# Patient Record
Sex: FEMALE | Race: WHITE | NOT HISPANIC OR LATINO | Employment: UNEMPLOYED | URBAN - METROPOLITAN AREA
[De-identification: names, ages, dates, MRNs, and addresses within clinical notes are randomized per-mention and may not be internally consistent; named-entity substitution may affect disease eponyms.]

---

## 2019-02-13 ENCOUNTER — OFFICE VISIT (OUTPATIENT)
Dept: FAMILY MEDICINE | Facility: CLINIC | Age: 24
End: 2019-02-13
Payer: COMMERCIAL

## 2019-02-13 ENCOUNTER — MEDICAL CORRESPONDENCE (OUTPATIENT)
Dept: HEALTH INFORMATION MANAGEMENT | Facility: CLINIC | Age: 24
End: 2019-02-13

## 2019-02-13 ENCOUNTER — TELEPHONE (OUTPATIENT)
Dept: FAMILY MEDICINE | Facility: CLINIC | Age: 24
End: 2019-02-13

## 2019-02-13 ENCOUNTER — TRANSFERRED RECORDS (OUTPATIENT)
Dept: HEALTH INFORMATION MANAGEMENT | Facility: CLINIC | Age: 24
End: 2019-02-13

## 2019-02-13 VITALS
BODY MASS INDEX: 23.74 KG/M2 | OXYGEN SATURATION: 98 % | DIASTOLIC BLOOD PRESSURE: 72 MMHG | HEART RATE: 83 BPM | WEIGHT: 134 LBS | HEIGHT: 63 IN | SYSTOLIC BLOOD PRESSURE: 115 MMHG | RESPIRATION RATE: 16 BRPM | TEMPERATURE: 98.1 F

## 2019-02-13 DIAGNOSIS — Z11.3 SCREEN FOR STD (SEXUALLY TRANSMITTED DISEASE): ICD-10-CM

## 2019-02-13 DIAGNOSIS — Z30.09 FAMILY PLANNING: ICD-10-CM

## 2019-02-13 DIAGNOSIS — E10.65 TYPE 1 DIABETES MELLITUS WITH HYPERGLYCEMIA (H): ICD-10-CM

## 2019-02-13 DIAGNOSIS — Z12.4 SCREENING FOR CERVICAL CANCER: ICD-10-CM

## 2019-02-13 DIAGNOSIS — Z11.1 SCREENING-PULMONARY TB: ICD-10-CM

## 2019-02-13 DIAGNOSIS — F41.8 ANXIETY WITH DEPRESSION: ICD-10-CM

## 2019-02-13 DIAGNOSIS — F15.10 METHAMPHETAMINE ABUSE (H): ICD-10-CM

## 2019-02-13 DIAGNOSIS — F11.10 HEROIN ABUSE (H): ICD-10-CM

## 2019-02-13 DIAGNOSIS — Z00.00 ROUTINE GENERAL MEDICAL EXAMINATION AT A HEALTH CARE FACILITY: Primary | ICD-10-CM

## 2019-02-13 LAB — BETA HCG QUAL IFA URINE: NEGATIVE

## 2019-02-13 PROCEDURE — 99213 OFFICE O/P EST LOW 20 MIN: CPT | Mod: 25 | Performed by: FAMILY MEDICINE

## 2019-02-13 PROCEDURE — 84703 CHORIONIC GONADOTROPIN ASSAY: CPT | Performed by: FAMILY MEDICINE

## 2019-02-13 PROCEDURE — G0145 SCR C/V CYTO,THINLAYER,RESCR: HCPCS | Performed by: FAMILY MEDICINE

## 2019-02-13 PROCEDURE — 99385 PREV VISIT NEW AGE 18-39: CPT | Performed by: FAMILY MEDICINE

## 2019-02-13 PROCEDURE — 87491 CHLMYD TRACH DNA AMP PROBE: CPT | Performed by: FAMILY MEDICINE

## 2019-02-13 PROCEDURE — 87591 N.GONORRHOEAE DNA AMP PROB: CPT | Performed by: FAMILY MEDICINE

## 2019-02-13 RX ORDER — ESCITALOPRAM OXALATE 5 MG/1
5 TABLET ORAL DAILY
Qty: 30 TABLET | Refills: 1 | Status: ON HOLD | OUTPATIENT
Start: 2019-02-13 | End: 2022-12-13

## 2019-02-13 ASSESSMENT — ANXIETY QUESTIONNAIRES
GAD7 TOTAL SCORE: 17
2. NOT BEING ABLE TO STOP OR CONTROL WORRYING: NEARLY EVERY DAY
7. FEELING AFRAID AS IF SOMETHING AWFUL MIGHT HAPPEN: NEARLY EVERY DAY
1. FEELING NERVOUS, ANXIOUS, OR ON EDGE: NEARLY EVERY DAY
5. BEING SO RESTLESS THAT IT IS HARD TO SIT STILL: SEVERAL DAYS
6. BECOMING EASILY ANNOYED OR IRRITABLE: NEARLY EVERY DAY
IF YOU CHECKED OFF ANY PROBLEMS ON THIS QUESTIONNAIRE, HOW DIFFICULT HAVE THESE PROBLEMS MADE IT FOR YOU TO DO YOUR WORK, TAKE CARE OF THINGS AT HOME, OR GET ALONG WITH OTHER PEOPLE: SOMEWHAT DIFFICULT
3. WORRYING TOO MUCH ABOUT DIFFERENT THINGS: NEARLY EVERY DAY

## 2019-02-13 ASSESSMENT — MIFFLIN-ST. JEOR: SCORE: 1341.95

## 2019-02-13 ASSESSMENT — PATIENT HEALTH QUESTIONNAIRE - PHQ9
SUM OF ALL RESPONSES TO PHQ QUESTIONS 1-9: 18
5. POOR APPETITE OR OVEREATING: SEVERAL DAYS

## 2019-02-13 NOTE — TELEPHONE ENCOUNTER
left message for omar([phone]) at [facility] to call back. Patient was here for a physical and labs, but told the lab she wanted to cancel the blood draw(so they did not draw her blood)   Stone Webb ma

## 2019-02-13 NOTE — NURSING NOTE
"Chief Complaint   Patient presents with     Physical     initial /72 (BP Location: Left arm, Cuff Size: Adult Regular)   Pulse 83   Temp 98.1  F (36.7  C) (Oral)   Resp 16   Ht 1.6 m (5' 3\")   Wt 60.8 kg (134 lb)   SpO2 98%   BMI 23.74 kg/m   Estimated body mass index is 23.74 kg/m  as calculated from the following:    Height as of this encounter: 1.6 m (5' 3\").    Weight as of this encounter: 60.8 kg (134 lb).  BP completed using cuff size: regular.  L  arm      Health Maintenance that is potentially due pending provider review:  NONE    n/a    Stone Webb ma  "

## 2019-02-13 NOTE — PATIENT INSTRUCTIONS
Start lexapro 5 mg once daily  Follow up at [facility] in 4-6 weeks    Earlier if concerns      Preventive Health Recommendations  Female Ages 21 to 25     Yearly exam:     See your health care provider every year in order to  o Review health changes.   o Discuss preventive care.    o Review your medicines if your doctor has prescribed any.      You should be tested each year for STDs (sexually transmitted diseases).       Talk to your provider about how often you should have cholesterol testing.      Get a Pap test every three years. If you have an abnormal result, your doctor may have you test more often.      If you are at risk for diabetes, you should have a diabetes test (fasting glucose).     Shots:     Get a flu shot each year.     Get a tetanus shot every 10 years.     Consider getting the shot (vaccine) that prevents cervical cancer (Gardasil).    Nutrition:     Eat at least 5 servings of fruits and vegetables each day.    Eat whole-grain bread, whole-wheat pasta and brown rice instead of white grains and rice.    Get adequate Calcium and Vitamin D.     Lifestyle    Exercise at least 150 minutes a week each week (30 minutes a day, 5 days a week). This will help you control your weight and prevent disease.    Limit alcohol to one drink per day.    No smoking.     Wear sunscreen to prevent skin cancer.    See your dentist every six months for an exam and cleaning.

## 2019-02-13 NOTE — LETTER
February 19, 2019      Bridget Dang Coleyjustin  [address] [place] [id number]    Dear Ms.Aasen,      I am happy to inform you that your recent cervical cancer screening test (PAP smear) was normal.      Preventative screenings such as this help to ensure your health for years to come. You should repeat a pap smear in 3 years, unless otherwise directed.      You will still need to return to the clinic every year for your annual exam and other preventive tests.     If you have additional questions regarding this result, please call our registered nurse, Neha at [phone].      Sincerely,      Lori Sheppard MD/jay jay

## 2019-02-13 NOTE — PROGRESS NOTES
hep   SUBJECTIVE:   CC: Deidre Nikole Aasen is an 23 year old woman who presents for preventive health visit.   new patient here to establish care.  Patient just started treatment at the [place] [facility] for her drug  abuse treatment - heroin & methamphetamine  .     Physical   Annual:     Getting at least 3 servings of Calcium per day:  Yes    Bi-annual eye exam:  Yes    Dental care twice a year:  NO    Sleep apnea or symptoms of sleep apnea:  Daytime drowsiness    Diet:  Regular (no restrictions)    Frequency of exercise:  2-3 days/week    Duration of exercise:  30-45 minutes    Taking medications regularly:  Yes    Medication side effects:  None    Additional concerns today:  No    PHQ-2 Total Score: 2    Mom has had depression and anxiety  Bridget feels a lot of anxiety and that leads to depression  She has had thoughts of better being dead but no plans and no intension of hurting herself or others.  She has never been prescribed any medications- never mentioned anxiety as a problem    She has been I the facility for treatment of heroin & methamphetamine   In 2015- was charged with possession and served time  Sent back for payroll violation dec 2018, and last use of heroin was Nov 26 th 2018  and then choose treatment       Has seen counselor once and did talk about anxiety and depression   Dad has told her to manage mood with only natural methods ,But mom had to get on medications for anxiety    PROBLEMS TO ADD ON...    Today's PHQ-2 Score:   PHQ-2 ( 1999 [employer]) 2/13/2019   Q1: Little interest or pleasure in doing things 1   Q2: Feeling down, depressed or hopeless 1   PHQ-2 Score 2   Q1: Little interest or pleasure in doing things Several days   Q2: Feeling down, depressed or hopeless Several days   PHQ-2 Score 2       Abuse: Current or Past(Physical, Sexual or Emotional)- No  Do you feel safe in your environment? Yes    Social History     Tobacco Use     Smoking status: Never Smoker     Smokeless  "tobacco: Never Used   Substance Use Topics     Alcohol use: No     Alcohol Use 2/13/2019   If you drink alcohol do you typically have greater than 3 drinks per day OR greater than 7 drinks per week? No   No flowsheet data found.    Reviewed orders with patient.  Reviewed health maintenance and updated orders accordingly - Yes  Labs reviewed in EPIC    Mammogram not appropriate for this patient based on age.    Pertinent mammograms are reviewed under the imaging tab.  History of abnormal Pap smear: NO - age 21-29 PAP every 3 years recommended     Reviewed and updated as needed this visit by clinical staff  Tobacco  Allergies  Meds  Soc Hx        Reviewed and updated as needed this visit by Provider            Review of Systems  CONSTITUTIONAL: NEGATIVE for fever, chills, change in weight  INTEGUMENTARU/SKIN: NEGATIVE for worrisome rashes, moles or lesions  EYES: NEGATIVE for vision changes or irritation  ENT: NEGATIVE for ear, mouth and throat problems  RESP: NEGATIVE for significant cough or SOB  BREAST: NEGATIVE for masses, tenderness or discharge  CV: NEGATIVE for chest pain, palpitations or peripheral edema  GI: NEGATIVE for nausea, abdominal pain, heartburn, or change in bowel habits  : NEGATIVE for unusual urinary or vaginal symptoms. Periods are regular.  MUSCULOSKELETAL: NEGATIVE for significant arthralgias or myalgia  NEURO: NEGATIVE for weakness, dizziness or paresthesias  PSYCHIATRIC: as in History of presenting illness      OBJECTIVE:   /72 (BP Location: Left arm, Cuff Size: Adult Regular)   Pulse 83   Temp 98.1  F (36.7  C) (Oral)   Resp 16   Ht 1.6 m (5' 3\")   Wt 60.8 kg (134 lb)   SpO2 98%   BMI 23.74 kg/m    Physical Exam  GENERAL: healthy, alert and no distress  EYES: Eyes grossly normal to inspection, PERRL and conjunctivae and sclerae normal  HENT: ear canals and TM's normal, nose and mouth without ulcers or lesions  NECK: no adenopathy, no asymmetry, masses, or scars and thyroid " normal to palpation  RESP: lungs clear to auscultation - no rales, rhonchi or wheezes  BREAST: normal without masses, tenderness or nipple discharge and no palpable axillary masses or adenopathy  CV: regular rate and rhythm, normal S1 S2, no S3 or S4, no murmur, click or rub, no peripheral edema and peripheral pulses strong  ABDOMEN: soft, nontender, no hepatosplenomegaly, no masses and bowel sounds normal   (female): normal female external genitalia, normal urethral meatus, vaginal mucosa pink, moist, well rugated, and normal cervix/adnexa/uterus without masses or discharge  MS: no gross musculoskeletal defects noted, no edema  SKIN: no suspicious lesions or rashes  NEURO: Normal strength and tone, mentation intact and speech normal  PSYCH: mentation appears normal, affect normal/bright    Diagnostic Test Results:  No results found for this or any previous visit (from the past 24 hour(s)).    ASSESSMENT/PLAN:   1. Routine general medical examination at a health care facility  new patient here to establish care.  Patient just started treatment at the [place] [facility] for her drug  abuse treatment - heroin & methamphetamine  .       - Hepatitis B Surface Antibody & Antigen ; Future  - Hepatitis Antibody A IgG & IGM; Future  - **Hepatitis C Screen Reflex to RNA FUTURE anytime; Future  - **HIV Antigen Antibody Combo FUTURE anytime; Future  - Hepatitis B surface antigen; Future    2. Screening for cervical cancer  Plan:- Pap imaged thin layer screen only - recommended age 21 - 24 years    3. Family planning  Plan:- Beta HCG Qual, Urine - [facility] and [place] ([id number])    4. Type 1 diabetes mellitus with hyperglycemia (H)  Plan: under care of endocrinology at [facility] [place]    5. Screen for STD (sexually transmitted disease)    - Chlamydia trachomatis PCR  - Neisseria gonorrhoeae PCR    6. Screening-pulmonary TB    - Quantiferon TB Gold Plus; Future    7. Anxiety with depression  Plan: We discussed the treatment for  "anxiety and depression in detail.  The importance of a multi faceted approach in controlling symptoms was reviewed.  The benefits of cognitive behavioral therapy reviewed, benefits of exercise, and stress reduction also discussed.      Duration of treatment  Is long months to years. It may take 3-4 weeks before symptom improvement happens.  Do not stop medication suddenly, medication will need to be tapered off.  Slight increased risk of suicide with SSRI group of medications discussed.        - escitalopram (LEXAPRO) 5 MG tablet; Take 1 tablet (5 mg) by mouth daily  Dispense: 30 tablet; Refill: 1    Follow up in 1 month     8. Heroin abuse (H),9. Methamphetamine abuse (H)   Patient just started treatment at the [place] [facility] for her drug  abuse treatment - heroin & methamphetamine  .     encouarged her to particiate woit      COUNSELING:  Reviewed preventive health counseling, as reflected in patient instructions       Regular exercise       Healthy diet/nutrition    BP Readings from Last 1 Encounters:   02/13/19 115/72     Estimated body mass index is 23.74 kg/m  as calculated from the following:    Height as of this encounter: 1.6 m (5' 3\").    Weight as of this encounter: 60.8 kg (134 lb).           reports that  has never smoked. she has never used smokeless tobacco.      Counseling Resources:  ATP IV Guidelines  Pooled Cohorts Equation Calculator  Breast Cancer Risk Calculator  FRAX Risk Assessment  ICSI Preventive Guidelines  Dietary Guidelines for Americans, 2010  USDA's MyPlate  ASA Prophylaxis  Lung CA Screening    Lori Sheppard MD  [facility]  "

## 2019-02-14 LAB
C TRACH DNA SPEC QL NAA+PROBE: NEGATIVE
N GONORRHOEA DNA SPEC QL NAA+PROBE: NEGATIVE
SPECIMEN SOURCE: NORMAL
SPECIMEN SOURCE: NORMAL

## 2019-02-14 ASSESSMENT — ANXIETY QUESTIONNAIRES: GAD7 TOTAL SCORE: 17

## 2019-02-14 NOTE — RESULT ENCOUNTER NOTE
Dear Team- [facility]- I am not sure if we have correct address or phone. Please find and inform- thanks     Inform patient about negative chlamydia and gonnerhea   Thanks  Lori Sheppard ....................  2/14/2019   5:05 PM

## 2019-02-18 LAB
COPATH REPORT: NORMAL
PAP: NORMAL

## 2019-09-17 ENCOUNTER — HOSPITAL ENCOUNTER (EMERGENCY)
Facility: CLINIC | Age: 24
Discharge: HOME | End: 2019-09-17
Attending: EMERGENCY MEDICINE | Admitting: EMERGENCY MEDICINE
Payer: COMMERCIAL

## 2019-09-17 VITALS
OXYGEN SATURATION: 100 % | SYSTOLIC BLOOD PRESSURE: 121 MMHG | BODY MASS INDEX: 21.26 KG/M2 | RESPIRATION RATE: 16 BRPM | TEMPERATURE: 97 F | WEIGHT: 120 LBS | HEIGHT: 63 IN | DIASTOLIC BLOOD PRESSURE: 72 MMHG

## 2019-09-17 DIAGNOSIS — R73.09 ELEVATED GLUCOSE: ICD-10-CM

## 2019-09-17 DIAGNOSIS — E10.9 TYPE 1 DIABETES MELLITUS WITHOUT COMPLICATION (H): ICD-10-CM

## 2019-09-17 LAB
ANION GAP SERPL CALCULATED.3IONS-SCNC: 7 MMOL/L (ref 3–14)
BASOPHILS # BLD AUTO: 0 10E9/L (ref 0–0.2)
BASOPHILS NFR BLD AUTO: 0.5 %
BUN SERPL-MCNC: 12 MG/DL (ref 7–30)
CALCIUM SERPL-MCNC: 9 MG/DL (ref 8.5–10.1)
CHLORIDE SERPL-SCNC: 94 MMOL/L (ref 94–109)
CO2 BLDCOV-SCNC: 28 MMOL/L (ref 21–28)
CO2 SERPL-SCNC: 28 MMOL/L (ref 20–32)
CREAT SERPL-MCNC: 0.41 MG/DL (ref 0.52–1.04)
DIFFERENTIAL METHOD BLD: NORMAL
EOSINOPHIL # BLD AUTO: 0.2 10E9/L (ref 0–0.7)
EOSINOPHIL NFR BLD AUTO: 2.5 %
ERYTHROCYTE [DISTWIDTH] IN BLOOD BY AUTOMATED COUNT: 13 % (ref 10–15)
ETHANOL SERPL-MCNC: <0.01 G/DL
GFR SERPL CREATININE-BSD FRML MDRD: >90 ML/MIN/{1.73_M2}
GLUCOSE BLDC GLUCOMTR-MCNC: >600 MG/DL (ref 70–99)
GLUCOSE SERPL-MCNC: 655 MG/DL (ref 70–99)
HCG SERPL QL: NEGATIVE
HCT VFR BLD AUTO: 39.5 % (ref 35–47)
HGB BLD-MCNC: 13.5 G/DL (ref 11.7–15.7)
IMM GRANULOCYTES # BLD: 0 10E9/L (ref 0–0.4)
IMM GRANULOCYTES NFR BLD: 0.2 %
KETONES BLD-SCNC: 0.2 MMOL/L (ref 0–0.6)
LACTATE BLD-SCNC: 0.7 MMOL/L (ref 0.7–2.1)
LACTATE BLD-SCNC: 0.8 MMOL/L (ref 0.7–2)
LYMPHOCYTES # BLD AUTO: 2.2 10E9/L (ref 0.8–5.3)
LYMPHOCYTES NFR BLD AUTO: 34.1 %
MCH RBC QN AUTO: 31 PG (ref 26.5–33)
MCHC RBC AUTO-ENTMCNC: 34.2 G/DL (ref 31.5–36.5)
MCV RBC AUTO: 91 FL (ref 78–100)
MONOCYTES # BLD AUTO: 0.5 10E9/L (ref 0–1.3)
MONOCYTES NFR BLD AUTO: 8.1 %
NEUTROPHILS # BLD AUTO: 3.4 10E9/L (ref 1.6–8.3)
NEUTROPHILS NFR BLD AUTO: 54.6 %
NRBC # BLD AUTO: 0 10*3/UL
NRBC BLD AUTO-RTO: 0 /100
PCO2 BLDV: 50 MM HG (ref 40–50)
PH BLDV: 7.36 PH (ref 7.32–7.43)
PLATELET # BLD AUTO: 321 10E9/L (ref 150–450)
PO2 BLDV: 57 MM HG (ref 25–47)
POTASSIUM SERPL-SCNC: 4 MMOL/L (ref 3.4–5.3)
RBC # BLD AUTO: 4.36 10E12/L (ref 3.8–5.2)
SAO2 % BLDV FROM PO2: 88 %
SODIUM SERPL-SCNC: 129 MMOL/L (ref 133–144)
WBC # BLD AUTO: 6.3 10E9/L (ref 4–11)

## 2019-09-17 PROCEDURE — 82010 KETONE BODYS QUAN: CPT | Performed by: EMERGENCY MEDICINE

## 2019-09-17 PROCEDURE — 96372 THER/PROPH/DIAG INJ SC/IM: CPT

## 2019-09-17 PROCEDURE — 85025 COMPLETE CBC W/AUTO DIFF WBC: CPT | Performed by: EMERGENCY MEDICINE

## 2019-09-17 PROCEDURE — 99284 EMERGENCY DEPT VISIT MOD MDM: CPT | Mod: 25

## 2019-09-17 PROCEDURE — 80048 BASIC METABOLIC PNL TOTAL CA: CPT | Performed by: EMERGENCY MEDICINE

## 2019-09-17 PROCEDURE — 00000146 ZZHCL STATISTIC GLUCOSE BY METER IP

## 2019-09-17 PROCEDURE — 83605 ASSAY OF LACTIC ACID: CPT

## 2019-09-17 PROCEDURE — 82803 BLOOD GASES ANY COMBINATION: CPT

## 2019-09-17 PROCEDURE — 25000131 ZZH RX MED GY IP 250 OP 636 PS 637: Performed by: EMERGENCY MEDICINE

## 2019-09-17 PROCEDURE — 80320 DRUG SCREEN QUANTALCOHOLS: CPT | Performed by: EMERGENCY MEDICINE

## 2019-09-17 PROCEDURE — 83605 ASSAY OF LACTIC ACID: CPT | Performed by: EMERGENCY MEDICINE

## 2019-09-17 PROCEDURE — 84703 CHORIONIC GONADOTROPIN ASSAY: CPT | Performed by: EMERGENCY MEDICINE

## 2019-09-17 RX ORDER — INSULIN GLARGINE 100 [IU]/ML
20 INJECTION, SOLUTION SUBCUTANEOUS DAILY
Qty: 6 ML | Refills: 0 | Status: ON HOLD | OUTPATIENT
Start: 2019-09-17 | End: 2023-01-18

## 2019-09-17 RX ADMIN — INSULIN ASPART 11 UNITS: 100 INJECTION, SOLUTION INTRAVENOUS; SUBCUTANEOUS at 03:54

## 2019-09-17 ASSESSMENT — MIFFLIN-ST. JEOR: SCORE: 1273.45

## 2019-09-17 ASSESSMENT — ENCOUNTER SYMPTOMS
FATIGUE: 1
DYSURIA: 0
NAUSEA: 0
RHINORRHEA: 0
COUGH: 0
VOMITING: 0
FEVER: 0
SORE THROAT: 0
ABDOMINAL PAIN: 1
FREQUENCY: 0

## 2019-09-17 NOTE — DISCHARGE INSTRUCTIONS
You must call Dr. Ness, your endocrinologist later today to discuss your being out of your insulin and your high sugars here.  If you do not take your diabetes medicine, and continue to check your sugars you could have severe lasting complications including death from this.  You are free to return the emergency department at any point should you feel worse, have trouble correcting her sugars or have low sugars.  You should check your sugar 30 minutes after leaving the ED.    Your sliding scale insulin using the aspart is as follows:  When blood sugars are elevated before meals use follow ing sliding scale for novolog    Blood sugars 151-200 take 1 extra unit of novolog  201-250 take 2 extra units of novolog  251-300 take 3 extra units of novolog  302-350 take 4 extra units of novolog  351-400 take 5 extra units of novolog  Over 400 take extra 6 units of novolog

## 2019-09-17 NOTE — ED NOTES
Patient refusing IV fluids because of pain in her IV site. Requesting to get IV out and leave. MD notified.

## 2019-09-17 NOTE — ED NOTES
DATE:  9/17/2019   TIME OF RECEIPT FROM LAB:  0319  LAB TEST:  Glucose  LAB VALUE:  655  RESULTS GIVEN WITH READ-BACK TO (PROVIDER):  Edgar Castellano MD  TIME LAB VALUE REPORTED TO PROVIDER:   0320

## 2019-09-17 NOTE — ED NOTES
Fluids started, then pt started crying and complaining of pain at the IV site, IV is infusing with no signs of infiltration or extravasation. Pt yells I don't want this, fluids stopped, pt request to take IV out, benefits of the IV explained. Primary RN notified.

## 2019-09-17 NOTE — ED PROVIDER NOTES
"  History     Chief Complaint:  Dehydration     The history is provided by the patient.      Deidre Nikole Aasen is a 24 year old female, with history significant for uncontrolled DM I and heroin abuse, who presents with concerns for dehydration. Notably, patient has been non-compliant to her insulin for the past six months. In addition, she complains of abdominal pain and fatigue. Bridget denies any fever, dysuria, frequency, urgency, difficulty urinating, rhinorrhea, sore throat, cough, nausea, emesis, chance of pregnancy, or recent drug use. Patient endorses prior hospitalization secondary to DKA.    Allergies:  Cipro     Medications:    Lexapro  Novolog  Levemir  Lantus   Clonidine  Valium  Basaglar    Past Medical History:    Anxiety with depression  Heroin abuse  Non-compliant DM I    Past Surgical History:    The patient does not have any pertinent past surgical history.    Family History:    No past pertinent family history.    Social History:  Daily Smoker  Alcohol Use: No  Drug use: Heroin  Marital Status: Single     Review of Systems   Constitutional: Positive for fatigue. Negative for fever.        +dehydration   HENT: Negative for rhinorrhea and sore throat.    Respiratory: Negative for cough.    Gastrointestinal: Positive for abdominal pain. Negative for nausea and vomiting.   Genitourinary: Negative for dysuria, frequency and urgency.   All other systems reviewed and are negative.    Physical Exam     Patient Vitals for the past 24 hrs:   BP Temp Temp src Heart Rate Resp SpO2 Height Weight   09/17/19 0141 121/72 97  F (36.1  C) Temporal 102 16 100 % 1.6 m (5' 3\") 54.4 kg (120 lb)     Physical Exam  Constitutional: Alert, attentive, GCS 15  HENT:    Nose: Nose normal.    Mouth/Throat: Oropharynx is clear, mucous membranes are dry  Eyes: EOM are normal, anicteric, conjugate gaze.Pupils midposition, reactive, no nystagmus  CV: regular rate and rhythm; no murmurs  Chest: Effort normal and breath sounds " clear without wheezing or rales, symmetric bilaterally   GI:  non tender. No distension. No guarding or rebound.    MSK: No LE edema, no tenderness to palpation of BLE.  Neurological: Alert, attentive, moving all extremities equally.   Skin: Skin is warm and dry.    Emergency Department Course     Laboratory:  Glc: >600 (HH)  CBC: WBC 6.3, HGB 13.5,   BMP: Na 123 (L) glc 544 (HH) creatinine 0.41 (L) o/w WNL  Ketone beta-hydroxybutyrate quant: 0.2  ISTAT VB.36/50/57(H)/28; Lactic Acid (Resulted: 0258): 0.7   Lactic Acid (Resulted: 0300): 0.8  Glc: >600 (HH)  Alcohol ethyl: <0.01    Interventions:  0354: Novolog 11 units subcutaneous    Emergency Department Course:  Nursing notes and vitals reviewed.  0207 I performed an exam of the patient as documented above.   0249 IV was inserted and blood was drawn for laboratory testing, results above.  0408 Patient voiced wishes to go. I personally answered all related questions prior to discharge, anticipatory guidance given.    Impression & Plan      Medical Decision Making:  Deidre Nikole Aasen is a 24 year old female with past medical history of poorly controlled type 1 diabetes, history of substance abuse though she declines this currently and states she is in remission presenting for evaluation of dehydration and not having checked her blood sugar or taken any insulin since March.  When her glucose on arrival was greater than 600, vital signs notable for mild tachycardia she had no evidence of clue michelle respirations and screening for DKA was negative including normal lactate and beta hydroxybutyrate.  She was given 11 units subcutaneous insulin as part per her sliding scale recommendations from her last note with endocrinology and plan was for IV hydration however patient declined IV hydration and requested to leave the emergency department.  I do not feel she is holdable at this time, serum ethanol was negative she declined drug use and is of sound mind at  this time.  I did recommend she stay ED for further IV hydration and monitoring of her blood sugar however she declined.  I was able to order her her PTA aspart and long-acting insulin per notes from her endocrinologist back in February as she says this was accurate.  I also ordered her further testing supplies implored her to follow-up with her endocrinologist and call them later this morning.  Patient expressed understand this plan and that she is free to return the emergency department at any point.  She was able to express the risks of failing to control her blood sugar including disability and death.  Patient was discharged AGAINST MEDICAL ADVICE from the emergency department.    Diagnosis:    ICD-10-CM   1. Elevated glucose R73.09   2. Type 1 diabetes mellitus without complication (H) E10.9     Disposition: Home    Discharge Medications:  insulin aspart 100 UNIT/ML pen  Commonly known as:  NovoLOG PEN Inject Subcutaneous 3 times daily (with meals)  Refills:  0     insulin aspart 100 UNIT/ML pen        Dose:  5 Units  Inject 5 Units Subcutaneous 3 times daily (with meals)  Quantity:  5 mL  Refills:  0     insulin glargine 100 UNIT/ML pen     Dose:  20 Units  Inject 20 Units Subcutaneous daily  Quantity:  6 mL  Refills:  0       Edgar Castellano MD, MD   [employer]  4:54 AM 09/17/19     Scribe Disclosure:  I, Anand Davidson, am serving as a scribe at 1:57 AM on 9/17/2019 to document services personally performed by Edgar Castellano MD based on my observations and the provider's statements to me.     EMERGENCY DEPARTMENT       Edgar Castellano MD  09/17/19 0454

## 2019-09-17 NOTE — ED AVS SNAPSHOT
Emergency Department  [address] [place] [address]  Phone:  [phone]  Fax:  [phone]                                    Deidre Nikole Aasen   MRN: [id number]    Department:   Emergency Department   Date of Visit:  9/17/2019           After Visit Summary Signature Page    I have received my discharge instructions, and my questions have been answered. I have discussed any challenges I see with this plan with the nurse or doctor.    ..........................................................................................................................................  Patient/Patient Representative Signature      ..........................................................................................................................................  Patient Representative Print Name and Relationship to Patient    ..................................................               ................................................  Date                                   Time    ..........................................................................................................................................  Reviewed by Signature/Title    ...................................................              ..............................................  Date                                               Time          22EPIC Rev 08/18

## 2020-01-14 ENCOUNTER — TELEPHONE (OUTPATIENT)
Dept: FAMILY MEDICINE | Facility: CLINIC | Age: 25
End: 2020-01-14

## 2020-01-14 NOTE — TELEPHONE ENCOUNTER
Summary:    Patient is due/failing the following:   Diabetic Eye Exam    Type of outreach:  Phone, left message for patient to call back.   Action needed: Schedule eye exam with eye doctor    Vivi SALES RN

## 2022-10-05 ENCOUNTER — HOSPITAL ENCOUNTER (EMERGENCY)
Facility: CLINIC | Age: 27
End: 2022-10-05

## 2022-12-12 PROCEDURE — 93005 ELECTROCARDIOGRAM TRACING: CPT

## 2022-12-12 PROCEDURE — C9803 HOPD COVID-19 SPEC COLLECT: HCPCS

## 2022-12-12 PROCEDURE — 99291 CRITICAL CARE FIRST HOUR: CPT | Mod: CS,25

## 2022-12-13 ENCOUNTER — APPOINTMENT (OUTPATIENT)
Dept: CT IMAGING | Facility: CLINIC | Age: 27
End: 2022-12-13
Attending: EMERGENCY MEDICINE
Payer: COMMERCIAL

## 2022-12-13 ENCOUNTER — APPOINTMENT (OUTPATIENT)
Dept: GENERAL RADIOLOGY | Facility: CLINIC | Age: 27
End: 2022-12-13
Attending: EMERGENCY MEDICINE
Payer: COMMERCIAL

## 2022-12-13 ENCOUNTER — HOSPITAL ENCOUNTER (EMERGENCY)
Facility: CLINIC | Age: 27
Discharge: ANOTHER HOSPITAL | End: 2022-12-13
Attending: EMERGENCY MEDICINE | Admitting: EMERGENCY MEDICINE
Payer: COMMERCIAL

## 2022-12-13 ENCOUNTER — APPOINTMENT (OUTPATIENT)
Dept: CT IMAGING | Facility: HOSPITAL | Age: 27
End: 2022-12-13
Attending: INTERNAL MEDICINE
Payer: COMMERCIAL

## 2022-12-13 ENCOUNTER — HOSPITAL ENCOUNTER (INPATIENT)
Facility: HOSPITAL | Age: 27
LOS: 1 days | Discharge: LEFT BEFORE DISCHARGE | End: 2022-12-13
Attending: INTERNAL MEDICINE | Admitting: INTERNAL MEDICINE
Payer: COMMERCIAL

## 2022-12-13 VITALS
DIASTOLIC BLOOD PRESSURE: 77 MMHG | HEIGHT: 63 IN | HEART RATE: 127 BPM | RESPIRATION RATE: 47 BRPM | TEMPERATURE: 98.7 F | WEIGHT: 96.12 LBS | OXYGEN SATURATION: 97 % | BODY MASS INDEX: 17.03 KG/M2 | SYSTOLIC BLOOD PRESSURE: 118 MMHG

## 2022-12-13 VITALS
RESPIRATION RATE: 15 BRPM | SYSTOLIC BLOOD PRESSURE: 113 MMHG | OXYGEN SATURATION: 97 % | HEART RATE: 134 BPM | TEMPERATURE: 97.9 F | DIASTOLIC BLOOD PRESSURE: 72 MMHG

## 2022-12-13 DIAGNOSIS — K92.0 COFFEE GROUND EMESIS: ICD-10-CM

## 2022-12-13 DIAGNOSIS — E10.10 DIABETIC KETOACIDOSIS WITHOUT COMA ASSOCIATED WITH TYPE 1 DIABETES MELLITUS (H): ICD-10-CM

## 2022-12-13 DIAGNOSIS — J18.9 PNEUMONIA OF RIGHT LOWER LOBE DUE TO INFECTIOUS ORGANISM: ICD-10-CM

## 2022-12-13 PROBLEM — E11.10 DKA (DIABETIC KETOACIDOSIS) (H): Status: ACTIVE | Noted: 2022-12-13

## 2022-12-13 LAB
ABO/RH(D): NORMAL
ALBUMIN SERPL BCG-MCNC: 3.8 G/DL (ref 3.5–5.2)
ALBUMIN UR-MCNC: 20 MG/DL
ALP SERPL-CCNC: 160 U/L (ref 35–104)
ALT SERPL W P-5'-P-CCNC: 18 U/L (ref 10–35)
AMPHETAMINES UR QL SCN: NORMAL
ANION GAP SERPL CALCULATED.3IONS-SCNC: 15 MMOL/L (ref 7–15)
ANION GAP SERPL CALCULATED.3IONS-SCNC: 19 MMOL/L (ref 7–15)
ANION GAP SERPL CALCULATED.3IONS-SCNC: 19 MMOL/L (ref 7–15)
ANION GAP SERPL CALCULATED.3IONS-SCNC: 20 MMOL/L (ref 7–15)
ANION GAP SERPL CALCULATED.3IONS-SCNC: 20 MMOL/L (ref 7–15)
ANION GAP SERPL CALCULATED.3IONS-SCNC: 21 MMOL/L (ref 7–15)
ANION GAP SERPL CALCULATED.3IONS-SCNC: 29 MMOL/L (ref 7–15)
ANTIBODY SCREEN: NEGATIVE
APPEARANCE UR: CLEAR
APTT PPP: 39 SECONDS (ref 22–38)
AST SERPL W P-5'-P-CCNC: 24 U/L (ref 10–35)
ATRIAL RATE - MUSE: 138 BPM
BARBITURATES UR QL SCN: NORMAL
BASE EXCESS BLDV CALC-SCNC: -12.5 MMOL/L (ref -7.7–1.9)
BASE EXCESS BLDV CALC-SCNC: -19.6 MMOL/L (ref -7.7–1.9)
BASOPHILS # BLD AUTO: 0.1 10E3/UL (ref 0–0.2)
BASOPHILS NFR BLD AUTO: 1 %
BENZODIAZ UR QL SCN: NORMAL
BILIRUB DIRECT SERPL-MCNC: <0.2 MG/DL (ref 0–0.3)
BILIRUB SERPL-MCNC: 0.3 MG/DL
BILIRUB UR QL STRIP: NEGATIVE
BUN SERPL-MCNC: 23 MG/DL (ref 6–20)
BUN SERPL-MCNC: 28 MG/DL (ref 6–20)
BUN SERPL-MCNC: 31 MG/DL (ref 6–20)
BUN SERPL-MCNC: 35.7 MG/DL (ref 6–20)
BUN SERPL-MCNC: 41.3 MG/DL (ref 6–20)
BUN SERPL-MCNC: 44 MG/DL (ref 6–20)
BUN SERPL-MCNC: 54.5 MG/DL (ref 6–20)
BZE UR QL SCN: NORMAL
CALCIUM SERPL-MCNC: 7.8 MG/DL (ref 8.6–10)
CALCIUM SERPL-MCNC: 8.6 MG/DL (ref 8.6–10)
CALCIUM SERPL-MCNC: 8.6 MG/DL (ref 8.6–10)
CALCIUM SERPL-MCNC: 8.7 MG/DL (ref 8.6–10)
CALCIUM SERPL-MCNC: 8.7 MG/DL (ref 8.6–10)
CALCIUM SERPL-MCNC: 8.8 MG/DL (ref 8.6–10)
CALCIUM SERPL-MCNC: 9.8 MG/DL (ref 8.6–10)
CANNABINOIDS UR QL SCN: NORMAL
CHLORIDE SERPL-SCNC: 100 MMOL/L (ref 98–107)
CHLORIDE SERPL-SCNC: 102 MMOL/L (ref 98–107)
CHLORIDE SERPL-SCNC: 105 MMOL/L (ref 98–107)
CHLORIDE SERPL-SCNC: 83 MMOL/L (ref 98–107)
CHLORIDE SERPL-SCNC: 96 MMOL/L (ref 98–107)
CHLORIDE SERPL-SCNC: 98 MMOL/L (ref 98–107)
CHLORIDE SERPL-SCNC: 99 MMOL/L (ref 98–107)
COLOR UR AUTO: ABNORMAL
CREAT SERPL-MCNC: 0.36 MG/DL (ref 0.51–0.95)
CREAT SERPL-MCNC: 0.39 MG/DL (ref 0.51–0.95)
CREAT SERPL-MCNC: 0.39 MG/DL (ref 0.51–0.95)
CREAT SERPL-MCNC: 0.44 MG/DL (ref 0.51–0.95)
CREAT SERPL-MCNC: 0.47 MG/DL (ref 0.51–0.95)
CREAT SERPL-MCNC: 0.51 MG/DL (ref 0.51–0.95)
CREAT SERPL-MCNC: 0.67 MG/DL (ref 0.51–0.95)
D DIMER PPP FEU-MCNC: 3.1 UG/ML FEU (ref 0–0.5)
DEPRECATED HCO3 PLAS-SCNC: 10 MMOL/L (ref 22–29)
DEPRECATED HCO3 PLAS-SCNC: 10 MMOL/L (ref 22–29)
DEPRECATED HCO3 PLAS-SCNC: 11 MMOL/L (ref 22–29)
DEPRECATED HCO3 PLAS-SCNC: 11 MMOL/L (ref 22–29)
DEPRECATED HCO3 PLAS-SCNC: 14 MMOL/L (ref 22–29)
DEPRECATED HCO3 PLAS-SCNC: 7 MMOL/L (ref 22–29)
DEPRECATED HCO3 PLAS-SCNC: 8 MMOL/L (ref 22–29)
DIASTOLIC BLOOD PRESSURE - MUSE: NORMAL MMHG
EOSINOPHIL # BLD AUTO: 0 10E3/UL (ref 0–0.7)
EOSINOPHIL NFR BLD AUTO: 0 %
ERYTHROCYTE [DISTWIDTH] IN BLOOD BY AUTOMATED COUNT: 11.5 % (ref 10–15)
ERYTHROCYTE [DISTWIDTH] IN BLOOD BY AUTOMATED COUNT: 11.7 % (ref 10–15)
ERYTHROCYTE [DISTWIDTH] IN BLOOD BY AUTOMATED COUNT: 12 % (ref 10–15)
FLUAV RNA SPEC QL NAA+PROBE: NEGATIVE
FLUBV RNA RESP QL NAA+PROBE: NEGATIVE
GASTROCULT GAST QL: POSITIVE
GFR SERPL CREATININE-BSD FRML MDRD: >90 ML/MIN/1.73M2
GLUCOSE BLDC GLUCOMTR-MCNC: 113 MG/DL (ref 70–99)
GLUCOSE BLDC GLUCOMTR-MCNC: 125 MG/DL (ref 70–99)
GLUCOSE BLDC GLUCOMTR-MCNC: 128 MG/DL (ref 70–99)
GLUCOSE BLDC GLUCOMTR-MCNC: 140 MG/DL (ref 70–99)
GLUCOSE BLDC GLUCOMTR-MCNC: 151 MG/DL (ref 70–99)
GLUCOSE BLDC GLUCOMTR-MCNC: 157 MG/DL (ref 70–99)
GLUCOSE BLDC GLUCOMTR-MCNC: 192 MG/DL (ref 70–99)
GLUCOSE BLDC GLUCOMTR-MCNC: 216 MG/DL (ref 70–99)
GLUCOSE BLDC GLUCOMTR-MCNC: 229 MG/DL (ref 70–99)
GLUCOSE BLDC GLUCOMTR-MCNC: 238 MG/DL (ref 70–99)
GLUCOSE BLDC GLUCOMTR-MCNC: 249 MG/DL (ref 70–99)
GLUCOSE BLDC GLUCOMTR-MCNC: 252 MG/DL (ref 70–99)
GLUCOSE BLDC GLUCOMTR-MCNC: 253 MG/DL (ref 70–99)
GLUCOSE BLDC GLUCOMTR-MCNC: 295 MG/DL (ref 70–99)
GLUCOSE BLDC GLUCOMTR-MCNC: 326 MG/DL (ref 70–99)
GLUCOSE BLDC GLUCOMTR-MCNC: 334 MG/DL (ref 70–99)
GLUCOSE BLDC GLUCOMTR-MCNC: 428 MG/DL (ref 70–99)
GLUCOSE BLDC GLUCOMTR-MCNC: 452 MG/DL (ref 70–99)
GLUCOSE BLDC GLUCOMTR-MCNC: 594 MG/DL (ref 70–99)
GLUCOSE SERPL-MCNC: 115 MG/DL (ref 70–99)
GLUCOSE SERPL-MCNC: 192 MG/DL (ref 70–99)
GLUCOSE SERPL-MCNC: 199 MG/DL (ref 70–99)
GLUCOSE SERPL-MCNC: 239 MG/DL (ref 70–99)
GLUCOSE SERPL-MCNC: 294 MG/DL (ref 70–99)
GLUCOSE SERPL-MCNC: 405 MG/DL (ref 70–99)
GLUCOSE SERPL-MCNC: 600 MG/DL (ref 70–99)
GLUCOSE UR STRIP-MCNC: >=1000 MG/DL
HBA1C MFR BLD: 16.4 %
HCG SERPL QL: NEGATIVE
HCO3 BLDV-SCNC: 12 MMOL/L (ref 21–28)
HCO3 BLDV-SCNC: 9 MMOL/L (ref 21–28)
HCT VFR BLD AUTO: 37.7 % (ref 35–47)
HCT VFR BLD AUTO: 38.6 % (ref 35–47)
HCT VFR BLD AUTO: 46.5 % (ref 35–47)
HGB BLD-MCNC: 13.4 G/DL (ref 11.7–15.7)
HGB BLD-MCNC: 13.7 G/DL (ref 11.7–15.7)
HGB BLD-MCNC: 16.3 G/DL (ref 11.7–15.7)
HGB UR QL STRIP: ABNORMAL
HOLD SPECIMEN: NORMAL
IMM GRANULOCYTES # BLD: 0.2 10E3/UL
IMM GRANULOCYTES NFR BLD: 1 %
INR PPP: 1.21 (ref 0.85–1.15)
INTERPRETATION ECG - MUSE: NORMAL
KETONES BLD-SCNC: 0.9 MMOL/L (ref 0–0.6)
KETONES BLD-SCNC: 1.6 MMOL/L (ref 0–0.6)
KETONES BLD-SCNC: 2.9 MMOL/L (ref 0–0.6)
KETONES BLD-SCNC: 3.7 MMOL/L (ref 0–0.6)
KETONES BLD-SCNC: 4.1 MMOL/L (ref 0–0.6)
KETONES UR STRIP-MCNC: >150 MG/DL
LACTATE SERPL-SCNC: 1.6 MMOL/L (ref 0.7–2)
LACTATE SERPL-SCNC: 3.5 MMOL/L (ref 0.7–2)
LEUKOCYTE ESTERASE UR QL STRIP: NEGATIVE
LYMPHOCYTES # BLD AUTO: 0.7 10E3/UL (ref 0.8–5.3)
LYMPHOCYTES NFR BLD AUTO: 4 %
MAGNESIUM SERPL-MCNC: 1.9 MG/DL (ref 1.7–2.3)
MAGNESIUM SERPL-MCNC: 2.4 MG/DL (ref 1.7–2.3)
MCH RBC QN AUTO: 31 PG (ref 26.5–33)
MCH RBC QN AUTO: 31.2 PG (ref 26.5–33)
MCH RBC QN AUTO: 31.4 PG (ref 26.5–33)
MCHC RBC AUTO-ENTMCNC: 35.1 G/DL (ref 31.5–36.5)
MCHC RBC AUTO-ENTMCNC: 35.5 G/DL (ref 31.5–36.5)
MCHC RBC AUTO-ENTMCNC: 35.5 G/DL (ref 31.5–36.5)
MCV RBC AUTO: 88 FL (ref 78–100)
MONOCYTES # BLD AUTO: 1.9 10E3/UL (ref 0–1.3)
MONOCYTES NFR BLD AUTO: 10 %
NEUTROPHILS # BLD AUTO: 15.4 10E3/UL (ref 1.6–8.3)
NEUTROPHILS NFR BLD AUTO: 84 %
NITRATE UR QL: NEGATIVE
NRBC # BLD AUTO: 0 10E3/UL
NRBC BLD AUTO-RTO: 0 /100
O2/TOTAL GAS SETTING VFR VENT: 0 %
O2/TOTAL GAS SETTING VFR VENT: 0 %
OPIATES UR QL SCN: NORMAL
P AXIS - MUSE: 82 DEGREES
PCO2 BLDV: 24 MM HG (ref 40–50)
PCO2 BLDV: 31 MM HG (ref 40–50)
PH BLDV: 7.09 [PH] (ref 7.32–7.43)
PH BLDV: 7.31 [PH] (ref 7.32–7.43)
PH UR STRIP: 5.5 [PH] (ref 5–7)
PHOSPHATE SERPL-MCNC: 0.6 MG/DL (ref 2.5–4.5)
PLATELET # BLD AUTO: 402 10E3/UL (ref 150–450)
PLATELET # BLD AUTO: 438 10E3/UL (ref 150–450)
PLATELET # BLD AUTO: 551 10E3/UL (ref 150–450)
PO2 BLDV: 25 MM HG (ref 25–47)
PO2 BLDV: 30 MM HG (ref 25–47)
POTASSIUM SERPL-SCNC: 2.8 MMOL/L (ref 3.4–5.3)
POTASSIUM SERPL-SCNC: 3.1 MMOL/L (ref 3.4–5.3)
POTASSIUM SERPL-SCNC: 3.4 MMOL/L (ref 3.4–5.3)
POTASSIUM SERPL-SCNC: 3.5 MMOL/L (ref 3.4–5.3)
POTASSIUM SERPL-SCNC: 4.1 MMOL/L (ref 3.4–5.3)
POTASSIUM SERPL-SCNC: 4.4 MMOL/L (ref 3.4–5.3)
POTASSIUM SERPL-SCNC: 5.2 MMOL/L (ref 3.4–5.3)
PR INTERVAL - MUSE: 114 MS
PROT SERPL-MCNC: 8.2 G/DL (ref 6.4–8.3)
QRS DURATION - MUSE: 60 MS
QT - MUSE: 370 MS
QTC - MUSE: 560 MS
R AXIS - MUSE: 81 DEGREES
RBC # BLD AUTO: 4.3 10E6/UL (ref 3.8–5.2)
RBC # BLD AUTO: 4.37 10E6/UL (ref 3.8–5.2)
RBC # BLD AUTO: 5.26 10E6/UL (ref 3.8–5.2)
RBC URINE: <1 /HPF
RSV RNA SPEC NAA+PROBE: NEGATIVE
SARS-COV-2 RNA RESP QL NAA+PROBE: POSITIVE
SODIUM SERPL-SCNC: 119 MMOL/L (ref 136–145)
SODIUM SERPL-SCNC: 126 MMOL/L (ref 136–145)
SODIUM SERPL-SCNC: 128 MMOL/L (ref 136–145)
SODIUM SERPL-SCNC: 128 MMOL/L (ref 136–145)
SODIUM SERPL-SCNC: 130 MMOL/L (ref 136–145)
SODIUM SERPL-SCNC: 132 MMOL/L (ref 136–145)
SODIUM SERPL-SCNC: 134 MMOL/L (ref 136–145)
SP GR UR STRIP: 1.02 (ref 1–1.03)
SPECIMEN EXPIRATION DATE: NORMAL
SQUAMOUS EPITHELIAL: 1 /HPF
SYSTOLIC BLOOD PRESSURE - MUSE: NORMAL MMHG
T AXIS - MUSE: 42 DEGREES
TSH SERPL DL<=0.005 MIU/L-ACNC: 0.46 UIU/ML (ref 0.3–4.2)
UROBILINOGEN UR STRIP-MCNC: NORMAL MG/DL
VENTRICULAR RATE- MUSE: 138 BPM
WBC # BLD AUTO: 14.7 10E3/UL (ref 4–11)
WBC # BLD AUTO: 16 10E3/UL (ref 4–11)
WBC # BLD AUTO: 18.2 10E3/UL (ref 4–11)
WBC URINE: 1 /HPF

## 2022-12-13 PROCEDURE — 85379 FIBRIN DEGRADATION QUANT: CPT | Performed by: INTERNAL MEDICINE

## 2022-12-13 PROCEDURE — 82010 KETONE BODYS QUAN: CPT | Performed by: INTERNAL MEDICINE

## 2022-12-13 PROCEDURE — 87637 SARSCOV2&INF A&B&RSV AMP PRB: CPT | Performed by: EMERGENCY MEDICINE

## 2022-12-13 PROCEDURE — C9113 INJ PANTOPRAZOLE SODIUM, VIA: HCPCS | Performed by: EMERGENCY MEDICINE

## 2022-12-13 PROCEDURE — 250N000011 HC RX IP 250 OP 636: Performed by: EMERGENCY MEDICINE

## 2022-12-13 PROCEDURE — 96365 THER/PROPH/DIAG IV INF INIT: CPT

## 2022-12-13 PROCEDURE — 82803 BLOOD GASES ANY COMBINATION: CPT | Performed by: EMERGENCY MEDICINE

## 2022-12-13 PROCEDURE — 84100 ASSAY OF PHOSPHORUS: CPT | Performed by: INTERNAL MEDICINE

## 2022-12-13 PROCEDURE — 250N000009 HC RX 250: Performed by: EMERGENCY MEDICINE

## 2022-12-13 PROCEDURE — 85025 COMPLETE CBC W/AUTO DIFF WBC: CPT | Performed by: EMERGENCY MEDICINE

## 2022-12-13 PROCEDURE — 80307 DRUG TEST PRSMV CHEM ANLYZR: CPT | Performed by: EMERGENCY MEDICINE

## 2022-12-13 PROCEDURE — 83735 ASSAY OF MAGNESIUM: CPT | Performed by: INTERNAL MEDICINE

## 2022-12-13 PROCEDURE — 82248 BILIRUBIN DIRECT: CPT | Performed by: EMERGENCY MEDICINE

## 2022-12-13 PROCEDURE — 250N000013 HC RX MED GY IP 250 OP 250 PS 637: Performed by: INTERNAL MEDICINE

## 2022-12-13 PROCEDURE — 84703 CHORIONIC GONADOTROPIN ASSAY: CPT | Performed by: EMERGENCY MEDICINE

## 2022-12-13 PROCEDURE — 36415 COLL VENOUS BLD VENIPUNCTURE: CPT | Performed by: INTERNAL MEDICINE

## 2022-12-13 PROCEDURE — 250N000012 HC RX MED GY IP 250 OP 636 PS 637: Performed by: INTERNAL MEDICINE

## 2022-12-13 PROCEDURE — 200N000001 HC R&B ICU

## 2022-12-13 PROCEDURE — 250N000009 HC RX 250: Performed by: INTERNAL MEDICINE

## 2022-12-13 PROCEDURE — 81001 URINALYSIS AUTO W/SCOPE: CPT | Mod: XU | Performed by: EMERGENCY MEDICINE

## 2022-12-13 PROCEDURE — 85730 THROMBOPLASTIN TIME PARTIAL: CPT | Performed by: EMERGENCY MEDICINE

## 2022-12-13 PROCEDURE — 96375 TX/PRO/DX INJ NEW DRUG ADDON: CPT

## 2022-12-13 PROCEDURE — 250N000011 HC RX IP 250 OP 636: Performed by: INTERNAL MEDICINE

## 2022-12-13 PROCEDURE — 258N000003 HC RX IP 258 OP 636: Performed by: EMERGENCY MEDICINE

## 2022-12-13 PROCEDURE — 71045 X-RAY EXAM CHEST 1 VIEW: CPT

## 2022-12-13 PROCEDURE — 96368 THER/DIAG CONCURRENT INF: CPT

## 2022-12-13 PROCEDURE — 85014 HEMATOCRIT: CPT | Performed by: EMERGENCY MEDICINE

## 2022-12-13 PROCEDURE — 82010 KETONE BODYS QUAN: CPT | Performed by: EMERGENCY MEDICINE

## 2022-12-13 PROCEDURE — 82310 ASSAY OF CALCIUM: CPT | Performed by: INTERNAL MEDICINE

## 2022-12-13 PROCEDURE — 74174 CTA ABD&PLVS W/CONTRAST: CPT

## 2022-12-13 PROCEDURE — 99223 1ST HOSP IP/OBS HIGH 75: CPT | Mod: AI | Performed by: INTERNAL MEDICINE

## 2022-12-13 PROCEDURE — 36415 COLL VENOUS BLD VENIPUNCTURE: CPT | Performed by: EMERGENCY MEDICINE

## 2022-12-13 PROCEDURE — 258N000003 HC RX IP 258 OP 636: Performed by: INTERNAL MEDICINE

## 2022-12-13 PROCEDURE — 87040 BLOOD CULTURE FOR BACTERIA: CPT | Performed by: EMERGENCY MEDICINE

## 2022-12-13 PROCEDURE — 84443 ASSAY THYROID STIM HORMONE: CPT | Performed by: INTERNAL MEDICINE

## 2022-12-13 PROCEDURE — 86901 BLOOD TYPING SEROLOGIC RH(D): CPT | Performed by: EMERGENCY MEDICINE

## 2022-12-13 PROCEDURE — 83036 HEMOGLOBIN GLYCOSYLATED A1C: CPT | Performed by: EMERGENCY MEDICINE

## 2022-12-13 PROCEDURE — 83605 ASSAY OF LACTIC ACID: CPT | Performed by: EMERGENCY MEDICINE

## 2022-12-13 PROCEDURE — 96366 THER/PROPH/DIAG IV INF ADDON: CPT

## 2022-12-13 PROCEDURE — 85610 PROTHROMBIN TIME: CPT | Performed by: EMERGENCY MEDICINE

## 2022-12-13 PROCEDURE — 86850 RBC ANTIBODY SCREEN: CPT | Performed by: EMERGENCY MEDICINE

## 2022-12-13 PROCEDURE — 83735 ASSAY OF MAGNESIUM: CPT | Performed by: EMERGENCY MEDICINE

## 2022-12-13 PROCEDURE — 82271 OCCULT BLOOD OTHER SOURCES: CPT | Performed by: EMERGENCY MEDICINE

## 2022-12-13 PROCEDURE — 82310 ASSAY OF CALCIUM: CPT | Performed by: EMERGENCY MEDICINE

## 2022-12-13 PROCEDURE — 96367 TX/PROPH/DG ADDL SEQ IV INF: CPT

## 2022-12-13 PROCEDURE — 96361 HYDRATE IV INFUSION ADD-ON: CPT

## 2022-12-13 PROCEDURE — 71275 CT ANGIOGRAPHY CHEST: CPT

## 2022-12-13 RX ORDER — PANTOPRAZOLE SODIUM 40 MG/1
40 TABLET, DELAYED RELEASE ORAL
Status: DISCONTINUED | OUTPATIENT
Start: 2022-12-13 | End: 2022-12-14 | Stop reason: HOSPADM

## 2022-12-13 RX ORDER — IOPAMIDOL 755 MG/ML
100 INJECTION, SOLUTION INTRAVASCULAR ONCE
Status: COMPLETED | OUTPATIENT
Start: 2022-12-13 | End: 2022-12-13

## 2022-12-13 RX ORDER — NALOXONE HYDROCHLORIDE 0.4 MG/ML
0.2 INJECTION, SOLUTION INTRAMUSCULAR; INTRAVENOUS; SUBCUTANEOUS
Status: DISCONTINUED | OUTPATIENT
Start: 2022-12-13 | End: 2022-12-14 | Stop reason: HOSPADM

## 2022-12-13 RX ORDER — ONDANSETRON 2 MG/ML
4 INJECTION INTRAMUSCULAR; INTRAVENOUS EVERY 6 HOURS PRN
Status: DISCONTINUED | OUTPATIENT
Start: 2022-12-13 | End: 2022-12-14 | Stop reason: HOSPADM

## 2022-12-13 RX ORDER — ONDANSETRON 4 MG/1
4 TABLET, ORALLY DISINTEGRATING ORAL EVERY 6 HOURS PRN
Status: DISCONTINUED | OUTPATIENT
Start: 2022-12-13 | End: 2022-12-14 | Stop reason: HOSPADM

## 2022-12-13 RX ORDER — NICOTINE POLACRILEX 4 MG
15-30 LOZENGE BUCCAL
Status: DISCONTINUED | OUTPATIENT
Start: 2022-12-13 | End: 2022-12-14 | Stop reason: HOSPADM

## 2022-12-13 RX ORDER — ONDANSETRON 2 MG/ML
4 INJECTION INTRAMUSCULAR; INTRAVENOUS EVERY 30 MIN PRN
Status: DISCONTINUED | OUTPATIENT
Start: 2022-12-13 | End: 2022-12-13 | Stop reason: HOSPADM

## 2022-12-13 RX ORDER — DEXTROSE MONOHYDRATE 25 G/50ML
25-50 INJECTION, SOLUTION INTRAVENOUS
Status: DISCONTINUED | OUTPATIENT
Start: 2022-12-13 | End: 2022-12-13 | Stop reason: HOSPADM

## 2022-12-13 RX ORDER — POTASSIUM CHLORIDE 7.45 MG/ML
10 INJECTION INTRAVENOUS
Status: DISCONTINUED | OUTPATIENT
Start: 2022-12-13 | End: 2022-12-13 | Stop reason: HOSPADM

## 2022-12-13 RX ORDER — POTASSIUM CHLORIDE 1500 MG/1
40 TABLET, EXTENDED RELEASE ORAL ONCE
Status: COMPLETED | OUTPATIENT
Start: 2022-12-13 | End: 2022-12-13

## 2022-12-13 RX ORDER — NICOTINE POLACRILEX 4 MG
15-30 LOZENGE BUCCAL
Status: DISCONTINUED | OUTPATIENT
Start: 2022-12-13 | End: 2022-12-13 | Stop reason: HOSPADM

## 2022-12-13 RX ORDER — PIPERACILLIN SODIUM, TAZOBACTAM SODIUM 3; .375 G/15ML; G/15ML
3.38 INJECTION, POWDER, LYOPHILIZED, FOR SOLUTION INTRAVENOUS EVERY 8 HOURS
Status: DISCONTINUED | OUTPATIENT
Start: 2022-12-13 | End: 2022-12-14 | Stop reason: HOSPADM

## 2022-12-13 RX ORDER — DEXTROSE MONOHYDRATE, SODIUM CHLORIDE, AND POTASSIUM CHLORIDE 50; 2.24; 4.5 G/1000ML; G/1000ML; G/1000ML
INJECTION, SOLUTION INTRAVENOUS CONTINUOUS
Status: DISPENSED | OUTPATIENT
Start: 2022-12-13 | End: 2022-12-13

## 2022-12-13 RX ORDER — NALOXONE HYDROCHLORIDE 0.4 MG/ML
0.4 INJECTION, SOLUTION INTRAMUSCULAR; INTRAVENOUS; SUBCUTANEOUS
Status: DISCONTINUED | OUTPATIENT
Start: 2022-12-13 | End: 2022-12-14 | Stop reason: HOSPADM

## 2022-12-13 RX ORDER — SODIUM CHLORIDE 9 MG/ML
1000 INJECTION, SOLUTION INTRAVENOUS CONTINUOUS
Status: DISCONTINUED | OUTPATIENT
Start: 2022-12-13 | End: 2022-12-13 | Stop reason: HOSPADM

## 2022-12-13 RX ORDER — METOPROLOL TARTRATE 25 MG/1
25 TABLET, FILM COATED ORAL EVERY 6 HOURS PRN
Status: DISCONTINUED | OUTPATIENT
Start: 2022-12-13 | End: 2022-12-14 | Stop reason: HOSPADM

## 2022-12-13 RX ORDER — HYDROMORPHONE HYDROCHLORIDE 1 MG/ML
0.5 INJECTION, SOLUTION INTRAMUSCULAR; INTRAVENOUS; SUBCUTANEOUS ONCE
Status: COMPLETED | OUTPATIENT
Start: 2022-12-13 | End: 2022-12-13

## 2022-12-13 RX ORDER — ENOXAPARIN SODIUM 100 MG/ML
40 INJECTION SUBCUTANEOUS EVERY 24 HOURS
Status: DISCONTINUED | OUTPATIENT
Start: 2022-12-13 | End: 2022-12-14 | Stop reason: HOSPADM

## 2022-12-13 RX ORDER — IOPAMIDOL 755 MG/ML
500 INJECTION, SOLUTION INTRAVASCULAR ONCE
Status: COMPLETED | OUTPATIENT
Start: 2022-12-13 | End: 2022-12-13

## 2022-12-13 RX ORDER — ACETAMINOPHEN 325 MG/1
650 TABLET ORAL EVERY 4 HOURS PRN
Status: DISCONTINUED | OUTPATIENT
Start: 2022-12-13 | End: 2022-12-14 | Stop reason: HOSPADM

## 2022-12-13 RX ORDER — DEXTROSE MONOHYDRATE 25 G/50ML
25-50 INJECTION, SOLUTION INTRAVENOUS
Status: DISCONTINUED | OUTPATIENT
Start: 2022-12-13 | End: 2022-12-14 | Stop reason: HOSPADM

## 2022-12-13 RX ADMIN — PANTOPRAZOLE SODIUM 80 MG: 40 INJECTION, POWDER, FOR SOLUTION INTRAVENOUS at 05:03

## 2022-12-13 RX ADMIN — REMDESIVIR 200 MG: 100 INJECTION, POWDER, LYOPHILIZED, FOR SOLUTION INTRAVENOUS at 07:22

## 2022-12-13 RX ADMIN — IOPAMIDOL 60 ML: 755 INJECTION, SOLUTION INTRAVENOUS at 04:09

## 2022-12-13 RX ADMIN — SODIUM CHLORIDE 5.5 UNITS/HR: 9 INJECTION, SOLUTION INTRAVENOUS at 01:54

## 2022-12-13 RX ADMIN — ONDANSETRON 4 MG: 4 TABLET, ORALLY DISINTEGRATING ORAL at 20:24

## 2022-12-13 RX ADMIN — ENOXAPARIN SODIUM 40 MG: 40 INJECTION SUBCUTANEOUS at 10:11

## 2022-12-13 RX ADMIN — TAZOBACTAM SODIUM AND PIPERACILLIN SODIUM 4.5 G: 500; 4 INJECTION, SOLUTION INTRAVENOUS at 03:13

## 2022-12-13 RX ADMIN — PANTOPRAZOLE SODIUM 40 MG: 40 TABLET, DELAYED RELEASE ORAL at 16:17

## 2022-12-13 RX ADMIN — METOPROLOL TARTRATE 25 MG: 25 TABLET, FILM COATED ORAL at 12:43

## 2022-12-13 RX ADMIN — POTASSIUM CHLORIDE 40 MEQ: 1500 TABLET, EXTENDED RELEASE ORAL at 17:56

## 2022-12-13 RX ADMIN — HYDROMORPHONE HYDROCHLORIDE 0.5 MG: 1 INJECTION, SOLUTION INTRAMUSCULAR; INTRAVENOUS; SUBCUTANEOUS at 05:26

## 2022-12-13 RX ADMIN — INSULIN HUMAN 5 UNITS: 1 INJECTION, SOLUTION INTRAVENOUS at 10:25

## 2022-12-13 RX ADMIN — SODIUM CHLORIDE 1000 ML: 9 INJECTION, SOLUTION INTRAVENOUS at 00:28

## 2022-12-13 RX ADMIN — POTASSIUM CHLORIDE, DEXTROSE MONOHYDRATE AND SODIUM CHLORIDE: 224; 5; 450 INJECTION, SOLUTION INTRAVENOUS at 13:26

## 2022-12-13 RX ADMIN — INSULIN GLARGINE 10 UNITS: 100 INJECTION, SOLUTION SUBCUTANEOUS at 17:50

## 2022-12-13 RX ADMIN — SODIUM CHLORIDE 1000 ML: 9 INJECTION, SOLUTION INTRAVENOUS at 00:32

## 2022-12-13 RX ADMIN — PIPERACILLIN AND TAZOBACTAM 3.38 G: 3; .375 INJECTION, POWDER, LYOPHILIZED, FOR SOLUTION INTRAVENOUS at 19:53

## 2022-12-13 RX ADMIN — SODIUM CHLORIDE 8 UNITS/HR: 9 INJECTION, SOLUTION INTRAVENOUS at 05:13

## 2022-12-13 RX ADMIN — SODIUM CHLORIDE 1000 ML: 9 INJECTION, SOLUTION INTRAVENOUS at 01:44

## 2022-12-13 RX ADMIN — POTASSIUM CHLORIDE 10 MEQ: 7.46 INJECTION, SOLUTION INTRAVENOUS at 01:44

## 2022-12-13 RX ADMIN — ONDANSETRON 4 MG: 2 INJECTION INTRAMUSCULAR; INTRAVENOUS at 00:31

## 2022-12-13 RX ADMIN — POTASSIUM CHLORIDE: 2 INJECTION, SOLUTION, CONCENTRATE INTRAVENOUS at 11:54

## 2022-12-13 RX ADMIN — INSULIN ASPART 2 UNITS: 100 INJECTION, SOLUTION INTRAVENOUS; SUBCUTANEOUS at 17:51

## 2022-12-13 RX ADMIN — IOPAMIDOL 100 ML: 755 INJECTION, SOLUTION INTRAVENOUS at 19:48

## 2022-12-13 RX ADMIN — SODIUM CHLORIDE 50 ML: 9 INJECTION, SOLUTION INTRAVENOUS at 07:22

## 2022-12-13 RX ADMIN — PIPERACILLIN AND TAZOBACTAM 3.38 G: 3; .375 INJECTION, POWDER, LYOPHILIZED, FOR SOLUTION INTRAVENOUS at 10:10

## 2022-12-13 ASSESSMENT — ENCOUNTER SYMPTOMS
FEVER: 0
WEAKNESS: 1
NAUSEA: 1
ABDOMINAL PAIN: 1
DYSURIA: 0
VOMITING: 1

## 2022-12-13 ASSESSMENT — ACTIVITIES OF DAILY LIVING (ADL)
ADLS_ACUITY_SCORE: 35
CONCENTRATING,_REMEMBERING_OR_MAKING_DECISIONS_DIFFICULTY: NO
ADLS_ACUITY_SCORE: 20
HEARING_DIFFICULTY_OR_DEAF: NO
ADLS_ACUITY_SCORE: 20
ADLS_ACUITY_SCORE: 35
DOING_ERRANDS_INDEPENDENTLY_DIFFICULTY: NO
CHANGE_IN_FUNCTIONAL_STATUS_SINCE_ONSET_OF_CURRENT_ILLNESS/INJURY: NO
ADLS_ACUITY_SCORE: 20
DIFFICULTY_COMMUNICATING: NO
FALL_HISTORY_WITHIN_LAST_SIX_MONTHS: NO
WEAR_GLASSES_OR_BLIND: NO
ADLS_ACUITY_SCORE: 18
WALKING_OR_CLIMBING_STAIRS_DIFFICULTY: NO
DIFFICULTY_EATING/SWALLOWING: NO
ADLS_ACUITY_SCORE: 20
TOILETING_ISSUES: NO
ADLS_ACUITY_SCORE: 35
ADLS_ACUITY_SCORE: 35
DRESSING/BATHING_DIFFICULTY: NO

## 2022-12-13 NOTE — ED PROVIDER NOTES
History   Chief Complaint:  Elevated Blood Sugar     HPI   Deidre N Aasen is a 27 year old female with history of type I diabetes who presents for evaluation of an elevated blood sugar. Over the last several days the patient has developed generalized weakness, chest pain, abdominal pain, nausea, and vomiting. She presents to the ED with primary concern that she could be in diabetic ketoacidosis as she has had similar symptoms with this in the past. She believes she was last in DKA about a year ago. She does not currently have an insulin pump. She denies any fever or dysuria.      Review of Systems   Unable to perform ROS: Acuity of condition   Constitutional: Negative for fever.   Cardiovascular: Positive for chest pain.   Gastrointestinal: Positive for abdominal pain, nausea and vomiting.   Genitourinary: Negative for dysuria.   Neurological: Positive for weakness.       Allergies:  Ciprofloxacin    Medications:  Lexapro  Ibuprofen  Novolog  Levemir  Basaglar     Past Medical History:     Anxiety   Depression   Methamphetamine abuse  Heroin abuse   Diabetes mellitus, type I       Social History:  The patient presents to the ED with her significant other  PCP: No Ref-Primary, Physician       Physical Exam     Patient Vitals for the past 24 hrs:   BP Temp Temp src Pulse Resp SpO2   12/13/22 0632 -- 97.9  F (36.6  C) Oral -- -- --   12/13/22 0531 -- -- -- (!) 138 -- 98 %   12/13/22 0516 124/84 -- -- (!) 135 -- 98 %   12/13/22 0501 138/87 -- -- (!) 131 -- 98 %   12/13/22 0446 132/88 -- -- (!) 131 -- 98 %   12/13/22 0445 -- -- -- (!) 130 -- 98 %   12/13/22 0430 134/81 -- -- (!) 126 -- 99 %   12/13/22 0415 124/85 -- -- (!) 125 -- 99 %   12/13/22 0318 130/89 -- -- (!) 129 -- 99 %   12/13/22 0255 129/79 -- -- (!) 128 -- 99 %   12/13/22 0240 135/86 -- -- (!) 129 -- 100 %   12/13/22 0225 128/84 -- -- (!) 130 -- --   12/13/22 0210 -- -- -- (!) 121 -- 98 %   12/13/22 0157 -- -- -- 117 -- 99 %   12/13/22 0142 128/79 -- -- 117  -- 99 %   12/13/22 0127 124/83 -- -- (!) 122 -- 99 %   12/13/22 0112 116/79 -- -- (!) 123 -- 100 %   12/13/22 0057 124/76 -- -- (!) 124 -- 99 %   12/13/22 0042 125/82 -- -- (!) 125 -- 98 %   12/13/22 0001 122/89 98.7  F (37.1  C) Oral (!) 142 26 100 %       Physical Exam  General: Laying on the ED bed, no distress  HEENT: Normocephalic, atraumatic  Cardiac: Radial pulses 2+, regular tachycardia  Pulm: Breathing comfortably, no accessory muscle usage, no conversational dyspnea, and lungs clear bilaterally  GI: Abdomen soft, mild diffuse TTP, no rigidity or guarding  MSK: No deformities, cachectic  Skin: Warm and dry  Neuro: Moves all extremities  Psych: Normal mood and affect     Emergency Department Course   ECG  ECG results from 12/13/22   EKG 12-lead, tracing only     Value    Systolic Blood Pressure     Diastolic Blood Pressure     Ventricular Rate 138    Atrial Rate 138    NV Interval 114    QRS Duration 60        QTc 560    P Axis 82    R AXIS 81    T Axis 42    Interpretation ECG      Sinus tachycardia  Right atrial enlargement  Prolonged QTc  Borderline ECG  No previous ECGs available         Imaging:  CTA Abdomen Pelvis with Contrast   Final Result   IMPRESSION:    1.  Partial visualization of apparent moderate wall thickening of the distal esophagus. This is nonspecific, but could relate to esophagitis. If clinically relevant, an upper endoscopy could be considered for further evaluation.   2.  Partial visualization of a moderate-sized region of patchy consolidation in the right lower lobe. This most likely represents pneumonia.    3.  No other acute abnormality identified in the abdomen or pelvis. No CT evidence of active gastrointestinal bleeding.      XR Chest Port 1 View   Final Result   IMPRESSION: Heart size and pulmonary vascularity normal. Airspace opacities within the right base laterally concerning for pneumonia. Follow-up exam recommended to ensure resolution. The left lung is clear. No  effusions.      Report per radiology    Laboratory:  Labs Ordered and Resulted from Time of ED Arrival to Time of ED Departure   GLUCOSE BY METER - Abnormal       Result Value    GLUCOSE BY METER POCT 594 (*)    BASIC METABOLIC PANEL - Abnormal    Sodium 119 (*)     Potassium 4.4      Chloride 83 (*)     Carbon Dioxide (CO2) 7 (*)     Anion Gap 29 (*)     Urea Nitrogen 54.5 (*)     Creatinine 0.67      Calcium 9.8      Glucose 600 (*)     GFR Estimate >90     BLOOD GAS VENOUS - Abnormal    pH Venous 7.09 (*)     pCO2 Venous 31 (*)     pO2 Venous 25      Bicarbonate Venous 9 (*)     Base Excess/Deficit (+/-) -19.6 (*)     FIO2 0     KETONE BETA-HYDROXYBUTYRATE QUANTITATIVE, RAPID - Abnormal    Ketone (Beta-Hydroxybutyrate) Quantitative 4.1 (*)    HEPATIC FUNCTION PANEL - Abnormal    Protein Total 8.2      Albumin 3.8      Bilirubin Total 0.3      Alkaline Phosphatase 160 (*)     AST 24      ALT 18      Bilirubin Direct <0.20     LACTIC ACID WHOLE BLOOD - Abnormal    Lactic Acid 3.5 (*)    ROUTINE UA WITH MICROSCOPIC REFLEX TO CULTURE - Abnormal    Color Urine Straw      Appearance Urine Clear      Glucose Urine >=1000 (*)     Bilirubin Urine Negative      Ketones Urine >150 (*)     Specific Gravity Urine 1.020      Blood Urine Moderate (*)     pH Urine 5.5      Protein Albumin Urine 20 (*)     Urobilinogen Urine Normal      Nitrite Urine Negative      Leukocyte Esterase Urine Negative      RBC Urine <1      WBC Urine 1      Squamous Epithelials Urine 1     MAGNESIUM - Abnormal    Magnesium 2.4 (*)    CBC WITH PLATELETS AND DIFFERENTIAL - Abnormal    WBC Count 18.2 (*)     RBC Count 5.26 (*)     Hemoglobin 16.3 (*)     Hematocrit 46.5      MCV 88      MCH 31.0      MCHC 35.1      RDW 11.7      Platelet Count 551 (*)     % Neutrophils 84      % Lymphocytes 4      % Monocytes 10      % Eosinophils 0      % Basophils 1      % Immature Granulocytes 1      NRBCs per 100 WBC 0      Absolute Neutrophils 15.4 (*)     Absolute  Lymphocytes 0.7 (*)     Absolute Monocytes 1.9 (*)     Absolute Eosinophils 0.0      Absolute Basophils 0.1      Absolute Immature Granulocytes 0.2      Absolute NRBCs 0.0     HEMOGLOBIN A1C - Abnormal    Hemoglobin A1C 16.4 (*)    GLUCOSE BY METER - Abnormal    GLUCOSE BY METER POCT 452 (*)    CBC WITH PLATELETS - Abnormal    WBC Count 16.0 (*)     RBC Count 4.37      Hemoglobin 13.7      Hematocrit 38.6      MCV 88      MCH 31.4      MCHC 35.5      RDW 11.5      Platelet Count 438     BASIC METABOLIC PANEL - Abnormal    Sodium 126 (*)     Potassium 5.2      Chloride 98      Carbon Dioxide (CO2) 8 (*)     Anion Gap 20 (*)     Urea Nitrogen 44.0 (*)     Creatinine 0.51      Calcium 7.8 (*)     Glucose 405 (*)     GFR Estimate >90     INR - Abnormal    INR 1.21 (*)    PARTIAL THROMBOPLASTIN TIME - Abnormal    aPTT 39 (*)    INFLUENZA A/B & SARS-COV2 PCR MULTIPLEX - Abnormal    Influenza A PCR Negative      Influenza B PCR Negative      RSV PCR Negative      SARS CoV2 PCR Positive (*)    OCCULT BLOOD GASTRIC - Abnormal    Occult Blood Gastric Positive (*)    GLUCOSE BY METER - Abnormal    GLUCOSE BY METER POCT 428 (*)    GLUCOSE BY METER - Abnormal    GLUCOSE BY METER POCT 326 (*)    CBC WITH PLATELETS - Abnormal    WBC Count 14.7 (*)     RBC Count 4.30      Hemoglobin 13.4      Hematocrit 37.7      MCV 88      MCH 31.2      MCHC 35.5      RDW 12.0      Platelet Count 402     BASIC METABOLIC PANEL - Abnormal    Sodium 128 (*)     Potassium 4.1      Chloride 96 (*)     Carbon Dioxide (CO2) 11 (*)     Anion Gap 21 (*)     Urea Nitrogen 41.3 (*)     Creatinine 0.47 (*)     Calcium 8.6      Glucose 294 (*)     GFR Estimate >90     GLUCOSE BY METER - Abnormal    GLUCOSE BY METER POCT 295 (*)    GLUCOSE BY METER - Abnormal    GLUCOSE BY METER POCT 252 (*)    GLUCOSE BY METER - Abnormal    GLUCOSE BY METER POCT 253 (*)    LACTIC ACID WHOLE BLOOD - Normal    Lactic Acid 1.6     HCG QUALITATIVE PREGNANCY - Normal    hCG Serum  Qualitative Negative     DRUG ABUSE SCREEN 1 URINE (ED) - Normal    Amphetamines Urine Screen Negative      Barbituates Urine Screen Negative      Benzodiazepine Urine Screen Negative      Cannabinoids Urine Screen Negative      Cocaine Urine Screen Negative      Opiates Urine Screen Negative     GLUCOSE MONITOR NURSING POCT   BASIC METABOLIC PANEL   BLOOD GAS VENOUS   TYPE AND SCREEN, ADULT    ABO/RH(D) O POS      Antibody Screen Negative      SPECIMEN EXPIRATION DATE [id number]     BLOOD CULTURE   BLOOD CULTURE   ABO/RH TYPE AND SCREEN        Procedures     Limited Bedside ED Ultrasound for Peripheral Vein Cannulation Procedure Note:     PROCEDURE: PERFORMED BY: Dr. Avitia  INDICATIONS/SYMPTOM:  Hypotension and IV Access Needed for Multiple Medications  PROBE: High frequency linear probe  BODY LOCATION: The ultrasound was performed on the left Upper arm.  FINDINGS: Artery and vein visualized.  Vein completely compressible (ie collapsible) and no thrombus visualized.  INTERPRETATION: Patent vein confirmed with US.  Central line inserted into vein utilizing real-time ultrasonic guidance.   IMAGE DOCUMENTATION: Images were not archived.       Emergency Department Course:     Reviewed:  I reviewed nursing notes, vitals and past medical history    Assessments:  0026: I obtained history and examined the patient as noted above.     0218: I updated and reassessed the patient.      0305:  A ultrasound IV placement was performed as outlined in the procedure note above.  The patient tolerated well and there were no complications.     Consults:  I spoke with Dr. RAO of the hospitalist service from [facility] regarding patient's presentation, findings, and plan of care.     I spoke with Dr. Colvin of the critical care service from [facility] regarding patient's presentation, findings, and plan of care.     Interventions:  0028 NS 1,000 mL IV   0031 Zofran 4 mg IV   0032 NS 1,000 mL IV   0144 NS 1,000 mL IV   0144 Potassium  "chloride 10 mEq IV   0154 Insulin 5.5 units/hr IV   0256 Insulin 12 units/hr IV rate/dose change  0313 Zosyn 4.5 g IV   0426 Insulin 10 units/hr IV   0526 Dilaudid 0.5 mg IV      Disposition:  The patient was transferred to [facility] via EMS. Dr. RAO accepted the patient for transfer.     Impression & Plan     CMS Diagnoses:   The patient has signs of Severe Sepsis        If one the following conditions is present, a 30 mL/kg bolus is recommended as part of the 6 hour bundle (IBW can be used for BMI >30, or document refusal/contraindication):      1.   Initial hypotension  defined as 2 bps < 90 or map < 65 in the 6hrs before or 3hrs after time zero.     2.  Lactate >4.      The patient has signs of Severe Sepsis as evidenced by:    1. 2 SIRS criteria, AND  2. Suspected infection, AND   3. Organ dysfunction: Lactic Acidosis with value >2.0    Time severe sepsis diagnosis confirmed: 0132 12/13/22 as this was the time when the chest x-ray showed pneumonia    3 Hour Severe Sepsis Bundle Completion:    1. Initial Lactic Acid Result:   Recent Labs   Lab Test 12/13/22  0235 12/13/22  0028 09/17/19  0250   LACT 1.6 3.5* 0.7     2. Blood Cultures before Antibiotics: No, antibiotics were started prior to BCx collection b/c waiting for BCx to be collected would have been detrimental to the patient  3. Broad Spectrum Antibiotics Administered:  Targeted antibiotics for pneumonia with Zosyn       Anti-infectives (From admission through now)    Start     Dose/Rate Route Frequency Ordered Stop    12/13/22 0230  piperacillin-tazobactam (ZOSYN) intermittent infusion 4.5 g        Note to Pharmacy: For [facility], [facility] and [facility]: For Zosyn-naive patients, use the \"Zosyn initial dose + extended infusion\" order panel.    4.5 g  200 mL/hr over 30 Minutes Intravenous ONCE 12/13/22 0229 12/13/22 0403          4. Is initial hypotension present?     No (IV fluid bolus NOT required). IV Fluid volume administered: 2 L bolus followed by infusion          "           Severe Sepsis reassessment:  1. Repeat Lactic Acid Level within 6 hours of time zero: 1.6  2. MAP>65 after initial IVF bolus, will continue to monitor fluid status and vital signs      Medical Decision Makin-year-old type I diabetic female presents with symptoms of DKA as above.  She is ill-appearing and quite tachycardic on initial evaluation.  Her initial labs are significant for acidemia, bicarb of 9, normal potassium, hyperglycemia.  Crystalloid bolus initiated prior to labs being resulted and after potassium resulted patient was started on an insulin drip.  She does not quite meet criteria for a bicarb drip at this time.  Her heart rate is improving with crystalloid resuscitation.  Plan at this time is for admission to the ICU for further care for this 27-year-old with DKA likely due to pneumonia.  Admission was unfortunately delayed due to limited bed availability and then subsequently limited transport availability.  She had an episode of coffee-ground emesis that was Gastroccult positive.  Repeat CBC after multiple liters of crystalloid resuscitation showed a slight drop in hemoglobin that was stable on another repeat, overall relatively low suspicion for acute GI bleed at this time however ordering pantoprazole bolus nonetheless.  Subsequent repeat BMPs showed slow closing of the anion gap and improving glucoses with repeat fingersticks.  Given the prolonged nature of her stay in the ED, a repeat VBG was ordered and showed improvement from previous.  Plan remains for transfer to [facility] ICU for further care when transport is available. Pt signed out to my colleague Dr. Baker at the end of my shift pending transport to [facility] ICU.    Critical Care Time: was 120 minutes for this patient excluding procedures    Diagnosis:    ICD-10-CM    1. Diabetic ketoacidosis without coma associated with type 1 diabetes mellitus (H)  E10.10       2. Pneumonia of right lower lobe due to infectious  organism  J18.9       3. Coffee ground emesis  K92.0            Scribe Disclosure:  I, Guille Estrada, am serving as a scribe at 12:26 AM on 12/13/2022 to document services personally performed by Rafiq Avitia MD based on my observations and the provider's statements to me.           Rafiq Avitia MD  12/13/22 0757

## 2022-12-13 NOTE — PHARMACY-ADMISSION MEDICATION HISTORY
Pharmacy Note - Admission Medication History     ______________________________________________________________________    Prior To Admission (PTA) med list completed and updated in EMR.       PTA Med List   Medication Sig Last Dose     insulin aspart (NOVOLOG PEN) 100 UNIT/ML pen Inject Subcutaneous 3 times daily (with meals) Sliding scale Unknown     insulin glargine (BASAGLAR KWIKPEN) 100 UNIT/ML pen Inject 20 Units Subcutaneous daily Unknown       Information source(s): Patient and CareEverywhere/SureScripts  Method of interview communication: via Vocera with RN in room    Summary of Changes to PTA Med List  New: none  Discontinued: escitalopram, ibuprofen  Changed: Novolog-sliding scale only per patient    Patient was asked about OTC/herbal products specifically.  PTA med list reflects this.    In the past week, patient estimated taking medication this percent of the time:  Unable to assess.    Allergies were reviewed, assessed, and updated with the patient.      Patient does not use any multi-dose medications prior to admission.    The information provided in this note is only as accurate as the sources available at the time of the update(s).    Thank you for the opportunity to participate in the care of this patient.    Melissa Gonzalez RPH  12/13/2022 10:30 AM

## 2022-12-13 NOTE — H&P
[facility]    History and Physical - Hospitalist Service       Date of Admission:  12/13/2022    Assessment & Plan      Deidre N Aasen is a 27 year old female with history of type I diabetes and polysubstance abuse who was transferred from [facility] ER for DKA.     DKA, type I diabetes: Poorly controlled with HbA1C 16.4, likely not compliant with medication.   - Continue insulin drip, IVF and monitoring ketone, BMP per DKA protocol  - PTA Lantus 20 units daily and Novolog sliding scale TIDAC. When DKA resolves, start diabetes diet and resume Lantus.   - Diabetes educator consult    Pneumonia: Presents with cough, tachycardia and leukocytosis. Chest XR reveals airspace opacities within the right lung base.   - Started Zosyn in ER, will continue  - Monitor respiratory status. She is not hypoxic currently  - Follow up blood culture    COVID19 infection: tested positive on 12/13/22. Patient reports that she is not vaccinated. It is not clear whether the pulmonary infiltrate she has is related to COVID. Patient is not hypoxic.   - Start remdesivir for 5 days or until discharge  - Check d dimer  - Continue COVID19 precaution    Sinus tachycardia: Patient has been having sinus tachycardia with HR at the 120s-130s. Likely related to her DKA and pneumonia.  - Check TSH  - Metoprolol prn    Hypophosphatemia: replace and recheck    Hematemesis: Patient reports having coffee-ground emesis. Gastric content occult blood positive. Likely due to Aydee-Dunn tear. Hemoglobin stable. CTA reveals no active bleeding, but esophagitis.   - Protonix daily    History of polysubstance abuse: mostly heroin. In ER, urine drug test reveals opiates. When asked about substance use, patient did not answer.   - Supportive care. May need social work consult.      Addendum: DKA resolved after insulin drip and IVF. Start diabetes diet. Resume lantus at 10 units today. If having good oral intake, will resume  Lnatus 20 units tomorrow. Also start Novolog mealtime carb count 1:10 tidac.  D dimer elevated to 3.1. CT chest to rule out PE.        Diet: NPO for Medical/Clinical Reasons Except for: Meds, Ice Chips    DVT Prophylaxis: Low Risk/Ambulatory with no VTE prophylaxis indicated  [place] Catheter: Not present  Central Lines: None  Cardiac Monitoring: ACTIVE order. Indication: dka  Code Status:  Full code      Disposition Plan      Expected Discharge Date: 12/15/2022                The patient's care was discussed with the Bedside Nurse, Care Coordinator/ and Patient.    Raffaele Gannon MD  Hospitalist Service  [facility]  Securely message with the Vocera Web Console (learn more here)  Text page via [employer] Paging/Directory         ______________________________________________________________________    Chief Complaint   Elevated blood sugar    History is obtained from the patient    History of Present Illness   Deidre N Aasen is a 27 year old female with history of type I diabetes who was transferred from [facility] ER for DKA. Yesterday, patient presented to [facility] ER for evaluation of elevated blood sugar. She reports that she has been having generalized weakness, chest pain, abdominal pain, nausea, and vomiting for the past few days. She concerns that she has developed DKA since she had similar symptoms in the past when she had DKA. Her last DKA was about one year ago. In ER, patient was found to have DKA. She was tested COVID19 positive. Chest XR showed airspace opacities within the right base laterally, concerning for pneumonia. Patient reports having some cough since yesterday. No fever and no SOB. Patient was started insulin drip and transferred to [facility].    Review of Systems    The 10 point Review of Systems is negative other than noted in the HPI or here.     Past Medical History    I have reviewed this patient's medical history  and updated it with pertinent information if needed.   Past Medical History:   Diagnosis Date     Anxiety     not taking any medications for it     Substance abuse (H)        Past Surgical History   I have reviewed this patient's surgical history and updated it with pertinent information if needed.  No past surgical history on file.    Social History   I have reviewed this patient's social history and updated it with pertinent information if needed.  Social History     Tobacco Use     Smoking status: Never     Smokeless tobacco: Never   Substance Use Topics     Alcohol use: No     Drug use: Yes       Family History   No pertinent family history    Prior to Admission Medications   Prior to Admission Medications   Prescriptions Last Dose Informant Patient Reported? Taking?   blood glucose (NO BRAND SPECIFIED) test strip   No No   Sig: Use to test blood sugar four times daily before meals and before bedtime.   escitalopram (LEXAPRO) 5 MG tablet   No No   Sig: Take 1 tablet (5 mg) by mouth daily   ibuprofen (ADVIL,MOTRIN) 600 MG tablet   No No   Sig: Take 1 tablet (600 mg) by mouth every 6 hours as needed for pain Take w/ food   insulin aspart (NOVOLOG PEN) 100 UNIT/ML pen   Yes No   Sig: Inject Subcutaneous 3 times daily (with meals)   insulin aspart (NOVOLOG PEN) 100 UNIT/ML pen   No No   Sig: Inject 5 Units Subcutaneous 3 times daily (with meals)   insulin detemir (LEVEMIR PEN) 100 UNIT/ML pen   Yes No   Sig: Inject Subcutaneous At Bedtime   insulin glargine (BASAGLAR KWIKPEN) 100 UNIT/ML pen   No No   Sig: Inject 20 Units Subcutaneous daily      Facility-Administered Medications: None     Allergies   Allergies   Allergen Reactions     Ciprofloxacin Other (See Comments)     Vomiting; diarrhea       Physical Exam   Vital Signs:                    Weight: 0 lbs 0 oz    General appearance: not in acute distress  HEENT: PERRL, EOMI  Lungs: Clear breath sounds in bilateral lung fields  Cardiovascular: Tachycardia, regular  rhythm, normal S1-S2  Abdomen: Soft, mild tenderness to palpation, no distension, normal bowel sound  Musculoskeletal: No joint swelling  Skin: No rash and no edema  Neurology: AAO ×3.  Cranial nerves II - XII normal.  Normal muscle strength in all four extremities.    Data   Data reviewed today: I reviewed all medications, new labs and imaging results over the last 24 hours.     Recent Labs   Lab 12/13/22  0744 12/13/22  0649 12/13/22  0646 12/13/22  0512 12/13/22  0508 12/13/22  0251 12/13/22  0235 12/13/22  0154 12/13/22  0003   WBC  --   --   --   --  14.7*  --  16.0*  --  18.2*   HGB  --   --   --   --  13.4  --  13.7  --  16.3*   MCV  --   --   --   --  88  --  88  --  88   PLT  --   --   --   --  402  --  438  --  551*   INR  --   --   --   --   --   --  1.21*  --   --    NA  --  128*  --   --  128*  --  126*  --  119*   POTASSIUM  --  3.5  --   --  4.1  --  5.2  --  4.4   CHLORIDE  --  99  --   --  96*  --  98  --  83*   CO2  --  10*  --   --  11*  --  8*  --  7*   BUN  --  35.7*  --   --  41.3*  --  44.0*  --  54.5*   CR  --  0.44*  --   --  0.47*  --  0.51  --  0.67   ANIONGAP  --  19*  --   --  21*  --  20*  --  29*   AGNES  --  8.7  --   --  8.6  --  7.8*  --  9.8   * 239* 249*   < > 294*   < > 405*   < > 600*   ALBUMIN  --   --   --   --   --   --   --   --  3.8   PROTTOTAL  --   --   --   --   --   --   --   --  8.2   BILITOTAL  --   --   --   --   --   --   --   --  0.3   ALKPHOS  --   --   --   --   --   --   --   --  160*   ALT  --   --   --   --   --   --   --   --  18   AST  --   --   --   --   --   --   --   --  24    < > = values in this interval not displayed.       CTA abdomen and pelvis:  IMPRESSION:   1.  Partial visualization of apparent moderate wall thickening of the distal esophagus. This is nonspecific, but could relate to esophagitis. If clinically relevant, an upper endoscopy could be considered for further evaluation.  2.  Partial visualization of a moderate-sized region of  patchy consolidation in the right lower lobe. This most likely represents pneumonia.   3.  No other acute abnormality identified in the abdomen or pelvis. No CT evidence of active gastrointestinal bleeding.     Chest XR:  IMPRESSION: Heart size and pulmonary vascularity normal. Airspace opacities within the right base laterally concerning for pneumonia. Follow-up exam recommended to ensure resolution. The left lung is clear. No effusions.

## 2022-12-13 NOTE — PROGRESS NOTES
"Patient transfer signout note.    I received call from patient placement regarding transfer of patient from [facility] ED to [facility] ICU for DKA.    Per ED provider, Dr. Avitia, 27-year-old female with past medical history of type I DM, polysubstance abuse presented to ED with a complaint of \"I may be on DKA\".  In ED, work-up showed DKA with venous pH 7.01, positive ketones, blood sugar 600, anion gap 29, bicarb 7.  .  Patient initiated on ED DKA protocol per ED provider.    Also reported right lower lobe pneumonia initiated on antibiotics  Also reported patient had coffee-ground emesis and reported getting CTA abdomen and pelvis to rule out active bleeding.  Requested ED provider to inform us if any significant CT findings.  Patient was also reviewed by intensivist and deemed appropriate for ICU but requested hospitalist medicine to manage patient's primarily.  I will give signout to daytime admitting provider in detail.      Note created using dragon voice recognition software.  Errors in spelling or words which seems out of context are unintentional.  Sounds alike errors may have escaped editing.    12/13/2022  JANAY KIM MD  [facility]  PAGER NO. [phone]   "

## 2022-12-13 NOTE — ED TRIAGE NOTES
"  Patient arrives to ED very anxious and hyperventilating.  Patient states she is in diabetic ketoacidosis, but is unable to tell writer when she last took her blood sugar or insulin. Blood sugar in triage 594.  Patient is tachycardic in triage. Patient unable to answer most questions and boyfriend states \"he is unsure of what is going on\"   Triage Assessment     Row Name 12/12/22 3466       Triage Assessment (Adult)    Airway WDL WDL       Respiratory WDL    Respiratory WDL WDL       Skin Circulation/Temperature WDL    Skin Circulation/Temperature WDL WDL       Cardiac WDL    Cardiac WDL WDL       Peripheral/Neurovascular WDL    Peripheral Neurovascular WDL WDL       Cognitive/Neuro/Behavioral WDL    Cognitive/Neuro/Behavioral WDL WDL              "

## 2022-12-14 NOTE — SIGNIFICANT EVENT
The patient requested to leave. I considered this to be leaving against medical advice. I personally discussed the following with them.    That they currently had a medical condition of: DKA + COVID + hypokalemia  My proposed course of evaluation and treatment currently would be remdesivir, insulin, potassium replacement  Benefits would include: glucose control, electrolyte correction  Risks of leaving before this had been completed include: discussed patient's low potassium and the cardiac risks this poses. Discussed the risk that patient will return to a DKA state without proper treatment.     Despite this they stated they wanted to leave and refused further evaluation, treatment, or admission at this time.They appear clinically sober, to be mentating appropriately, free from distracting injury, have controlled pain, appear to have intact insight, judgement, and reason and in my opinion have the capacity to make this decision. Patient has a history of drug use, UDS on arrival was negative.     Specifically, they were able to verbally state back in a coherent manner their current medical condition/current diagnosis, the proposes course of treatment, and the risks, benefits, and alternatives of treatment versus leaving against medical advice.    They understand that they may return to seek medical attention here at whatever time they want. I highly advised them to return to the Emergency Department immediately if they experienced any worsening or recurrent symptoms, reconsidered treatment a/o admission, or had any other concerns. This would be without any repercussions.    I recommended they follow-up with primary care provider or return to the emergency department for further evaluation and treatment.    Sierra Orozco MD  [facility] [place] [facility], PGY-2

## 2022-12-14 NOTE — SIGNIFICANT EVENT
Patient stated she wishes to leave. Dr. Orozco discussed her condition bedside and ensured she understood the risks of leaving versus the benefits of staying. Patient chose to leave and able to state clearly that she understands and has someone at home to take care of her. Her boyfriend is to pick her up at 11:15 pm. Myself and security will escort her down.

## 2022-12-16 ENCOUNTER — APPOINTMENT (OUTPATIENT)
Dept: GENERAL RADIOLOGY | Facility: CLINIC | Age: 27
End: 2022-12-16
Attending: INTERNAL MEDICINE
Payer: COMMERCIAL

## 2022-12-16 ENCOUNTER — HOSPITAL ENCOUNTER (INPATIENT)
Facility: CLINIC | Age: 27
LOS: 19 days | Discharge: NURSING, REHAB OR LONG-TERM CARE | End: 2023-01-04
Attending: INTERNAL MEDICINE | Admitting: INTERNAL MEDICINE
Payer: COMMERCIAL

## 2022-12-16 ENCOUNTER — APPOINTMENT (OUTPATIENT)
Dept: CT IMAGING | Facility: CLINIC | Age: 27
End: 2022-12-16
Attending: EMERGENCY MEDICINE
Payer: COMMERCIAL

## 2022-12-16 ENCOUNTER — HOSPITAL ENCOUNTER (EMERGENCY)
Facility: CLINIC | Age: 27
Discharge: ANOTHER HOSPITAL | End: 2022-12-16
Attending: EMERGENCY MEDICINE | Admitting: EMERGENCY MEDICINE
Payer: COMMERCIAL

## 2022-12-16 ENCOUNTER — APPOINTMENT (OUTPATIENT)
Dept: CARDIOLOGY | Facility: CLINIC | Age: 27
End: 2022-12-16
Attending: INTERNAL MEDICINE
Payer: COMMERCIAL

## 2022-12-16 ENCOUNTER — APPOINTMENT (OUTPATIENT)
Dept: GENERAL RADIOLOGY | Facility: CLINIC | Age: 27
End: 2022-12-16
Attending: EMERGENCY MEDICINE
Payer: COMMERCIAL

## 2022-12-16 VITALS
TEMPERATURE: 92.1 F | HEART RATE: 103 BPM | SYSTOLIC BLOOD PRESSURE: 92 MMHG | OXYGEN SATURATION: 95 % | RESPIRATION RATE: 37 BRPM | DIASTOLIC BLOOD PRESSURE: 53 MMHG

## 2022-12-16 DIAGNOSIS — F41.8 ANXIETY WITH DEPRESSION: ICD-10-CM

## 2022-12-16 DIAGNOSIS — U07.1 INFECTION DUE TO 2019 NOVEL CORONAVIRUS: ICD-10-CM

## 2022-12-16 DIAGNOSIS — F11.10 HEROIN ABUSE (H): ICD-10-CM

## 2022-12-16 DIAGNOSIS — O99.323: ICD-10-CM

## 2022-12-16 DIAGNOSIS — E10.10 DIABETIC KETOACIDOSIS WITHOUT COMA ASSOCIATED WITH TYPE 1 DIABETES MELLITUS (H): ICD-10-CM

## 2022-12-16 DIAGNOSIS — Z91.148 NONCOMPLIANCE WITH MEDICATION REGIMEN: ICD-10-CM

## 2022-12-16 DIAGNOSIS — T68.XXXA HYPOTHERMIA, INITIAL ENCOUNTER: ICD-10-CM

## 2022-12-16 DIAGNOSIS — N17.9 ACUTE KIDNEY INJURY (H): ICD-10-CM

## 2022-12-16 DIAGNOSIS — E46 CHRONIC MALNUTRITION (H): ICD-10-CM

## 2022-12-16 DIAGNOSIS — J69.0 ASPIRATION PNEUMONIA OF RIGHT LOWER LOBE, UNSPECIFIED ASPIRATION PNEUMONIA TYPE (H): ICD-10-CM

## 2022-12-16 DIAGNOSIS — E86.0 DEHYDRATION: ICD-10-CM

## 2022-12-16 DIAGNOSIS — E10.10 TYPE 1 DIABETES MELLITUS WITH KETOACIDOSIS WITHOUT COMA (H): ICD-10-CM

## 2022-12-16 DIAGNOSIS — G93.40 ENCEPHALOPATHY: ICD-10-CM

## 2022-12-16 DIAGNOSIS — F15.10 METHAMPHETAMINE ABUSE (H): ICD-10-CM

## 2022-12-16 DIAGNOSIS — T40.1X1A ACCIDENTAL OVERDOSE OF HEROIN, INITIAL ENCOUNTER (H): ICD-10-CM

## 2022-12-16 DIAGNOSIS — F19.10: ICD-10-CM

## 2022-12-16 DIAGNOSIS — N17.0 ACUTE KIDNEY FAILURE WITH TUBULAR NECROSIS (H): Primary | ICD-10-CM

## 2022-12-16 LAB
ABO/RH(D): NORMAL
ACANTHOCYTES BLD QL SMEAR: ABNORMAL
ALBUMIN SERPL BCG-MCNC: 2.8 G/DL (ref 3.5–5.2)
ALBUMIN UR-MCNC: 100 MG/DL
ALLEN'S TEST: ABNORMAL
ALLEN'S TEST: YES
ALLEN'S TEST: YES
ALP SERPL-CCNC: 173 U/L (ref 35–104)
ALT SERPL W P-5'-P-CCNC: 13 U/L (ref 10–35)
AMPHETAMINES UR QL SCN: ABNORMAL
ANION GAP SERPL CALCULATED.3IONS-SCNC: 11 MMOL/L (ref 3–14)
ANION GAP SERPL CALCULATED.3IONS-SCNC: 15 MMOL/L (ref 3–14)
ANION GAP SERPL CALCULATED.3IONS-SCNC: 24 MMOL/L (ref 7–15)
ANION GAP SERPL CALCULATED.3IONS-SCNC: 28 MMOL/L (ref 7–15)
ANION GAP SERPL CALCULATED.3IONS-SCNC: 9 MMOL/L (ref 3–14)
ANTIBODY SCREEN: NEGATIVE
APAP SERPL-MCNC: <5 UG/ML (ref 10–30)
APPEARANCE UR: CLEAR
AST SERPL W P-5'-P-CCNC: 27 U/L (ref 10–35)
ATRIAL RATE - MUSE: 110 BPM
BARBITURATES UR QL SCN: ABNORMAL
BASE EXCESS BLDA CALC-SCNC: -10.5 MMOL/L (ref -9–1.8)
BASE EXCESS BLDA CALC-SCNC: -11 MMOL/L (ref -9–1.8)
BASE EXCESS BLDA CALC-SCNC: -12.8 MMOL/L (ref -9–1.8)
BASE EXCESS BLDV CALC-SCNC: -12.8 MMOL/L (ref -7.7–1.9)
BASE EXCESS BLDV CALC-SCNC: -13.3 MMOL/L (ref -7.7–1.9)
BASE EXCESS BLDV CALC-SCNC: -25.4 MMOL/L (ref -7.7–1.9)
BASE EXCESS BLDV CALC-SCNC: -28.5 MMOL/L (ref -7.7–1.9)
BASOPHILS # BLD MANUAL: 0 10E3/UL (ref 0–0.2)
BASOPHILS # BLD MANUAL: 0 10E3/UL (ref 0–0.2)
BASOPHILS NFR BLD MANUAL: 0 %
BASOPHILS NFR BLD MANUAL: 0 %
BENZODIAZ UR QL SCN: ABNORMAL
BILIRUB SERPL-MCNC: <0.2 MG/DL
BILIRUB UR QL STRIP: NEGATIVE
BUN SERPL-MCNC: 21.5 MG/DL (ref 6–20)
BUN SERPL-MCNC: 24 MG/DL (ref 7–30)
BUN SERPL-MCNC: 26 MG/DL (ref 7–30)
BUN SERPL-MCNC: 27 MG/DL (ref 7–30)
BUN SERPL-MCNC: 27.1 MG/DL (ref 6–20)
BZE UR QL SCN: ABNORMAL
CA-I BLD-MCNC: 4.6 MG/DL (ref 4.4–5.2)
CA-I BLD-MCNC: 4.6 MG/DL (ref 4.4–5.2)
CALCIUM SERPL-MCNC: 7.3 MG/DL (ref 8.5–10.1)
CALCIUM SERPL-MCNC: 7.4 MG/DL (ref 8.5–10.1)
CALCIUM SERPL-MCNC: 7.9 MG/DL (ref 8.5–10.1)
CALCIUM SERPL-MCNC: 8.3 MG/DL (ref 8.6–10)
CALCIUM SERPL-MCNC: 9.3 MG/DL (ref 8.6–10)
CANNABINOIDS UR QL SCN: ABNORMAL
CHLORIDE BLD-SCNC: 108 MMOL/L (ref 94–109)
CHLORIDE BLD-SCNC: 111 MMOL/L (ref 94–109)
CHLORIDE BLD-SCNC: 112 MMOL/L (ref 94–109)
CHLORIDE SERPL-SCNC: 88 MMOL/L (ref 98–107)
CHLORIDE SERPL-SCNC: 96 MMOL/L (ref 98–107)
CK SERPL-CCNC: 221 U/L (ref 30–225)
CO2 SERPL-SCNC: 14 MMOL/L (ref 20–32)
CO2 SERPL-SCNC: 14 MMOL/L (ref 20–32)
CO2 SERPL-SCNC: 16 MMOL/L (ref 20–32)
COLOR UR AUTO: ABNORMAL
CREAT SERPL-MCNC: 0.54 MG/DL (ref 0.52–1.04)
CREAT SERPL-MCNC: 0.6 MG/DL (ref 0.52–1.04)
CREAT SERPL-MCNC: 0.71 MG/DL (ref 0.51–0.95)
CREAT SERPL-MCNC: 0.72 MG/DL (ref 0.51–0.95)
CREAT SERPL-MCNC: 0.88 MG/DL (ref 0.52–1.04)
CRP SERPL-MCNC: 277 MG/L (ref 0–8)
CRP SERPL-MCNC: 468.79 MG/L
D DIMER PPP FEU-MCNC: 3.7 UG/ML FEU (ref 0–0.5)
DEPRECATED HCO3 PLAS-SCNC: 5 MMOL/L (ref 22–29)
DEPRECATED HCO3 PLAS-SCNC: 7 MMOL/L (ref 22–29)
DIASTOLIC BLOOD PRESSURE - MUSE: NORMAL MMHG
ENTEROCOCCUS FAECALIS: NOT DETECTED
ENTEROCOCCUS FAECIUM: NOT DETECTED
EOSINOPHIL # BLD MANUAL: 0 10E3/UL (ref 0–0.7)
EOSINOPHIL # BLD MANUAL: 0 10E3/UL (ref 0–0.7)
EOSINOPHIL NFR BLD MANUAL: 0 %
EOSINOPHIL NFR BLD MANUAL: 0 %
ERYTHROCYTE [DISTWIDTH] IN BLOOD BY AUTOMATED COUNT: 13.2 % (ref 10–15)
ERYTHROCYTE [DISTWIDTH] IN BLOOD BY AUTOMATED COUNT: 14.2 % (ref 10–15)
ETHANOL SERPL-MCNC: <0.01 G/DL
GFR SERPL CREATININE-BSD FRML MDRD: >90 ML/MIN/1.73M2
GLUCOSE BLD-MCNC: 204 MG/DL (ref 70–99)
GLUCOSE BLD-MCNC: 246 MG/DL (ref 70–99)
GLUCOSE BLD-MCNC: 272 MG/DL (ref 70–99)
GLUCOSE BLDC GLUCOMTR-MCNC: 121 MG/DL (ref 70–99)
GLUCOSE BLDC GLUCOMTR-MCNC: 166 MG/DL (ref 70–99)
GLUCOSE BLDC GLUCOMTR-MCNC: 168 MG/DL (ref 70–99)
GLUCOSE BLDC GLUCOMTR-MCNC: 196 MG/DL (ref 70–99)
GLUCOSE BLDC GLUCOMTR-MCNC: 200 MG/DL (ref 70–99)
GLUCOSE BLDC GLUCOMTR-MCNC: 208 MG/DL (ref 70–99)
GLUCOSE BLDC GLUCOMTR-MCNC: 225 MG/DL (ref 70–99)
GLUCOSE BLDC GLUCOMTR-MCNC: 230 MG/DL (ref 70–99)
GLUCOSE BLDC GLUCOMTR-MCNC: 230 MG/DL (ref 70–99)
GLUCOSE BLDC GLUCOMTR-MCNC: 231 MG/DL (ref 70–99)
GLUCOSE BLDC GLUCOMTR-MCNC: 232 MG/DL (ref 70–99)
GLUCOSE BLDC GLUCOMTR-MCNC: 236 MG/DL (ref 70–99)
GLUCOSE BLDC GLUCOMTR-MCNC: 237 MG/DL (ref 70–99)
GLUCOSE BLDC GLUCOMTR-MCNC: 254 MG/DL (ref 70–99)
GLUCOSE BLDC GLUCOMTR-MCNC: 294 MG/DL (ref 70–99)
GLUCOSE BLDC GLUCOMTR-MCNC: 383 MG/DL (ref 70–99)
GLUCOSE BLDC GLUCOMTR-MCNC: 421 MG/DL (ref 70–99)
GLUCOSE BLDC GLUCOMTR-MCNC: 493 MG/DL (ref 70–99)
GLUCOSE BLDC GLUCOMTR-MCNC: 504 MG/DL (ref 70–99)
GLUCOSE BLDC GLUCOMTR-MCNC: 593 MG/DL (ref 70–99)
GLUCOSE SERPL-MCNC: 517 MG/DL (ref 70–99)
GLUCOSE SERPL-MCNC: 561 MG/DL (ref 70–99)
GLUCOSE UR STRIP-MCNC: >=1000 MG/DL
HCG SERPL QL: NEGATIVE
HCO3 BLD-SCNC: 15 MMOL/L (ref 21–28)
HCO3 BLD-SCNC: 16 MMOL/L (ref 21–28)
HCO3 BLD-SCNC: 17 MMOL/L (ref 21–28)
HCO3 BLDV-SCNC: 14 MMOL/L (ref 21–28)
HCO3 BLDV-SCNC: 16 MMOL/L (ref 21–28)
HCO3 BLDV-SCNC: 5 MMOL/L (ref 21–28)
HCO3 BLDV-SCNC: 7 MMOL/L (ref 21–28)
HCT VFR BLD AUTO: 26 % (ref 35–47)
HCT VFR BLD AUTO: 37.4 % (ref 35–47)
HEMOCCULT STL QL: POSITIVE
HGB BLD-MCNC: 11.8 G/DL (ref 11.7–15.7)
HGB BLD-MCNC: 9.2 G/DL (ref 11.7–15.7)
HGB UR QL STRIP: ABNORMAL
INR PPP: 1.68 (ref 0.85–1.15)
INR PPP: 1.82 (ref 0.85–1.15)
INTERPRETATION ECG - MUSE: NORMAL
KETONES BLD-SCNC: 3.2 MMOL/L (ref 0–0.6)
KETONES BLD-SCNC: 4.4 MMOL/L (ref 0–0.6)
KETONES UR STRIP-MCNC: >150 MG/DL
LACTATE SERPL-SCNC: 1.2 MMOL/L (ref 0.7–2)
LACTATE SERPL-SCNC: 2 MMOL/L (ref 0.7–2)
LEUKOCYTE ESTERASE UR QL STRIP: NEGATIVE
LIPASE SERPL-CCNC: 47 U/L (ref 73–393)
LISTERIA SPECIES (DETECTED/NOT DETECTED): NOT DETECTED
LVEF ECHO: NORMAL
LYMPHOCYTES # BLD MANUAL: 0.8 10E3/UL (ref 0.8–5.3)
LYMPHOCYTES # BLD MANUAL: 0.9 10E3/UL (ref 0.8–5.3)
LYMPHOCYTES NFR BLD MANUAL: 28 %
LYMPHOCYTES NFR BLD MANUAL: 5 %
MAGNESIUM SERPL-MCNC: 1.6 MG/DL (ref 1.6–2.3)
MAGNESIUM SERPL-MCNC: 1.6 MG/DL (ref 1.6–2.3)
MAGNESIUM SERPL-MCNC: 2 MG/DL (ref 1.6–2.3)
MAGNESIUM SERPL-MCNC: 2.2 MG/DL (ref 1.6–2.3)
MAGNESIUM SERPL-MCNC: 2.6 MG/DL (ref 1.7–2.3)
MCH RBC QN AUTO: 31 PG (ref 26.5–33)
MCH RBC QN AUTO: 31.3 PG (ref 26.5–33)
MCHC RBC AUTO-ENTMCNC: 31.6 G/DL (ref 31.5–36.5)
MCHC RBC AUTO-ENTMCNC: 35.4 G/DL (ref 31.5–36.5)
MCV RBC AUTO: 88 FL (ref 78–100)
MCV RBC AUTO: 98 FL (ref 78–100)
METAMYELOCYTES # BLD MANUAL: 0.2 10E3/UL
METAMYELOCYTES # BLD MANUAL: 0.6 10E3/UL
METAMYELOCYTES NFR BLD MANUAL: 4 %
METAMYELOCYTES NFR BLD MANUAL: 6 %
MONOCYTES # BLD MANUAL: 0.1 10E3/UL (ref 0–1.3)
MONOCYTES # BLD MANUAL: 1.3 10E3/UL (ref 0–1.3)
MONOCYTES NFR BLD MANUAL: 4 %
MONOCYTES NFR BLD MANUAL: 8 %
MRSA DNA SPEC QL NAA+PROBE: POSITIVE
MUCOUS THREADS #/AREA URNS LPF: PRESENT /LPF
NEUTROPHILS # BLD MANUAL: 13.3 10E3/UL (ref 1.6–8.3)
NEUTROPHILS # BLD MANUAL: 2 10E3/UL (ref 1.6–8.3)
NEUTROPHILS NFR BLD MANUAL: 62 %
NEUTROPHILS NFR BLD MANUAL: 83 %
NITRATE UR QL: NEGATIVE
O2/TOTAL GAS SETTING VFR VENT: 100 %
O2/TOTAL GAS SETTING VFR VENT: 3 %
O2/TOTAL GAS SETTING VFR VENT: 4 %
O2/TOTAL GAS SETTING VFR VENT: 50 %
O2/TOTAL GAS SETTING VFR VENT: 50 %
OPIATES UR QL SCN: ABNORMAL
OXYHGB MFR BLDV: 59 % (ref 70–75)
OXYHGB MFR BLDV: 75 % (ref 70–75)
P AXIS - MUSE: 84 DEGREES
PCO2 BLD: 31 MM HG (ref 35–45)
PCO2 BLD: 39 MM HG (ref 35–45)
PCO2 BLD: 62 MM HG (ref 35–45)
PCO2 BLDV: 35 MM HG (ref 40–50)
PCO2 BLDV: 35 MM HG (ref 40–50)
PCO2 BLDV: 44 MM HG (ref 40–50)
PCO2 BLDV: 51 MM HG (ref 40–50)
PH BLD: 7.06 [PH] (ref 7.35–7.45)
PH BLD: 7.23 [PH] (ref 7.35–7.45)
PH BLD: 7.28 [PH] (ref 7.35–7.45)
PH BLDV: 6.79 [PH] (ref 7.32–7.43)
PH BLDV: 6.8 [PH] (ref 7.32–7.43)
PH BLDV: 7.1 [PH] (ref 7.32–7.43)
PH BLDV: 7.21 [PH] (ref 7.32–7.43)
PH UR STRIP: 5.5 [PH] (ref 5–7)
PHOSPHATE SERPL-MCNC: 1 MG/DL (ref 2.5–4.5)
PHOSPHATE SERPL-MCNC: 1.4 MG/DL (ref 2.5–4.5)
PLAT MORPH BLD: ABNORMAL
PLAT MORPH BLD: ABNORMAL
PLATELET # BLD AUTO: 238 10E3/UL (ref 150–450)
PLATELET # BLD AUTO: 497 10E3/UL (ref 150–450)
PO2 BLD: 136 MM HG (ref 80–105)
PO2 BLD: 41 MM HG (ref 80–105)
PO2 BLD: 78 MM HG (ref 80–105)
PO2 BLDV: 31 MM HG (ref 25–47)
PO2 BLDV: 31 MM HG (ref 25–47)
PO2 BLDV: 51 MM HG (ref 25–47)
PO2 BLDV: 51 MM HG (ref 25–47)
POLYCHROMASIA BLD QL SMEAR: SLIGHT
POTASSIUM BLD-SCNC: 2.6 MMOL/L (ref 3.4–5.3)
POTASSIUM BLD-SCNC: 2.6 MMOL/L (ref 3.4–5.3)
POTASSIUM BLD-SCNC: 3.8 MMOL/L (ref 3.4–5.3)
POTASSIUM BLD-SCNC: 4 MMOL/L (ref 3.4–5.3)
POTASSIUM SERPL-SCNC: 3.2 MMOL/L (ref 3.4–5.3)
POTASSIUM SERPL-SCNC: 4.9 MMOL/L (ref 3.4–5.3)
PR INTERVAL - MUSE: 138 MS
PROCALCITONIN SERPL IA-MCNC: 72.56 NG/ML
PROT SERPL-MCNC: 6.9 G/DL (ref 6.4–8.3)
QRS DURATION - MUSE: 76 MS
QT - MUSE: 340 MS
QTC - MUSE: 460 MS
R AXIS - MUSE: 81 DEGREES
RBC # BLD AUTO: 2.94 10E6/UL (ref 3.8–5.2)
RBC # BLD AUTO: 3.81 10E6/UL (ref 3.8–5.2)
RBC MORPH BLD: ABNORMAL
RBC MORPH BLD: ABNORMAL
RBC URINE: 1 /HPF
SA TARGET DNA: POSITIVE
SALICYLATES SERPL-MCNC: <0.5 MG/DL
SODIUM SERPL-SCNC: 121 MMOL/L (ref 136–145)
SODIUM SERPL-SCNC: 127 MMOL/L (ref 136–145)
SODIUM SERPL-SCNC: 135 MMOL/L (ref 133–144)
SODIUM SERPL-SCNC: 137 MMOL/L (ref 133–144)
SODIUM SERPL-SCNC: 138 MMOL/L (ref 133–144)
SP GR UR STRIP: 1.02 (ref 1–1.03)
SPECIMEN EXPIRATION DATE: NORMAL
SQUAMOUS EPITHELIAL: 1 /HPF
STAPHYLOCOCCUS AUREUS: DETECTED
STAPHYLOCOCCUS EPIDERMIDIS: NOT DETECTED
STAPHYLOCOCCUS LUGDUNENSIS: NOT DETECTED
STREPTOCOCCUS AGALACTIAE: NOT DETECTED
STREPTOCOCCUS ANGINOSUS GROUP: NOT DETECTED
STREPTOCOCCUS PNEUMONIAE: NOT DETECTED
STREPTOCOCCUS PYOGENES: NOT DETECTED
STREPTOCOCCUS SPECIES: NOT DETECTED
SYSTOLIC BLOOD PRESSURE - MUSE: NORMAL MMHG
T AXIS - MUSE: 38 DEGREES
TOXIC GRANULES BLD QL SMEAR: PRESENT
UROBILINOGEN UR STRIP-MCNC: NORMAL MG/DL
VENTRICULAR RATE- MUSE: 110 BPM
WBC # BLD AUTO: 16 10E3/UL (ref 4–11)
WBC # BLD AUTO: 3.3 10E3/UL (ref 4–11)
WBC URINE: 1 /HPF

## 2022-12-16 PROCEDURE — 86140 C-REACTIVE PROTEIN: CPT | Performed by: EMERGENCY MEDICINE

## 2022-12-16 PROCEDURE — 999N000065 XR CHEST PORT 1 VIEW

## 2022-12-16 PROCEDURE — 31500 INSERT EMERGENCY AIRWAY: CPT | Mod: GC | Performed by: INTERNAL MEDICINE

## 2022-12-16 PROCEDURE — 94002 VENT MGMT INPAT INIT DAY: CPT

## 2022-12-16 PROCEDURE — 250N000011 HC RX IP 250 OP 636: Performed by: SURGERY

## 2022-12-16 PROCEDURE — 99255 IP/OBS CONSLTJ NEW/EST HI 80: CPT | Performed by: INTERNAL MEDICINE

## 2022-12-16 PROCEDURE — 86140 C-REACTIVE PROTEIN: CPT | Performed by: INTERNAL MEDICINE

## 2022-12-16 PROCEDURE — 99292 CRITICAL CARE ADDL 30 MIN: CPT

## 2022-12-16 PROCEDURE — 250N000009 HC RX 250: Performed by: INTERNAL MEDICINE

## 2022-12-16 PROCEDURE — 99222 1ST HOSP IP/OBS MODERATE 55: CPT | Mod: CS | Performed by: PHYSICIAN ASSISTANT

## 2022-12-16 PROCEDURE — 85014 HEMATOCRIT: CPT | Performed by: INTERNAL MEDICINE

## 2022-12-16 PROCEDURE — 258N000003 HC RX IP 258 OP 636: Performed by: INTERNAL MEDICINE

## 2022-12-16 PROCEDURE — 93306 TTE W/DOPPLER COMPLETE: CPT | Mod: 26 | Performed by: INTERNAL MEDICINE

## 2022-12-16 PROCEDURE — 83690 ASSAY OF LIPASE: CPT | Performed by: SURGERY

## 2022-12-16 PROCEDURE — 70450 CT HEAD/BRAIN W/O DYE: CPT

## 2022-12-16 PROCEDURE — 86923 COMPATIBILITY TEST ELECTRIC: CPT | Performed by: INTERNAL MEDICINE

## 2022-12-16 PROCEDURE — 87077 CULTURE AEROBIC IDENTIFY: CPT | Performed by: EMERGENCY MEDICINE

## 2022-12-16 PROCEDURE — 85007 BL SMEAR W/DIFF WBC COUNT: CPT | Performed by: EMERGENCY MEDICINE

## 2022-12-16 PROCEDURE — 250N000011 HC RX IP 250 OP 636: Performed by: EMERGENCY MEDICINE

## 2022-12-16 PROCEDURE — 82310 ASSAY OF CALCIUM: CPT | Performed by: SURGERY

## 2022-12-16 PROCEDURE — 99291 CRITICAL CARE FIRST HOUR: CPT | Mod: 25

## 2022-12-16 PROCEDURE — 82010 KETONE BODYS QUAN: CPT | Performed by: EMERGENCY MEDICINE

## 2022-12-16 PROCEDURE — 84132 ASSAY OF SERUM POTASSIUM: CPT | Performed by: INTERNAL MEDICINE

## 2022-12-16 PROCEDURE — 3E043XZ INTRODUCTION OF VASOPRESSOR INTO CENTRAL VEIN, PERCUTANEOUS APPROACH: ICD-10-PCS | Performed by: INTERNAL MEDICINE

## 2022-12-16 PROCEDURE — 999N000157 HC STATISTIC RCP TIME EA 10 MIN

## 2022-12-16 PROCEDURE — 258N000003 HC RX IP 258 OP 636: Performed by: EMERGENCY MEDICINE

## 2022-12-16 PROCEDURE — 250N000011 HC RX IP 250 OP 636: Performed by: INTERNAL MEDICINE

## 2022-12-16 PROCEDURE — 83735 ASSAY OF MAGNESIUM: CPT | Performed by: EMERGENCY MEDICINE

## 2022-12-16 PROCEDURE — 85027 COMPLETE CBC AUTOMATED: CPT | Performed by: EMERGENCY MEDICINE

## 2022-12-16 PROCEDURE — 83605 ASSAY OF LACTIC ACID: CPT | Performed by: SURGERY

## 2022-12-16 PROCEDURE — 255N000002 HC RX 255 OP 636: Performed by: INTERNAL MEDICINE

## 2022-12-16 PROCEDURE — XW043E5 INTRODUCTION OF REMDESIVIR ANTI-INFECTIVE INTO CENTRAL VEIN, PERCUTANEOUS APPROACH, NEW TECHNOLOGY GROUP 5: ICD-10-PCS | Performed by: INTERNAL MEDICINE

## 2022-12-16 PROCEDURE — 84100 ASSAY OF PHOSPHORUS: CPT | Performed by: INTERNAL MEDICINE

## 2022-12-16 PROCEDURE — 200N000001 HC R&B ICU

## 2022-12-16 PROCEDURE — 84145 PROCALCITONIN (PCT): CPT | Performed by: EMERGENCY MEDICINE

## 2022-12-16 PROCEDURE — 82010 KETONE BODYS QUAN: CPT | Performed by: INTERNAL MEDICINE

## 2022-12-16 PROCEDURE — 80047 BASIC METABLC PNL IONIZED CA: CPT | Mod: XU

## 2022-12-16 PROCEDURE — 85610 PROTHROMBIN TIME: CPT | Performed by: INTERNAL MEDICINE

## 2022-12-16 PROCEDURE — 85007 BL SMEAR W/DIFF WBC COUNT: CPT | Performed by: INTERNAL MEDICINE

## 2022-12-16 PROCEDURE — 5A1955Z RESPIRATORY VENTILATION, GREATER THAN 96 CONSECUTIVE HOURS: ICD-10-PCS | Performed by: INTERNAL MEDICINE

## 2022-12-16 PROCEDURE — 85610 PROTHROMBIN TIME: CPT | Performed by: EMERGENCY MEDICINE

## 2022-12-16 PROCEDURE — 36569 INSJ PICC 5 YR+ W/O IMAGING: CPT

## 2022-12-16 PROCEDURE — 80179 DRUG ASSAY SALICYLATE: CPT | Performed by: EMERGENCY MEDICINE

## 2022-12-16 PROCEDURE — 250N000009 HC RX 250

## 2022-12-16 PROCEDURE — 250N000013 HC RX MED GY IP 250 OP 250 PS 637: Performed by: INTERNAL MEDICINE

## 2022-12-16 PROCEDURE — 82272 OCCULT BLD FECES 1-3 TESTS: CPT | Performed by: EMERGENCY MEDICINE

## 2022-12-16 PROCEDURE — 80048 BASIC METABOLIC PNL TOTAL CA: CPT | Performed by: INTERNAL MEDICINE

## 2022-12-16 PROCEDURE — 85379 FIBRIN DEGRADATION QUANT: CPT | Performed by: INTERNAL MEDICINE

## 2022-12-16 PROCEDURE — 80307 DRUG TEST PRSMV CHEM ANLYZR: CPT | Performed by: EMERGENCY MEDICINE

## 2022-12-16 PROCEDURE — 250N000009 HC RX 250: Performed by: SURGERY

## 2022-12-16 PROCEDURE — 99223 1ST HOSP IP/OBS HIGH 75: CPT | Mod: AI | Performed by: INTERNAL MEDICINE

## 2022-12-16 PROCEDURE — 87077 CULTURE AEROBIC IDENTIFY: CPT | Performed by: SURGERY

## 2022-12-16 PROCEDURE — C9113 INJ PANTOPRAZOLE SODIUM, VIA: HCPCS | Performed by: INTERNAL MEDICINE

## 2022-12-16 PROCEDURE — 87149 DNA/RNA DIRECT PROBE: CPT | Performed by: EMERGENCY MEDICINE

## 2022-12-16 PROCEDURE — 272N000451 HC KIT SHRLOCK 5FR POWER PICC DOUBLE LUMEN

## 2022-12-16 PROCEDURE — 82550 ASSAY OF CK (CPK): CPT | Performed by: SURGERY

## 2022-12-16 PROCEDURE — 80053 COMPREHEN METABOLIC PANEL: CPT | Performed by: EMERGENCY MEDICINE

## 2022-12-16 PROCEDURE — 82805 BLOOD GASES W/O2 SATURATION: CPT | Performed by: EMERGENCY MEDICINE

## 2022-12-16 PROCEDURE — 36415 COLL VENOUS BLD VENIPUNCTURE: CPT | Performed by: EMERGENCY MEDICINE

## 2022-12-16 PROCEDURE — 82803 BLOOD GASES ANY COMBINATION: CPT | Performed by: SURGERY

## 2022-12-16 PROCEDURE — 96367 TX/PROPH/DG ADDL SEQ IV INF: CPT

## 2022-12-16 PROCEDURE — 93005 ELECTROCARDIOGRAM TRACING: CPT

## 2022-12-16 PROCEDURE — 82330 ASSAY OF CALCIUM: CPT | Performed by: SURGERY

## 2022-12-16 PROCEDURE — 96365 THER/PROPH/DIAG IV INF INIT: CPT

## 2022-12-16 PROCEDURE — 83735 ASSAY OF MAGNESIUM: CPT | Performed by: SURGERY

## 2022-12-16 PROCEDURE — 83605 ASSAY OF LACTIC ACID: CPT | Performed by: INTERNAL MEDICINE

## 2022-12-16 PROCEDURE — 272N000017 HC KIT MONITOR CVP

## 2022-12-16 PROCEDURE — 96366 THER/PROPH/DIAG IV INF ADDON: CPT

## 2022-12-16 PROCEDURE — 999N000040 HC STATISTIC CONSULT NO CHARGE VASC ACCESS

## 2022-12-16 PROCEDURE — 80143 DRUG ASSAY ACETAMINOPHEN: CPT | Performed by: EMERGENCY MEDICINE

## 2022-12-16 PROCEDURE — 250N000013 HC RX MED GY IP 250 OP 250 PS 637: Performed by: SURGERY

## 2022-12-16 PROCEDURE — 87641 MR-STAPH DNA AMP PROBE: CPT | Performed by: INTERNAL MEDICINE

## 2022-12-16 PROCEDURE — 250N000009 HC RX 250: Performed by: EMERGENCY MEDICINE

## 2022-12-16 PROCEDURE — 93010 ELECTROCARDIOGRAM REPORT: CPT | Performed by: INTERNAL MEDICINE

## 2022-12-16 PROCEDURE — 82805 BLOOD GASES W/O2 SATURATION: CPT | Performed by: INTERNAL MEDICINE

## 2022-12-16 PROCEDURE — 82330 ASSAY OF CALCIUM: CPT | Performed by: INTERNAL MEDICINE

## 2022-12-16 PROCEDURE — 250N000012 HC RX MED GY IP 250 OP 636 PS 637: Performed by: INTERNAL MEDICINE

## 2022-12-16 PROCEDURE — 87205 SMEAR GRAM STAIN: CPT | Performed by: SURGERY

## 2022-12-16 PROCEDURE — 86850 RBC ANTIBODY SCREEN: CPT | Performed by: EMERGENCY MEDICINE

## 2022-12-16 PROCEDURE — 36620 INSERTION CATHETER ARTERY: CPT | Mod: GC | Performed by: INTERNAL MEDICINE

## 2022-12-16 PROCEDURE — 82803 BLOOD GASES ANY COMBINATION: CPT | Performed by: EMERGENCY MEDICINE

## 2022-12-16 PROCEDURE — 999N000208 ECHOCARDIOGRAM COMPLETE

## 2022-12-16 PROCEDURE — 258N000003 HC RX IP 258 OP 636: Performed by: SURGERY

## 2022-12-16 PROCEDURE — 86901 BLOOD TYPING SEROLOGIC RH(D): CPT | Performed by: EMERGENCY MEDICINE

## 2022-12-16 PROCEDURE — 258N000001 HC RX 258: Performed by: INTERNAL MEDICINE

## 2022-12-16 PROCEDURE — C9113 INJ PANTOPRAZOLE SODIUM, VIA: HCPCS | Performed by: SURGERY

## 2022-12-16 PROCEDURE — 250N000011 HC RX IP 250 OP 636

## 2022-12-16 PROCEDURE — 36556 INSERT NON-TUNNEL CV CATH: CPT

## 2022-12-16 PROCEDURE — 82077 ASSAY SPEC XCP UR&BREATH IA: CPT | Performed by: EMERGENCY MEDICINE

## 2022-12-16 PROCEDURE — 36415 COLL VENOUS BLD VENIPUNCTURE: CPT | Mod: 76 | Performed by: EMERGENCY MEDICINE

## 2022-12-16 PROCEDURE — 258N000002 HC RX IP 258 OP 250: Performed by: INTERNAL MEDICINE

## 2022-12-16 PROCEDURE — 81001 URINALYSIS AUTO W/SCOPE: CPT | Mod: XU | Performed by: EMERGENCY MEDICINE

## 2022-12-16 PROCEDURE — 84703 CHORIONIC GONADOTROPIN ASSAY: CPT | Performed by: EMERGENCY MEDICINE

## 2022-12-16 PROCEDURE — 83735 ASSAY OF MAGNESIUM: CPT | Performed by: INTERNAL MEDICINE

## 2022-12-16 RX ORDER — DEXTROSE MONOHYDRATE 25 G/50ML
25-50 INJECTION, SOLUTION INTRAVENOUS
Status: DISCONTINUED | OUTPATIENT
Start: 2022-12-16 | End: 2022-12-17

## 2022-12-16 RX ORDER — PIPERACILLIN SODIUM, TAZOBACTAM SODIUM 4; .5 G/20ML; G/20ML
4.5 INJECTION, POWDER, LYOPHILIZED, FOR SOLUTION INTRAVENOUS EVERY 6 HOURS
Status: DISCONTINUED | OUTPATIENT
Start: 2022-12-16 | End: 2022-12-17

## 2022-12-16 RX ORDER — PHENYLEPHRINE HCL IN 0.9% NACL 50MG/250ML
.1-6 PLASTIC BAG, INJECTION (ML) INTRAVENOUS CONTINUOUS
Status: DISCONTINUED | OUTPATIENT
Start: 2022-12-16 | End: 2022-12-19

## 2022-12-16 RX ORDER — AMPICILLIN AND SULBACTAM 2; 1 G/1; G/1
3 INJECTION, POWDER, FOR SOLUTION INTRAMUSCULAR; INTRAVENOUS ONCE
Status: COMPLETED | OUTPATIENT
Start: 2022-12-16 | End: 2022-12-16

## 2022-12-16 RX ORDER — ONDANSETRON 2 MG/ML
4 INJECTION INTRAMUSCULAR; INTRAVENOUS EVERY 6 HOURS PRN
Status: DISCONTINUED | OUTPATIENT
Start: 2022-12-16 | End: 2023-01-04 | Stop reason: HOSPADM

## 2022-12-16 RX ORDER — DEXTROSE MONOHYDRATE 25 G/50ML
25-50 INJECTION, SOLUTION INTRAVENOUS
Status: DISCONTINUED | OUTPATIENT
Start: 2022-12-16 | End: 2022-12-16

## 2022-12-16 RX ORDER — NICOTINE POLACRILEX 4 MG
15-30 LOZENGE BUCCAL
Status: DISCONTINUED | OUTPATIENT
Start: 2022-12-16 | End: 2022-12-16

## 2022-12-16 RX ORDER — SODIUM CHLORIDE 9 MG/ML
INJECTION, SOLUTION INTRAVENOUS CONTINUOUS
Status: DISCONTINUED | OUTPATIENT
Start: 2022-12-16 | End: 2022-12-21 | Stop reason: ALTCHOICE

## 2022-12-16 RX ORDER — POTASSIUM CHLORIDE 7.45 MG/ML
10 INJECTION INTRAVENOUS
Status: DISCONTINUED | OUTPATIENT
Start: 2022-12-16 | End: 2022-12-16

## 2022-12-16 RX ORDER — NALOXONE HYDROCHLORIDE 0.4 MG/ML
0.4 INJECTION, SOLUTION INTRAMUSCULAR; INTRAVENOUS; SUBCUTANEOUS
Status: DISCONTINUED | OUTPATIENT
Start: 2022-12-16 | End: 2022-12-20

## 2022-12-16 RX ORDER — PROCHLORPERAZINE 25 MG
25 SUPPOSITORY, RECTAL RECTAL EVERY 12 HOURS PRN
Status: DISCONTINUED | OUTPATIENT
Start: 2022-12-16 | End: 2023-01-04 | Stop reason: HOSPADM

## 2022-12-16 RX ORDER — POTASSIUM CHLORIDE 7.45 MG/ML
10 INJECTION INTRAVENOUS
Status: COMPLETED | OUTPATIENT
Start: 2022-12-16 | End: 2022-12-16

## 2022-12-16 RX ORDER — CALCIUM GLUCONATE 20 MG/ML
2 INJECTION, SOLUTION INTRAVENOUS ONCE
Status: COMPLETED | OUTPATIENT
Start: 2022-12-16 | End: 2022-12-16

## 2022-12-16 RX ORDER — CHLORHEXIDINE GLUCONATE ORAL RINSE 1.2 MG/ML
15 SOLUTION DENTAL EVERY 12 HOURS
Status: DISCONTINUED | OUTPATIENT
Start: 2022-12-16 | End: 2023-01-04 | Stop reason: HOSPADM

## 2022-12-16 RX ORDER — NALOXONE HYDROCHLORIDE 1 MG/ML
0.2 INJECTION INTRAMUSCULAR; INTRAVENOUS; SUBCUTANEOUS ONCE
Status: COMPLETED | OUTPATIENT
Start: 2022-12-16 | End: 2022-12-16

## 2022-12-16 RX ORDER — FENTANYL CITRATE 50 UG/ML
25-100 INJECTION, SOLUTION INTRAMUSCULAR; INTRAVENOUS
Status: DISCONTINUED | OUTPATIENT
Start: 2022-12-16 | End: 2022-12-30

## 2022-12-16 RX ORDER — ACETAMINOPHEN 650 MG/1
650 SUPPOSITORY RECTAL EVERY 4 HOURS PRN
Status: DISCONTINUED | OUTPATIENT
Start: 2022-12-16 | End: 2022-12-19

## 2022-12-16 RX ORDER — ACETAMINOPHEN 325 MG/1
650 TABLET ORAL EVERY 6 HOURS PRN
Status: DISCONTINUED | OUTPATIENT
Start: 2022-12-16 | End: 2022-12-16

## 2022-12-16 RX ORDER — HEPARIN SODIUM 5000 [USP'U]/.5ML
5000 INJECTION, SOLUTION INTRAVENOUS; SUBCUTANEOUS EVERY 12 HOURS
Status: DISCONTINUED | OUTPATIENT
Start: 2022-12-16 | End: 2023-01-04 | Stop reason: HOSPADM

## 2022-12-16 RX ORDER — PROPOFOL 10 MG/ML
5-75 INJECTION, EMULSION INTRAVENOUS CONTINUOUS
Status: DISCONTINUED | OUTPATIENT
Start: 2022-12-16 | End: 2022-12-25

## 2022-12-16 RX ORDER — NOREPINEPHRINE BITARTRATE 0.02 MG/ML
.01-.6 INJECTION, SOLUTION INTRAVENOUS CONTINUOUS
Status: DISCONTINUED | OUTPATIENT
Start: 2022-12-16 | End: 2022-12-19

## 2022-12-16 RX ORDER — NALOXONE HYDROCHLORIDE 0.4 MG/ML
0.2 INJECTION, SOLUTION INTRAMUSCULAR; INTRAVENOUS; SUBCUTANEOUS
Status: DISCONTINUED | OUTPATIENT
Start: 2022-12-16 | End: 2022-12-20

## 2022-12-16 RX ORDER — NALOXONE HYDROCHLORIDE 0.4 MG/ML
0.2 INJECTION, SOLUTION INTRAMUSCULAR; INTRAVENOUS; SUBCUTANEOUS ONCE
Status: DISCONTINUED | OUTPATIENT
Start: 2022-12-16 | End: 2022-12-16

## 2022-12-16 RX ORDER — NICOTINE POLACRILEX 4 MG
15-30 LOZENGE BUCCAL
Status: DISCONTINUED | OUTPATIENT
Start: 2022-12-16 | End: 2022-12-17

## 2022-12-16 RX ORDER — SODIUM CHLORIDE 9 MG/ML
1000 INJECTION, SOLUTION INTRAVENOUS CONTINUOUS
Status: DISCONTINUED | OUTPATIENT
Start: 2022-12-16 | End: 2022-12-16

## 2022-12-16 RX ORDER — SODIUM CHLORIDE 9 MG/ML
INJECTION, SOLUTION INTRAVENOUS ONCE
Status: COMPLETED | OUTPATIENT
Start: 2022-12-16 | End: 2022-12-16

## 2022-12-16 RX ORDER — CALCIUM GLUCONATE 94 MG/ML
2 INJECTION, SOLUTION INTRAVENOUS ONCE
Status: DISCONTINUED | OUTPATIENT
Start: 2022-12-16 | End: 2022-12-16

## 2022-12-16 RX ORDER — AMPICILLIN AND SULBACTAM 2; 1 G/1; G/1
3 INJECTION, POWDER, FOR SOLUTION INTRAMUSCULAR; INTRAVENOUS EVERY 6 HOURS
Status: DISCONTINUED | OUTPATIENT
Start: 2022-12-16 | End: 2022-12-16

## 2022-12-16 RX ORDER — ACETAMINOPHEN 325 MG/1
650 TABLET ORAL EVERY 4 HOURS PRN
Status: DISCONTINUED | OUTPATIENT
Start: 2022-12-16 | End: 2022-12-19

## 2022-12-16 RX ORDER — PROCHLORPERAZINE MALEATE 5 MG
5 TABLET ORAL EVERY 6 HOURS PRN
Status: DISCONTINUED | OUTPATIENT
Start: 2022-12-16 | End: 2023-01-04 | Stop reason: HOSPADM

## 2022-12-16 RX ORDER — SODIUM CHLORIDE 9 MG/ML
INJECTION, SOLUTION INTRAVENOUS CONTINUOUS
Status: DISCONTINUED | OUTPATIENT
Start: 2022-12-16 | End: 2022-12-17

## 2022-12-16 RX ORDER — PROPOFOL 10 MG/ML
INJECTION, EMULSION INTRAVENOUS
Status: COMPLETED
Start: 2022-12-16 | End: 2022-12-16

## 2022-12-16 RX ORDER — HALOPERIDOL 5 MG/ML
5 INJECTION INTRAMUSCULAR EVERY 6 HOURS PRN
Status: DISCONTINUED | OUTPATIENT
Start: 2022-12-16 | End: 2022-12-19

## 2022-12-16 RX ORDER — SODIUM CHLORIDE 450 MG/100ML
1000 INJECTION, SOLUTION INTRAVENOUS CONTINUOUS
Status: DISCONTINUED | OUTPATIENT
Start: 2022-12-16 | End: 2022-12-16

## 2022-12-16 RX ORDER — NOREPINEPHRINE BITARTRATE 0.02 MG/ML
.01-.6 INJECTION, SOLUTION INTRAVENOUS CONTINUOUS
Status: DISCONTINUED | OUTPATIENT
Start: 2022-12-16 | End: 2022-12-16

## 2022-12-16 RX ORDER — FENTANYL CITRATE 50 UG/ML
25-50 INJECTION, SOLUTION INTRAMUSCULAR; INTRAVENOUS
Status: DISCONTINUED | OUTPATIENT
Start: 2022-12-16 | End: 2022-12-16

## 2022-12-16 RX ORDER — ONDANSETRON 4 MG/1
4 TABLET, ORALLY DISINTEGRATING ORAL EVERY 6 HOURS PRN
Status: DISCONTINUED | OUTPATIENT
Start: 2022-12-16 | End: 2023-01-04 | Stop reason: HOSPADM

## 2022-12-16 RX ORDER — NOREPINEPHRINE BITARTRATE 0.02 MG/ML
INJECTION, SOLUTION INTRAVENOUS
Status: COMPLETED
Start: 2022-12-16 | End: 2022-12-16

## 2022-12-16 RX ORDER — MIDAZOLAM HCL IN 0.9 % NACL/PF 1 MG/ML
1-8 PLASTIC BAG, INJECTION (ML) INTRAVENOUS CONTINUOUS
Status: DISCONTINUED | OUTPATIENT
Start: 2022-12-16 | End: 2022-12-19

## 2022-12-16 RX ORDER — VANCOMYCIN HYDROCHLORIDE 1 G/200ML
1000 INJECTION, SOLUTION INTRAVENOUS ONCE
Status: COMPLETED | OUTPATIENT
Start: 2022-12-16 | End: 2022-12-16

## 2022-12-16 RX ADMIN — HUMAN ALBUMIN MICROSPHERES AND PERFLUTREN 9 ML: 10; .22 INJECTION, SOLUTION INTRAVENOUS at 15:39

## 2022-12-16 RX ADMIN — LIDOCAINE HYDROCHLORIDE 1 ML: 10 INJECTION, SOLUTION EPIDURAL; INFILTRATION; INTRACAUDAL; PERINEURAL at 15:36

## 2022-12-16 RX ADMIN — CHLORHEXIDINE GLUCONATE 0.12% ORAL RINSE 15 ML: 1.2 LIQUID ORAL at 12:49

## 2022-12-16 RX ADMIN — MIDAZOLAM 2 MG: 1 INJECTION INTRAMUSCULAR; INTRAVENOUS at 16:14

## 2022-12-16 RX ADMIN — Medication 10 MG: at 14:21

## 2022-12-16 RX ADMIN — ONDANSETRON 4 MG: 2 INJECTION INTRAMUSCULAR; INTRAVENOUS at 09:24

## 2022-12-16 RX ADMIN — ACETAMINOPHEN 650 MG: 650 SUPPOSITORY RECTAL at 20:08

## 2022-12-16 RX ADMIN — NALOXONE HYDROCHLORIDE 0.2 MG: 1 INJECTION PARENTERAL at 02:25

## 2022-12-16 RX ADMIN — AMPICILLIN SODIUM AND SULBACTAM SODIUM 3 G: 2; 1 INJECTION, POWDER, FOR SOLUTION INTRAMUSCULAR; INTRAVENOUS at 04:03

## 2022-12-16 RX ADMIN — PHENYLEPHRINE HYDROCHLORIDE 0.5 MCG/KG/MIN: 10 INJECTION INTRAVENOUS at 18:19

## 2022-12-16 RX ADMIN — SODIUM BICARBONATE 25 MEQ: 84 INJECTION INTRAVENOUS at 05:10

## 2022-12-16 RX ADMIN — SODIUM CHLORIDE: 9 INJECTION, SOLUTION INTRAVENOUS at 16:23

## 2022-12-16 RX ADMIN — MIDAZOLAM 2 MG: 1 INJECTION INTRAMUSCULAR; INTRAVENOUS at 22:18

## 2022-12-16 RX ADMIN — POTASSIUM CHLORIDE 10 MEQ: 7.46 INJECTION, SOLUTION INTRAVENOUS at 13:33

## 2022-12-16 RX ADMIN — SODIUM CHLORIDE: 9 INJECTION, SOLUTION INTRAVENOUS at 08:37

## 2022-12-16 RX ADMIN — HYDROCORTISONE SODIUM SUCCINATE 50 MG: 100 INJECTION, POWDER, FOR SOLUTION INTRAMUSCULAR; INTRAVENOUS at 18:01

## 2022-12-16 RX ADMIN — Medication 50 MCG/HR: at 15:21

## 2022-12-16 RX ADMIN — POTASSIUM CHLORIDE: 2 INJECTION, SOLUTION, CONCENTRATE INTRAVENOUS at 09:41

## 2022-12-16 RX ADMIN — SODIUM CHLORIDE: 9 INJECTION, SOLUTION INTRAVENOUS at 10:02

## 2022-12-16 RX ADMIN — PROPOFOL 5 MCG/KG/MIN: 10 INJECTION, EMULSION INTRAVENOUS at 11:24

## 2022-12-16 RX ADMIN — PANTOPRAZOLE SODIUM 40 MG: 40 INJECTION, POWDER, FOR SOLUTION INTRAVENOUS at 20:07

## 2022-12-16 RX ADMIN — VASOPRESSIN 2.4 UNITS/HR: 20 INJECTION, SOLUTION INTRAMUSCULAR; SUBCUTANEOUS at 18:44

## 2022-12-16 RX ADMIN — POTASSIUM CHLORIDE: 2 INJECTION, SOLUTION, CONCENTRATE INTRAVENOUS at 18:05

## 2022-12-16 RX ADMIN — HEPARIN SODIUM 5000 UNITS: 5000 INJECTION, SOLUTION INTRAVENOUS; SUBCUTANEOUS at 21:19

## 2022-12-16 RX ADMIN — MIDAZOLAM 2 MG: 1 INJECTION INTRAMUSCULAR; INTRAVENOUS at 20:04

## 2022-12-16 RX ADMIN — PIPERACILLIN AND TAZOBACTAM 4.5 G: 4; .5 INJECTION, POWDER, FOR SOLUTION INTRAVENOUS at 09:47

## 2022-12-16 RX ADMIN — POTASSIUM CHLORIDE 10 MEQ: 7.46 INJECTION, SOLUTION INTRAVENOUS at 14:24

## 2022-12-16 RX ADMIN — SODIUM CHLORIDE 50 ML: 9 INJECTION, SOLUTION INTRAVENOUS at 08:28

## 2022-12-16 RX ADMIN — FENTANYL CITRATE 25 MCG: 50 INJECTION, SOLUTION INTRAMUSCULAR; INTRAVENOUS at 13:56

## 2022-12-16 RX ADMIN — SODIUM BICARBONATE 50 MEQ: 84 INJECTION INTRAVENOUS at 08:17

## 2022-12-16 RX ADMIN — POTASSIUM CHLORIDE 10 MEQ: 7.46 INJECTION, SOLUTION INTRAVENOUS at 17:34

## 2022-12-16 RX ADMIN — POTASSIUM CHLORIDE 10 MEQ: 7.46 INJECTION, SOLUTION INTRAVENOUS at 15:24

## 2022-12-16 RX ADMIN — MIDAZOLAM HYDROCHLORIDE 1 MG/HR: 1 INJECTION, SOLUTION INTRAVENOUS at 21:15

## 2022-12-16 RX ADMIN — SODIUM CHLORIDE: 900 INJECTION INTRAVENOUS at 04:47

## 2022-12-16 RX ADMIN — SODIUM CHLORIDE 1000 ML: 9 INJECTION, SOLUTION INTRAVENOUS at 09:43

## 2022-12-16 RX ADMIN — Medication 50 MEQ: at 08:17

## 2022-12-16 RX ADMIN — SODIUM CHLORIDE 5.5 UNITS/HR: 9 INJECTION, SOLUTION INTRAVENOUS at 03:25

## 2022-12-16 RX ADMIN — SODIUM CHLORIDE: 9 INJECTION, SOLUTION INTRAVENOUS at 11:33

## 2022-12-16 RX ADMIN — SODIUM CHLORIDE 2 UNITS/HR: 9 INJECTION, SOLUTION INTRAVENOUS at 23:17

## 2022-12-16 RX ADMIN — SODIUM CHLORIDE 1000 ML: 9 INJECTION, SOLUTION INTRAVENOUS at 08:43

## 2022-12-16 RX ADMIN — FENTANYL CITRATE 100 MCG: 50 INJECTION INTRAMUSCULAR; INTRAVENOUS at 14:51

## 2022-12-16 RX ADMIN — SODIUM CHLORIDE 1000 ML: 9 INJECTION, SOLUTION INTRAVENOUS at 11:20

## 2022-12-16 RX ADMIN — MIDAZOLAM 2 MG: 1 INJECTION INTRAMUSCULAR; INTRAVENOUS at 18:26

## 2022-12-16 RX ADMIN — SODIUM BICARBONATE: 84 INJECTION, SOLUTION INTRAVENOUS at 08:42

## 2022-12-16 RX ADMIN — SODIUM CHLORIDE, POTASSIUM CHLORIDE, SODIUM LACTATE AND CALCIUM CHLORIDE 1000 ML: 600; 310; 30; 20 INJECTION, SOLUTION INTRAVENOUS at 15:44

## 2022-12-16 RX ADMIN — Medication 0.03 MCG/KG/MIN: at 10:50

## 2022-12-16 RX ADMIN — VASOPRESSIN 2.4 UNITS/HR: 20 INJECTION, SOLUTION INTRAMUSCULAR; SUBCUTANEOUS at 11:51

## 2022-12-16 RX ADMIN — DEXTROSE MONOHYDRATE 30 MMOL: 5 INJECTION, SOLUTION INTRAVENOUS at 15:33

## 2022-12-16 RX ADMIN — VANCOMYCIN HYDROCHLORIDE 1000 MG: 1 INJECTION, SOLUTION INTRAVENOUS at 09:58

## 2022-12-16 RX ADMIN — PROPOFOL 15 MCG/KG/MIN: 10 INJECTION, EMULSION INTRAVENOUS at 22:30

## 2022-12-16 RX ADMIN — CALCIUM GLUCONATE 2 G: 20 INJECTION, SOLUTION INTRAVENOUS at 14:09

## 2022-12-16 RX ADMIN — HYDROCORTISONE SODIUM SUCCINATE 50 MG: 100 INJECTION, POWDER, FOR SOLUTION INTRAMUSCULAR; INTRAVENOUS at 13:14

## 2022-12-16 RX ADMIN — Medication 10 MG: at 14:33

## 2022-12-16 RX ADMIN — PIPERACILLIN AND TAZOBACTAM 4.5 G: 4; .5 INJECTION, POWDER, FOR SOLUTION INTRAVENOUS at 20:07

## 2022-12-16 RX ADMIN — POTASSIUM CHLORIDE 10 MEQ: 7.46 INJECTION, SOLUTION INTRAVENOUS at 18:32

## 2022-12-16 RX ADMIN — SODIUM CHLORIDE: 9 INJECTION, SOLUTION INTRAVENOUS at 09:22

## 2022-12-16 RX ADMIN — POTASSIUM CHLORIDE 10 MEQ: 7.46 INJECTION, SOLUTION INTRAVENOUS at 16:29

## 2022-12-16 RX ADMIN — PANTOPRAZOLE SODIUM 40 MG: 40 INJECTION, POWDER, FOR SOLUTION INTRAVENOUS at 08:13

## 2022-12-16 RX ADMIN — VANCOMYCIN HYDROCHLORIDE 750 MG: 1 INJECTION, POWDER, LYOPHILIZED, FOR SOLUTION INTRAVENOUS at 22:10

## 2022-12-16 RX ADMIN — PIPERACILLIN AND TAZOBACTAM 4.5 G: 4; .5 INJECTION, POWDER, FOR SOLUTION INTRAVENOUS at 15:11

## 2022-12-16 RX ADMIN — SODIUM CHLORIDE 1000 ML: 9 INJECTION, SOLUTION INTRAVENOUS at 04:03

## 2022-12-16 RX ADMIN — SODIUM CHLORIDE 1000 ML: 9 INJECTION, SOLUTION INTRAVENOUS at 03:22

## 2022-12-16 RX ADMIN — SODIUM CHLORIDE: 9 INJECTION, SOLUTION INTRAVENOUS at 09:56

## 2022-12-16 RX ADMIN — CHLORHEXIDINE GLUCONATE 0.12% ORAL RINSE 15 ML: 1.2 LIQUID ORAL at 20:08

## 2022-12-16 RX ADMIN — HEPARIN SODIUM 5000 UNITS: 5000 INJECTION, SOLUTION INTRAVENOUS; SUBCUTANEOUS at 09:35

## 2022-12-16 RX ADMIN — SODIUM CHLORIDE 12 UNITS/HR: 9 INJECTION, SOLUTION INTRAVENOUS at 07:02

## 2022-12-16 RX ADMIN — NOREPINEPHRINE BITARTRATE 0.03 MCG/KG/MIN: 0.02 INJECTION, SOLUTION INTRAVENOUS at 10:50

## 2022-12-16 RX ADMIN — SODIUM CHLORIDE: 9 INJECTION, SOLUTION INTRAVENOUS at 21:22

## 2022-12-16 RX ADMIN — REMDESIVIR 200 MG: 100 INJECTION, POWDER, LYOPHILIZED, FOR SOLUTION INTRAVENOUS at 08:29

## 2022-12-16 RX ADMIN — Medication 0.14 MCG/KG/MIN: at 19:04

## 2022-12-16 RX ADMIN — SODIUM CHLORIDE 7 UNITS/HR: 9 INJECTION, SOLUTION INTRAVENOUS at 21:07

## 2022-12-16 RX ADMIN — PROPOFOL 5 MCG/KG/MIN: 10 INJECTION, EMULSION INTRAVENOUS at 22:11

## 2022-12-16 ASSESSMENT — ACTIVITIES OF DAILY LIVING (ADL)
ADLS_ACUITY_SCORE: 35
ADLS_ACUITY_SCORE: 48
ADLS_ACUITY_SCORE: 35
ADLS_ACUITY_SCORE: 53
ADLS_ACUITY_SCORE: 35
ADLS_ACUITY_SCORE: 35
ADLS_ACUITY_SCORE: 44
ADLS_ACUITY_SCORE: 35

## 2022-12-16 NOTE — PROGRESS NOTES
[facility]    Medicine History and Physical - Hospitalist Service       Date of Admission:  12/16/2022    Interval History   First visit with Deidre N Aasen today in ICU this morning, admitted overnight by Dr. Krishna.  Patient discussed with Dr. Krishna.  Prior hospitalization with AMA discharge 12/13 reported ([facility]).  In bed, awake, confused, able to follow some commands such as squeezing hands.  On BiPAP, easily falls asleep, fatigued and encephalopathic.  ICU nursing at the bedside.  Patient discussed with intensivist Dr. Dawkins this morning.  Critically ill, severe and life-threatening. Subsequent intubation and mechanical ventilation with addition of IV pressors shortly after my initial visit.    Physical Exam                      There were no vitals filed for this visit.  Exam  GENERAL lying in bed, fatigued, mental status fluctuating  HEENT no conjunctival injection or jaundice  LUNGS moderate inspiratory effort, no wheezes or crackles  HEART S1, S2 regular rate and rhythm; no rubs or gallops  ABDOMEN soft, nontender to palpation; no guarding, rebound, or rigidity  MUSCULOSKELETAL extremities without edema  SKIN warm and dry  NEUROLOGIC moves upper and lower extremities spontaneously and to command  MENTAL STATUS on BiPAP, following some simple commands, fatigued and confused    Assessment & Plan   Deidre N Aasen, 27 year old female w/ PMH of type I diabetes, anxiety and depression, prior polysubstance use, history of medical noncompliance previously leaving another hospital A, admitted to ICU 12/16/2022 w/ diabetic ketoacidosis (DKA) w/ severe acidosis, acute hypoxic respiratory failure, acute encephalopathy, (+) COVID-19, and pneumonia with a right-sided cavitary lung lesion on CXR.    Severe diabetic ketoacidosis (DKA) w/ hyperglycemia and acidosis  Type I diabetes mellitus, uncontrolled, A1C 16.4% 12/16/22  Severe metabolic acidosis w/ high anion gap, admission PH  6.80  (+) COVID-19  Acute hypoxic respiratory failure, s/p intubation and w/ mechanical ventilation 12/16/22  Probable pneumonia right lung, ~ 5cm cavitation on CXR, r/o abscess  Acute encephalopathy, toxic-metabolic  Acute sinusitis right maxillary and sphenoid on admission CT 12/16  Coagulopathy, admission INR 1.68  Hypothermia  Hypotension  S/p R femoral artery line placement 12/16  Severe DKA, acidosis, uncontrolled type I diabetes at baseline, presenting with hypoxia, low blood pressure, and altered mental status.  Concerns of septic shock.  IV pressors started in ICU, later intubated and placed on mechanical ventilation.  Subspecialty consultation requested for severe, life-threatening critical illness, currently in ICU.    Intensivist consulted, patient discussed w/ Dr. Barbaar Dawkins    Thoracic surgery consult    ID consult    IV insulin, IV fluids, electrolyte replacement    IV remdesivir on admission, defer to ID    IV Unasyn on admission, adjusted to Zosyn/vancomycin, ID to review    IV hydrocortisone 50 mg IV q6h    Bicarbonate replacement, as per intensivist    IV Protonix, PPI    CT chest, cavitary lesion on admit CXR    Pain control, sedation while on mechanical ventilation    NPO, nutrition consult    AM labs as ordered w/ follow-up labs today    By report, prior blood cultures drawn at outside hospital, need follow-up**    Reported hematemesis  Per admission H&P, unclear if ongoing, suspicion of Aydee-Dunn tear last hospitalization, see note.    Continue PPI    Monitor Hb, consider GI consult if worsening anemia or clear concerns arise    Hyponatremia  Hypokalemia    Labs as ordered    Replace potassium as needed    Anxiety and depression  History of polysubstance use, heroin and methamphetamine by report  History of medical noncompliance and past AMA from hospital    Monitor, social work consulted    Intensivist service unable to contact family today, note 12/16   Ju[place]o    Disposition  Estimated length of stay 5 to 7 days, indeterminate at this time    Care plan discussed with patient and ICU nursing at the bedside.  Care plan discussed with Dr. Dawkins, intensivist service, on 12/16/22 on two separate occasions.  Given the fact patient is now intubated, sedated, and on pressors in ICU and critically ill with life-threatening illness, will turn over primary care to intensivist service.    Clinically Significant Risk Factors Present on Admission        # Hypokalemia: Lowest K = 3.2 mmol/L in last 2 days, will replace as needed  # Hyponatremia: Lowest Na = 121 mmol/L in last 2 days, will monitor as appropriate   # Hypercalcemia: corrected calcium is >10.1, will monitor as appropriate   # Anion Gap Metabolic Acidosis: Highest Anion Gap = 28 mmol/L in last 2 days, will monitor and treat as appropriate  # Hypoalbuminemia: Lowest albumin = 2.8 g/dL at 12/16/2022  2:43 AM, will monitor as appropriate  # Coagulation Defect: INR = 1.68 (Ref range: 0.85 - 1.15) and/or PTT = 39 Seconds (Ref range: 22 - 38 Seconds), will monitor for bleeding                 Diet: NPO for Medical/Clinical Reasons Except for: Meds, Ice Chips    [place] Catheter: Not present  DVT Prophylaxis: Defer to primary service, on SCD's, subcutaneous heparin  Code Status: Full Code       Expected Discharge Date: 12/18/2022               Roman Garcia MD [facility]  Hospitalist Service  [facility]    ______________________________________________________________________    Medications     IV infusion builder WITH LARGE additive list       insulin 12 Units/hr (12/16/22 0702)     - MEDICATION INSTRUCTIONS -       IV infusion builder WITH LARGE additive list         remdesivir  200 mg Intravenous Once    Followed by     sodium chloride 0.9%  50 mL Intravenous Once     [START ON 12/17/2022] remdesivir  100 mg Intravenous Q24H    And     [START ON 12/17/2022] sodium chloride 0.9%  50 mL  Intravenous Q24H     ampicillin-sulbactam  3 g Intravenous Q6H     pantoprazole  40 mg Intravenous Daily with breakfast     sodium bicarb and potassium chloride piggyback for dka   Intravenous Once     sodium bicarbonate           Data reviewed today: I reviewed all new labs and imaging results over the last 24 hours.    Data   Recent Labs   Lab 12/16/22  0646 12/16/22  0532 12/16/22  0500 12/16/22  0434 12/16/22  0310 12/16/22  0243 12/13/22  1600 12/13/22  1537 12/13/22  0512 12/13/22  0508 12/13/22  0251 12/13/22  0235 12/13/22  0154 12/13/22  0003   WBC  --   --   --   --   --  16.0*  --   --   --  14.7*  --  16.0*  --  18.2*   HGB  --   --   --   --   --  11.8  --   --   --  13.4  --  13.7  --  16.3*   MCV  --   --   --   --   --  98  --   --   --  88  --  88  --  88   PLT  --   --   --   --   --  497*  --   --   --  402  --  438  --  551*   INR  --   --   --   --   --  1.68*  --   --   --   --   --  1.21*  --   --    NA  --   --   --  127*  --  121*  --  134*   < > 128*  --  126*  --  119*   POTASSIUM  --   --   --  3.2*  --  4.9  --  2.8*   < > 4.1  --  5.2  --  4.4   CHLORIDE  --   --   --  96*  --  88*  --  105   < > 96*  --  98  --  83*   CO2  --   --   --  7*  --  5*  --  14*   < > 11*  --  8*  --  7*   BUN  --   --   --  27.1*  --  21.5*  --  23.0*   < > 41.3*  --  44.0*  --  54.5*   CR  --   --   --  0.71  --  0.72  --  0.36*   < > 0.47*  --  0.51  --  0.67   ANIONGAP  --   --   --  24*  --  28*  --  15   < > 21*  --  20*  --  29*   AGNES  --   --   --  8.3*  --  9.3  --  8.7   < > 8.6  --  7.8*  --  9.8   * 421* 493* 517*   < > 561*   < > 115*   < > 294*   < > 405*   < > 600*   ALBUMIN  --   --   --   --   --  2.8*  --   --   --   --   --   --   --  3.8   PROTTOTAL  --   --   --   --   --  6.9  --   --   --   --   --   --   --  8.2   BILITOTAL  --   --   --   --   --  <0.2  --   --   --   --   --   --   --  0.3   ALKPHOS  --   --   --   --   --  173*  --   --   --   --   --   --   --  160*   ALT   --   --   --   --   --  13  --   --   --   --   --   --   --  18   AST  --   --   --   --   --  27  --   --   --   --   --   --   --  24    < > = values in this interval not displayed.       Imaging  Recent Results (from the past 24 hour(s))   Head CT w/o contrast    Narrative    EXAM: CT HEAD W/O CONTRAST  LOCATION: [facility]  DATE/TIME: 12/16/2022 2:47 AM    INDICATION: AMS  COMPARISON: None.  TECHNIQUE: Routine CT Head without IV contrast. Multiplanar reformats. Dose reduction techniques were used.    FINDINGS:  INTRACRANIAL CONTENTS: No intracranial hemorrhage, extraaxial collection, or mass effect.  No CT evidence of acute infarct. Normal parenchymal attenuation. Normal ventricles and sulci.     VISUALIZED ORBITS/SINUSES/MASTOIDS: No intraorbital abnormality. Incomplete evaluation of the paranasal sinuses demonstrates evidence of pneumatized secretions in the right maxillary and sphenoid sinuses. No middle ear or mastoid effusion.    BONES/SOFT TISSUES: No acute abnormality.      Impression    IMPRESSION:  1.  No acute intracranial pathology.  2.  Evidence of acute sinusitis in the right maxillary and sphenoid sinuses.   XR Chest Port 1 View    Narrative    EXAM: CHEST SINGLE VIEW PORTABLE  LOCATION: [facility]  DATE/TIME: 12/16/2022 3:19 AM    INDICATION: Altered mental state. Known COVID infection.  COMPARISON: 12/13/2022.      Impression    IMPRESSION:   1. A moderate to large-sized region of patchy opacities within the mid and lower right lung. This is nonspecific, but most likely represents pneumonia. On the recent comparison study dated 12/13/2022, there was a much smaller patchy nodular opacity at   this location.  2. Within the central aspect of the aforementioned patchy opacity, there is a 5 cm lobulated lucent region that may represent cavitation, possibly an abscess.  3. Interval placement of a right internal jugular central venous catheter distal  catheter tip in the right atrium, approximately 1 cm distal to the junction of the superior vena cava and right atrium.

## 2022-12-16 NOTE — PROCEDURES
[facility]    Double Lumen PICC Placement    Date/Time: 12/16/2022 2:30 PM  Performed by: Gary Ortiz RN  Authorized by: Barbara Dawkins MD   Indications: vascular access      UNIVERSAL PROTOCOL   Site Marked: Yes  Prior Images Obtained and Reviewed:  Yes  Required items: Required blood products, implants, devices and special equipment available    Patient identity confirmed:  Arm band and hospital-assigned identification number  Patient was reevaluated immediately before administering moderate or deep sedation or anesthesia  Confirmation Checklist:  Patient's identity using two indicators, procedure was appropriate and matched the consent or emergent situation, relevant allergies and correct equipment/implants were available  Time out: Immediately prior to the procedure a time out was called    Universal Protocol: the Joint Commission Universal Protocol was followed    Preparation: Patient was prepped and draped in usual sterile fashion    ESBL (mL):  1     ANESTHESIA    Local Anesthetic: Lidocaine 1% without epinephrine      SEDATION    Patient Sedated: No        Preparation: skin prepped with ChloraPrep  Skin prep agent: skin prep agent completely dried prior to procedure  Sterile barriers: maximum sterile barriers were used: cap, mask, sterile gown, sterile gloves, and large sterile sheet  Hand hygiene: hand hygiene performed prior to central venous catheter insertion  Type of line used: PICC  Catheter type: double lumen  Lumen type: valved and power PICC  Lumen Identification: Red and Purple  Catheter size: 5 Fr  Brand: Bard  Lot number: [id number]  Placement method: venipuncture, MST, ultrasound and tip navigation system  Number of attempts: 1  Difficulty threading catheter: no  Successful placement: yes  Orientation: right    Location: basilic vein  Tip Location: SVC  Arm circumference: adults 10 cm  Extremity circumference: 21  Visible catheter length: 4  Total catheter length:  35  Internal Lumen Volume: 31 mL    Dressing and securement: transparent securement dressing, sterile dressing applied, transparent dressing, site cleansed, chlorhexidine patch applied and subcutaneous anchor securement system  Post procedure assessment: blood return through all ports  PROCEDURE   Patient Tolerance:  Patient tolerated the procedure well with no immediate complicationsDescribe Procedure: Family not located, pt's provider signed the consent form. Double lumen picc line placed on right basilic vein. No difficulty threading. Pt tolerated well.

## 2022-12-16 NOTE — ED TRIAGE NOTES
Pt dropped off by friends after noticing increased work of breathing. Pt is unresponsive on arrival. Kussmaul respirations noted. Seen 12/13 for DKA and GI bleed, pt left AMA.     Triage Assessment     Row Name 12/16/22 0257       Triage Assessment (Adult)    Airway WDL WDL       Respiratory WDL    Respiratory WDL X;all    Rhythm/Pattern, Respiratory Kussmaul respirations;shortness of breath;shallow

## 2022-12-16 NOTE — PROGRESS NOTES
Attempted to call family with update and obtain consent for art line procedure. Numbers listed in the chart are either not working or received no answer. Unable to update family. Art line placed under assumed implied consent.     Barbara Dawkins MD

## 2022-12-16 NOTE — PROVIDER NOTIFICATION
Dr. Pool notified of critical lab value: K 2.6. MD ordered K replacement but already on order set.

## 2022-12-16 NOTE — ED PROVIDER NOTES
History     Chief Complaint:  Unresponsive     The history is provided by a friend and medical records. History limited by: altered mental status.      Deidre N Aasen is a 27 year old female with history of type 1 diabetes DKA, anxiety, depression, and polysubstance abuse who presents with increasing shortness of breath today (per friends that dropped her off) and altered mental status. Per her friend's report, she was seen in the ED 3 days ago and was found to be in DKA with possible hematemesis. Hemoglobin 13 at that time. She was transferred to [facility] where she was also found to be Covid positive and was further intended to be treated for RLL pneumonia, however, she left AMA yesterday. Her friend reports she found the patient to be short of breath today, which seemed to worsen throughout the day, prompting her to bring in the patient. Patient was brought into the room with a GCS of 6 (E1, V2, M3). At 0216, she responded to nasal narcan and is now responding to questions with head nods.    Review of Systems   Unable to perform ROS: Mental status change     Allergies:  Ciprofloxacin    Medications:  Novolog  Levemir  Basaglar    Past Medical History:     Diagnosis Date   Anxiety    Depressive disorder    DKA    Diabetes mellitus    Heroin abuse    Methamphetamine abuse    H/o Medication noncompliance     Social History:  The patient presents in a private vehicle  The patient presents with a friend    Physical Exam     Patient Vitals for the past 24 hrs:   BP Temp Temp src Pulse Resp SpO2   12/16/22 0530 92/49  92.1  F (33.4  C) Bladder 100 30 97 %   12/16/22 0525 92/46  -- 98 39 97 %   12/16/22 0520 92/46  -- 95 28 97 %   12/16/22 0515 95/42 92.1  F (33.4  C) -- 95  42 96 %   12/16/22 0510  83/45  -- 92 22 93 %   12/16/22 0505 96/44  -- 91 24 99 %   12/16/22 0500 98/52  92.5  F (33.6  C) -- 107  52 98 %   12/16/22 0455 104/51  -- 97 29 97 %   12/16/22 0450 92/54  -- 98  35 97 %   12/16/22 0446 92/62 92.7  F  (33.7  C) Bladder 97 30 99 %   12/16/22 0429  85/53 -- -- 76 29 96 %   12/16/22 0403 115/58  93.5  F (34.2  C) Rectal 105  42 98 %   12/16/22 0400 -- -- -- 103 41 98 %   12/16/22 0355 114/32 -- -- 102  36 98 %   12/16/22 0350 100/57 -- -- 105  39 97 %   12/16/22 0345 101/55 -- -- 106  35 98 %   12/16/22 0340 98/54 -- -- 106  41 97 %   12/16/22 0335 105/57 -- -- 106  41 97 %   12/16/22 0330 96/53 -- -- 105 24 98 %   12/16/22 0325 94/50 -- -- 109  42 96 %   12/16/22 0320 89/46 -- -- 108 42 92 %   12/16/22 0250 -- -- -- 112  42 96 %   12/16/22 0230 -- -- -- 116 24  81 %   12/16/22 0225 101/51 -- -- 114 26 91 %   12/16/22 0220 85/49 -- -- 117 30 95 %       Physical Exam  Nursing note and vitals reviewed.  Constitutional: Unresponsive to verbal stimuli or sternal rub. Cachectic and chronically ill appearing. Decorticate posturing of upper extremities.  HENT:   Mouth/Throat: extremely dry mucous membranes.   Eyes: Pupils are miotic and midline.  Cardiovascular: Tachycardic, regular rhythm and normal heart sounds.    Pulmonary/Chest: Poor respiratory effort initially. Following nasal narcan began kussmaul respiration pattern. Coarse breath sounds, especially on the right.   Abdominal: Soft. Normal appearance.  No distention.   Musculoskeletal: Normal range of motion without deformity.   Neurological: Initial GCS 6 (E1, V2, M3).    Following Narcan:  GCS 12 (E3, V4, M5)  Skin: Skin is diaphoretic.   Psychiatric: Unable to assess.     Emergency Department Course     ECG  ECG taken at 0253, ECG read at 0308  Sinus tachycardia.   Otherwise normal ECG.  Rate 110 bpm. WY interval 138 ms. QRS duration 76 ms. QT/QTc 340/460 ms. P-R-T axis 84 81 38.     Imaging:  XR Chest Port 1 View   Final Result   IMPRESSION:    1. A moderate to large-sized region of patchy opacities within the mid and lower right lung. This is nonspecific, but most likely represents pneumonia. On the recent comparison study dated 12/13/2022, there was a much  smaller patchy nodular opacity at    this location.   2. Within the central aspect of the aforementioned patchy opacity, there is a 5 cm lobulated lucent region that may represent cavitation, possibly an abscess.   3. Interval placement of a right internal jugular central venous catheter distal catheter tip in the right atrium, approximately 1 cm distal to the junction of the superior vena cava and right atrium.      Head CT w/o contrast   Final Result   IMPRESSION:   1.  No acute intracranial pathology.   2.  Evidence of acute sinusitis in the right maxillary and sphenoid sinuses.      Report per radiology    Laboratory:  Labs Ordered and Resulted from Time of ED Arrival to Time of ED Departure   COMPREHENSIVE METABOLIC PANEL - Abnormal       Result Value    Sodium 121 (*)     Potassium 4.9      Chloride 88 (*)     Carbon Dioxide (CO2) 5 (*)     Anion Gap 28 (*)     Urea Nitrogen 21.5 (*)     Creatinine 0.72      Calcium 9.3      Glucose 561 (*)     Alkaline Phosphatase 173 (*)     AST 27      ALT 13      Protein Total 6.9      Albumin 2.8 (*)     Bilirubin Total <0.2      GFR Estimate >90     ACETAMINOPHEN LEVEL - Abnormal    Acetaminophen <5.0 (*)    MAGNESIUM - Abnormal    Magnesium 2.6 (*)    INR - Abnormal    INR 1.68 (*)    KETONE BETA-HYDROXYBUTYRATE QUANTITATIVE, RAPID - Abnormal    Ketone (Beta-Hydroxybutyrate) Quantitative 4.4 (*)    BLOOD GAS VENOUS - Abnormal    pH Venous 6.79 (*)     pCO2 Venous 35 (*)     pO2 Venous 51 (*)     Bicarbonate Venous 5 (*)     Base Excess/Deficit (+/-) -28.5 (*)     FIO2 3     ROUTINE UA WITH MICROSCOPIC - Abnormal    Color Urine Light Yellow      Appearance Urine Clear      Glucose Urine >=1000 (*)     Bilirubin Urine Negative      Ketones Urine >150 (*)     Specific Gravity Urine 1.019      Blood Urine Small (*)     pH Urine 5.5      Protein Albumin Urine 100 (*)     Urobilinogen Urine Normal      Nitrite Urine Negative      Leukocyte Esterase Urine Negative      Mucus  Urine Present (*)     RBC Urine 1      WBC Urine 1      Squamous Epithelials Urine 1     CBC WITH PLATELETS AND DIFFERENTIAL - Abnormal    WBC Count 16.0 (*)     RBC Count 3.81      Hemoglobin 11.8      Hematocrit 37.4      MCV 98      MCH 31.0      MCHC 31.6      RDW 14.2      Platelet Count 497 (*)    CRP INFLAMMATION - Abnormal    CRP Inflammation 468.79 (*)    PROCALCITONIN - Abnormal    Procalcitonin 72.56 (*)    GLUCOSE BY METER - Abnormal    GLUCOSE BY METER POCT 593 (*)    BLOOD GAS VENOUS WITH OXYHEMOGLOBIN (after 1 hour in insulin and 1 liter IVF) - Abnormal    pH Venous 6.80 (*)     pCO2 Venous 44      pO2 Venous 51 (*)     Bicarbonate Venous 7 (*)     FIO2 4      Oxyhemoglobin Venous 75      Base Excess/Deficit (+/-) -25.4 (*)    BASIC METABOLIC PANEL (after 1 hour in insulin and 1 liter IVF) - Abnormal    Sodium 127 (*)     Potassium 3.2 (*)     Chloride 96 (*)     Carbon Dioxide (CO2) 7 (*)     Anion Gap 24 (*)     Urea Nitrogen 27.1 (*)     Creatinine 0.71      Calcium 8.3 (*)     Glucose 517 (*)     GFR Estimate >90     OCCULT BLOOD STOOL - Abnormal    Occult Blood Positive (*)    DRUG ABUSE SCREEN 1 URINE (ED) - Abnormal    Amphetamines Urine Screen Positive (*)     Barbituates Urine Screen Negative      Benzodiazepine Urine Screen Negative      Cannabinoids Urine Screen Negative      Cocaine Urine Screen Negative      Opiates Urine Screen Negative     DIFFERENTIAL - Abnormal    % Neutrophils 83      % Lymphocytes 5      % Monocytes 8      % Eosinophils 0      % Basophils 0      % Metamyelocytes 4      Absolute Neutrophils 13.3 (*)     Absolute Lymphocytes 0.8      Absolute Monocytes 1.3      Absolute Eosinophils 0.0      Absolute Basophils 0.0      Absolute Metamyelocytes 0.6 (*)     RBC Morphology Confirmed RBC Indices      Platelet Assessment        Value: Automated Count Confirmed. Platelet morphology is normal.    Toxic Neutrophils Present (*)    GLUCOSE BY METER - Abnormal    GLUCOSE BY METER  POCT 504 (*)    GLUCOSE BY METER - Abnormal    GLUCOSE BY METER POCT 493 (*)    GLUCOSE BY METER - Abnormal    GLUCOSE BY METER POCT 421 (*)    SALICYLATE LEVEL - Normal    Salicylate <0.5     ETHYL ALCOHOL LEVEL - Normal    Alcohol ethyl <0.01     HCG QUALITATIVE PREGNANCY - Normal    hCG Serum Qualitative Negative     TYPE AND SCREEN, ADULT    ABO/RH(D) O POS      Antibody Screen Negative      SPECIMEN EXPIRATION DATE [id number]     BLOOD CULTURE - pending   BLOOD CULTURE - pending      Procedures      Central Line Placement     Procedure:  Central Venous Catheter Insertion     Indication: Vascular access    Consent:  Unable/Emergent  Risk Discussed: Unable due to encephalopathy    Universal Protocol: Universal protocol was followed and time out conducted just prior to starting procedure, confirming patient identity, site/side, procedure, patient position, and availability of correct equipment and implants.    Anesthesia/Sedation: Lidocaine - 1%    Procedure Detail:    Central line bundle elements were complete including preparatory hand leansing, donning full barrier precautions, use of a full body drape and chlorhxidine gluconate skin prep.   The Right internal jugular vein was selected as the optimal location for patient s condition.   Ultrasound was utilized for guidance: Yes.   A finder needle was used to enter the vein, through which a guidewire was inserted. The finder needle was then removed and the tract was dilated.   A triple lumen central venous catheter was inserted over the guidewire without difficulty. The guidewire was removed and the catheter was sutured in place and covered with an appropriate dressing.   A post-procedure chest radiograph was performed.     Patient Status:  The patient tolerated the procedure well: Yes. There were no complications.    Reviewed:  I reviewed nursing notes, vitals, past medical history and Care Everywhere    Assessments:  0213 I obtained history and examined the  patient as noted above.   0236 I rechecked the patient. The patient is more with it now. She admits to heroin use tonight.  0249 I rechecked the patient and placed a central line.  0400 I rechecked the patient.   0418 I rechecked the patient. Her temperature was 97 degrees. Will place a Camilo hugger and temperature sensitive degroot catheter.    Consults:  0343 I consulted with Dr. Gregg, hospitalist, regarding the patient's history and presentation here in the emergency department who accepted the patient for transfer and admission at [facility].    Interventions:  0216 Narcan 0.2 mg nasal. Patient responded well (see above)  0322 NS 1 L IV  0325 Novolin, humulin regular 5.5 units/her IV  0403 Unasyn 3 g IV  0403 NS 1 L IV  0429 Novolin, humulin regular 5.5 units/her IV rate/dose verify  0510 Novolin, humulin regular 5.5 units/her IV rate/dose verify  0510 Sodium bicarbonate 8.4% 25 mEq IV    Disposition:  The patient was transferred to [facility] via ambulance. Dr. Gregg accepted the patient for transfer.     Impression & Plan     Medical Decision Making:  Deidre Aasen is a 27 year old female noncompliant diabetic with history of polysubstance abuse who presents in near respiratory rest. Etiology ultimately was likely due to be heroin overdose as the patient responded to nasal narcan and has since admitted to using heroin this evening. Her Urine drug screen was positive for amphetamines only, but given her clinical response to Narcan I suspect she has come type of opioid on board as well.  She recently signed out against medical advise following a DKA admission. After narcan resuscitation, she immediately developed a kussmaul respiratory pattern. She is severely dehydrated based on clinical exam and lab analysis. Workup also consists of diabetic ketoacidosis with severe acidemia. An insulin drip has been initiated with a single bicarb bolus given due to persistently low pH.   Encephalopathy is likely multifactorial and secondary to dehydration,infection (pneumonia), DKA, and ongoing drug use. She also has a significant infiltrate in the right lower lobe of her lung almost certainly due to aspiration given her clinical history. A CT may be helpful given concern raised by radiology for cavitary lesion.  Unasyn has been started, blood cultures have been sent and pending. Her Covid test was positive several days ago. At this time, she has been resuscitated, though remains critically ill and will need ICU care moving forward for correction of her multiple metabolic abnormalities, continued monitored fluid resuscitation, and monitoring of her respiratory status, as well as treatment of her underlying pneumonia.    Critical Care Time: was 45 minutes for this patient excluding procedures    Diagnosis:    ICD-10-CM    1. Diabetic ketoacidosis without coma associated with type 1 diabetes mellitus  E10.10       2. Accidental overdose of heroin, initial encounter  T40.1X1A       3. Dehydration  E86.0       4. Noncompliance with medication regimen  Z91.14       5. Chronic malnutrition  E46       6. Infection due to 2019 novel coronavirus  U07.1       7. Aspiration pneumonia of right lower lobe, unspecified aspiration pneumonia type  J69.0       8. Encephalopathy  G93.40       9. Acute kidney injury  N17.9       10. Hypothermia, initial encounter  T68.XXXA           Scribe Disclosure:  Rosa GARCIA, am serving as a scribe at 2:20 AM on 12/16/2022 to document services personally performed by Edgar Harrell MD based on my observations and the provider's statements to me.          Edgar Harrell MD  12/16/22 0664

## 2022-12-16 NOTE — PROGRESS NOTES
Before patient was intubated, she gave permission for her significant other José Luis to receive medical information. She gave permission for her Dad to be told that she is in the hospital for Covid but doesn't want him to know other medical information.

## 2022-12-16 NOTE — PROGRESS NOTES
[facility] ICU RESPIRATORY NOTE        Date of Admission: 12/16/2022    Date of Intubation (most recent): 12/16/2022.    Reason for Mechanical Ventilation: Respiratory Failure.    Number of Days on Mechanical Ventilation: Initial Day.    Met Criteria for Spontaneous Breathing Trial: No.    Reason for No Spontaneous Breathing Trial: PEEP of 10 cm H2O, FiO2: 100%.    Significant Events Today: [facility], failed BiPAP, intubated. Vent settings adjusted.    ABG Results:   Recent Labs   Lab 12/16/22  1439 12/16/22  1258 12/16/22  1015 12/16/22  0915   PH 7.23* 7.06*  --   --    PCO2 39 62*  --   --    PO2 41* 78*  --   --    HCO3 16* 17*  --   --    O2PER 100 100 50 50       Current Vent Settings: Vent Mode: CMV/AC  (Continuous Mandatory Ventilation/ Assist Control)  FiO2 (%): 100 %  Resp Rate (Set): 30 breaths/min  Tidal Volume (Set, mL): 400 mL  PEEP (cm H2O): 10 cmH2O  Resp: (!) 32      Skin Assessment: Clean/Dry/Intact.    Plan: Continue to monitor patient on full ventilatory support and assess for weaning daily.     Gordo Ray, RRT on 12/16/2022 at 5:22 PM

## 2022-12-16 NOTE — H&P
[facility]    History and Physical - Hospitalist Service       Date of Admission:  12/16/2022  Dictation #: [id number]  Brief Summary (see dictation for more details): 27F hx DM1 presents with confusion, SOB and hyperglycemia noted to have severe DKA with pH of 6.8.  Also noted is COVID-19 infection and likely aspiration pneumonia though with reported polysubstance abuse and amphetamine positive she is at high risk given the cavitary lesion.  Will change unasyn to zosyn and add vanco given cavitary lesion, hypothermia, critical illness and concern for evolving sepsis. Blood cultures already drawn at [facility].  Remdesivir for COVID.  For DKA will be on protocol and also give 50 meq bicarb.  Repeat labs ordered for 0900 at which point additional assessment will be required for consideration of adjustment in treatment plan and additional labs.  Will ask for thoracic surgery consult give cavitary lesion in right lung.  Monitor hgb and assess for evidence of GI bleed, consider GI consult.  Started PPI.  Discussed with oncoming hospitalist and will also get intensivist to consult and help manage.      Clinically Significant Risk Factors Present on Admission        # Hypokalemia: Lowest K = 3.2 mmol/L in last 2 days, will replace as needed  # Hyponatremia: Lowest Na = 121 mmol/L in last 2 days, will monitor as appropriate   # Hypercalcemia: corrected calcium is >10.1, will monitor as appropriate   # Anion Gap Metabolic Acidosis: Highest Anion Gap = 28 mmol/L in last 2 days, will monitor and treat as appropriate  # Hypoalbuminemia: Lowest albumin = 2.8 g/dL at 12/16/2022  2:43 AM, will monitor as appropriate  # Coagulation Defect: INR = 1.68 (Ref range: 0.85 - 1.15) and/or PTT = 39 Seconds (Ref range: 22 - 38 Seconds), will monitor for bleeding                   Joao Krishna, DO  Hospitalist Service  [facility]  Securely message with the Vocera Web Console (learn more  here)  Text page via [facility] Paging/Directory

## 2022-12-16 NOTE — PHARMACY-VANCOMYCIN DOSING SERVICE
"Pharmacy Vancomycin Initial Note  Date of Service 2022  Patient's  1995  27 year old, female    Indication: Sepsis    Current estimated CrCl = Estimated Creatinine Clearance: 83.4 mL/min (based on SCr of 0.71 mg/dL).    Creatinine for last 3 days  2022:  9:48 AM Creatinine 0.39 mg/dL; 12:22 PM Creatinine 0.39 mg/dL;  3:37 PM Creatinine 0.36 mg/dL  2022:  2:43 AM Creatinine 0.72 mg/dL;  4:34 AM Creatinine 0.71 mg/dL    Recent Vancomycin Level(s) for last 3 days  No results found for requested labs within last 72 hours.      Vancomycin IV Administrations (past 72 hours)      No vancomycin orders with administrations in past 72 hours.                Nephrotoxins and other renal medications (From now, onward)    Start     Dose/Rate Route Frequency Ordered Stop    220  vancomycin (VANCOCIN) 750 mg in sodium chloride 0.9 % 250 mL intermittent infusion         750 mg  over 90 Minutes Intravenous EVERY 12 HOURS 22 0908      22 0930  vancomycin (VANCOCIN) 1000 mg in dextrose 5% 200 mL PREMIX         1,000 mg  200 mL/hr over 1 Hours Intravenous ONCE 22 0907      22 0830  piperacillin-tazobactam (ZOSYN) 4.5 g vial to attach to  mL bag        Note to Pharmacy: For [facility], [facility] and [facility]: For Zosyn-naive patients, use the \"Zosyn initial dose + extended infusion\" order panel.    4.5 g  over 30 Minutes Intravenous EVERY 6 HOURS 22 0811            Contrast Orders - past 72 hours (72h ago, onward)    None          [employer] Prediction of Planned Initial Vancomycin Regimen    Loading dose: 1000 mg at 0930 2022.  Regimen: 750 mg IV every 12 hours.  Start time:  on 2022  Exposure target: AUC24 (range)400-600 mg/L.hr   AUC24,ss: 443 mg/L.hr  Probability of AUC24 > 400: 61 %  Ctrough,ss: 12.7 mg/L  Probability of Ctrough,ss > 20: 17 %  Probability of nephrotoxicity (Lodise LEONORA ): 8 %          Plan:  1. Start vancomycin  1000 mg IV once; then 750 " mg IV q12h.   2. Vancomycin monitoring method: AUC  3. Vancomycin therapeutic monitoring goal: 400-600 mg*h/L  4. Pharmacy will check vancomycin levels as appropriate in 1-3 Days.    5. Serum creatinine levels will be ordered daily for the first week of therapy and at least twice weekly for subsequent weeks.      Aleena Crump RPH

## 2022-12-16 NOTE — ED NOTES
Report given to Itz at [facility] ICU.   Update given to José Luis (SADA) on transfer to [facility]

## 2022-12-16 NOTE — PROCEDURES
[facility]  Procedure Note           Intubation:       Deidre N Aasen  MRN# [id number]   December 16, 2022, 12:19 PM Indication: Respiratory failure           Patient intubated at: December 16, 2022, 12:20 PM   Patient informed of: Why intubation was required   Informed consent: Obtained   Cervical spine: Was not stabilized during the procedure   Sedative medication: Was administered during the procedure   Technique used: Fiberoptic visualization with GlideScope   Endotracheal tube size: 7.0 cm with cuff   Number of attempts: 1   Placement confirmed by: Auscultation of bilateral breath sounds  Visualization of bilateral chest wall rise  End-tidal CO2 monitor  Chest X-ray   ET tube repositioning: Was not performed   Tube secured at: 22 cm      This procedure was performed without difficulty and she tolerated the procedure well with no complications.      Recorded by Barbara Dawkins MD

## 2022-12-16 NOTE — PROCEDURES
[facility]    Arterial line placement    Date/Time: 12/16/2022 12:21 PM  Performed by: Barbara Dawkins MD  Authorized by: Barbara Dawkins MD       UNIVERSAL PROTOCOL   Site Marked: Yes  Prior Images Obtained and Reviewed:  NA  Required items: Required blood products, implants, devices and special equipment available    Patient identity confirmed:  Anonymous protocol, patient vented/unresponsive and arm band  NA - No sedation, light sedation, or local anesthesia  Confirmation Checklist:  Patient's identity using two indicators, relevant allergies, correct equipment/implants were available and procedure was appropriate and matched the consent or emergent situation  Time out: Immediately prior to the procedure a time out was called    Universal Protocol: the Joint Commission Universal Protocol was followed    ESBL (mL):  5  Indication: multiple ABGs respiratory failure hemodynamic monitoring  Location:  Right radial      SEDATION  Patient Sedated: Yes    Sedation Type:  Moderate (conscious) sedation  Sedation:  Propofol  Vital signs: Vital signs monitored during sedation      PROCEDURE DETAILS  Polo's Test Normal?: Polo's test not abnormal  Needle Gauge:  20  Seldinger technique: Seldinger technique used    Number of Attempts:  5 or more        PROCEDURE  Describe Procedure: Attempted to place right radial arterial line under US guidance using Selinger technique. Multiple attempts. First attempt with cannulation of the artery but unable to thread wire without resistance. Developed local hematoma at this site. Pressure applied. Additional attempts more proximally but unable to cannulate artery again. Aborted further attempts at the right radial location and transitioned to femoral art line attempt.   Length of time physician/provider present for 1:1 monitoring during sedation: 20

## 2022-12-16 NOTE — PROCEDURES
[facility]    Arterial line placement    Date/Time: 12/16/2022 12:25 PM  Performed by: Barbara Dawkins MD  Authorized by: Barbara Dawkins MD       UNIVERSAL PROTOCOL   Site Marked: Yes  Prior Images Obtained and Reviewed:  NA  Required items: Required blood products, implants, devices and special equipment available    Patient identity confirmed:  Arm band  Patient was reevaluated immediately before administering moderate or deep sedation or anesthesia  Confirmation Checklist:  Patient's identity using two indicators, relevant allergies, procedure was appropriate and matched the consent or emergent situation and correct equipment/implants were available  Time out: Immediately prior to the procedure a time out was called    Universal Protocol: the Joint Commission Universal Protocol was followed    Preparation: Patient was prepped and draped in usual sterile fashion    ESBL (mL):  5  Indication: multiple ABGs respiratory failure hemodynamic monitoring  Location:  Right femoral      SEDATION  Patient Sedated: Yes    Sedation Type:  Moderate (conscious) sedation  Sedation:  Propofol  Vital signs: Vital signs monitored during sedation      PROCEDURE DETAILS  Polo's test: NA.  Needle Gauge:  20  Seldinger technique: Seldinger technique used    Number of Attempts:  2  Post-procedure:  Line sutured and dressing applied      PROCEDURE  Describe Procedure: Right femoral artery line placed under US guidance using Seldinger technique. Cannulated artery successfully on first attempt but wire unable to be threaded. Lost cannulation with removal of wire. Second attempt with successful artery cannulation. Transitioned to thinner wire and able to thread without resistance. Successful placement of right femoral arterial line.   Length of time physician/provider present for 1:1 monitoring during sedation: 20

## 2022-12-16 NOTE — CONSULTS
[facility]    Infectious Disease Consultation     Date of Admission:  12/16/2022  Date of Consult (When I saw the patient): 12/16/22    Assessment & Plan   Deidre N Aasen is a 27 year old female who was admitted on 12/16/2022.     Impression:  1. 27 y.o patient with polysubstance abuse, IVDU  2. Type 1 diabetes  3. History of medical noncompliance   4. Recent presentation to OSH (epic records reviewed ) left AMA  5. Admitted this occasion with diabetic ketoacidosis (DKA) w/ severe acidosis, acute hypoxic respiratory failure, acute encephalopathy, (+) COVID-19, and pneumonia possible cavitation and abscess.   6. On remdesivir, vancomycin and zosyn   7. Cultures pending   8. bronch planned     Recommendations:   Agree with broad spectrum antibiotics   Follow up on the pending cultures and planned bronch   Continue remdesivir and dexamethasone.       Risk profile for tociluzumab includes serious  infection.   In recovery trial has show superiority of remdesivir + dexamethasone over remdesivir +  barcitinib.   Risk profile for Bracitinib include serious infection, thrombosis. So avoid     Max pulmonary support as in place.        Rosy Davis MD    Reason for Consult   Reason for consult: I was asked to evaluate this patient for covid+, encephalopathy, pneumonia.    Primary Care Physician   Physician No Ref-Primary    Chief Complaint   Intubated     History is obtained from the medical records    History of Present Illness    Patient ins intubated I cannot obtain any history   History from the chart:     Deidre N Aasen is a 27 year old female  w/ PMH of type I diabetes, anxiety and depression, prior polysubstance use, history of medical noncompliance previously leaving another hospital AMA, admitted to ICU 12/16/2022 w/ diabetic ketoacidosis (DKA) w/ severe acidosis, acute hypoxic respiratory failure, acute encephalopathy, (+) COVID-19, and pneumonia     Past Medical History   I have reviewed  this patient's medical history and updated it with pertinent information if needed.   Past Medical History:   Diagnosis Date     Anxiety      Chronic malnutrition      Depressive disorder      Diabetes mellitus      Heroin abuse      Methamphetamine abuse      Noncompliance with medication regimen        Past Surgical History   I have reviewed this patient's surgical history and updated it with pertinent information if needed.  No past surgical history on file.    Prior to Admission Medications   Prior to Admission Medications   Prescriptions Last Dose Informant Patient Reported? Taking?   insulin aspart (NOVOLOG PEN) 100 UNIT/ML pen   Yes No   Sig: Inject Subcutaneous 3 times daily (with meals) Sliding scale   insulin glargine (BASAGLAR KWIKPEN) 100 UNIT/ML pen   No No   Sig: Inject 20 Units Subcutaneous daily      Facility-Administered Medications: None     Allergies   Allergies   Allergen Reactions     Ciprofloxacin Other (See Comments)     Vomiting; diarrhea       Immunization History   Immunization History   Administered Date(s) Administered     DTAP (<7y) 1997, 08/29/2002     DTP-Hib 1997, 1997     HPV Quadrivalent 05/31/2007     HepA-ped 2 Dose 05/31/2007     HepB-Adult 1997, 1997, 02/26/1998     Hib (PRP-T) 1997     Influenza Vaccine >6 months (Alfuria,Fluzone) 11/05/2015     MMR 09/17/1998, 08/29/2002     OPV, trivalent, live 1997, 1997, 09/17/1998     Poliovirus, inactivated (IPV) 08/29/2002     TRIHIBIT (DTAP/HIB, <7y) 09/17/1998     Tdap (Adacel,Boostrix) 09/30/2015     Varicella 05/26/2005, 05/31/2007       Social History   I have reviewed this patient's social history and updated it with pertinent information if needed. Deidre N Aasen  reports that she has never smoked. She has never used smokeless tobacco. She reports current drug use. She reports that she does not drink alcohol.    Family History   I have reviewed this patient's family history and  updated it with pertinent information if needed.   No family history on file.    Review of Systems   The 10 point Review of Systems cannot be obtained as patient is intubated     Physical Exam   Temp: 98.4  F (36.9  C) Temp src: Axillary BP: 93/44 Pulse: (!) 128   Resp: 28 SpO2: 99 % O2 Device: Mechanical Ventilator    Vital Signs with Ranges  Temp:  [91.9  F (33.3  C)-98.4  F (36.9  C)] 98.4  F (36.9  C)  Pulse:  [] 128  Resp:  [22-52] 28  BP: ()/(32-74) 93/44  FiO2 (%):  [100 %] 100 %  SpO2:  [81 %-99 %] 99 %  0 lbs 0 oz  There is no height or weight on file to calculate BMI.    GENERAL APPEARANCE:  Intubated   EYES: Eyes grossly normal to inspection  NECK: no adenopathy  RESP: lungs clear   CV: regular rates and rhythm  LYMPHATICS: normal ant/post cervical and supraclavicular nodes  ABDOMEN: soft, nontender  MS: extremities normal  SKIN: multiple track marks         Data   Lab Results   Component Value Date    WBC 3.3 (L) 12/16/2022    HGB 9.2 (L) 12/16/2022    HCT 26.0 (L) 12/16/2022     12/16/2022     12/16/2022    POTASSIUM 2.6 (LL) 12/16/2022    CHLORIDE 108 12/16/2022    CO2 14 (L) 12/16/2022    BUN 26 12/16/2022    CR 0.60 12/16/2022     (H) 12/16/2022    DD 3.70 (H) 12/16/2022    AST 27 12/16/2022    ALT 13 12/16/2022    ALKPHOS 173 (H) 12/16/2022    BILITOTAL <0.2 12/16/2022    INR 1.82 (H) 12/16/2022     No results for input(s): CULT in the last 168 hours.  No lab results found.    Invalid input(s): UC       All cultures:  Recent Labs   Lab 12/13/22  0423   CULTURE No growth after 3 days  No growth after 3 days      Blood culture:  Results for orders placed or performed during the hospital encounter of 12/13/22   Blood Culture Arm, Right    Specimen: Arm, Right; Blood   Result Value Ref Range    Culture No growth after 3 days    Blood Culture Arm, Left    Specimen: Arm, Left; Blood   Result Value Ref Range    Culture No growth after 3 days       Urine culture:  No results  found for this or any previous visit.

## 2022-12-16 NOTE — H&P
Admitted: 12/16/2022    PRIMARY CARE PHYSICIAN:  None currently.    CODE STATUS:  Full code.    CHIEF COMPLAINT:  Concerns for DKA, confusion and weakness.    HISTORY OF PRESENT ILLNESS:  Ms. Aasen is a 27-year-old female with a past medical history significant for type 1 diabetes, noncompliant with insulin regimen, depression, anxiety, recent admission to [facility] a few days ago with DKA where she left [place], who returns to [facility] Emergency department when her friends were concerned about her being short of breath, confused and not herself.  History is obtained through chart review as the patient is not really able to provide much in the way of history currently.    The patient presented to [facility] on 12/13/2022 with evidence of diabetic ketoacidosis and concern that she had not been taking her typical insulin regimen.  Her pH at that point was 7.09 with a bicarbonate of 9. She was started on typical DKA insulin regimen and did have improvement in her ketones and slight improvement in her bicarbonate as well as anion gap.  She was diagnosed with COVID-19 at that point and started on remdesivir.  The patient improved enough fairly quickly that she actually decided to the hospital and after discussion with the physician on site left against medical advice.  She returns to the Emergency Department at [facility] earlier today after her friends noted to be confused, breathing quickly, seemingly short of breath and were concerned about her health.  It is unclear if she has been taking her insulin since she left the hospital against medical advice a couple of days ago.  Upon presentation to the Emergency Department at [facility], she was noted to have bicarbonate and her BNP of 5 with a glucose of 561, anion gap of 28, pH of 6.79.  She was given 2 L of normal saline and started on insulin drip per the DKA regimen.  She was also given 25 mEq of bicarbonate.  Repeat labs about an hour later showed  slight improvement in her bicarbonate of 7 and a pH of 6.8.    In addition, in the ED at [facility] she had a chest x-ray showing evidence of right middle lobe and right lower lobe pneumonia with a 5 cm lobulated lucent region concerning for possible abscess.  She previously had a chest CT with contrast to rule out PE on 12/13/2022.  There was no PE noted at that point, but she did have evidence of right lower lobe consolidation and extensive airway debris concerning for possible pneumonia.  Head CT at [facility] earlier this morning, was also negative for acute pathology.    In addition to the above, When she was at [facility] a few days ago.  There was some concern for a GI bleed.  It sounds like she was having hematemesis with coffee-ground emesis and was diagnosed with a suspected Aydee-Dunn tear.  I do not believe that she saw GI or had an EGD.  Unclear if she is having ongoing hematemesis at this point, but a Hemoccult stool was checked in the ED at [facility] and was positive.  Drug of abuse screen in the ED turned up positive for amphetamines.  When I saw the patient in our ICU, she was awake more so then she had been prior discussion with the ICU, RN.  Of note, she did get a dose of Narcan at the [facility] and had minimal response.  At this point, she is able to follow commands, albeit somewhat slowly and not comprehensively.  She is unable to provide any additional history at this point.    PAST MEDICAL HISTORY:     1.  Diabetes type 1.  Unclear how compliant with medications.  Hemoglobin A1c of 16.4 on 12/13/2022.  2.  Depression.  3.  Anxiety.  4.  History of heroin and meth use.    MEDICATIONS:   Medications Prior to Admission   Medication Sig Dispense Refill Last Dose     insulin aspart (NOVOLOG PEN) 100 UNIT/ML pen Inject Subcutaneous 3 times daily (with meals) Sliding scale        insulin glargine (BASAGLAR KWIKPEN) 100 UNIT/ML pen Inject 20 Units Subcutaneous daily 6 mL 0           SOCIAL HISTORY:  Unable to  obtain at this point to the patient's encephalopathy.    FAMILY HISTORY:  Unable to obtain at this point to the patient's encephalopathy.    ALLERGIES:  CIPROFLOXACIN.    REVIEW OF SYSTEMS:  Unable to obtain at this point due to the patient's encephalopathy.    PHYSICAL EXAMINATION:    VITAL SIGNS:  Show blood pressure of 92/53, heart rate 103, respirations 30, satting 95% oxygen.  GENERAL:  The patient is lying in bed in the ICU and appears ill.  HEENT:  Pupils equal, round, reactive to light.  Extraocular muscle function intact.  No scleral icterus.  Oropharynx shows dry mucous membranes.  NECK:  No lymphadenopathy or thyromegaly.  CARDIOVASCULAR:  Heart is borderline tachycardic without any appreciable murmur, rub or gallop.  PULMONARY:  She is tachypneic and has very rhonchorous sounds at the left base, though more so on the right side.  GASTROINTESTINAL:  Positive bowel sounds, soft, nontender, nondistended.  SKIN:  No rashes or lesions.  LYMPHATICS:  No peripheral edema.  PSYCHIATRIC:  The patient is currently encephalopathic.  NEUROLOGIC:  The patient is able to follow commands to the extent of moving all extremities and squeezing my fingers, but more complex tasks at this point she does not follow.    LABORATORY DATA:  Sodium 127, potassium 3.2, chloride 96, CO2 of 7, BUN 27, creatinine 0.71, glucose 517, pH of 6.8 with a VBG bicarbonate of 7.  Occult stool positive.  WBC count 16.0, hemoglobin 11.8, platelets of 497.    Head CT shows no acute intracranial process.    Chest x-ray shows moderate to large-sized patchy opacities within the mid and lower right lung, likely representing pneumonia.  On the most recent study dated 12/13/2022 the area of patchy opacity was smaller at this region.  There is also a 5 cm lobulated loops an area that may represent cavitation, possibly an abscess.    IMPRESSION AND PLAN:  Ms. Aasen Is a 27-year-old female with a past medical history significant for type 1 diabetes,  depression and anxiety, who presents to the Emergency Departments with concerns of shortness of breath and confusion and hyperglycemia.  1.  Severe diabetic ketoacidosis with severe metabolic acidosis and elevated anion gap. pH of 6.8 and bicarbonate as low as 5. Suspect in part due to medical noncompliance.  Also due to COVID-19 infection and likely aspiration pneumonia.  She has been initiated on the insulin drip per diabetic ketoacidosis protocol.  We will also give her another 50 mEq of bicarbonate given her severe acidosis.  We will repeat a BMP, VBG and ketones in a couple of hours at 9:00 and will need further adjustment and further labs sent at that point.  Hemoglobin A1c of 16.4 a few days ago suggest very poor control.  3.  COVID-19 infection:  Unclear if the patient may have COVID-19 pneumonia, though I am more concerned about the likelihood of an aspiration pneumonia given the location, the severely elevated procalcitonin and her overall clinical picture.  Note cavitary lesion on CXR which is suggestive of bacterial process. She has been initiated in remdesivir, will change to zosyn and vanco given concern for evolving sepsis and overall clinical picture.   4.  Hyponatremia:  Likely pseudohyponatremia as this does correct into the low normal range given blood sugar though hypovolemia, also likely contributing.  Continue with diabetic ketoacidosis treatment as above.  5.  Mild hypokalemia patient will have potassium in her IV fluids and place on protocol.  6.  Reported hematemesis:  Unclear if this is ongoing, but Aydee-Dunn tear was suspected last hospitalization.  We will follow hemoglobin and place on proton pump inhibitor.  Consider Gastroenterology consultation.  7.  Metabolic encephalopathy:  Due to diabetic ketoacidosis and underlying infections.  Head CT is negative.  Expect improvement with treatment of above.  8.  Polysubstance abuse:  Amphetamine positive.  Reported minimal response to  Narcan.  We can further discuss abstinence and potential treatment during this hospital stay as her mentation and medical issues improve.  Coagulopathy:  INR is 1.68.  Suspect this is due to critical illness.  No history of liver dysfunction.  We will monitor.  9.  Disposition:  The patient is critically ill and I expect at least 3-4 days in the hospital for treatment of diabetic ketoacidosis, COVID-19, aspiration pneumonia.    Joao Krishna DO        D: 2022   T: 2022   MT: [id number]    Name:     AASEN, DEDAVI STEINBERG  MRN:      [id number]        Account:     [id number]   :      1995           Admitted:    2022       Document: [id number]

## 2022-12-16 NOTE — PROGRESS NOTES
[facility]    ICU Consult    Primary Team: Hospitalist  Reason for Critical Care Admission: DKA, respiratory failure, covid and bacterial pneumonia  Admitting Physician: Joao Krishna DO  Date of Admission:  12/16/2022    Assessment: Critical Care   Deidre N Aasen is a 27 year old female admitted on 12/16/2022 for DKA, respiratory failure from concurrent covid, bacterial pneumonia.     After arrival to the ICU she developed acute respiratory failure requiring intubation and development of septic shock requiring initation of dual vasopressor support and stress dose steroids in addition to broad spectrum antimicrobial therapy. She has ongoing fluid resuscitation needs, management of severe electrolyte abnormalities and ongoing treatment for septic shock and DKA per protocol. She remains severely critically ill.        Plan: Critical Care   Neuro/ pain/ sedation:  # Toxic metabolic encephalopathy  # Aggitation  # Polysubstance abuse- meth and heroin use. At risk for withdrawal  # IVDU  # Meth intoxication  - Monitor neurological status. Notify provider for any acute changes in exam  - Sedation: propofol  - Pain: fent gtt  - Haldol PRN    Pulmonary:  # Acute respiratory failure with refractory hypoxia  # Complex cavitary pneumonia 2/2 to Covid with superimposed bacterial infection suspected.   # Acute respiratory acidosis  # severe ARDS, P/F ratio 78 at time of intubation, worsening to 41  Acute respiratory distress. Trial of bipap but continued to have evidence of respiratory collapse. Intubated on 12/16/22. + covid with new cavitary lung infection consistent with concurrent superimposed bacterial pneumonia. Adjusting vent settings to address hyperventilation. Trending ABGs for vent management. Received remdesivir on arrival but discontinued after intubation. Initated on stress dose steroids for refractory septic shock.   - Continue intubation and mechanical ventilation  - serial ABG  -  supplemental oxygen to keep saturation about 92%  - continue steroids  - discontinue remdesivir  - will consider addition of epoprostonol, paralysis and proning as indicated pending clinical progress  - obtain chest CT once stable for transport and imaging  - sputum cultures send and pending  - plan for diagnostic and therapeutic bronch this afternoon    Cardiovascular:  # Severe septic shock  # Sinus tachycardia  - Dual vasopressor support  - maintain normal calcium levels  - continue antibiotics  - Echo to evaluate underlying cardiac function and evaluate for evidence of endocarditis given history of IV drug use    GI/Nutrition:  # Abdominal pain. Present prior to intubation. Large gastric bubble on am CXR. Unclear etiology. May be component of heroin with drawl symptoms vs. DKA. Mild alk phosphatemia on admission but otherwise hepatic panel normal.   # ?Upper GI bleed. melenotic stools. Hem occult positive. Suspect upper GI bleed.   - Diet: NPO for Medical/Clinical Reasons Except for: Meds, Ice Chips    - Continue NG to LIS  - Not appropriate for enteral nutrition at this time  - check lipase to rule out pancreatitis  - BID PPI  - Monitor NG characteristics, if obvious blood will consult GI for EGD    Renal/ Fluid Balance:   #Diabetic ketoacidosis- DKA protocol  #Hypokalemia- replacement per protocol  # hypophosphatemia- replacement per protocol  # high anion gap metabolic acidosis 2/2 to DKA  # Ketonuria  - DKA protocol  - ensure adequate hydration, bolus IVF PRN  - insulin gtt per protocol  - Will monitor intake and output  - bolus IVF PRN.  - bicarb PRN per protocol  - ICU electrolyte replacement protocol    Endocrine:  #Type 1 diabetes with insulin non complicance, complicated by DKA. History of prior admission for DKA  - insulin therapy per DKA protocol    ID:  # Septic shock  # Complicated pneumonia  # Covid positive  - ID consulted  - continue broad spectrum antibiotics with vanc and zosyn.   - blood  cultures pending  - sputum culture pending  - plan for diagnostic and therapeutic bronch    Hematology:  # DVT ppx  # melenotic stools, hem occult positive  - subcutaneous heparin  - possible upper GI bleed. On PPI. Monitor clinically at this time. No acute indication for GI intervention or transfusion.     MSK:   - PT and OT consulted. Appreciate recommendations.     Lines/ tubes/ drains:  [place] Catheter: PRESENT, indication:    Central Lines: PRESENT  PICC 12/16/22 Double Lumen Right Basilic Ok to use picc line-Site Assessment: WDL  CVC Triple Lumen Left External jugular-Site Assessment: WDL    Prophylaxis:  - DVT Prophylaxis: Heparin SQ and Pneumatic Compression Devices  - PUD Prophylaxis: PPI    Code Status: Full Code      Disposition:  - ICU    The patient's care was discussed with the Attending Physician, Corine Moss, Bedside Nurse, Patient and ID, hospitalist Consultant.  Critical care time exclusive of procedures: 180 minutes.     Clinically Significant Risk Factors Present on Admission        # Hypokalemia: Lowest K = 2.6 mmol/L in last 2 days, will replace as needed  # Hyponatremia: Lowest Na = 121 mmol/L in last 2 days, will monitor as appropriate   # Hypercalcemia: corrected calcium is >10.1, will monitor as appropriate   # Anion Gap Metabolic Acidosis: Highest Anion Gap = 28 mmol/L in last 2 days, will monitor and treat as appropriate  # Hypoalbuminemia: Lowest albumin = 2.8 g/dL at 12/16/2022  2:43 AM, will monitor as appropriate  # Coagulation Defect: INR = 1.82 (Ref range: 0.85 - 1.15) and/or PTT = 39 Seconds (Ref range: 22 - 38 Seconds), will monitor for bleeding      # Circulatory Shock: currently requiring pressors for blood pressure support  # Acute Respiratory Failure: Documented O2 saturation < 91%.  Continue supplemental oxygen as needed          # Anemia: based on hgb <11         Barbara Dawkins MD  [facility]  Securely message with the Vocera Web Console  (learn more here)  Text page via [facility] Paging/Directory         ______________________________________________________________________    Chief Complaint   DKA, septic shock    History obtained from review of the medical record and with discussion with hospitalist    History of Present Illness   Deidre N Aasen is a 27 year old female with known poorly controlled insulin non compliant diabetes mellitus type 1, polysubstance abuse/IVDU (meth, heroin) and recent hospitalization for DKA earlier this week. Patient left [facility] and was brought back to the ED after she developed lethargy and increased work of breathing. Workup in the ED was consistent with DKA. She is known covid positive from most recent hospitalization and was found to have a new right lower and middle lobe consolidation with cavitation. Earlier this am she developed worsening encephalopathy, respiratory distress requiring BiPAP therapy and was admitted to the ICU for ongoing management.       Review of Systems    Patient complained of nausea, abdominal pain, respiratory distress prior to intubation. Otherwise unable to obtain full ROS due to encephalopathy and critical illness, sedation and intubation.     Past Medical History    I have reviewed this patient's medical history and updated it with pertinent information if needed.   Past Medical History:   Diagnosis Date     Anxiety      Chronic malnutrition      Depressive disorder      Diabetes mellitus      Heroin abuse      Methamphetamine abuse      Noncompliance with medication regimen        Past Surgical History   I have reviewed this patient's surgical history and updated it with pertinent information if needed.  No past surgical history on file.    Social History   I have reviewed this patient's social history and updated it with pertinent information if needed.  Social History     Tobacco Use     Smoking status: Never     Smokeless tobacco: Never   Substance Use Topics     Alcohol use:  No     Drug use: Yes       Family History     Unknown. Unable to provide information and no family available to provide history.     Prior to Admission Medications   Prior to Admission Medications   Prescriptions Last Dose Informant Patient Reported? Taking?   insulin aspart (NOVOLOG PEN) 100 UNIT/ML pen   Yes No   Sig: Inject Subcutaneous 3 times daily (with meals) Sliding scale   insulin glargine (BASAGLAR KWIKPEN) 100 UNIT/ML pen   No No   Sig: Inject 20 Units Subcutaneous daily      Facility-Administered Medications: None     Allergies   Allergies   Allergen Reactions     Ciprofloxacin Other (See Comments)     Vomiting; diarrhea       Physical Exam   Vital Signs: Temp: (!) 102.7  F (39.3  C) Temp src: Bladder BP: 94/49 Pulse: (!) 142   Resp: (!) 35 SpO2: 94 % O2 Device: Mechanical Ventilator    Weight: 0 lbs 0 oz       Intake/Output Summary (Last 24 hours) at 12/16/2022 1630  Last data filed at 12/16/2022 1600  Gross per 24 hour   Intake 5830.15 ml   Output 2100 ml   Net 3730.15 ml       General: acutely ill appearing female, in distress, altered and disoriented  HEENT: AT, NC, PERRL, EOMI, trachea midline. ET tube in place at 22 cm, 7.0 tube.   Neuro: sedated  CV: sinus tachycardia, non mottled appearance. Appears hypovolemic.   Pulm: course breath sounds worse on the right compared to the left, on mechanical ventilation.   Abd: soft, non distended. NG in place with bilious output.   : degroot in place with clear pale yellow urine in the bag  Extremities: thin, covered in needle stick marks  Skin: non mottled, dry appearing.   Psych: Agitated prior to intubation.     Data   I reviewed all medications, new labs and imaging results over the last 24 hours.  Arterial Blood Gases   Recent Labs   Lab 12/16/22  1439 12/16/22  1258   PH 7.23* 7.06*   PCO2 39 62*   PO2 41* 78*   HCO3 16* 17*       Complete Blood Count   Recent Labs   Lab 12/16/22  1015 12/16/22  0243 12/13/22  0508 12/13/22  0235   WBC 3.3* 16.0* 14.7*  16.0*   HGB 9.2* 11.8 13.4 13.7    497* 402 438       Basic Metabolic Panel  Recent Labs   Lab 12/16/22  1611 12/16/22  1519 12/16/22  1354 12/16/22  1230 12/16/22  1133 12/16/22  1015 12/16/22  0500 12/16/22  0434 12/16/22  0310 12/16/22  0243   NA  --   --   --  137  --  138  --  127*  --  121*   POTASSIUM  --   --   --  2.6*  --  2.6*  --  3.2*  --  4.9   CHLORIDE  --   --   --  108  --  111*  --  96*  --  88*   CO2  --   --   --  14*  --  16*  --  7*  --  5*   BUN  --   --   --  26  --  24  --  27.1*  --  21.5*   CR  --   --   --  0.60  --  0.54  --  0.71  --  0.72   * 232* 225* 246*   < > 204*   < > 517*   < > 561*    < > = values in this interval not displayed.       Liver Function Tests  Recent Labs   Lab 12/16/22  1015 12/16/22  0243 12/13/22  0235 12/13/22  0003   AST  --  27  --  24   ALT  --  13  --  18   ALKPHOS  --  173*  --  160*   BILITOTAL  --  <0.2  --  0.3   ALBUMIN  --  2.8*  --  3.8   INR 1.82* 1.68* 1.21*  --        Pancreatic Enzymes  Recent Labs   Lab 12/16/22  1230   LIPASE 47*       Coagulation Profile  Recent Labs   Lab 12/16/22  1015 12/16/22  0243 12/13/22  0235   INR 1.82* 1.68* 1.21*   PTT  --   --  39*       IMAGING:  Recent Results (from the past 24 hour(s))   Head CT w/o contrast    Narrative    EXAM: CT HEAD W/O CONTRAST  LOCATION: [facility]  DATE/TIME: 12/16/2022 2:47 AM    INDICATION: AMS  COMPARISON: None.  TECHNIQUE: Routine CT Head without IV contrast. Multiplanar reformats. Dose reduction techniques were used.    FINDINGS:  INTRACRANIAL CONTENTS: No intracranial hemorrhage, extraaxial collection, or mass effect.  No CT evidence of acute infarct. Normal parenchymal attenuation. Normal ventricles and sulci.     VISUALIZED ORBITS/SINUSES/MASTOIDS: No intraorbital abnormality. Incomplete evaluation of the paranasal sinuses demonstrates evidence of pneumatized secretions in the right maxillary and sphenoid sinuses. No middle ear or mastoid  effusion.    BONES/SOFT TISSUES: No acute abnormality.      Impression    IMPRESSION:  1.  No acute intracranial pathology.  2.  Evidence of acute sinusitis in the right maxillary and sphenoid sinuses.   XR Chest Port 1 View    Narrative    EXAM: CHEST SINGLE VIEW PORTABLE  LOCATION: [facility]  DATE/TIME: 2022 3:19 AM    INDICATION: Altered mental state. Known COVID infection.  COMPARISON: 2022.      Impression    IMPRESSION:   1. A moderate to large-sized region of patchy opacities within the mid and lower right lung. This is nonspecific, but most likely represents pneumonia. On the recent comparison study dated 2022, there was a much smaller patchy nodular opacity at   this location.  2. Within the central aspect of the aforementioned patchy opacity, there is a 5 cm lobulated lucent region that may represent cavitation, possibly an abscess.  3. Interval placement of a right internal jugular central venous catheter distal catheter tip in the right atrium, approximately 1 cm distal to the junction of the superior vena cava and right atrium.   XR Chest Port 1 View    Narrative    CHEST PORTABLE ONE VIEW  2022 11:34 AM       INDICATION: Acute worsening hypoxia.  COMPARISON: 2022.       Impression    IMPRESSION: Cavitary pneumonia in the right lower lung is similar to  previous. No pneumothorax. Right IJ central venous catheter tip is  near the superior atriocaval junction. There is a new endotracheal  tube in good position above the demar. New NG tube in the stomach.       CARLY SULLIVAN MD         SYSTEM ID:  [id number]   Echocardiogram Complete   Result Value    LVEF  60-65%    Narrative    [id number]  [id number]  [id number]  [id number]^ALIA^[facility]  Echocardiography Laboratory  [address] [place] [address]     Name: AASEN, DEIDRE N  MRN: [id number]  : 1995  Study Date: 2022 03:07 PM  Age: 27 yrs  Gender:  Female  Patient Location: [facility]  Reason For Study: Shock  Ordering Physician: DEIRDRE DAWKINS  Referring Physician: Deirdre Dawkins  Performed By: Yosvany Goncalves     BSA: 1.4 m2  Height: 62 in  Weight: 96 lb  HR: 153  BP: 94/49 mmHg  ______________________________________________________________________________  Procedure  Complete Echo Adult. Optison (NDC #[id number]) given intravenously.  ______________________________________________________________________________  Interpretation Summary     1. Left ventricular systolic function is normal. The visual ejection fraction  is 60-65%.  2. No regional wall motion abnormalities noted.  3. The right ventricle is normal in structure, function and size.  4. No evidence for significant valvular pathology.  ______________________________________________________________________________  Left Ventricle  The left ventricle is normal in size. There is normal left ventricular wall  thickness. Left ventricular systolic function is normal. The visual ejection  fraction is 60-65%. Left ventricular diastolic function is indeterminate. No  regional wall motion abnormalities noted.     Right Ventricle  The right ventricle is normal in structure, function and size.     Atria  Normal left atrial size. Right atrial size is normal. There is no color  Doppler evidence of an atrial shunt.     Mitral Valve  The mitral valve is normal in structure and function.     Tricuspid Valve  The tricuspid valve is normal in structure and function. There is mild (1+)  tricuspid regurgitation.     Aortic Valve  The aortic valve is normal in structure and function.     Pulmonic Valve  The pulmonic valve is not well seen, but is grossly normal.     Vessels  Normal size aorta.     Pericardium  There is no pericardial effusion.     Rhythm  Sinus rhythm was noted.  ______________________________________________________________________________  MMode/2D Measurements & Calculations  IVSd: 0.84 cm     LVIDd: 2.7  cm  LVIDs: 2.1 cm  LVPWd: 0.79 cm  FS: 21.0 %  LV mass(C)d: 50.1 grams  LV mass(C)dI: 35.8 grams/m2  Ao root diam: 2.9 cm  LVOT diam: 1.8 cm  LVOT area: 2.6 cm2  RWT: 0.59     Doppler Measurements & Calculations  MV E max ramu: 92.2 cm/sec  MV A max ramu: 109.5 cm/sec  MV E/A: 0.84  MV dec slope: 818.6 cm/sec2  MV dec time: 0.11 sec  PA acc time: 0.08 sec  TR max ramu: 239.8 cm/sec  TR max P.1 mmHg  E/E' av.4  Lateral E/e': 5.8  Medial E/e': 11.0     ______________________________________________________________________________  Report approved by: Uma Price 2022 04:27 PM             Barbara Dawkins MD  Surgical Critical Fellow

## 2022-12-16 NOTE — CONSULTS
THORACIC SURGERY CONSULT NOTE    Consult Reason: COVID-19, cavitary lesion right lung    HPI: Deidre N Aasen is a 27 year old female with history of type 1 diabetes DKA, anxiety, depression, and polysubstance abuse transferred from [facility] to [facility] ICU on 12/16/22 for COVID-19, DKA, pneumonia and concerned for pulmonary abscess. She was recently admitted to [facility] on 12/13/22 for the same presentation, but left AMA same day. Thoracic surgery was consulted for the cavitary lesion in the right lung. She has shortness of breath and upper abd pain. She uses illicit drugs.     A/P: Patient is a 27 year old female with COVID-19 and pneumonia right lung, currently on bipap.  -appreciate excellent ICU care  -chest tube not indicated  -continue antibiotics and ICU cares  -we will follow  -please page on-call team over the weekend if any questions    Patient and plan discussed with attending.    Thank you for the opportunity to participate in the care of this patient.    PMH:  Past Medical History:   Diagnosis Date     Anxiety      Chronic malnutrition      Depressive disorder      Diabetes mellitus      Heroin abuse      Methamphetamine abuse      Noncompliance with medication regimen        PSH:  Denies previous surgeries    FH:  No known family history of pneumonia    SH:  +Tobacco use  +Illicit drug use, heroin  Not   Not working    Allergies:  Allergies   Allergen Reactions     Ciprofloxacin Other (See Comments)     Vomiting; diarrhea       Home Meds:  Medications Prior to Admission   Medication Sig Dispense Refill Last Dose     insulin aspart (NOVOLOG PEN) 100 UNIT/ML pen Inject Subcutaneous 3 times daily (with meals) Sliding scale        insulin glargine (BASAGLAR KWIKPEN) 100 UNIT/ML pen Inject 20 Units Subcutaneous daily 6 mL 0        ROS: + SOB, abd pain. Denies CP, leg swelling.     Physical Exam:  Temp:  [91.9  F (33.3  C)-97.4  F (36.3  C)] 97.4  F (36.3  C)  Pulse:   [] 126  Resp:  [22-52] 32  BP: ()/(32-74) 99/66  SpO2:  [81 %-99 %] 99 %    Gen: NAD, resting comfortably in bed  Lungs: Currently on bipap, no tachynpea  Abd: Soft, mild epigastric tenderness  Ext: No pitting edema, several sores on the left upper leg  Neuro: Alert    Labs:  CBC  Recent Labs   Lab 12/16/22  0243 12/13/22  0508 12/13/22  0235 12/13/22  0003   WBC 16.0* 14.7* 16.0* 18.2*   HGB 11.8 13.4 13.7 16.3*   * 402 438 551*     BMP  Recent Labs   Lab 12/16/22  0915 12/16/22  0836 12/16/22  0646 12/16/22  0532 12/16/22  0500 12/16/22  0434 12/16/22  0310 12/16/22  0243 12/13/22  1600 12/13/22  1537 12/13/22  1225 12/13/22  1222   NA  --   --   --   --   --  127*  --  121*  --  134*  --  132*   POTASSIUM  --   --   --   --   --  3.2*  --  4.9  --  2.8*  --  3.1*   CHLORIDE  --   --   --   --   --  96*  --  88*  --  105  --  102   CO2  --   --   --   --   --  7*  --  5*  --  14*  --  11*   BUN  --   --   --   --   --  27.1*  --  21.5*  --  23.0*  --  28.0*   CR  --   --   --   --   --  0.71  --  0.72  --  0.36*  --  0.39*   * 294* 383* 421*   < > 517*   < > 561*   < > 115*   < > 199*    < > = values in this interval not displayed.     LFT  Recent Labs   Lab 12/16/22  0243 12/13/22 0235 12/13/22  0003   AST 27  --  24   ALT 13  --  18   ALKPHOS 173*  --  160*   BILITOTAL <0.2  --  0.3   ALBUMIN 2.8*  --  3.8   INR 1.68* 1.21*  --      Imaging:  CT chest 12/13 and CXR 12/16 reviewed, new consolidation right lung      Tawnya Baird PA-C  Thoracic Surgery  [phone] (p)

## 2022-12-17 LAB
ALLEN'S TEST: ABNORMAL
ALLEN'S TEST: YES
ANION GAP SERPL CALCULATED.3IONS-SCNC: 10 MMOL/L (ref 3–14)
ANION GAP SERPL CALCULATED.3IONS-SCNC: 10 MMOL/L (ref 3–14)
ANION GAP SERPL CALCULATED.3IONS-SCNC: 11 MMOL/L (ref 3–14)
ANION GAP SERPL CALCULATED.3IONS-SCNC: 11 MMOL/L (ref 3–14)
ANION GAP SERPL CALCULATED.3IONS-SCNC: 12 MMOL/L (ref 3–14)
BASE EXCESS BLDA CALC-SCNC: -10.2 MMOL/L (ref -9–1.8)
BASE EXCESS BLDA CALC-SCNC: -11.3 MMOL/L (ref -9–1.8)
BASE EXCESS BLDA CALC-SCNC: -11.3 MMOL/L (ref -9–1.8)
BASE EXCESS BLDA CALC-SCNC: -4.2 MMOL/L (ref -9–1.8)
BASE EXCESS BLDA CALC-SCNC: -8.9 MMOL/L (ref -9–1.8)
BLD PROD TYP BPU: NORMAL
BLOOD COMPONENT TYPE: NORMAL
BUN SERPL-MCNC: 25 MG/DL (ref 7–30)
BUN SERPL-MCNC: 27 MG/DL (ref 7–30)
BUN SERPL-MCNC: 29 MG/DL (ref 7–30)
BUN SERPL-MCNC: 30 MG/DL (ref 7–30)
BUN SERPL-MCNC: 33 MG/DL (ref 7–30)
BUN SERPL-MCNC: 34 MG/DL (ref 7–30)
CA-I BLD-MCNC: 5.2 MG/DL (ref 4.4–5.2)
CALCIUM SERPL-MCNC: 6.3 MG/DL (ref 8.5–10.1)
CALCIUM SERPL-MCNC: 6.4 MG/DL (ref 8.5–10.1)
CALCIUM SERPL-MCNC: 6.6 MG/DL (ref 8.5–10.1)
CALCIUM SERPL-MCNC: 6.7 MG/DL (ref 8.5–10.1)
CALCIUM SERPL-MCNC: 6.8 MG/DL (ref 8.5–10.1)
CHLORIDE BLD-SCNC: 114 MMOL/L (ref 94–109)
CHLORIDE BLD-SCNC: 114 MMOL/L (ref 94–109)
CHLORIDE BLD-SCNC: 115 MMOL/L (ref 94–109)
CHLORIDE BLD-SCNC: 116 MMOL/L (ref 94–109)
CHLORIDE BLD-SCNC: 117 MMOL/L (ref 94–109)
CHLORIDE BLD-SCNC: 99 MMOL/L (ref 94–109)
CO2 SERPL-SCNC: 14 MMOL/L (ref 20–32)
CO2 SERPL-SCNC: 16 MMOL/L (ref 20–32)
CODING SYSTEM: NORMAL
CREAT BLD-MCNC: 0.8 MG/DL (ref 0.5–1)
CREAT SERPL-MCNC: 1.02 MG/DL (ref 0.52–1.04)
CREAT SERPL-MCNC: 1.13 MG/DL (ref 0.52–1.04)
CREAT SERPL-MCNC: 1.19 MG/DL (ref 0.52–1.04)
CREAT SERPL-MCNC: 1.29 MG/DL (ref 0.52–1.04)
CREAT SERPL-MCNC: 1.33 MG/DL (ref 0.52–1.04)
CROSSMATCH: NORMAL
ERYTHROCYTE [DISTWIDTH] IN BLOOD BY AUTOMATED COUNT: 13.2 % (ref 10–15)
GFR SERPL CREATININE-BSD FRML MDRD: 57 ML/MIN/1.73M2
GFR SERPL CREATININE-BSD FRML MDRD: 59 ML/MIN/1.73M2
GFR SERPL CREATININE-BSD FRML MDRD: 65 ML/MIN/1.73M2
GFR SERPL CREATININE-BSD FRML MDRD: 69 ML/MIN/1.73M2
GFR SERPL CREATININE-BSD FRML MDRD: 78 ML/MIN/1.73M2
GLUCOSE BLD-MCNC: 139 MG/DL (ref 70–99)
GLUCOSE BLD-MCNC: 143 MG/DL (ref 70–99)
GLUCOSE BLD-MCNC: 152 MG/DL (ref 70–99)
GLUCOSE BLD-MCNC: 154 MG/DL (ref 70–99)
GLUCOSE BLD-MCNC: 159 MG/DL (ref 70–99)
GLUCOSE BLD-MCNC: 519 MG/DL (ref 70–99)
GLUCOSE BLDC GLUCOMTR-MCNC: 111 MG/DL (ref 70–99)
GLUCOSE BLDC GLUCOMTR-MCNC: 126 MG/DL (ref 70–99)
GLUCOSE BLDC GLUCOMTR-MCNC: 132 MG/DL (ref 70–99)
GLUCOSE BLDC GLUCOMTR-MCNC: 137 MG/DL (ref 70–99)
GLUCOSE BLDC GLUCOMTR-MCNC: 139 MG/DL (ref 70–99)
GLUCOSE BLDC GLUCOMTR-MCNC: 139 MG/DL (ref 70–99)
GLUCOSE BLDC GLUCOMTR-MCNC: 144 MG/DL (ref 70–99)
GLUCOSE BLDC GLUCOMTR-MCNC: 146 MG/DL (ref 70–99)
GLUCOSE BLDC GLUCOMTR-MCNC: 147 MG/DL (ref 70–99)
GLUCOSE BLDC GLUCOMTR-MCNC: 147 MG/DL (ref 70–99)
GLUCOSE BLDC GLUCOMTR-MCNC: 152 MG/DL (ref 70–99)
GLUCOSE BLDC GLUCOMTR-MCNC: 154 MG/DL (ref 70–99)
GLUCOSE BLDC GLUCOMTR-MCNC: 155 MG/DL (ref 70–99)
GLUCOSE BLDC GLUCOMTR-MCNC: 156 MG/DL (ref 70–99)
GLUCOSE BLDC GLUCOMTR-MCNC: 160 MG/DL (ref 70–99)
GLUCOSE BLDC GLUCOMTR-MCNC: 160 MG/DL (ref 70–99)
GLUCOSE BLDC GLUCOMTR-MCNC: 540 MG/DL (ref 70–99)
HCO3 BLD-SCNC: 14 MMOL/L (ref 21–28)
HCO3 BLD-SCNC: 15 MMOL/L (ref 21–28)
HCO3 BLD-SCNC: 18 MMOL/L (ref 21–28)
HCT VFR BLD AUTO: 22.1 % (ref 35–47)
HCT VFR BLD CALC: 39 % (ref 35–47)
HGB BLD-MCNC: 13.3 G/DL (ref 11.7–15.7)
HGB BLD-MCNC: 6.3 G/DL (ref 11.7–15.7)
HGB BLD-MCNC: 8 G/DL (ref 11.7–15.7)
ISSUE DATE AND TIME: NORMAL
KETONES BLD-SCNC: 0.8 MMOL/L (ref 0–0.6)
LACTATE SERPL-SCNC: 1.4 MMOL/L (ref 0.7–2)
MAGNESIUM SERPL-MCNC: 1.5 MG/DL (ref 1.6–2.3)
MAGNESIUM SERPL-MCNC: 1.5 MG/DL (ref 1.6–2.3)
MAGNESIUM SERPL-MCNC: 2 MG/DL (ref 1.6–2.3)
MAGNESIUM SERPL-MCNC: 2.1 MG/DL (ref 1.6–2.3)
MAGNESIUM SERPL-MCNC: 2.2 MG/DL (ref 1.6–2.3)
MAGNESIUM SERPL-MCNC: 2.2 MG/DL (ref 1.6–2.3)
MCH RBC QN AUTO: 30.9 PG (ref 26.5–33)
MCHC RBC AUTO-ENTMCNC: 36.2 G/DL (ref 31.5–36.5)
MCV RBC AUTO: 85 FL (ref 78–100)
O2/TOTAL GAS SETTING VFR VENT: 40 %
O2/TOTAL GAS SETTING VFR VENT: 40 %
O2/TOTAL GAS SETTING VFR VENT: 60 %
O2/TOTAL GAS SETTING VFR VENT: 70 %
O2/TOTAL GAS SETTING VFR VENT: 90 %
PCO2 BLD: 23 MM HG (ref 35–45)
PCO2 BLD: 24 MM HG (ref 35–45)
PCO2 BLD: 27 MM HG (ref 35–45)
PCO2 BLD: 30 MM HG (ref 35–45)
PCO2 BLD: 31 MM HG (ref 35–45)
PH BLD: 7.27 [PH] (ref 7.35–7.45)
PH BLD: 7.31 [PH] (ref 7.35–7.45)
PH BLD: 7.33 [PH] (ref 7.35–7.45)
PH BLD: 7.41 [PH] (ref 7.35–7.45)
PH BLD: 7.5 [PH] (ref 7.35–7.45)
PHOSPHATE SERPL-MCNC: 2.7 MG/DL (ref 2.5–4.5)
PLATELET # BLD AUTO: 162 10E3/UL (ref 150–450)
PO2 BLD: 106 MM HG (ref 80–105)
PO2 BLD: 122 MM HG (ref 80–105)
PO2 BLD: 134 MM HG (ref 80–105)
PO2 BLD: 182 MM HG (ref 80–105)
PO2 BLD: 214 MM HG (ref 80–105)
POTASSIUM BLD-SCNC: 3.1 MMOL/L (ref 3.4–5.3)
POTASSIUM BLD-SCNC: 3.3 MMOL/L (ref 3.4–5.3)
POTASSIUM BLD-SCNC: 3.4 MMOL/L (ref 3.4–5.3)
POTASSIUM BLD-SCNC: 3.4 MMOL/L (ref 3.4–5.3)
POTASSIUM BLD-SCNC: 3.5 MMOL/L (ref 3.4–5.3)
POTASSIUM BLD-SCNC: 3.5 MMOL/L (ref 3.4–5.3)
POTASSIUM BLD-SCNC: 5.7 MMOL/L (ref 3.4–5.3)
RBC # BLD AUTO: 2.59 10E6/UL (ref 3.8–5.2)
SODIUM BLD-SCNC: 121 MMOL/L (ref 133–144)
SODIUM SERPL-SCNC: 139 MMOL/L (ref 133–144)
SODIUM SERPL-SCNC: 140 MMOL/L (ref 133–144)
SODIUM SERPL-SCNC: 140 MMOL/L (ref 133–144)
SODIUM SERPL-SCNC: 141 MMOL/L (ref 133–144)
SODIUM SERPL-SCNC: 142 MMOL/L (ref 133–144)
UNIT ABO/RH: NORMAL
UNIT NUMBER: NORMAL
UNIT STATUS: NORMAL
UNIT TYPE ISBT: 5100
VANCOMYCIN SERPL-MCNC: 20.1 MG/L
WBC # BLD AUTO: 12.6 10E3/UL (ref 4–11)

## 2022-12-17 PROCEDURE — 83605 ASSAY OF LACTIC ACID: CPT | Performed by: INTERNAL MEDICINE

## 2022-12-17 PROCEDURE — 84100 ASSAY OF PHOSPHORUS: CPT | Performed by: INTERNAL MEDICINE

## 2022-12-17 PROCEDURE — 999N000157 HC STATISTIC RCP TIME EA 10 MIN

## 2022-12-17 PROCEDURE — 258N000003 HC RX IP 258 OP 636: Performed by: INTERNAL MEDICINE

## 2022-12-17 PROCEDURE — 84132 ASSAY OF SERUM POTASSIUM: CPT | Performed by: SURGERY

## 2022-12-17 PROCEDURE — 83735 ASSAY OF MAGNESIUM: CPT | Performed by: INTERNAL MEDICINE

## 2022-12-17 PROCEDURE — 99233 SBSQ HOSP IP/OBS HIGH 50: CPT | Performed by: INTERNAL MEDICINE

## 2022-12-17 PROCEDURE — 250N000011 HC RX IP 250 OP 636: Performed by: INTERNAL MEDICINE

## 2022-12-17 PROCEDURE — 94003 VENT MGMT INPAT SUBQ DAY: CPT

## 2022-12-17 PROCEDURE — 250N000009 HC RX 250: Performed by: INTERNAL MEDICINE

## 2022-12-17 PROCEDURE — 84132 ASSAY OF SERUM POTASSIUM: CPT | Performed by: INTERNAL MEDICINE

## 2022-12-17 PROCEDURE — C9113 INJ PANTOPRAZOLE SODIUM, VIA: HCPCS | Performed by: SURGERY

## 2022-12-17 PROCEDURE — 82803 BLOOD GASES ANY COMBINATION: CPT | Performed by: SURGERY

## 2022-12-17 PROCEDURE — 250N000013 HC RX MED GY IP 250 OP 250 PS 637: Performed by: INTERNAL MEDICINE

## 2022-12-17 PROCEDURE — 85027 COMPLETE CBC AUTOMATED: CPT | Performed by: SURGERY

## 2022-12-17 PROCEDURE — P9016 RBC LEUKOCYTES REDUCED: HCPCS | Performed by: INTERNAL MEDICINE

## 2022-12-17 PROCEDURE — 200N000001 HC R&B ICU

## 2022-12-17 PROCEDURE — 250N000012 HC RX MED GY IP 250 OP 636 PS 637: Performed by: INTERNAL MEDICINE

## 2022-12-17 PROCEDURE — 82010 KETONE BODYS QUAN: CPT | Performed by: INTERNAL MEDICINE

## 2022-12-17 PROCEDURE — 250N000013 HC RX MED GY IP 250 OP 250 PS 637: Performed by: SURGERY

## 2022-12-17 PROCEDURE — 80202 ASSAY OF VANCOMYCIN: CPT | Performed by: INTERNAL MEDICINE

## 2022-12-17 PROCEDURE — 258N000003 HC RX IP 258 OP 636: Performed by: SURGERY

## 2022-12-17 PROCEDURE — 80048 BASIC METABOLIC PNL TOTAL CA: CPT | Performed by: SURGERY

## 2022-12-17 PROCEDURE — 99291 CRITICAL CARE FIRST HOUR: CPT | Performed by: INTERNAL MEDICINE

## 2022-12-17 PROCEDURE — 250N000011 HC RX IP 250 OP 636: Performed by: SURGERY

## 2022-12-17 RX ORDER — POTASSIUM CHLORIDE 7.45 MG/ML
10 INJECTION INTRAVENOUS
Status: COMPLETED | OUTPATIENT
Start: 2022-12-17 | End: 2022-12-17

## 2022-12-17 RX ORDER — VANCOMYCIN HYDROCHLORIDE 500 MG/10ML
500 INJECTION, POWDER, LYOPHILIZED, FOR SOLUTION INTRAVENOUS ONCE
Status: COMPLETED | OUTPATIENT
Start: 2022-12-17 | End: 2022-12-18

## 2022-12-17 RX ORDER — AMPICILLIN AND SULBACTAM 2; 1 G/1; G/1
3 INJECTION, POWDER, FOR SOLUTION INTRAMUSCULAR; INTRAVENOUS EVERY 6 HOURS
Status: DISCONTINUED | OUTPATIENT
Start: 2022-12-17 | End: 2022-12-19

## 2022-12-17 RX ORDER — DEXTROSE MONOHYDRATE 25 G/50ML
25-50 INJECTION, SOLUTION INTRAVENOUS
Status: DISCONTINUED | OUTPATIENT
Start: 2022-12-17 | End: 2023-01-04 | Stop reason: HOSPADM

## 2022-12-17 RX ORDER — MAGNESIUM SULFATE HEPTAHYDRATE 40 MG/ML
2 INJECTION, SOLUTION INTRAVENOUS ONCE
Status: COMPLETED | OUTPATIENT
Start: 2022-12-17 | End: 2022-12-17

## 2022-12-17 RX ORDER — CALCIUM GLUCONATE 20 MG/ML
1 INJECTION, SOLUTION INTRAVENOUS ONCE
Status: COMPLETED | OUTPATIENT
Start: 2022-12-17 | End: 2022-12-17

## 2022-12-17 RX ORDER — NICOTINE POLACRILEX 4 MG
15-30 LOZENGE BUCCAL
Status: DISCONTINUED | OUTPATIENT
Start: 2022-12-17 | End: 2023-01-04 | Stop reason: HOSPADM

## 2022-12-17 RX ADMIN — SODIUM CHLORIDE: 9 INJECTION, SOLUTION INTRAVENOUS at 11:55

## 2022-12-17 RX ADMIN — POTASSIUM CHLORIDE 10 MEQ: 7.46 INJECTION, SOLUTION INTRAVENOUS at 20:56

## 2022-12-17 RX ADMIN — SODIUM PHOSPHATE, MONOBASIC, MONOHYDRATE 15 MMOL: 276; 142 INJECTION, SOLUTION INTRAVENOUS at 00:12

## 2022-12-17 RX ADMIN — Medication 150 MCG/HR: at 06:15

## 2022-12-17 RX ADMIN — Medication 150 MCG/HR: at 22:04

## 2022-12-17 RX ADMIN — VASOPRESSIN 2.4 UNITS/HR: 20 INJECTION, SOLUTION INTRAMUSCULAR; SUBCUTANEOUS at 18:27

## 2022-12-17 RX ADMIN — SODIUM BICARBONATE 100 ML/HR: 84 INJECTION, SOLUTION INTRAVENOUS at 18:21

## 2022-12-17 RX ADMIN — MIDAZOLAM 2 MG: 1 INJECTION INTRAMUSCULAR; INTRAVENOUS at 04:21

## 2022-12-17 RX ADMIN — INSULIN GLARGINE 20 UNITS: 100 INJECTION, SOLUTION SUBCUTANEOUS at 19:59

## 2022-12-17 RX ADMIN — INSULIN ASPART 1 UNITS: 100 INJECTION, SOLUTION INTRAVENOUS; SUBCUTANEOUS at 23:03

## 2022-12-17 RX ADMIN — VANCOMYCIN HYDROCHLORIDE 500 MG: 500 INJECTION, POWDER, LYOPHILIZED, FOR SOLUTION INTRAVENOUS at 23:02

## 2022-12-17 RX ADMIN — POTASSIUM CHLORIDE 10 MEQ: 7.46 INJECTION, SOLUTION INTRAVENOUS at 05:59

## 2022-12-17 RX ADMIN — Medication 0.05 MCG/KG/MIN: at 09:09

## 2022-12-17 RX ADMIN — AMPICILLIN SODIUM AND SULBACTAM SODIUM 3 G: 2; 1 INJECTION, POWDER, FOR SOLUTION INTRAMUSCULAR; INTRAVENOUS at 13:19

## 2022-12-17 RX ADMIN — SODIUM BICARBONATE 50 MEQ: 84 INJECTION INTRAVENOUS at 01:18

## 2022-12-17 RX ADMIN — AMPICILLIN SODIUM AND SULBACTAM SODIUM 3 G: 2; 1 INJECTION, POWDER, FOR SOLUTION INTRAMUSCULAR; INTRAVENOUS at 20:02

## 2022-12-17 RX ADMIN — HEPARIN SODIUM 5000 UNITS: 5000 INJECTION, SOLUTION INTRAVENOUS; SUBCUTANEOUS at 09:24

## 2022-12-17 RX ADMIN — SODIUM CHLORIDE, POTASSIUM CHLORIDE, SODIUM LACTATE AND CALCIUM CHLORIDE 1000 ML: 600; 310; 30; 20 INJECTION, SOLUTION INTRAVENOUS at 13:16

## 2022-12-17 RX ADMIN — Medication 150 MCG/HR: at 22:10

## 2022-12-17 RX ADMIN — SODIUM CHLORIDE, POTASSIUM CHLORIDE, SODIUM LACTATE AND CALCIUM CHLORIDE 1000 ML: 600; 310; 30; 20 INJECTION, SOLUTION INTRAVENOUS at 18:17

## 2022-12-17 RX ADMIN — VASOPRESSIN 2.4 UNITS/HR: 20 INJECTION, SOLUTION INTRAMUSCULAR; SUBCUTANEOUS at 09:51

## 2022-12-17 RX ADMIN — MIDAZOLAM HYDROCHLORIDE 6 MG/HR: 1 INJECTION, SOLUTION INTRAVENOUS at 12:17

## 2022-12-17 RX ADMIN — SODIUM CHLORIDE: 9 INJECTION, SOLUTION INTRAVENOUS at 11:57

## 2022-12-17 RX ADMIN — HYDROCORTISONE SODIUM SUCCINATE 100 MG: 100 INJECTION, POWDER, FOR SOLUTION INTRAMUSCULAR; INTRAVENOUS at 18:26

## 2022-12-17 RX ADMIN — VANCOMYCIN HYDROCHLORIDE 750 MG: 1 INJECTION, POWDER, LYOPHILIZED, FOR SOLUTION INTRAVENOUS at 10:33

## 2022-12-17 RX ADMIN — CALCIUM GLUCONATE 1 G: 20 INJECTION, SOLUTION INTRAVENOUS at 07:04

## 2022-12-17 RX ADMIN — VASOPRESSIN 2.4 UNITS/HR: 20 INJECTION, SOLUTION INTRAMUSCULAR; SUBCUTANEOUS at 02:16

## 2022-12-17 RX ADMIN — ACETAMINOPHEN 650 MG: 650 SUPPOSITORY RECTAL at 04:19

## 2022-12-17 RX ADMIN — POTASSIUM CHLORIDE 10 MEQ: 7.46 INJECTION, SOLUTION INTRAVENOUS at 19:59

## 2022-12-17 RX ADMIN — PIPERACILLIN AND TAZOBACTAM 4.5 G: 4; .5 INJECTION, POWDER, FOR SOLUTION INTRAVENOUS at 09:24

## 2022-12-17 RX ADMIN — CHLORHEXIDINE GLUCONATE 0.12% ORAL RINSE 15 ML: 1.2 LIQUID ORAL at 20:03

## 2022-12-17 RX ADMIN — CHLORHEXIDINE GLUCONATE 0.12% ORAL RINSE 15 ML: 1.2 LIQUID ORAL at 09:24

## 2022-12-17 RX ADMIN — POTASSIUM CHLORIDE 10 MEQ: 7.46 INJECTION, SOLUTION INTRAVENOUS at 07:04

## 2022-12-17 RX ADMIN — PANTOPRAZOLE SODIUM 40 MG: 40 INJECTION, POWDER, FOR SOLUTION INTRAVENOUS at 20:03

## 2022-12-17 RX ADMIN — HYDROCORTISONE SODIUM SUCCINATE 100 MG: 100 INJECTION, POWDER, FOR SOLUTION INTRAMUSCULAR; INTRAVENOUS at 10:34

## 2022-12-17 RX ADMIN — HEPARIN SODIUM 5000 UNITS: 5000 INJECTION, SOLUTION INTRAVENOUS; SUBCUTANEOUS at 20:58

## 2022-12-17 RX ADMIN — SODIUM BICARBONATE 100 ML/HR: 84 INJECTION, SOLUTION INTRAVENOUS at 23:09

## 2022-12-17 RX ADMIN — HYDROCORTISONE SODIUM SUCCINATE 100 MG: 100 INJECTION, POWDER, FOR SOLUTION INTRAMUSCULAR; INTRAVENOUS at 02:15

## 2022-12-17 RX ADMIN — SODIUM CHLORIDE: 9 INJECTION, SOLUTION INTRAVENOUS at 04:08

## 2022-12-17 RX ADMIN — MAGNESIUM SULFATE HEPTAHYDRATE 2 G: 40 INJECTION, SOLUTION INTRAVENOUS at 04:09

## 2022-12-17 RX ADMIN — PANTOPRAZOLE SODIUM 40 MG: 40 INJECTION, POWDER, FOR SOLUTION INTRAVENOUS at 09:24

## 2022-12-17 RX ADMIN — PIPERACILLIN AND TAZOBACTAM 4.5 G: 4; .5 INJECTION, POWDER, FOR SOLUTION INTRAVENOUS at 02:20

## 2022-12-17 ASSESSMENT — ACTIVITIES OF DAILY LIVING (ADL)
ADLS_ACUITY_SCORE: 49
ADLS_ACUITY_SCORE: 53
ADLS_ACUITY_SCORE: 49
ADLS_ACUITY_SCORE: 53
ADLS_ACUITY_SCORE: 53
ADLS_ACUITY_SCORE: 49
ADLS_ACUITY_SCORE: 49
ADLS_ACUITY_SCORE: 53
ADLS_ACUITY_SCORE: 49

## 2022-12-17 NOTE — PROVIDER NOTIFICATION
1:06 AM  Phone call to Dr. Carlton with ABG results; discussed HCO-3 of 15; plan to order 1 amp bicarb, awaiting new orders. Continue to monitor.

## 2022-12-17 NOTE — PROGRESS NOTES
Notified provider about indwelling degroot catheter discussed removal or continued need.    Did provider choose to remove indwelling degroot catheter? Yes    Provider's degroot indication for keeping indwelling degroot catheter: Strict 1-2 Hour I & O if external catheters are not an option.    Is there an order for indwelling degroot catheter? Yes

## 2022-12-17 NOTE — PLAN OF CARE
Goal Outcome Evaluation:      Plan of Care Reviewed With: patient    Overall Patient Progress: decliningOverall Patient Progress: declining    Pt here with DKA, Covid +, pneumonia, and a GI bleed. A&O x3, D/O to time when alert. Neuros follows some commands when alert - but is confused. VSS. Intubated at 10:45 - FiO2 90, Peep 10, RR 30, . NPO. Takes meds through IV or crushed through NG tube. Up with A2 + lift. No nonverbal indicators of pain. Pt scoring yellow on the Aggression Stop Light Tool for disorientation. Plan to monitor blood gases and electrolytes, also monitor Hgb due to GI bleed potential issues . Discharge plan pending medical readiness/stability.

## 2022-12-17 NOTE — PROGRESS NOTES
[facility]    Infectious Disease Progress Note    Date of Service (when I saw the patient): 12/17/2022     Assessment & Plan   Deidre N Aasen is a 27 year old female who was admitted on 12/16/2022.     Impression:  1. 27 y.o patient with polysubstance abuse, IVDU  2. Type 1 diabetes  3. History of medical noncompliance   4. Recent presentation to OSH (epic records reviewed ) left AMA  5. Admitted this occasion with diabetic ketoacidosis (DKA) w/ severe acidosis, acute hypoxic respiratory failure, acute encephalopathy, (+) COVID-19, and pneumonia possible cavitation and abscess.   6. On remdesivir, vancomycin and zosyn   7. Cultures in the blood MRSA   8. bronch planned      Recommendations:   Blood cultures with MRSA which explains almost all the findings   Has multiple central lines including picc, strongly recommending removing all central lines if possible.discussed with ICU      Vancomycin per pharmacy, close eye of the kidney function, will ask lab to run dapto susc   Switch from zosyn to unasyn for aspiration pneumonia, suspect the lung findings are also MRSA     Daily blood cultures till clears   TTE noted will need CATHY     Continue remdesivir      Risk profile for tociluzumab includes serious  infection. in recovery trial has show superiority of remdesivir + dexamethasone over remdesivir +  barcitinib.   Risk profile for Bracitinib include serious infection, thrombosis. So avoid      Max pulmonary support as in place.         Rosy Davis MD    Interval History   Intubated   MRSA In the blood     Physical Exam   Temp: (!) 102.2  F (39  C) Temp src: Bladder BP: 107/70 Pulse: 113   Resp: 22 SpO2: 99 % O2 Device: Mechanical Ventilator    Vitals:    12/17/22 0600   Weight: 48.9 kg (107 lb 12.9 oz)     Vital Signs with Ranges  Temp:  [98.8  F (37.1  C)-103.5  F (39.7  C)] 102.2  F (39  C)  Pulse:  [113-160] 113  Resp:  [20-37] 22  BP: ()/(49-70) 107/70  MAP:  [45 mmHg-89 mmHg] 70  mmHg  Arterial Line BP: ()/(31-77) 92/56  FiO2 (%):  [60 %-100 %] 60 %  SpO2:  [78 %-100 %] 99 %    Constitutional: intubated   Lungs: Clear to auscultation bilaterally, no crackles or wheezing  Cardiovascular: Regular rate and rhythm, normal S1 and S2, and no murmur noted  Abdomen: Normal bowel sounds, soft, non-distended, non-tender  Skin:multiple track marks   Other:    Medications     fentaNYL 150 mcg/hr (12/17/22 0615)     insulin 1 mL/hr at 12/17/22 1039     propofol Stopped (12/17/22 0416)    And     - MEDICATION INSTRUCTIONS -       - MEDICATION INSTRUCTIONS -       midazolam 6 mg/hr (12/17/22 0600)     norepinephrine 0.05 mcg/kg/min (12/17/22 0909)     phenylephrine 1.5 mcg/kg/min (12/17/22 0600)     sodium chloride 10 mL/hr at 12/17/22 1157     sodium chloride 125 mL/hr at 12/17/22 1155     vasopressin 2.4 Units/hr (12/17/22 0951)       ampicillin-sulbactam  3 g Intravenous Q6H     chlorhexidine  15 mL Mouth/Throat Q12H     heparin ANTICOAGULANT  5,000 Units Subcutaneous Q12H     hydrocortisone sodium succinate PF  100 mg Intravenous Q8H     pantoprazole  40 mg Intravenous BID     sodium chloride (PF)  10-40 mL Intracatheter Q8H     vancomycin  750 mg Intravenous Q12H       Data   All microbiology laboratory data reviewed.  Recent Labs   Lab Test 12/17/22  0417 12/16/22  1015 12/16/22  0243   WBC 12.6* 3.3* 16.0*   HGB 8.0* 9.2* 11.8   HCT 22.1* 26.0* 37.4   MCV 85 88 98    238 497*     Recent Labs   Lab Test 12/17/22  0939 12/17/22  0417 12/17/22  0235   CR 1.19* 1.13* 1.02     No lab results found.  No lab results found.    Invalid input(s):     All cultures:  Recent Labs   Lab 12/16/22  1309 12/16/22  0356 12/16/22  0321 12/13/22  0423   CULTURE Culture in progress Positive on the 1st day of incubation*  Gram positive cocci in clusters* Positive on the 1st day of incubation*  Gram positive cocci in clusters* No growth after 4 days  No growth after 4 days      Blood culture:  Results  for orders placed or performed during the hospital encounter of 12/16/22   Blood Culture Arm, Right    Specimen: Arm, Right; Blood   Result Value Ref Range    Culture Positive on the 1st day of incubation (A)     Culture Gram positive cocci in clusters (AA)    Blood Culture Peripheral Blood    Specimen: Peripheral Blood   Result Value Ref Range    Culture Positive on the 1st day of incubation (A)     Culture Gram positive cocci in clusters (AA)    Results for orders placed or performed during the hospital encounter of 12/13/22   Blood Culture Arm, Right    Specimen: Arm, Right; Blood   Result Value Ref Range    Culture No growth after 4 days    Blood Culture Arm, Left    Specimen: Arm, Left; Blood   Result Value Ref Range    Culture No growth after 4 days       Urine culture:  No results found for this or any previous visit.

## 2022-12-17 NOTE — PLAN OF CARE
Goal Outcome Evaluation:      Plan of Care Reviewed With: patient    Overall Patient Progress: no changeOverall Patient Progress: no change    HR remained tachy, 120-140's. Tmax 102.9 via degroot, prn tylenol given with minimal response. FiO2 decreased to 70% this shift via ETT, repeat ABG, continue to monitor; 1 amp bicarb given this shift. RASS goal -2 to -3; required fentanyl at 150 mcg/hr, midazolam gtt initiation with boluses to maintain goal. MAP goal >  60; stephanie at 1.5 mcg/kg/min, vaso at 2.4 unit(s)/hr, and able to decrease levophed to 0.09 mcg/kg/min, see MAR for activity. BLE pale, cool with mottling noted. Insulin gtt continues, currently in algorithm 1, see MAR. NPO, OG in place to LIS with moderate amount of coffee-ground output, no BM. Degroot with good UO, sediment present. IVF changed to NS at 125 ml/hr. ABX given, lytes replaced as ordered. Tele ST. Continue to monitor.     Right wrist and Left wrist restraints continued 12/17/2022    Clinical Justification: Pulling lines, pulling tubes, and pulling equipment  Less Restrictive Alternative: 1:1 patient care, Repositioning, Re-evaluate equipment, Disguise equipment, Pain management, Alarm, De-escalation, Reorientation  Attending Physician Notified: Yes, Attending Physician's Name: Juancho   New orders placed  Order remains valid through shift  Length of Order: 1 Day      Anusha Hart RN

## 2022-12-17 NOTE — PROGRESS NOTES
[facility] ICU RESPIRATORY NOTE        Date of Admission: 12/16/2022    Date of Intubation (most recent): 12/16/22    Reason for Mechanical Ventilation: Resp Failure    Number of Days on Mechanical Ventilation: 2    Met Criteria for Spontaneous Breathing Trial: No    Reason for No Spontaneous Breathing Trial: PEEP +10, FiO2 75%    Significant Events Today: FiO2 weaned from 75% to 40%    ABG Results:   Recent Labs   Lab 12/17/22  1209 12/17/22  0522 12/17/22  0029 12/16/22  1759   PH 7.33* 7.31* 7.27* 7.28*   PCO2 27* 30* 31* 31*   PO2 134* 182* 214* 136*   HCO3 14* 15* 15* 15*   O2PER 60 70 90 100       Current Vent Settings: Vent Mode: CMV/AC  (Continuous Mandatory Ventilation/ Assist Control)  FiO2 (%): 40 %  Resp Rate (Set): 30 breaths/min  Tidal Volume (Set, mL): 400 mL  PEEP (cm H2O): 10 cmH2O  Resp: 30      Skin Assessment: Dry, clean, intact    Plan: Continue full vent support; reassess daily for SBT.    Carina Martinez, RT on 12/17/2022 at 4:14 PM

## 2022-12-17 NOTE — PHARMACY-ADMISSION MEDICATION HISTORY
Pharmacy Medication History  Admission medication history interview status for the 12/16/2022  admission is complete. See EPIC admission navigator for prior to admission medications     Location of Interview: Outside patient room but on unit  Medication history sources: Outside med rec     Significant changes made to the medication list:    In the past week, patient estimated taking medication this percent of the time: Unknown    Additional medication history information:   Patient left AMA from [facility] and had a medication reconciliation done on 12/13 by a pharmacist. See their note for more details. They noted discontinuing escitalopram and ibuprofen, adjusting Novolog to sliding scale.    Medication reconciliation completed by provider prior to medication history? No    Time spent in this activity: 10 minutes    Prior to Admission medications    Medication Sig Last Dose Taking? Auth Provider Long Term End Date   insulin aspart (NOVOLOG PEN) 100 UNIT/ML pen Inject Subcutaneous 3 times daily (with meals) Sliding scale   Reported, Patient Yes    insulin glargine (BASAGLAR KWIKPEN) 100 UNIT/ML pen Inject 20 Units Subcutaneous daily   Edgar Castellano MD Yes        The information provided in this note is only as accurate as the sources available at the time of update(s)

## 2022-12-17 NOTE — PROGRESS NOTES
[facility]  12/17/2022    Progress Note  Reason for Critical Care Admission: DKA, respiratory failure, covid and bacterial pneumonia  Admitting Physician: Joao Krishna DO  Date of Admission:  12/16/2022    Assessment: Critical Care   Deidre N Aasen is a 27 year old female admitted on 12/16/2022 for DKA, respiratory failure from concurrent covid, bacterial pneumonia.   After arrival to the ICU she developed acute respiratory failure requiring intubation and development of septic shock requiring initation of dual vasopressor support and stress dose steroids in addition to broad spectrum antimicrobial therapy. She has ongoing fluid resuscitation needs, management of severe electrolyte abnormalities and ongoing treatment for septic shock and DKA per protocol. She remains severely critically ill.        Plan: Critical Care   Neuro/ pain/ sedation:  # Toxic metabolic encephalopathy  # Agitation  # Polysubstance abuse- meth and heroin use. At risk for withdrawal  # IVDU  # Meth intoxication  - Will keep sedated now for RASS of (-3)  - Sedation: Midazolam  - Pain: fent gtt  - Haldol PRN    Pulmonary:  # Acute respiratory failure with refractory hypoxia  # Complex cavitary pneumonia most likely 2/2 to MRSA pneumonia vs aspiration pneumonia given sudden onset and progression based on comparison of chest imaging (12/13 vs 12/16). Less likely due to fungal or COVID PNA. Echo shows no vegetations but high suspicion and may consider CATHY.  # Acute respiratory acidosis  # severe ARDS, P/F ratio 78 at time of intubation, worsening to 41  Acute respiratory distress. Trial of bipap but continued to have evidence of respiratory collapse. Intubated on 12/16/22. + covid with new cavitary lung infection consistent with concurrent superimposed bacterial pneumonia. Adjusting vent settings to address hyperventilation. Trending ABGs for vent management. Received remdesivir on arrival but discontinued after  intubation. Initated on stress dose steroids for refractory septic shock.   Plan  Vent Mode: CMV/AC  (Continuous Mandatory Ventilation/ Assist Control)  FiO2 (%): 50 %  Resp Rate (Set): 30 breaths/min  Tidal Volume (Set, mL): 400 mL  PEEP (cm H2O): 10 cmH2O  Resp: 30  - Continue intubation and mechanical ventilation, oxygen requirements are downtrending  - supplemental oxygen to keep saturation about 92%  - continue steroids- stress dose  - sputum cultures send and pending  - No dire need for bronchoscopy as this is likely MRSA pneumonia vs aspiration pneumonia.     Cardiovascular:  # Severe septic shock  # Sinus tachycardia  - Triple vasopressor support, patient is responsive to leg raise and will bolus with 1L LR and will continue to wean off pressors  - continue antibiotics- Vanc and Unasyn. Repeat blood cultures tomorrow  - Echo is negative for endocarditis and may need CATHY due to cavitation which indicates possible embolic phenomenom    GI/Nutrition:  # Abdominal pain. Present prior to intubation. Large gastric bubble on am CXR. Unclear etiology. May be component of heroin with drawl symptoms vs. DKA. Mild alk phosphatemia on admission but otherwise hepatic panel normal.   # ?Upper GI bleed. melenotic stools. Hem occult positive. Suspect upper GI bleed.   - Diet: NPO for Medical/Clinical Reasons Except for: Meds, Ice Chips    - Will start trickle feeds for now and escalate as needed  - Lipase not elevated  - BID PPI  - Monitor NG characteristics, if obvious blood will consult GI for EGD    Renal/ Fluid Balance:   #Diabetic ketoacidosis- DKA protocol  #Hypokalemia- replacement per protocol  # hypophosphatemia- replacement per protocol  # high anion gap metabolic acidosis 2/2 to DKA  # Ketonuria  - DKA protocol and still appears to have metabolic acidosis, will recheck lactic acid and ketones  - ensure adequate hydration, bolus IVF now  - insulin gtt per protocol  - Will monitor intake and output  - ICU  electrolyte replacement protocol    Endocrine:  #Type 1 diabetes with insulin non complicance, complicated by DKA. History of prior admission for DKA  - insulin therapy per DKA protocol    ID:  # Septic shock and MRSA bacteremia  # Complicated pneumonia likely due to MRSA pneumonia and aspiration  # Covid positive  - ID consulted  - continue broad spectrum antibiotics with vanc and Unasyn   - No dire need for bronchoscopy and ID on board    Hematology:  # DVT ppx  # melenotic stools, hem occult positive  - subcutaneous heparin  - possible upper GI bleed. On PPI. Monitor clinically at this time. No acute indication for GI intervention or transfusion.     MSK:   - PT and OT consulted. Appreciate recommendations.     Lines/ tubes/ drains:  [place] Catheter: PRESENT, indication: Strict 1-2 Hour I&O  Central Lines: PRESENT  PICC 12/16/22 Double Lumen Right Basilic Ok to use picc line-Site Assessment: WDL  CVC Triple Lumen Right Internal jugular-Site Assessment: WDL    Prophylaxis:  - DVT Prophylaxis: Heparin SQ and Pneumatic Compression Devices  - PUD Prophylaxis: PPI    Code Status: Full Code      Disposition:  - ICU    Critical care time exclusive of procedures: 40 minutes.     Clinically Significant Risk Factors        # Hypokalemia: Lowest K = 2.6 mmol/L in last 2 days, will replace as needed  # Hyperkalemia: Highest K = 5.7 mmol/L in last 2 days, will monitor as appropriate  # Hyponatremia: Lowest Na = 121 mmol/L in last 2 days, will monitor as appropriate   # Hypercalcemia: corrected calcium is >10.1, will monitor as appropriate  # Hypomagnesemia: Lowest Mg = 1.5 mg/dL in last 2 days, will replace as needed  # Anion Gap Metabolic Acidosis: Highest Anion Gap = 28 mmol/L in last 2 days, will monitor and treat as appropriate  # Hypoalbuminemia: Lowest albumin = 2.8 g/dL at 12/16/2022  2:43 AM, will monitor as appropriate    # Acute Kidney Injury, unspecified: based on a >150% or 0.3 mg/dL increase in last creatinine  compared to past 90 day average, will monitor renal function                      Alejo Titus MD  Pulmonary, Critical Care and Sleep Medicine  [facility]  Pager: [phone]'  [facility]  Securely message with the Vocera Web Console (learn more here)  Text page via [employer] Paging/Directory         ______________________________________________________________________    Chief Complaint   DKA, septic shock, MRSA bacteremia    History obtained from review of the medical record and with discussion with hospitalist    History of Present Illness   Deidre N Aasen is a 27 year old female with known poorly controlled insulin non compliant diabetes mellitus type 1, polysubstance abuse/IVDU (meth, heroin) and recent hospitalization for DKA earlier this week. Patient left [facility] and was brought back to the ED after she developed lethargy and increased work of breathing. Workup in the ED was consistent with DKA. She is known covid positive from most recent hospitalization and was found to have a new right lower and middle lobe consolidation with cavitation. Earlier this am she developed worsening encephalopathy, respiratory distress requiring BiPAP therapy and was admitted to the ICU for ongoing management.       Review of Systems    Patient complained of nausea, abdominal pain, respiratory distress prior to intubation. Otherwise unable to obtain full ROS due to encephalopathy and critical illness, sedation and intubation.     Past Medical History    I have reviewed this patient's medical history and updated it with pertinent information if needed.   Past Medical History:   Diagnosis Date     Anxiety      Chronic malnutrition      Depressive disorder      Diabetes mellitus      Heroin abuse      Methamphetamine abuse      Noncompliance with medication regimen        Past Surgical History   I have reviewed this patient's surgical history and updated it with pertinent  information if needed.  No past surgical history on file.    Social History   I have reviewed this patient's social history and updated it with pertinent information if needed.  Social History     Tobacco Use     Smoking status: Never     Smokeless tobacco: Never   Substance Use Topics     Alcohol use: No     Drug use: Yes       Family History     Unknown. Unable to provide information and no family available to provide history.     Prior to Admission Medications   Prior to Admission Medications   Prescriptions Last Dose Informant Patient Reported? Taking?   insulin aspart (NOVOLOG PEN) 100 UNIT/ML pen   Yes No   Sig: Inject Subcutaneous 3 times daily (with meals) Sliding scale   insulin glargine (BASAGLAR KWIKPEN) 100 UNIT/ML pen   No No   Sig: Inject 20 Units Subcutaneous daily      Facility-Administered Medications: None     Allergies   Allergies   Allergen Reactions     Ciprofloxacin Other (See Comments)     Vomiting; diarrhea       Physical Exam   Vital Signs: Temp: (!) 102.6  F (39.2  C) Temp src: Bladder BP: 107/70 Pulse: 116   Resp: 30 SpO2: 99 % O2 Device: Mechanical Ventilator    Weight: 107 lbs 12.88 oz       Intake/Output Summary (Last 24 hours) at 12/16/2022 1630  Last data filed at 12/16/2022 1600  Gross per 24 hour   Intake 5830.15 ml   Output 2100 ml   Net 3730.15 ml       General: Sedated  HEENT: AT, NC, PERRL, EOMI, trachea midline. ET tube in place at 22 cm, 7.0 tube.   Neuro: sedated  CV: sinus tachycardia, non mottled appearance. Appears hypovolemic.   Pulm: course breath sounds worse on the right compared to the left, on mechanical ventilation.   Abd: soft, non distended. NG in place with bilious output.   : degroot in place with clear pale yellow urine in the bag  Extremities: thin, covered in needle stick marks  Skin: non mottled, dry appearing.   Psych: Sedated    Data   I reviewed all medications, new labs and imaging results over the last 24 hours.  Arterial Blood Gases   Recent Labs   Lab  22  1209 22  0522 22  0029 22  1759   PH 7.33* 7.31* 7.27* 7.28*   PCO2 27* 30* 31* 31*   PO2 134* 182* 214* 136*   HCO3 14* 15* 15* 15*       Complete Blood Count   Recent Labs   Lab 22  0417 22  1015 22  0243 22  0228 22  0508   WBC 12.6* 3.3* 16.0*  --  14.7*   HGB 8.0* 9.2* 11.8 13.3 13.4    238 497*  --  402       Basic Metabolic Panel  Recent Labs   Lab 22  1202 22  1038 22  0939 22  0746 22  0519 22  0417 22  0414 22  0235 22  2315 22  2222 22  1843 22  1755   NA  --   --  140  --   --  139  --  141  --   --   --  135   POTASSIUM  --   --  3.5  3.4  --   --  3.4  --  3.5  --  3.8  --  4.0   CHLORIDE  --   --  114*  --   --  114*  --  115*  --   --   --  112*   CO2  --   --  14*  --   --  14*  --  16*  --   --   --  14*   BUN  --   --  29  --   --  27  --  25  --   --   --  27   CR  --   --  1.19*  --   --  1.13*  --  1.02  --   --   --  0.88   * 154* 159* 139*   < > 154*   < > 139*   < >  --    < > 272*    < > = values in this interval not displayed.       Liver Function Tests  Recent Labs   Lab 22  1015 22  0243 22  0235 22  0003   AST  --  27  --  24   ALT  --  13  --  18   ALKPHOS  --  173*  --  160*   BILITOTAL  --  <0.2  --  0.3   ALBUMIN  --  2.8*  --  3.8   INR 1.82* 1.68* 1.21*  --        Pancreatic Enzymes  Recent Labs   Lab 22  1230   LIPASE 47*       Coagulation Profile  Recent Labs   Lab 22  1015 22  0243 22  0235   INR 1.82* 1.68* 1.21*   PTT  --   --  39*       IMAGING:  Recent Results (from the past 24 hour(s))   Echocardiogram Complete   Result Value    LVEF  60-65%    Narrative    [id number]  [facility]ly [facility], [facility]  [id number]  [id number]^ALIA^[facility]  Echocardiography Laboratory  [address] [place] [address]     Name: AASEN, DEDAVI CHAPMAN  MRN: [id number]  : 1995  Study Date:  12/16/2022 03:07 PM  Age: 27 yrs  Gender: Female  Patient Location: [facility]  Reason For Study: Shock  Ordering Physician: DEIRDRE DAWKINS  Referring Physician: Deirdre Dawkins  Performed By: Yosvany Goncalves     BSA: 1.4 m2  Height: 62 in  Weight: 96 lb  HR: 153  BP: 94/49 mmHg  ______________________________________________________________________________  Procedure  Complete Echo Adult. Optison (NDC #[id number]) given intravenously.  ______________________________________________________________________________  Interpretation Summary     1. Left ventricular systolic function is normal. The visual ejection fraction  is 60-65%.  2. No regional wall motion abnormalities noted.  3. The right ventricle is normal in structure, function and size.  4. No evidence for significant valvular pathology.  ______________________________________________________________________________  Left Ventricle  The left ventricle is normal in size. There is normal left ventricular wall  thickness. Left ventricular systolic function is normal. The visual ejection  fraction is 60-65%. Left ventricular diastolic function is indeterminate. No  regional wall motion abnormalities noted.     Right Ventricle  The right ventricle is normal in structure, function and size.     Atria  Normal left atrial size. Right atrial size is normal. There is no color  Doppler evidence of an atrial shunt.     Mitral Valve  The mitral valve is normal in structure and function.     Tricuspid Valve  The tricuspid valve is normal in structure and function. There is mild (1+)  tricuspid regurgitation.     Aortic Valve  The aortic valve is normal in structure and function.     Pulmonic Valve  The pulmonic valve is not well seen, but is grossly normal.     Vessels  Normal size aorta.     Pericardium  There is no pericardial effusion.     Rhythm  Sinus rhythm was noted.  ______________________________________________________________________________  MMode/2D Measurements &  Calculations  IVSd: 0.84 cm     LVIDd: 2.7 cm  LVIDs: 2.1 cm  LVPWd: 0.79 cm  FS: 21.0 %  LV mass(C)d: 50.1 grams  LV mass(C)dI: 35.8 grams/m2  Ao root diam: 2.9 cm  LVOT diam: 1.8 cm  LVOT area: 2.6 cm2  RWT: 0.59     Doppler Measurements & Calculations  MV E max ramu: 92.2 cm/sec  MV A max ramu: 109.5 cm/sec  MV E/A: 0.84  MV dec slope: 818.6 cm/sec2  MV dec time: 0.11 sec  PA acc time: 0.08 sec  TR max ramu: 239.8 cm/sec  TR max P.1 mmHg  E/E' av.4  Lateral E/e': 5.8  Medial E/e': 11.0     ______________________________________________________________________________  Report approved by: Uma Price 2022 04:27 PM             Alejo Titus MD  Pulmonary, Critical Care and Sleep Medicine  [facility]  Pager: [phone]

## 2022-12-18 PROBLEM — N17.0 ACUTE KIDNEY FAILURE WITH TUBULAR NECROSIS (H): Status: ACTIVE | Noted: 2022-12-18

## 2022-12-18 LAB
ALLEN'S TEST: ABNORMAL
ALLEN'S TEST: NORMAL
ANION GAP SERPL CALCULATED.3IONS-SCNC: 10 MMOL/L (ref 3–14)
ANION GAP SERPL CALCULATED.3IONS-SCNC: 10 MMOL/L (ref 3–14)
ANION GAP SERPL CALCULATED.3IONS-SCNC: 11 MMOL/L (ref 3–14)
ANION GAP SERPL CALCULATED.3IONS-SCNC: 7 MMOL/L (ref 3–14)
ANION GAP SERPL CALCULATED.3IONS-SCNC: 9 MMOL/L (ref 3–14)
BACTERIA BLD CULT: ABNORMAL
BACTERIA BLD CULT: ABNORMAL
BACTERIA BLD CULT: NO GROWTH
BACTERIA BLD CULT: NO GROWTH
BACTERIA SPT CULT: ABNORMAL
BACTERIA SPT CULT: ABNORMAL
BASE EXCESS BLDA CALC-SCNC: 0.4 MMOL/L (ref -9–1.8)
BASE EXCESS BLDA CALC-SCNC: 1.2 MMOL/L (ref -9–1.8)
BUN SERPL-MCNC: 38 MG/DL (ref 7–30)
BUN SERPL-MCNC: 41 MG/DL (ref 7–30)
BUN SERPL-MCNC: 43 MG/DL (ref 7–30)
BUN SERPL-MCNC: 47 MG/DL (ref 7–30)
BUN SERPL-MCNC: 48 MG/DL (ref 7–30)
CALCIUM SERPL-MCNC: 6 MG/DL (ref 8.5–10.1)
CALCIUM SERPL-MCNC: 6 MG/DL (ref 8.5–10.1)
CALCIUM SERPL-MCNC: 6.2 MG/DL (ref 8.5–10.1)
CALCIUM SERPL-MCNC: 6.2 MG/DL (ref 8.5–10.1)
CALCIUM SERPL-MCNC: 6.5 MG/DL (ref 8.5–10.1)
CHLORIDE BLD-SCNC: 115 MMOL/L (ref 94–109)
CHLORIDE BLD-SCNC: 115 MMOL/L (ref 94–109)
CHLORIDE BLD-SCNC: 116 MMOL/L (ref 94–109)
CHLORIDE BLD-SCNC: 116 MMOL/L (ref 94–109)
CHLORIDE BLD-SCNC: 117 MMOL/L (ref 94–109)
CO2 SERPL-SCNC: 19 MMOL/L (ref 20–32)
CO2 SERPL-SCNC: 23 MMOL/L (ref 20–32)
CO2 SERPL-SCNC: 24 MMOL/L (ref 20–32)
CO2 SERPL-SCNC: 24 MMOL/L (ref 20–32)
CO2 SERPL-SCNC: 25 MMOL/L (ref 20–32)
CREAT SERPL-MCNC: 1.23 MG/DL (ref 0.52–1.04)
CREAT SERPL-MCNC: 1.24 MG/DL (ref 0.52–1.04)
CREAT SERPL-MCNC: 1.28 MG/DL (ref 0.52–1.04)
CREAT SERPL-MCNC: 1.29 MG/DL (ref 0.52–1.04)
CREAT SERPL-MCNC: 1.32 MG/DL (ref 0.52–1.04)
ERYTHROCYTE [DISTWIDTH] IN BLOOD BY AUTOMATED COUNT: 13.6 % (ref 10–15)
GFR SERPL CREATININE-BSD FRML MDRD: 57 ML/MIN/1.73M2
GFR SERPL CREATININE-BSD FRML MDRD: 59 ML/MIN/1.73M2
GFR SERPL CREATININE-BSD FRML MDRD: 59 ML/MIN/1.73M2
GFR SERPL CREATININE-BSD FRML MDRD: 62 ML/MIN/1.73M2
GFR SERPL CREATININE-BSD FRML MDRD: 62 ML/MIN/1.73M2
GLUCOSE BLD-MCNC: 121 MG/DL (ref 70–99)
GLUCOSE BLD-MCNC: 131 MG/DL (ref 70–99)
GLUCOSE BLD-MCNC: 131 MG/DL (ref 70–99)
GLUCOSE BLD-MCNC: 151 MG/DL (ref 70–99)
GLUCOSE BLD-MCNC: 159 MG/DL (ref 70–99)
GLUCOSE BLDC GLUCOMTR-MCNC: 113 MG/DL (ref 70–99)
GLUCOSE BLDC GLUCOMTR-MCNC: 125 MG/DL (ref 70–99)
GLUCOSE BLDC GLUCOMTR-MCNC: 125 MG/DL (ref 70–99)
GLUCOSE BLDC GLUCOMTR-MCNC: 142 MG/DL (ref 70–99)
GLUCOSE BLDC GLUCOMTR-MCNC: 152 MG/DL (ref 70–99)
GRAM STAIN RESULT: ABNORMAL
GRAM STAIN RESULT: ABNORMAL
HCO3 BLD-SCNC: 24 MMOL/L (ref 21–28)
HCO3 BLD-SCNC: 24 MMOL/L (ref 21–28)
HCT VFR BLD AUTO: 23 % (ref 35–47)
HGB BLD-MCNC: 8.3 G/DL (ref 11.7–15.7)
MAGNESIUM SERPL-MCNC: 2 MG/DL (ref 1.6–2.3)
MAGNESIUM SERPL-MCNC: 2.1 MG/DL (ref 1.6–2.3)
MAGNESIUM SERPL-MCNC: 2.2 MG/DL (ref 1.6–2.3)
MCH RBC QN AUTO: 30.1 PG (ref 26.5–33)
MCHC RBC AUTO-ENTMCNC: 36.1 G/DL (ref 31.5–36.5)
MCV RBC AUTO: 83 FL (ref 78–100)
O2/TOTAL GAS SETTING VFR VENT: 40 %
O2/TOTAL GAS SETTING VFR VENT: 40 %
PCO2 BLD: 32 MM HG (ref 35–45)
PCO2 BLD: 35 MM HG (ref 35–45)
PH BLD: 7.45 [PH] (ref 7.35–7.45)
PH BLD: 7.49 [PH] (ref 7.35–7.45)
PHOSPHATE SERPL-MCNC: 3.1 MG/DL (ref 2.5–4.5)
PLATELET # BLD AUTO: 153 10E3/UL (ref 150–450)
PO2 BLD: 72 MM HG (ref 80–105)
PO2 BLD: 83 MM HG (ref 80–105)
POTASSIUM BLD-SCNC: 2.7 MMOL/L (ref 3.4–5.3)
POTASSIUM BLD-SCNC: 3.3 MMOL/L (ref 3.4–5.3)
POTASSIUM BLD-SCNC: 3.4 MMOL/L (ref 3.4–5.3)
POTASSIUM BLD-SCNC: 3.5 MMOL/L (ref 3.4–5.3)
RBC # BLD AUTO: 2.76 10E6/UL (ref 3.8–5.2)
SODIUM SERPL-SCNC: 147 MMOL/L (ref 133–144)
SODIUM SERPL-SCNC: 147 MMOL/L (ref 133–144)
SODIUM SERPL-SCNC: 148 MMOL/L (ref 133–144)
SODIUM SERPL-SCNC: 149 MMOL/L (ref 133–144)
SODIUM SERPL-SCNC: 150 MMOL/L (ref 133–144)
VANCOMYCIN SERPL-MCNC: 14.1 MG/L
WBC # BLD AUTO: 30.3 10E3/UL (ref 4–11)

## 2022-12-18 PROCEDURE — 250N000011 HC RX IP 250 OP 636: Performed by: INTERNAL MEDICINE

## 2022-12-18 PROCEDURE — 99233 SBSQ HOSP IP/OBS HIGH 50: CPT | Performed by: INTERNAL MEDICINE

## 2022-12-18 PROCEDURE — 250N000011 HC RX IP 250 OP 636: Performed by: SURGERY

## 2022-12-18 PROCEDURE — 82310 ASSAY OF CALCIUM: CPT | Performed by: INTERNAL MEDICINE

## 2022-12-18 PROCEDURE — 200N000001 HC R&B ICU

## 2022-12-18 PROCEDURE — 87040 BLOOD CULTURE FOR BACTERIA: CPT | Performed by: INTERNAL MEDICINE

## 2022-12-18 PROCEDURE — 83735 ASSAY OF MAGNESIUM: CPT | Performed by: INTERNAL MEDICINE

## 2022-12-18 PROCEDURE — 94003 VENT MGMT INPAT SUBQ DAY: CPT

## 2022-12-18 PROCEDURE — 82803 BLOOD GASES ANY COMBINATION: CPT | Performed by: SURGERY

## 2022-12-18 PROCEDURE — 258N000003 HC RX IP 258 OP 636: Performed by: SURGERY

## 2022-12-18 PROCEDURE — 82310 ASSAY OF CALCIUM: CPT | Performed by: SURGERY

## 2022-12-18 PROCEDURE — 36415 COLL VENOUS BLD VENIPUNCTURE: CPT | Performed by: INTERNAL MEDICINE

## 2022-12-18 PROCEDURE — 84100 ASSAY OF PHOSPHORUS: CPT | Performed by: INTERNAL MEDICINE

## 2022-12-18 PROCEDURE — 999N000157 HC STATISTIC RCP TIME EA 10 MIN

## 2022-12-18 PROCEDURE — 80202 ASSAY OF VANCOMYCIN: CPT | Performed by: INTERNAL MEDICINE

## 2022-12-18 PROCEDURE — 99291 CRITICAL CARE FIRST HOUR: CPT | Performed by: INTERNAL MEDICINE

## 2022-12-18 PROCEDURE — 272N000433 HC KIT CATH IV 18 OR 20G CM, POWERGLIDE W MAX BARRIER

## 2022-12-18 PROCEDURE — C9113 INJ PANTOPRAZOLE SODIUM, VIA: HCPCS | Performed by: SURGERY

## 2022-12-18 PROCEDURE — 250N000013 HC RX MED GY IP 250 OP 250 PS 637: Performed by: SURGERY

## 2022-12-18 PROCEDURE — 999N000040 HC STATISTIC CONSULT NO CHARGE VASC ACCESS

## 2022-12-18 PROCEDURE — 999N000128 HC STATISTIC PERIPHERAL IV START W/O US GUIDANCE

## 2022-12-18 PROCEDURE — 36569 INSJ PICC 5 YR+ W/O IMAGING: CPT

## 2022-12-18 PROCEDURE — 258N000003 HC RX IP 258 OP 636: Performed by: INTERNAL MEDICINE

## 2022-12-18 PROCEDURE — 84132 ASSAY OF SERUM POTASSIUM: CPT | Performed by: INTERNAL MEDICINE

## 2022-12-18 PROCEDURE — 85027 COMPLETE CBC AUTOMATED: CPT | Performed by: SURGERY

## 2022-12-18 RX ORDER — POTASSIUM CHLORIDE 7.45 MG/ML
10 INJECTION INTRAVENOUS
Status: COMPLETED | OUTPATIENT
Start: 2022-12-18 | End: 2022-12-18

## 2022-12-18 RX ORDER — DEXTROSE MONOHYDRATE 100 MG/ML
INJECTION, SOLUTION INTRAVENOUS CONTINUOUS PRN
Status: DISCONTINUED | OUTPATIENT
Start: 2022-12-18 | End: 2023-01-04 | Stop reason: HOSPADM

## 2022-12-18 RX ORDER — CALCIUM GLUCONATE 20 MG/ML
1 INJECTION, SOLUTION INTRAVENOUS ONCE
Status: COMPLETED | OUTPATIENT
Start: 2022-12-18 | End: 2022-12-18

## 2022-12-18 RX ADMIN — VANCOMYCIN HYDROCHLORIDE 750 MG: 1 INJECTION, POWDER, LYOPHILIZED, FOR SOLUTION INTRAVENOUS at 16:42

## 2022-12-18 RX ADMIN — PANTOPRAZOLE SODIUM 40 MG: 40 INJECTION, POWDER, FOR SOLUTION INTRAVENOUS at 08:00

## 2022-12-18 RX ADMIN — CHLORHEXIDINE GLUCONATE 0.12% ORAL RINSE 15 ML: 1.2 LIQUID ORAL at 08:00

## 2022-12-18 RX ADMIN — CALCIUM GLUCONATE 1 G: 20 INJECTION, SOLUTION INTRAVENOUS at 13:03

## 2022-12-18 RX ADMIN — HYDROCORTISONE SODIUM SUCCINATE 100 MG: 100 INJECTION, POWDER, FOR SOLUTION INTRAMUSCULAR; INTRAVENOUS at 02:23

## 2022-12-18 RX ADMIN — POTASSIUM CHLORIDE 10 MEQ: 7.46 INJECTION, SOLUTION INTRAVENOUS at 06:51

## 2022-12-18 RX ADMIN — HYDROCORTISONE SODIUM SUCCINATE 100 MG: 100 INJECTION, POWDER, FOR SOLUTION INTRAMUSCULAR; INTRAVENOUS at 10:26

## 2022-12-18 RX ADMIN — MIDAZOLAM HYDROCHLORIDE 5 MG/HR: 1 INJECTION, SOLUTION INTRAVENOUS at 03:55

## 2022-12-18 RX ADMIN — Medication 100 MCG/HR: at 16:01

## 2022-12-18 RX ADMIN — VASOPRESSIN 2.4 UNITS/HR: 20 INJECTION, SOLUTION INTRAMUSCULAR; SUBCUTANEOUS at 02:51

## 2022-12-18 RX ADMIN — AMPICILLIN SODIUM AND SULBACTAM SODIUM 3 G: 2; 1 INJECTION, POWDER, FOR SOLUTION INTRAMUSCULAR; INTRAVENOUS at 02:22

## 2022-12-18 RX ADMIN — POTASSIUM CHLORIDE 10 MEQ: 7.46 INJECTION, SOLUTION INTRAVENOUS at 03:27

## 2022-12-18 RX ADMIN — AMPICILLIN SODIUM AND SULBACTAM SODIUM 3 G: 2; 1 INJECTION, POWDER, FOR SOLUTION INTRAMUSCULAR; INTRAVENOUS at 08:00

## 2022-12-18 RX ADMIN — AMPICILLIN SODIUM AND SULBACTAM SODIUM 3 G: 2; 1 INJECTION, POWDER, FOR SOLUTION INTRAMUSCULAR; INTRAVENOUS at 20:03

## 2022-12-18 RX ADMIN — CHLORHEXIDINE GLUCONATE 0.12% ORAL RINSE 15 ML: 1.2 LIQUID ORAL at 20:03

## 2022-12-18 RX ADMIN — VASOPRESSIN 2.4 UNITS/HR: 20 INJECTION, SOLUTION INTRAMUSCULAR; SUBCUTANEOUS at 12:25

## 2022-12-18 RX ADMIN — AMPICILLIN SODIUM AND SULBACTAM SODIUM 3 G: 2; 1 INJECTION, POWDER, FOR SOLUTION INTRAMUSCULAR; INTRAVENOUS at 14:30

## 2022-12-18 RX ADMIN — HEPARIN SODIUM 5000 UNITS: 5000 INJECTION, SOLUTION INTRAVENOUS; SUBCUTANEOUS at 20:03

## 2022-12-18 RX ADMIN — POTASSIUM CHLORIDE 10 MEQ: 7.46 INJECTION, SOLUTION INTRAVENOUS at 05:44

## 2022-12-18 RX ADMIN — INSULIN ASPART 1 UNITS: 100 INJECTION, SOLUTION INTRAVENOUS; SUBCUTANEOUS at 21:43

## 2022-12-18 RX ADMIN — HEPARIN SODIUM 5000 UNITS: 5000 INJECTION, SOLUTION INTRAVENOUS; SUBCUTANEOUS at 08:00

## 2022-12-18 RX ADMIN — HYDROCORTISONE SODIUM SUCCINATE 100 MG: 100 INJECTION, POWDER, FOR SOLUTION INTRAMUSCULAR; INTRAVENOUS at 18:28

## 2022-12-18 RX ADMIN — POTASSIUM CHLORIDE 10 MEQ: 7.46 INJECTION, SOLUTION INTRAVENOUS at 04:38

## 2022-12-18 RX ADMIN — INSULIN GLARGINE 20 UNITS: 100 INJECTION, SOLUTION SUBCUTANEOUS at 21:42

## 2022-12-18 RX ADMIN — INSULIN ASPART 1 UNITS: 100 INJECTION, SOLUTION INTRAVENOUS; SUBCUTANEOUS at 02:15

## 2022-12-18 RX ADMIN — PANTOPRAZOLE SODIUM 40 MG: 40 INJECTION, POWDER, FOR SOLUTION INTRAVENOUS at 20:03

## 2022-12-18 RX ADMIN — INSULIN ASPART 1 UNITS: 100 INJECTION, SOLUTION INTRAVENOUS; SUBCUTANEOUS at 18:28

## 2022-12-18 ASSESSMENT — ACTIVITIES OF DAILY LIVING (ADL)
ADLS_ACUITY_SCORE: 40
ADLS_ACUITY_SCORE: 36
ADLS_ACUITY_SCORE: 40
ADLS_ACUITY_SCORE: 36
CONCENTRATING,_REMEMBERING_OR_MAKING_DECISIONS_DIFFICULTY: NO
ADLS_ACUITY_SCORE: 40
ADLS_ACUITY_SCORE: 49
ADLS_ACUITY_SCORE: 40
WEAR_GLASSES_OR_BLIND: NO
DIFFICULTY_EATING/SWALLOWING: NO
ADLS_ACUITY_SCORE: 49
ADLS_ACUITY_SCORE: 36
ADLS_ACUITY_SCORE: 40

## 2022-12-18 NOTE — PHARMACY-VANCOMYCIN DOSING SERVICE
Pharmacy Vancomycin Note  Date of Service 2022  Patient's  1995   27 year old, female    Indication: Sepsis  Day of Therapy: 2  Current vancomycin regimen:  750 mg IV q12h  Current vancomycin monitoring method: AUC  Current vancomycin therapeutic monitoring goal: 400-600 mg*h/L    [employer]ight[employer] Prediction of Current Vancomycin Regimen  Regimen: 750 mg IV every 12 hours.  Start time: 21:57 on 2022  Exposure target: AUC24 (range)400-600 mg/L.hr   AUC24,ss: 762 mg/L.hr  Probability of AUC24 > 400: 100 %  Ctrough,ss: 25.8 mg/L  Probability of Ctrough,ss > 20: 86 %  Probability of nephrotoxicity (Lodise LEONORA ): 30 %      Current estimated CrCl = Estimated Creatinine Clearance: 49.9 mL/min (A) (based on SCr of 1.33 mg/dL (H)).    Creatinine for last 3 days  2022:  2:43 AM Creatinine 0.72 mg/dL;  4:34 AM Creatinine 0.71 mg/dL; 10:15 AM Creatinine 0.54 mg/dL; 12:30 PM Creatinine 0.60 mg/dL;  5:55 PM Creatinine 0.88 mg/dL;  2:28 AM Creatinine POCT 0.8 mg/dL  2022:  2:35 AM Creatinine 1.02 mg/dL;  4:17 AM Creatinine 1.13 mg/dL;  9:39 AM Creatinine 1.19 mg/dL;  3:08 PM Creatinine 1.29 mg/dL;  6:38 PM Creatinine 1.33 mg/dL    Recent Vancomycin Levels (past 3 days)  2022:  6:38 PM Vancomycin 20.1 mg/L    Vancomycin IV Administrations (past 72 hours)                   vancomycin (VANCOCIN) 750 mg in sodium chloride 0.9 % 250 mL intermittent infusion (mg) 750 mg New Bag 22 1033     750 mg New Bag 22 2210    vancomycin (VANCOCIN) 1000 mg in dextrose 5% 200 mL PREMIX (mg) 1,000 mg New Bag 22 0958                Nephrotoxins and other renal medications (From now, onward)    Start     Dose/Rate Route Frequency Ordered Stop    22 2300  vancomycin (VANCOCIN) 500 mg vial to attach to  mL bag         500 mg  over 1 Hours Intravenous ONCE 12/17/22 2102      12/17/22 1400  ampicillin-sulbactam (UNASYN) 3 g vial to attach to  mL bag         3 g  over 15-30  Minutes Intravenous EVERY 6 HOURS 12/17/22 1201      12/17/22 1301  vancomycin place hunter - receiving intermittent dosing         1 each Intravenous SEE ADMIN INSTRUCTIONS 12/17/22 1302      12/16/22 1900  norepinephrine (LEVOPHED) 4 mg in  mL infusion PREMIX         0.01-0.6 mcg/kg/min × 43.6 kg  1.6-98.1 mL/hr  Intravenous CONTINUOUS 12/16/22 1851      12/16/22 1130  vasopressin 0.2 units/mL in NS (PITRESSIN) standard conc infusion         2.4 Units/hr  12 mL/hr  Intravenous CONTINUOUS 12/16/22 1105               Contrast Orders - past 72 hours (72h ago, onward)    Start     Dose/Rate Route Frequency Stop    12/16/22 1600  perflutren diluted 1mL to 2mL with saline (OPTISON) diluted injection 9 mL         9 mL Intravenous ONCE 12/16/22 1539          Interpretation of levels and current regimen:  Vancomycin level is reflective of AUC greater than 600    Has serum creatinine changed greater than 50% in last 72 hours: Yes    Urine output:  diminished urine output    Renal Function: Worsening    [employer] Prediction of Planned New Vancomycin Regimen  Regimen: 500 mg IV every 12 hours.  Start time: 21:57 on 12/17/2022  Exposure target: AUC24 (range)400-600 mg/L.hr   AUC24,ss: 521 mg/L.hr  Probability of AUC24 > 400: 92 %  Ctrough,ss: 17.5 mg/L  Probability of Ctrough,ss > 20: 29 %  Probability of nephrotoxicity (Lodise LEONORA 2009): 13 %      Plan:  1. Will give Vancomycin 500mg IV x1 tonight and reassess further dosing based on labs tomorrow  2. Vancomycin monitoring method: AUC  3. Vancomycin therapeutic monitoring goal: 400-600 mg*h/L  4. Pharmacy will check vancomycin levels as appropriate in 1-3 Days.  5. Serum creatinine levels will be ordered every 48 hours.    Savannah Ca [facility]

## 2022-12-18 NOTE — PROGRESS NOTES
[facility]    Infectious Disease Progress Note    Date of Service (when I saw the patient): 12/18/2022     Assessment & Plan   Deidre N Aasen is a 27 year old female who was admitted on 12/16/2022.     Impression:  1. 27 y.o patient with polysubstance abuse, IVDU  2. Type 1 diabetes  3. History of medical noncompliance   4. Recent presentation to OSH (epic records reviewed ) left AMA  5. Admitted this occasion with diabetic ketoacidosis (DKA) w/ severe acidosis, acute hypoxic respiratory failure, acute encephalopathy, (+) COVID-19, and pneumonia possible cavitation and abscess.   6. On remdesivir, vancomycin and zosyn   7. Cultures in the blood MRSA        Recommendations:   Blood cultures with MRSA which explains almost all the findings   Has multiple central lines including picc, strongly recommending removing all central lines if possible.discussed with ICU      Vancomycin per pharmacy, close eye of the kidney function, will ask lab to run dapto susc   Switch from zosyn to unasyn for aspiration pneumonia, suspect the lung findings are also MRSA     Daily blood cultures till clears   TTE noted will need CATHY     Continue remdesivir      Risk profile for tociluzumab includes serious  infection. in recovery trial has show superiority of remdesivir + dexamethasone over remdesivir +  barcitinib.   Risk profile for Bracitinib include serious infection, thrombosis. So avoid      Max pulmonary support as in place.         Rosy Davis MD    Interval History   Intubated   MRSA In the blood     Physical Exam   Temp: 99.3  F (37.4  C) Temp src: Bladder BP: 107/67 Pulse: 93   Resp: 25 SpO2: 96 % O2 Device: Mechanical Ventilator    Vitals:    12/17/22 0600 12/18/22 0237   Weight: 48.9 kg (107 lb 12.9 oz) 39.4 kg (86 lb 13.8 oz)     Vital Signs with Ranges  Temp:  [99.3  F (37.4  C)-102.7  F (39.3  C)] 99.3  F (37.4  C)  Pulse:  [] 93  Resp:  [0-32] 25  BP: (105-107)/(64-67) 107/67  MAP:  [65  mmHg-162 mmHg] 77 mmHg  Arterial Line BP: ()/() 100/63  FiO2 (%):  [40 %-60 %] 40 %  SpO2:  [91 %-100 %] 96 %    Constitutional: intubated   Lungs: Clear to auscultation bilaterally, no crackles or wheezing  Cardiovascular: Regular rate and rhythm, normal S1 and S2, and no murmur noted  Abdomen: Normal bowel sounds, soft, non-distended, non-tender  Skin:multiple track marks   Other:    Medications     fentaNYL 100 mcg/hr (12/18/22 1030)     propofol Stopped (12/17/22 0416)    And     - MEDICATION INSTRUCTIONS -       - MEDICATION INSTRUCTIONS -       midazolam 3 mg/hr (12/18/22 1000)     norepinephrine Stopped (12/17/22 1650)     phenylephrine 0.3 mcg/kg/min (12/18/22 0813)     sodium bicarbonate Stopped (12/18/22 0243)     sodium chloride 10 mL/hr at 12/18/22 0813     vasopressin 1.2 Units/hr (12/18/22 1030)       ampicillin-sulbactam  3 g Intravenous Q6H     chlorhexidine  15 mL Mouth/Throat Q12H     heparin ANTICOAGULANT  5,000 Units Subcutaneous Q12H     hydrocortisone sodium succinate PF  100 mg Intravenous Q8H     insulin aspart  1-6 Units Subcutaneous Q4H     insulin glargine  20 Units Subcutaneous At Bedtime     pantoprazole  40 mg Intravenous BID     sodium chloride (PF)  10-40 mL Intracatheter Q8H     vancomycin place hunter - receiving intermittent dosing  1 each Intravenous See Admin Instructions       Data   All microbiology laboratory data reviewed.  Recent Labs   Lab Test 12/18/22  0558 12/17/22  1842 12/17/22  0417 12/16/22  1015   WBC 30.3*  --  12.6* 3.3*   HGB 8.3* 6.3* 8.0* 9.2*   HCT 23.0*  --  22.1* 26.0*   MCV 83  --  85 88     --  162 238     Recent Labs   Lab Test 12/18/22  1025 12/18/22  0558 12/18/22  0208   CR 1.23* 1.28* 1.32*     No lab results found.  No lab results found.    Invalid input(s): UC    All cultures:  Recent Labs   Lab 12/16/22  1309 12/16/22  0356 12/16/22  0321 12/13/22  0423   CULTURE 4+ Normal aaron  4+ Staphylococcus aureus* Positive on the 1st day  of incubation*  Staphylococcus aureus MRSA* Positive on the 1st day of incubation*  Staphylococcus aureus MRSA* No Growth  No Growth      Blood culture:  Results for orders placed or performed during the hospital encounter of 12/16/22   Blood Culture Arm, Right    Specimen: Arm, Right; Blood   Result Value Ref Range    Culture Positive on the 1st day of incubation (A)     Culture Staphylococcus aureus MRSA (AA)    Blood Culture Peripheral Blood    Specimen: Peripheral Blood   Result Value Ref Range    Culture Positive on the 1st day of incubation (A)     Culture Staphylococcus aureus MRSA (AA)        Susceptibility    Staphylococcus aureus MRSA - SAMANTHA*     Oxacillin* >=4 Resistant ug/mL      * Oxacillin susceptible isolates are susceptible to cephalosporins (example: cefazolin and cephalexin) and beta lactam combination agents. Oxacillin resistant isolates are resistant to these agents.     Gentamicin <=0.5 Susceptible ug/mL     Erythromycin >=8 Resistant ug/mL     Clindamycin* <=0.25 Susceptible ug/mL      * This isolate DOES NOT demonstrate inducible clindamycin resistance in vitro. Clindamycin is susceptible and could be used when indicated, however, erythromycin is resistant and should not be used.     Linezolid 2 Susceptible ug/mL     Vancomycin 1 Susceptible ug/mL     Tetracycline <=1 Susceptible ug/mL     Trimethoprim/Sulfamethoxazole <=0.5/9.5 Susceptible ug/mL     * MRSA requires contact precautions.   Results for orders placed or performed during the hospital encounter of 12/13/22   Blood Culture Arm, Right    Specimen: Arm, Right; Blood   Result Value Ref Range    Culture No Growth    Blood Culture Arm, Left    Specimen: Arm, Left; Blood   Result Value Ref Range    Culture No Growth       Urine culture:  No results found for this or any previous visit.

## 2022-12-18 NOTE — PHARMACY-VANCOMYCIN DOSING SERVICE
Pharmacy Vancomycin Note  Date of Service 2022  Patient's  1995   27 year old, female    Indication: Sepsis  Day of Therapy: 3  Current vancomycin regimen:  750 mg IV q12h, which was changed to intermittent on .  500mg IV x1 given  pm  Current vancomycin monitoring method: AUC  Current vancomycin therapeutic monitoring goal: 400-600 mg*h/L    Insight[employer] Prediction of Current Vancomycin Regimen  Regimen: 500 mg IV every 12 hours.  Start time: 16:49 on 2022  Exposure target: AUC24 (range)400-600 mg/L.hr   AUC24,ss: 537 mg/L.hr  Probability of AUC24 > 400: 98 %  Ctrough,ss: 17.8 mg/L  Probability of Ctrough,ss > 20: 26 %  Probability of nephrotoxicity (Lodise LEONORA ): 14 %      Current estimated CrCl = Estimated Creatinine Clearance: 41.5 mL/min (A) (based on SCr of 1.29 mg/dL (H)).    Creatinine for last 3 days  2022:  2:43 AM Creatinine 0.72 mg/dL;  4:34 AM Creatinine 0.71 mg/dL; 10:15 AM Creatinine 0.54 mg/dL; 12:30 PM Creatinine 0.60 mg/dL;  5:55 PM Creatinine 0.88 mg/dL;  2:28 AM Creatinine POCT 0.8 mg/dL  2022:  2:35 AM Creatinine 1.02 mg/dL;  4:17 AM Creatinine 1.13 mg/dL;  9:39 AM Creatinine 1.19 mg/dL;  3:08 PM Creatinine 1.29 mg/dL;  6:38 PM Creatinine 1.33 mg/dL  2022:  2:08 AM Creatinine 1.32 mg/dL;  5:58 AM Creatinine 1.28 mg/dL; 10:25 AM Creatinine 1.23 mg/dL;  2:39 PM Creatinine 1.29 mg/dL    Recent Vancomycin Levels (past 3 days)  2022:  6:38 PM Vancomycin 20.1 mg/L  2022:  2:40 PM Vancomycin 14.1 mg/L    Vancomycin IV Administrations (past 72 hours)                   vancomycin (VANCOCIN) 500 mg vial to attach to  mL bag (mg) 500 mg New Bag 12/17/22 2302    vancomycin (VANCOCIN) 750 mg in sodium chloride 0.9 % 250 mL intermittent infusion (mg) 750 mg New Bag 22 1033     750 mg New Bag 22 2210    vancomycin (VANCOCIN) 1000 mg in dextrose 5% 200 mL PREMIX (mg) 1,000 mg New Bag 22 0958                Nephrotoxins  and other renal medications (From now, onward)    Start     Dose/Rate Route Frequency Ordered Stop    12/18/22 1600  vancomycin (VANCOCIN) 750 mg in sodium chloride 0.9 % 250 mL intermittent infusion         750 mg  over 90 Minutes Intravenous EVERY 24 HOURS 12/18/22 1559      12/17/22 1400  ampicillin-sulbactam (UNASYN) 3 g vial to attach to  mL bag         3 g  over 15-30 Minutes Intravenous EVERY 6 HOURS 12/17/22 1201      12/16/22 1900  norepinephrine (LEVOPHED) 4 mg in  mL infusion PREMIX         0.01-0.6 mcg/kg/min × 43.6 kg  1.6-98.1 mL/hr  Intravenous CONTINUOUS 12/16/22 1851      12/16/22 1130  vasopressin 0.2 units/mL in NS (PITRESSIN) standard conc infusion         2.4 Units/hr  12 mL/hr  Intravenous CONTINUOUS 12/16/22 1105               Contrast Orders - past 72 hours (72h ago, onward)    Start     Dose/Rate Route Frequency Stop    12/16/22 1600  perflutren diluted 1mL to 2mL with saline (OPTISON) diluted injection 9 mL         9 mL Intravenous ONCE 12/16/22 1539          Interpretation of levels and current regimen:  Vancomycin level is reflective of -600  Has serum creatinine changed greater than 50% in last 72 hours: Yes  Urine output:  diminished urine output  Renal Function: Worsening    [employer]ight[employer] Prediction of Planned New Vancomycin Regimen  Regimen: 750 mg IV every 24 hours.  Start time: 16:49 on 12/18/2022  Exposure target: AUC24 (range)400-600 mg/L.hr   AUC24,ss: 417 mg/L.hr  Probability of AUC24 > 400: 60 %  Ctrough,ss: 11.7 mg/L  Probability of Ctrough,ss > 20: 1 %  Probability of nephrotoxicity (Lodise LEONORA 2009): 7 %      Plan:  1. Decrease Dose to 750 mg IV q24hrs due to worsening renal function  2. Vancomycin monitoring method: AUC  3. Vancomycin therapeutic monitoring goal: 400-600 mg*h/L  4. Pharmacy will check vancomycin levels as appropriate in 1-3 Days.  5. Serum creatinine levels will be ordered every 48 hours.    Savannah Ca RPH

## 2022-12-18 NOTE — PROGRESS NOTES
[facility]  12/18/2022    Progress Note  Reason for Critical Care Admission: DKA, respiratory failure, covid and bacterial pneumonia  Admitting Physician: Joao Krishna DO  Date of Admission:  12/16/2022    Assessment: Critical Care   Deidre N Aasen is a 27 year old female admitted on 12/16/2022 for DKA, respiratory failure from concurrent covid, bacterial pneumonia.   After arrival to the ICU she developed acute respiratory failure requiring intubation and development of septic shock requiring initation of dual vasopressor support and stress dose steroids in addition to broad spectrum antimicrobial therapy. She has ongoing fluid resuscitation needs, management of severe electrolyte abnormalities and ongoing treatment for septic shock and DKA per protocol. She remains severely critically ill.        Plan: Critical Care   Neuro/ pain/ sedation:  # Toxic metabolic encephalopathy  # Agitation  # Polysubstance abuse- meth and heroin use. At risk for withdrawal  # IVDU  # Meth intoxication  - Will keep sedated now for RASS of (-1 to -2), will try to lighten sedation.  - Sedation: Midazolam  - Pain: fent gtt  - Haldol PRN    Pulmonary:  # Acute respiratory failure with refractory hypoxia  # Complex cavitary pneumonia most likely 2/2 to MRSA pneumonia vs aspiration pneumonia given sudden onset and progression based on comparison of chest imaging (12/13 vs 12/16). Less likely due to fungal or COVID PNA. Echo shows no vegetations but high suspicion and may consider CATHY.  # Acute respiratory acidosis  # severe ARDS, P/F ratio 78 at time of intubation, worsening to 41  Acute respiratory distress. Trial of bipap but continued to have evidence of respiratory collapse. Intubated on 12/16/22. + covid with new cavitary lung infection consistent with concurrent superimposed bacterial pneumonia. Adjusting vent settings to address hyperventilation. Trending ABGs for vent management. Received remdesivir on  arrival but discontinued after intubation. Initated on stress dose steroids for refractory septic shock.   Plan  Vent Mode: CMV/AC  (Continuous Mandatory Ventilation/ Assist Control) (vent settingschanged per MD)  FiO2 (%): 40 %  Resp Rate (Set): 18 breaths/min  Tidal Volume (Set, mL): 300 mL  PEEP (cm H2O): 10 cmH2O  Resp: 25  - Continue intubation and mechanical ventilation, oxygen requirements are downtrending  - supplemental oxygen to keep saturation about 92%  - continue steroids- stress dose  - No dire need for bronchoscopy as this is likely MRSA pneumonia c/b bacteremia    Cardiovascular:  # Severe septic shock  # Sinus tachycardia  # Worsening leucocytosis  - Pressor requirements are down trending and will continue fluid administration. However, worsening leucocytosis and we may need to remove her lines due to concern f  - continue antibiotics- Vanc and Unasyn. ID on board  - Echo is negative for endocarditis and may need CATHY due to cavitation which indicates possible embolic phenomenon and high suspicion for vegetations    GI/Nutrition:  # Abdominal pain. Present prior to intubation. Large gastric bubble on am CXR. Unclear etiology. May be component of heroin with drawl symptoms vs. DKA. Mild alk phosphatemia on admission but otherwise hepatic panel normal.   # ?Upper GI bleed. melenotic stools. Hem occult positive. Suspect upper GI bleed but no recurrence. Needed a unit of pRBC's last night. On PPIs  - Diet: NPO for Medical/Clinical Reasons Except for: Meds, Ice Chips    - Will start trickle tube feeds for now and escalate as needed. Will monitor for refeeding syndrome and replace electrolytes appropriately.  - Lipase not elevated  - BID PPI  - Monitor NG characteristics, if obvious blood will consult GI for EGD  - Start tube feeds    Renal/ Fluid Balance:   #Diabetic ketoacidosis- Resolved and DKA protocol discontinued  #Hypokalemia- replacement per protocol  # hypophosphatemia- replacement per  protocol  # high anion gap metabolic acidosis 2/2 to DKA- resolved  # Ketonuria- resolved  - Gap is closed and DKA protocol transitioned to subcutaneous insulin  - ensure adequate hydration, bolus IVF now  - Will monitor intake and output  - ICU electrolyte replacement protocol    Endocrine:  #Type 1 diabetes with insulin non complicance, complicated by DKA. History of prior admission for DKA  - On subcutaneous insulin    ID:  # Septic shock and MRSA bacteremia  # Complicated pneumonia likely due to MRSA pneumonia and aspiration  # Covid positive  - ID consulted and appreciate recs. Concern for worsening leucocytosis and may plan to remove lines and replace.  - continue broad spectrum antibiotics with vanc and Unasyn   - No dire need for bronchoscopy and ID on board  - will plan for CATHY and line exchanges    Hematology:  # DVT ppx  # melenotic stools, hem occult positive  - subcutaneous heparin  - possible upper GI bleed. On PPI. Monitor clinically at this time. No acute indication for GI intervention. Needed transfusion last night    MSK:   - PT and OT consulted. Appreciate recommendations.     Lines/ tubes/ drains:  Sheffield Catheter: PRESENT, indication: Strict 1-2 Hour I&O  Central Lines: PRESENT  PICC 12/16/22 Double Lumen Right Basilic Ok to use picc line-Site Assessment: WDL  CVC Triple Lumen Right Internal jugular-Site Assessment: WDL    Prophylaxis:  - DVT Prophylaxis: Heparin SQ and Pneumatic Compression Devices  - PUD Prophylaxis: PPI    Code Status: Full Code      Disposition:  - ICU    Critical care time exclusive of procedures: 40 minutes.     Clinically Significant Risk Factors        # Hypokalemia: Lowest K = 2.6 mmol/L in last 2 days, will replace as needed  # Hyponatremia: Lowest Na = 135 mmol/L in last 2 days, will monitor as appropriate  # Hypernatremia: Highest Na = 150 mmol/L in last 2 days, will monitor as appropriate    # Hypomagnesemia: Lowest Mg = 1.5 mg/dL in last 2 days, will replace as  needed   # Hypoalbuminemia: Lowest albumin = 2.8 g/dL at 12/16/2022  2:43 AM, will monitor as appropriate    # Acute Kidney Injury, unspecified: based on a >150% or 0.3 mg/dL increase in last creatinine compared to past 90 day average, will monitor renal function                      Alejo Titus MD  Pulmonary, Critical Care and Sleep Medicine  [facility]  Pager: [phone]'  [facility]  Securely message with the Vocera Web Console (learn more here)  Text page via [employer] Paging/Directory         ______________________________________________________________________    Chief Complaint   DKA, septic shock, MRSA bacteremia    History obtained from review of the medical record and with discussion with hospitalist    History of Present Illness   Deidre N Aasen is a 27 year old female with known poorly controlled insulin non compliant diabetes mellitus type 1, polysubstance abuse/IVDU (meth, heroin) and recent hospitalization for DKA earlier this week. Patient left [facility] and was brought back to the ED after she developed lethargy and increased work of breathing. Workup in the ED was consistent with DKA. She is known covid positive from most recent hospitalization and was found to have a new right lower and middle lobe consolidation with cavitation. Earlier this am she developed worsening encephalopathy, respiratory distress requiring BiPAP therapy and was admitted to the ICU for ongoing management.       Review of Systems    Patient complained of nausea, abdominal pain, respiratory distress prior to intubation. Otherwise unable to obtain full ROS due to encephalopathy and critical illness, sedation and intubation.     Past Medical History    I have reviewed this patient's medical history and updated it with pertinent information if needed.   Past Medical History:   Diagnosis Date     Anxiety      Chronic malnutrition      Depressive disorder      Diabetes mellitus       Heroin abuse      Methamphetamine abuse      Noncompliance with medication regimen        Past Surgical History   I have reviewed this patient's surgical history and updated it with pertinent information if needed.  No past surgical history on file.    Social History   I have reviewed this patient's social history and updated it with pertinent information if needed.  Social History     Tobacco Use     Smoking status: Never     Smokeless tobacco: Never   Substance Use Topics     Alcohol use: No     Drug use: Yes       Family History     Unknown. Unable to provide information and no family available to provide history.     Prior to Admission Medications   Prior to Admission Medications   Prescriptions Last Dose Informant Patient Reported? Taking?   insulin aspart (NOVOLOG PEN) 100 UNIT/ML pen   Yes No   Sig: Inject Subcutaneous 3 times daily (with meals) Sliding scale   insulin glargine (BASAGLAR KWIKPEN) 100 UNIT/ML pen   No No   Sig: Inject 20 Units Subcutaneous daily      Facility-Administered Medications: None     Allergies   Allergies   Allergen Reactions     Ciprofloxacin Other (See Comments)     Vomiting; diarrhea       Physical Exam   Vital Signs: Temp: 99.3  F (37.4  C) Temp src: Bladder BP: 107/67 Pulse: 93   Resp: 25 SpO2: 96 % O2 Device: Mechanical Ventilator    Weight: 86 lbs 13.78 oz       Intake/Output Summary (Last 24 hours) at 12/16/2022 1630  Last data filed at 12/16/2022 1600  Gross per 24 hour   Intake 5830.15 ml   Output 2100 ml   Net 3730.15 ml       General: Sedated  HEENT: AT, NC, PERRL, EOMI, trachea midline. ET tube in place at 22 cm, 7.0 tube.   Neuro: sedated  CV: sinus tachycardia, non mottled appearance.  Pulm: course breath sounds worse on the right compared to the left, on mechanical ventilation.   Abd: soft, non distended. NG in place with bilious output.   : degroot in place with clear pale yellow urine in the bag  Extremities: thin, covered in needle stick marks  Skin: non  mottled, dry appearing.   Psych: Sedated    Data   I reviewed all medications, new labs and imaging results over the last 24 hours.  Arterial Blood Gases   Recent Labs   Lab 12/18/22  0559 12/18/22  0214 12/17/22  2315 12/17/22  1842   PH 7.45 7.49* 7.50* 7.41   PCO2 35 32* 23* 24*   PO2 83 72* 106* 122*   HCO3 24 24 18* 15*       Complete Blood Count   Recent Labs   Lab 12/18/22  0558 12/17/22  1842 12/17/22  0417 12/16/22  1015 12/16/22  0243   WBC 30.3*  --  12.6* 3.3* 16.0*   HGB 8.3* 6.3* 8.0* 9.2* 11.8     --  162 238 497*       Basic Metabolic Panel  Recent Labs   Lab 12/18/22  1031 12/18/22  1025 12/18/22  0558 12/18/22  0549 12/18/22  0214 12/18/22  0208 12/17/22  2230 12/17/22  1838   NA  --  148* 149*  --   --  150*  --  142   POTASSIUM  --  3.5  3.5 3.3*  --   --  2.7*  --  3.1*   CHLORIDE  --  115* 116*  --   --  116*  --  117*   CO2  --  24 23  --   --  24  --  14*   BUN  --  43* 41*  --   --  38*  --  33*   CR  --  1.23* 1.28*  --   --  1.32*  --  1.33*   * 121* 131* 125*   < > 151*   < > 143*    < > = values in this interval not displayed.       Liver Function Tests  Recent Labs   Lab 12/16/22  1015 12/16/22  0243 12/13/22  0235 12/13/22  0003   AST  --  27  --  24   ALT  --  13  --  18   ALKPHOS  --  173*  --  160*   BILITOTAL  --  <0.2  --  0.3   ALBUMIN  --  2.8*  --  3.8   INR 1.82* 1.68* 1.21*  --        Pancreatic Enzymes  Recent Labs   Lab 12/16/22  1230   LIPASE 47*       Coagulation Profile  Recent Labs   Lab 12/16/22  1015 12/16/22  0243 12/13/22  0235   INR 1.82* 1.68* 1.21*   PTT  --   --  39*       IMAGING:  No results found for this or any previous visit (from the past 24 hour(s)).    Alejo Titus MD  Pulmonary, Critical Care and Sleep Medicine  [facility] [place]  Pager: [phone]

## 2022-12-18 NOTE — CONSULTS
CLINICAL NUTRITION SERVICES  -  ASSESSMENT NOTE      Recommendations Ordered by Registered Dietitian (RD):     Will initially feed 2/3 estimated daily needs for first week: 920-1050 cals, 35-40 gm pro  Nutrition Support Enteral:  Type of Feeding Tube: NG  Enteral Frequency:  Continuous  Enteral Regimen: Osmolite 1.5 @ 30 mL/hr - initial goal for the first week  Total Enteral Provisions: 1080 cals (27 cals/kg), 45 gm pro (1.1 gm/kg), 147 gm CHO, 0 gm fiber, 549 mL free water  Free Water Flush: 60 mL every 4 hrs for tube patency    Begin trickle feeds at 10 mL/hr.  Will assess daily for ability to increase to initial goal rate of 30 mL/hr     Malnutrition:   % Weight Loss:  Large wt fluctuations - unable to assess true wt loss  % Intake: Pt intubated, no diet hx - however, suspect decreased po intake since presentation to ED on 12/13  Subcutaneous Fat Loss:  Unable to complete NFPA - pt in COVID isolation  Muscle Loss:  Unable to complete NFPA - pt in COVID isolation  Fluid Retention:  None noted    Malnutrition Diagnosis: Suspect Severe malnutrition  In Context of:  Acute illness or injury on Chronic illness or disease (DKA, COVID - polysubstance abuse, DM)        REASON FOR ASSESSMENT  Deidre N Aasen is a 27 year old female assessed by Registered Dietitian for Provider Order - Registered Dietitian to Assess and Order TF per Medical Nutrition Therapy Protocol      NUTRITION HISTORY  Chart reviewed  Pt intubated - unable to obtain diet hx  Note pt not feeling well upon presentation to ED on 12/13  Suspect poor intake past several days    Pt also with polysubstance abuse - has low body wt, so suspect not eating adequately       CURRENT NUTRITION ORDERS  Diet Order:     NPO    Pt with NG tube - plan to start trickle feeds      NUTRITION FOCUSED PHYSICAL ASSESSMENT FOR DIAGNOSING MALNUTRITION)  No:  Pt in COVID isolation - we do not enter these rooms             Obtained from Chart/Interdisciplinary Team:  Critically ill  "  DKA  12/16: intubated   12/16: NG placed      ANTHROPOMETRICS  Height: 5'3\"  Weight:(12/18) 39.4 kg / 86 lbs 13.78 oz  Body mass index is 15.39 kg/m .  Weight Status:  Underweight BMI <18.5  IBW: 52.3 kg  % IBW: 75%  Weight History:   Large wt fluctuations (20# drop from yesterday, 10# drop from 12/13 ??)  Net I/O: +10,931 mL    12/18/22 0237 39.4 kg (86 lb 13.8 oz) Bed scale   12/17/22 0600 48.9 kg (107 lb 12.9 oz) Bed scale     12/13/22: 96 lb (ED visit)    Wt Readings from Last 10 Encounters:   12/18/22 39.4 kg (86 lb 13.8 oz)   12/13/22 43.6 kg (96 lb 1.9 oz)   09/17/19 54.4 kg (120 lb)   02/13/19 60.8 kg (134 lb)         LABS  12/13: +COVID    12/18: Na 148 (H)             K 3.5             Mg 2.2    MEDICATIONS  Norepinephrine   Phenylephrine  Vasopressin   Insulin       ASSESSED NUTRITION NEEDS PER APPROVED PRACTICE GUIDELINES:    Dosing Weight 39.4 kg  Estimated Energy Needs: 7388-6010 kcals (35-40 Kcal/Kg)  Justification: Underweight  Estimated Protein Needs: 50-60 grams protein (1.2-1.5 g pro/Kg)   Justification: Repletion  Estimated Fluid Needs: Per MD for fluid balance    MALNUTRITION:  % Weight Loss:  Large wt fluctuations - unable to assess true wt loss  % Intake: Pt intubated, no diet hx - however, suspect decreased po intake since presentation to ED on 12/13  Subcutaneous Fat Loss:  Unable to complete NFPA - pt in COVID isolation  Muscle Loss:  Unable to complete NFPA - pt in COVID isolation  Fluid Retention:  None noted    Malnutrition Diagnosis: Suspect Severe malnutrition  In Context of:  Acute illness or injury on Chronic illness or disease (DKA, COVID - polysubstance abuse, DM)    NUTRITION DIAGNOSIS:  Inadequate protein-energy intake related to NPO status on vent as evidenced by pt not meeting estimated needs      NUTRITION INTERVENTIONS  Recommendations / Nutrition Prescription  NPO    Will initially feed 2/3 estimated daily needs for first week: 920-1050 cals, 35-40 gm pro  Nutrition " Support Enteral:  Type of Feeding Tube: NG  Enteral Frequency:  Continuous  Enteral Regimen: Osmolite 1.5 @ 30 mL/hr - initial goal for the first week  Total Enteral Provisions: 1080 cals (27 cals/kg), 45 gm pro (1.1 gm/kg), 147 gm CHO, 0 gm fiber, 549 mL free water  Free Water Flush: 60 mL every 4 hrs for tube patency    Begin trickle feeds at 10 mL/hr.  Will assess daily for ability to increase to initial goal rate of 30 mL/hr  .      Implementation  Nutrition education: Not appropriate at this time due to patient condition  EN Composition and EN Schedule: orders completed in EPIC  .      Nutrition Goals  EN to reach initial goal rate in the next 2-3 days  .      MONITORING AND EVALUATION:  Progress towards goals will be monitored and evaluated per protocol and Practice Guidelines

## 2022-12-18 NOTE — PROGRESS NOTES
[facility] ICU RESPIRATORY NOTE        Date of Admission: 12/16/2022    Date of Intubation (most recent): 12/16/2022    Reason for Mechanical Ventilation: Respiratory failure    Number of Days on Mechanical Ventilation: 2    Met Criteria for Spontaneous Breathing Trial: No    Reason for No Spontaneous Breathing Trial: Per MD    Significant Events Today: None overnight.    ABG Results:   Recent Labs   Lab 12/18/22  0559 12/18/22  0214 12/17/22  2315 12/17/22  1842   PH 7.45 7.49* 7.50* 7.41   PCO2 35 32* 23* 24*   PO2 83 72* 106* 122*   HCO3 24 24 18* 15*   O2PER 40 40 40 40       Current Vent Settings: Vent Mode: CMV/AC  (Continuous Mandatory Ventilation/ Assist Control) (vent settingschanged per MD)  FiO2 (%): 40 %  Resp Rate (Set): 18 breaths/min  Tidal Volume (Set, mL): 300 mL  PEEP (cm H2O): 10 cmH2O  Resp: 18          Yao Lerma, RT on 12/18/2022 at 6:20 AM

## 2022-12-18 NOTE — PROCEDURES
[facility]    Single Lumen Midline Placement    Date/Time: 12/18/2022 1:22 PM  Performed by: Gary Ortiz RN  Authorized by: Alejo Titus MD   Indications: vascular access      UNIVERSAL PROTOCOL   Site Marked: Yes  Prior Images Obtained and Reviewed:  Yes  Required items: Required blood products, implants, devices and special equipment available    Patient identity confirmed:  Arm band, hospital-assigned identification number and anonymous protocol, patient vented/unresponsive  Patient was reevaluated immediately before administering moderate or deep sedation or anesthesia  Confirmation Checklist:  Patient's identity using two indicators, relevant allergies, procedure was appropriate and matched the consent or emergent situation and correct equipment/implants were available  Time out: Immediately prior to the procedure a time out was called    Universal Protocol: the Joint Commission Universal Protocol was followed    Preparation: Patient was prepped and draped in usual sterile fashion    ESBL (mL):  1     ANESTHESIA    Local Anesthetic: Lidocaine 1% without epinephrine  Anesthetic Total (mL):  1      SEDATION    Patient Sedated: No        Preparation: skin prepped with ChloraPrep  Skin prep agent: skin prep agent completely dried prior to procedure  Sterile barriers: maximum sterile barriers were used: cap, mask, sterile gown, sterile gloves, and large sterile sheet  Hand hygiene: hand hygiene performed prior to central venous catheter insertion  Type of line used: Midline  Catheter type: single lumen  Lumen type: non-valved  Catheter size: 4 Fr  Brand: Bard  Lot number: [id number]  Placement method: venipuncture and ultrasound  Number of attempts: 1  Difficulty threading catheter: no  Successful placement: yes  Orientation: left    Location: brachial vein (lateral)  Tip Location: subclavian  Arm circumference: adults 10 cm  Extremity circumference: 24  Visible catheter length:  1  Internal length: 11 cm  Total catheter length: 12  Dressing and securement: chlorhexidine patch applied, site cleansed, statlock, sterile dressing applied and transparent securement dressing  Post procedure assessment: blood return through all ports  PROCEDURE   Patient Tolerance:  Patient tolerated the procedure well with no immediate complicationsDescribe Procedure: Single lumen medline placed on left brachial. Patient tolerated well. Ok to use the line

## 2022-12-18 NOTE — PLAN OF CARE
Goal Outcome Evaluation:      Plan of Care Reviewed With: patient    Overall Patient Progress: improvingOverall Patient Progress: improving    Pt here with DKA, Covid +, pneumonia, and a GI bleed. A&O GADIEL - intubated and sedated. Neuros withdraws from pain. VSS. Intubated - FiO2 40, Peep 10, RR 30, . NPO. Takes meds through IV or crushed through NG tube. Up with A2 + lift. No nonverbal indicators of pain. Pt scoring yellow on the Aggression Stop Light Tool for unable to assess orientation. Plan to monitor blood gases, electrolytes, and Hgb. Discharge plan pending medical readiness/stability.

## 2022-12-18 NOTE — PROGRESS NOTES
Neuro: Sedated. Not following. Not opening eyes  Pulm: on full vent support. Mod thick ETT secretion. Biting ETT and coughing with oral cares  CV: SR/ST. BP soft, but within parameters. Off pressors  : Sheffield patent with low UOP. MD aware. 1L bolus given with very minimal effect  GI: soft BMx2. Trickle TF started with free water   Extremities: Sedated  Skin: Scattered bruises, scabs and abrasions throughout the body. Coccyx intact, covered with foam dressing  Lines: Midline and PIVx1 on LUE  Family: Father updated via phone  Plan: Continue to monitor and support.

## 2022-12-19 ENCOUNTER — APPOINTMENT (OUTPATIENT)
Dept: CT IMAGING | Facility: CLINIC | Age: 27
End: 2022-12-19
Attending: INTERNAL MEDICINE
Payer: COMMERCIAL

## 2022-12-19 ENCOUNTER — APPOINTMENT (OUTPATIENT)
Dept: GENERAL RADIOLOGY | Facility: CLINIC | Age: 27
End: 2022-12-19
Attending: INTERNAL MEDICINE
Payer: COMMERCIAL

## 2022-12-19 PROBLEM — E83.39 HYPOPHOSPHATEMIA: Status: ACTIVE | Noted: 2022-12-19

## 2022-12-19 PROBLEM — J12.82 PNEUMONIA DUE TO 2019 NOVEL CORONAVIRUS: Status: ACTIVE | Noted: 2022-12-19

## 2022-12-19 PROBLEM — J15.9 BACTERIAL PNEUMONIA: Status: ACTIVE | Noted: 2022-12-19

## 2022-12-19 PROBLEM — K20.90 ESOPHAGITIS: Status: ACTIVE | Noted: 2022-12-19

## 2022-12-19 PROBLEM — U07.1 PNEUMONIA DUE TO 2019 NOVEL CORONAVIRUS: Status: ACTIVE | Noted: 2022-12-19

## 2022-12-19 LAB
ANION GAP SERPL CALCULATED.3IONS-SCNC: 7 MMOL/L (ref 3–14)
BUN SERPL-MCNC: 43 MG/DL (ref 7–30)
CALCIUM SERPL-MCNC: 6.7 MG/DL (ref 8.5–10.1)
CHLORIDE BLD-SCNC: 118 MMOL/L (ref 94–109)
CO2 SERPL-SCNC: 25 MMOL/L (ref 20–32)
CREAT SERPL-MCNC: 1.15 MG/DL (ref 0.52–1.04)
ERYTHROCYTE [DISTWIDTH] IN BLOOD BY AUTOMATED COUNT: 14.6 % (ref 10–15)
GFR SERPL CREATININE-BSD FRML MDRD: 67 ML/MIN/1.73M2
GLUCOSE BLD-MCNC: 202 MG/DL (ref 70–99)
GLUCOSE BLDC GLUCOMTR-MCNC: 163 MG/DL (ref 70–99)
GLUCOSE BLDC GLUCOMTR-MCNC: 179 MG/DL (ref 70–99)
GLUCOSE BLDC GLUCOMTR-MCNC: 189 MG/DL (ref 70–99)
GLUCOSE BLDC GLUCOMTR-MCNC: 190 MG/DL (ref 70–99)
GLUCOSE BLDC GLUCOMTR-MCNC: 195 MG/DL (ref 70–99)
GLUCOSE BLDC GLUCOMTR-MCNC: 239 MG/DL (ref 70–99)
GLUCOSE BLDC GLUCOMTR-MCNC: 243 MG/DL (ref 70–99)
HCT VFR BLD AUTO: 21.4 % (ref 35–47)
HGB BLD-MCNC: 7.9 G/DL (ref 11.7–15.7)
MAGNESIUM SERPL-MCNC: 2.4 MG/DL (ref 1.6–2.3)
MCH RBC QN AUTO: 31.1 PG (ref 26.5–33)
MCHC RBC AUTO-ENTMCNC: 36.9 G/DL (ref 31.5–36.5)
MCV RBC AUTO: 84 FL (ref 78–100)
PHOSPHATE SERPL-MCNC: 2.5 MG/DL (ref 2.5–4.5)
PLATELET # BLD AUTO: 145 10E3/UL (ref 150–450)
POTASSIUM BLD-SCNC: 3.2 MMOL/L (ref 3.4–5.3)
POTASSIUM BLD-SCNC: 3.4 MMOL/L (ref 3.4–5.3)
POTASSIUM BLD-SCNC: 3.5 MMOL/L (ref 3.4–5.3)
RBC # BLD AUTO: 2.54 10E6/UL (ref 3.8–5.2)
SODIUM SERPL-SCNC: 150 MMOL/L (ref 133–144)
WBC # BLD AUTO: 21.2 10E3/UL (ref 4–11)

## 2022-12-19 PROCEDURE — 94640 AIRWAY INHALATION TREATMENT: CPT | Mod: 76

## 2022-12-19 PROCEDURE — 999N000185 HC STATISTIC TRANSPORT TIME EA 15 MIN

## 2022-12-19 PROCEDURE — 250N000011 HC RX IP 250 OP 636: Performed by: STUDENT IN AN ORGANIZED HEALTH CARE EDUCATION/TRAINING PROGRAM

## 2022-12-19 PROCEDURE — 250N000011 HC RX IP 250 OP 636: Performed by: SURGERY

## 2022-12-19 PROCEDURE — 99291 CRITICAL CARE FIRST HOUR: CPT | Mod: GC | Performed by: INTERNAL MEDICINE

## 2022-12-19 PROCEDURE — 36415 COLL VENOUS BLD VENIPUNCTURE: CPT | Performed by: INTERNAL MEDICINE

## 2022-12-19 PROCEDURE — 82310 ASSAY OF CALCIUM: CPT | Performed by: INTERNAL MEDICINE

## 2022-12-19 PROCEDURE — 250N000013 HC RX MED GY IP 250 OP 250 PS 637: Performed by: INTERNAL MEDICINE

## 2022-12-19 PROCEDURE — 87077 CULTURE AEROBIC IDENTIFY: CPT | Performed by: INTERNAL MEDICINE

## 2022-12-19 PROCEDURE — 250N000011 HC RX IP 250 OP 636: Performed by: INTERNAL MEDICINE

## 2022-12-19 PROCEDURE — 94003 VENT MGMT INPAT SUBQ DAY: CPT

## 2022-12-19 PROCEDURE — 200N000001 HC R&B ICU

## 2022-12-19 PROCEDURE — 71045 X-RAY EXAM CHEST 1 VIEW: CPT

## 2022-12-19 PROCEDURE — 250N000013 HC RX MED GY IP 250 OP 250 PS 637: Performed by: SURGERY

## 2022-12-19 PROCEDURE — 999N000157 HC STATISTIC RCP TIME EA 10 MIN

## 2022-12-19 PROCEDURE — 250N000009 HC RX 250: Performed by: INTERNAL MEDICINE

## 2022-12-19 PROCEDURE — 84132 ASSAY OF SERUM POTASSIUM: CPT | Performed by: INTERNAL MEDICINE

## 2022-12-19 PROCEDURE — 83735 ASSAY OF MAGNESIUM: CPT | Performed by: INTERNAL MEDICINE

## 2022-12-19 PROCEDURE — 94640 AIRWAY INHALATION TREATMENT: CPT

## 2022-12-19 PROCEDURE — 258N000003 HC RX IP 258 OP 636: Performed by: INTERNAL MEDICINE

## 2022-12-19 PROCEDURE — C9113 INJ PANTOPRAZOLE SODIUM, VIA: HCPCS | Performed by: SURGERY

## 2022-12-19 PROCEDURE — 84100 ASSAY OF PHOSPHORUS: CPT | Performed by: INTERNAL MEDICINE

## 2022-12-19 PROCEDURE — 71260 CT THORAX DX C+: CPT

## 2022-12-19 PROCEDURE — 99233 SBSQ HOSP IP/OBS HIGH 50: CPT | Performed by: INTERNAL MEDICINE

## 2022-12-19 PROCEDURE — 85027 COMPLETE CBC AUTOMATED: CPT | Performed by: SURGERY

## 2022-12-19 PROCEDURE — 87106 FUNGI IDENTIFICATION YEAST: CPT | Performed by: INTERNAL MEDICINE

## 2022-12-19 RX ORDER — MIDAZOLAM HCL IN 0.9 % NACL/PF 1 MG/ML
1-6 PLASTIC BAG, INJECTION (ML) INTRAVENOUS CONTINUOUS
Status: DISCONTINUED | OUTPATIENT
Start: 2022-12-19 | End: 2022-12-27

## 2022-12-19 RX ORDER — IOPAMIDOL 755 MG/ML
50 INJECTION, SOLUTION INTRAVASCULAR ONCE
Status: COMPLETED | OUTPATIENT
Start: 2022-12-19 | End: 2022-12-19

## 2022-12-19 RX ORDER — ACETAMINOPHEN 650 MG/1
650 SUPPOSITORY RECTAL EVERY 4 HOURS PRN
Status: DISCONTINUED | OUTPATIENT
Start: 2022-12-19 | End: 2022-12-20

## 2022-12-19 RX ORDER — POTASSIUM CHLORIDE 1.5 G/1.58G
20 POWDER, FOR SOLUTION ORAL ONCE
Status: COMPLETED | OUTPATIENT
Start: 2022-12-19 | End: 2022-12-19

## 2022-12-19 RX ORDER — ACETAMINOPHEN 325 MG/1
650 TABLET ORAL EVERY 4 HOURS PRN
Status: DISCONTINUED | OUTPATIENT
Start: 2022-12-19 | End: 2022-12-20

## 2022-12-19 RX ORDER — LEVALBUTEROL 1.25 MG/.5ML
0.63 SOLUTION, CONCENTRATE RESPIRATORY (INHALATION) EVERY 8 HOURS
Status: DISCONTINUED | OUTPATIENT
Start: 2022-12-19 | End: 2023-01-04 | Stop reason: HOSPADM

## 2022-12-19 RX ORDER — LEVALBUTEROL INHALATION SOLUTION 0.31 MG/3ML
0.63 SOLUTION RESPIRATORY (INHALATION) EVERY 8 HOURS
Status: DISCONTINUED | OUTPATIENT
Start: 2022-12-19 | End: 2022-12-19 | Stop reason: RX

## 2022-12-19 RX ADMIN — HYDROCORTISONE SODIUM SUCCINATE 100 MG: 100 INJECTION, POWDER, FOR SOLUTION INTRAMUSCULAR; INTRAVENOUS at 10:41

## 2022-12-19 RX ADMIN — AMPICILLIN SODIUM AND SULBACTAM SODIUM 3 G: 2; 1 INJECTION, POWDER, FOR SOLUTION INTRAMUSCULAR; INTRAVENOUS at 13:58

## 2022-12-19 RX ADMIN — INSULIN ASPART 2 UNITS: 100 INJECTION, SOLUTION INTRAVENOUS; SUBCUTANEOUS at 02:12

## 2022-12-19 RX ADMIN — HEPARIN SODIUM 5000 UNITS: 5000 INJECTION, SOLUTION INTRAVENOUS; SUBCUTANEOUS at 09:06

## 2022-12-19 RX ADMIN — INSULIN ASPART 3 UNITS: 100 INJECTION, SOLUTION INTRAVENOUS; SUBCUTANEOUS at 17:09

## 2022-12-19 RX ADMIN — INSULIN ASPART 2 UNITS: 100 INJECTION, SOLUTION INTRAVENOUS; SUBCUTANEOUS at 05:39

## 2022-12-19 RX ADMIN — INSULIN GLARGINE 20 UNITS: 100 INJECTION, SOLUTION SUBCUTANEOUS at 21:39

## 2022-12-19 RX ADMIN — IOPAMIDOL 50 ML: 755 INJECTION, SOLUTION INTRAVENOUS at 08:27

## 2022-12-19 RX ADMIN — CHLORHEXIDINE GLUCONATE 0.12% ORAL RINSE 15 ML: 1.2 LIQUID ORAL at 20:05

## 2022-12-19 RX ADMIN — PANTOPRAZOLE SODIUM 40 MG: 40 INJECTION, POWDER, FOR SOLUTION INTRAVENOUS at 09:06

## 2022-12-19 RX ADMIN — POTASSIUM CHLORIDE 20 MEQ: 1.5 POWDER, FOR SOLUTION ORAL at 10:37

## 2022-12-19 RX ADMIN — AMPICILLIN SODIUM AND SULBACTAM SODIUM 3 G: 2; 1 INJECTION, POWDER, FOR SOLUTION INTRAMUSCULAR; INTRAVENOUS at 08:08

## 2022-12-19 RX ADMIN — INSULIN ASPART 1 UNITS: 100 INJECTION, SOLUTION INTRAVENOUS; SUBCUTANEOUS at 14:15

## 2022-12-19 RX ADMIN — HYDROCORTISONE SODIUM SUCCINATE 100 MG: 100 INJECTION, POWDER, FOR SOLUTION INTRAMUSCULAR; INTRAVENOUS at 02:15

## 2022-12-19 RX ADMIN — INSULIN ASPART 2 UNITS: 100 INJECTION, SOLUTION INTRAVENOUS; SUBCUTANEOUS at 21:39

## 2022-12-19 RX ADMIN — ACETAMINOPHEN 650 MG: 325 TABLET, FILM COATED ORAL at 16:40

## 2022-12-19 RX ADMIN — SODIUM CHLORIDE 60 ML: 9 INJECTION, SOLUTION INTRAVENOUS at 08:27

## 2022-12-19 RX ADMIN — LEVALBUTEROL HYDROCHLORIDE 0.63 MG: 1.25 SOLUTION, CONCENTRATE RESPIRATORY (INHALATION) at 14:52

## 2022-12-19 RX ADMIN — Medication 150 MCG/HR: at 19:58

## 2022-12-19 RX ADMIN — POTASSIUM CHLORIDE 20 MEQ: 1.5 POWDER, FOR SOLUTION ORAL at 17:03

## 2022-12-19 RX ADMIN — ACETAMINOPHEN 650 MG: 325 TABLET, FILM COATED ORAL at 21:39

## 2022-12-19 RX ADMIN — LEVALBUTEROL HYDROCHLORIDE 0.63 MG: 1.25 SOLUTION, CONCENTRATE RESPIRATORY (INHALATION) at 23:49

## 2022-12-19 RX ADMIN — MIDAZOLAM 2 MG: 1 INJECTION INTRAMUSCULAR; INTRAVENOUS at 16:50

## 2022-12-19 RX ADMIN — VANCOMYCIN HYDROCHLORIDE 750 MG: 1 INJECTION, POWDER, LYOPHILIZED, FOR SOLUTION INTRAVENOUS at 17:04

## 2022-12-19 RX ADMIN — INSULIN ASPART 1 UNITS: 100 INJECTION, SOLUTION INTRAVENOUS; SUBCUTANEOUS at 09:19

## 2022-12-19 RX ADMIN — AMPICILLIN SODIUM AND SULBACTAM SODIUM 3 G: 2; 1 INJECTION, POWDER, FOR SOLUTION INTRAMUSCULAR; INTRAVENOUS at 02:16

## 2022-12-19 RX ADMIN — PANTOPRAZOLE SODIUM 40 MG: 40 INJECTION, POWDER, FOR SOLUTION INTRAVENOUS at 20:05

## 2022-12-19 RX ADMIN — CHLORHEXIDINE GLUCONATE 0.12% ORAL RINSE 15 ML: 1.2 LIQUID ORAL at 08:10

## 2022-12-19 RX ADMIN — HEPARIN SODIUM 5000 UNITS: 5000 INJECTION, SOLUTION INTRAVENOUS; SUBCUTANEOUS at 20:05

## 2022-12-19 RX ADMIN — MIDAZOLAM HYDROCHLORIDE 3 MG/HR: 1 INJECTION, SOLUTION INTRAVENOUS at 16:40

## 2022-12-19 RX ADMIN — CEFEPIME HYDROCHLORIDE 2000 MG: 2 INJECTION, POWDER, FOR SOLUTION INTRAVENOUS at 19:53

## 2022-12-19 ASSESSMENT — ACTIVITIES OF DAILY LIVING (ADL)
ADLS_ACUITY_SCORE: 36

## 2022-12-19 NOTE — PLAN OF CARE
Late Entry:  Problem: Plan of Care - These are the overarching goals to be used throughout the patient stay.    Goal: Plan of Care Review  Outcome: Progressing   Goal Outcome Evaluation: Improving Respiratory status with improved ABG after multiple ventilator changes made with Dr. Estrada overnight and RT.  Less vasopressor needs. Less febrile overnight.  Potassium repletion.  1 Unit PRBC overnight given. Consider initiating trickle tube feeds today.  RASS -3:  Slow titration down Versed infusion started.

## 2022-12-19 NOTE — PRE-PROCEDURE
GENERAL PRE-PROCEDURE:   Procedure:  CATHY    Verbal consent obtained?: Yes    Written consent obtained?: Yes    Risks and benefits: Risks, benefits and alternatives were discussed    Consent given by:  Patient  Patient states understanding of procedure being performed: Yes    Patient's understanding of procedure matches consent: Yes    Procedure consent matches procedure scheduled: Yes    Expected level of sedation:  Moderate  Appropriately NPO:  Yes  ASA Class:  2  Mallampati  :  Grade 1- soft palate, uvula, tonsillar pillars, and posterior pharyngeal wall visible  Lungs:  Lungs clear with good breath sounds bilaterally  Heart:  Normal heart sounds and rate  History & Physical reviewed:  History and physical reviewed and no updates needed  Statement of review:  I have reviewed the lab findings, diagnostic data, medications, and the plan for sedation

## 2022-12-19 NOTE — PROGRESS NOTES
RT note:    ABG orders Q12 hours, artline has been removed.  RT spoke with Dr Marie, decision made evening ABG not needed.  VBG could be done if wanted.     Carina Martinez, RT  6:16 PM 12/18/22

## 2022-12-19 NOTE — PROGRESS NOTES
[facility]  12/19/2022    Progress Note  Reason for Critical Care Admission: DKA, respiratory failure, covid and bacterial pneumonia  Admitting Physician: Joao Krishna DO  Date of Admission:  12/16/2022    Assessment: Critical Care   Deidre N Aasen is a 27 year old female admitted on 12/16/2022 for DKA, respiratory failure from concurrent covid, bacterial pneumonia.   After arrival to the ICU she developed acute respiratory failure requiring intubation and development of septic shock requiring initation of dual vasopressor support and stress dose steroids in addition to broad spectrum antimicrobial therapy. She has ongoing fluid resuscitation needs, management of severe electrolyte abnormalities and ongoing treatment for septic shock and DKA per protocol. She remains severely critically ill but downtrending support needed. Found on CT Chest w/ contrast to have decreased blood flow to RLL w/ cavitations.       Plan: Critical Care   Neuro/ pain/ sedation:  # Toxic metabolic encephalopathy  # Agitation  # Polysubstance abuse- meth and heroin use. At risk for withdrawal  # IVDU  # Meth intoxication  - sedation holiday today  - Wean fent and versed gtts as able  - will ultilize PRNs    Pulmonary:  # Acute respiratory failure with refractory hypoxia  # Complex cavitary pneumonia most likely 2/2 to MRSA pneumonia given sudden onset and progression based on comparison of chest imaging (12/13 vs 12/16). Less likely due to fungal or COVID PNA. Echo shows no vegetations but high suspicion and may consider CATHY.  # Acute respiratory acidosis  # severe ARDS, P/F ratio 78 at time of intubation, worsening to 41  Acute respiratory distress. Trial of bipap but continued to have evidence of respiratory collapse. Intubated on 12/16/22. + covid with new cavitary lung infection consistent with concurrent superimposed bacterial pneumonia. Adjusting vent settings to address hyperventilation. Trending ABGs for  vent management. Received remdesivir on arrival but discontinued after intubation. Initated on stress dose steroids for refractory septic shock.   Plan  Vent Mode: CMV/AC  (Continuous Mandatory Ventilation/ Assist Control)  FiO2 (%): 30 %  Resp Rate (Set): 18 breaths/min  Tidal Volume (Set, mL): 300 mL  PEEP (cm H2O): 10 cmH2O  Resp: 22  - Continue intubation and mechanical ventilation, oxygen requirements are downtrending  - Thoracic surgery consult for concern of RLL hypoperfusion and cavitary lesion  - supplemental oxygen to keep saturation about 92%  - continue steroids- stress dose  - No dire need for bronchoscopy as this is likely MRSA pneumonia c/b bacteremia    Cardiovascular:  # septic shock, resolved  # Sinus tachycardia  - Echo is negative for endocarditis and may need CATHY due to cavitation which indicates possible embolic phenomenon and high suspicion for vegetations, will obtain CATHY    GI/Nutrition:  # Abdominal pain. Present prior to intubation. Large gastric bubble on am CXR. Unclear etiology. May be component of heroin with drawl symptoms vs. DKA. Mild alk phosphatemia on admission but otherwise hepatic panel normal.   # ?Upper GI bleed. melenotic stools. Hem occult positive. Suspect upper GI bleed but no recurrence. Needed a unit of pRBC's last night. On PPIs  - Diet: NPO for Medical/Clinical Reasons Except for: Meds, Ice Chips  Adult Formula Drip Feeding: Continuous Osmolite 1.5; Nasogastric tube; Goal Rate: 30; mL/hr; Begin TF at 10 mL/hr.  Hold at this rate.  Will assess daily for ability to increase.    - BID PPI  - Continue TF advancement    Renal/ Fluid Balance:   #Diabetic ketoacidosis- Resolved and DKA protocol discontinued  #Hypokalemia- replacement per protocol  # hypophosphatemia- replacement per protocol  # high anion gap metabolic acidosis 2/2 to DKA- resolved  # Ketonuria- resolved  - Gap is closed and DKA protocol transitioned to subcutaneous insulin  - Will monitor intake and  output  - ICU electrolyte replacement protocol    Endocrine:  #Type 1 diabetes with insulin non complicance, complicated by DKA. History of prior admission for DKA  - continue glargine 20 at bedtime, no changes  - continue sliding scale insulin  - stress dose steroids course 12/16-present. Will stop today given off pressors    ID:  # Septic shock and MRSA bacteremia  # Complicated pneumonia due to MRSA pneumonia and aspiration  # Covid positive  #MRSA bacteremia  - ID consulted and appreciate recs. Concern for worsening leucocytosis and may plan to remove lines and replace.  - Abx course: vanco 12/16- present; unasyn 12/16- present  - Last positive blood cx: MRSA 12/16  - Last positive BAL 12/16  - continue broad spectrum antibiotics with vanc. Will plan to stop unasyn  - will plan for CATHY and line exchanges    Hematology:  # DVT ppx  # melenotic stools, hem occult positive  - subcutaneous heparin  - possible upper GI bleed. On PPI. Monitor clinically at this time. No acute indication for GI intervention.    MSK:   - PT and OT consulted. Appreciate recommendations.     Lines/ tubes/ drains:  [place] Catheter: PRESENT, indication: Strict 1-2 Hour I&O  Central Lines: PICC removed due to bacteremia    Prophylaxis:  - DVT Prophylaxis: Heparin SQ and Pneumatic Compression Devices  - PUD Prophylaxis: PPI    Code Status: Full Code      Disposition:  - ICU  - FatherJeronimo ([phone]), updated by phone today.      Clinically Significant Risk Factors        # Hypokalemia: Lowest K = 2.7 mmol/L in last 2 days, will replace as needed  # Hypernatremia: Highest Na = 150 mmol/L in last 2 days, will monitor as appropriate      # Hypoalbuminemia: Lowest albumin = 2.8 g/dL at 12/16/2022  2:43 AM, will monitor as appropriate                       Discussed with staff, Dr. Hira Tran MD  Surgical Critical Care Fellow    ______________________________________________________________________    Interval events:  -no major  events.    Physical Exam   Vital Signs: Temp: 99.7  F (37.6  C) Temp src: Bladder BP: 105/67 Pulse: 105   Resp: 22 SpO2: 93 % O2 Device: Mechanical Ventilator    Weight: 100 lbs 1.42 oz     Intake/Output Summary (Last 24 hours) at 12/16/2022 1630  Last data filed at 12/16/2022 1600  Gross per 24 hour   Intake 5830.15 ml   Output 2100 ml   Net 3730.15 ml       General: Sedated  HEENT: AT, NC, PERRL, EOMI, trachea midline. ET tube in place at 22 cm, 7.0 tube.   Neuro: sedated  CV: sinus tachycardia, non mottled appearance.  Pulm: course breath sounds worse on the right compared to the left, on mechanical ventilation.   Abd: soft, non distended. NG in place with tube feeds going  : degroot in place with clear pale yellow urine in the bag  Extremities: thin, covered in needle stick marks, edematous  Skin: non mottled     Data   I reviewed all medications, new labs and imaging results over the last 24 hours.  Arterial Blood Gases   Recent Labs   Lab 12/18/22  0559 12/18/22  0214 12/17/22  2315 12/17/22  1842   PH 7.45 7.49* 7.50* 7.41   PCO2 35 32* 23* 24*   PO2 83 72* 106* 122*   HCO3 24 24 18* 15*       Complete Blood Count   Recent Labs   Lab 12/19/22  0814 12/18/22  0558 12/17/22  1842 12/17/22  0417 12/16/22  1015   WBC 21.2* 30.3*  --  12.6* 3.3*   HGB 7.9* 8.3* 6.3* 8.0* 9.2*   * 153  --  162 238       Basic Metabolic Panel  Recent Labs   Lab 12/19/22  0919 12/19/22  0814 12/19/22  0537 12/19/22  0210 12/18/22  2141 12/18/22  1925 12/18/22  1446 12/18/22  1439 12/18/22  1031 12/18/22  1025   NA  --  150*  --   --   --  147*  --  147*  --  148*   POTASSIUM  --  3.2*  --   --   --  3.5  --  3.4  --  3.5  3.5   CHLORIDE  --  118*  --   --   --  117*  --  115*  --  115*   CO2  --  25  --   --   --  19*  --  25  --  24   BUN  --  43*  --   --   --  47*  --  48*  --  43*   CR  --  1.15*  --   --   --  1.24*  --  1.29*  --  1.23*   * 202* 190* 195*   < > 159*   < > 131*   < > 121*    < > = values in this  interval not displayed.       Liver Function Tests  Recent Labs   Lab 12/16/22  1015 12/16/22  0243 12/13/22  0235 12/13/22  0003   AST  --  27  --  24   ALT  --  13  --  18   ALKPHOS  --  173*  --  160*   BILITOTAL  --  <0.2  --  0.3   ALBUMIN  --  2.8*  --  3.8   INR 1.82* 1.68* 1.21*  --        Pancreatic Enzymes  Recent Labs   Lab 12/16/22  1230   LIPASE 47*       Coagulation Profile  Recent Labs   Lab 12/16/22  1015 12/16/22  0243 12/13/22  0235   INR 1.82* 1.68* 1.21*   PTT  --   --  39*       IMAGING:  Recent Results (from the past 24 hour(s))   XR Chest Port 1 View    Narrative    EXAM: XR CHEST PORT 1 VIEW  LOCATION: [facility]  DATE/TIME: 12/19/2022 5:40 AM    INDICATION: evaluate pneumonia  COMPARISON: 12/16/2022, 12/13/2022.      Impression    IMPRESSION: Interval intubation. Endotracheal tube tip is about 5 cm above the demar. Enteric tube terminates below the diaphragm. Increasing bibasilar airspace opacities and layering pleural effusions. No visible pneumothorax.   CT Chest w Contrast    Narrative    CT CHEST WITH CONTRAST 12/19/2022 8:55 AM     HISTORY: Cavitary right lung lesion.    COMPARISON: December 13, 2022    TECHNIQUE: Volumetric helical acquisition of CT images of the chest  from the clavicles to the kidneys were acquired after the  administration of 50mL Isovue-370 IV contrast. Radiation dose for this  scan was reduced using automated exposure control, adjustment of the  mA and/or kV according to patient size, or iterative reconstruction  technique.    FINDINGS:     LUNGS AND PLEURA: The examination was not tailored for pulmonary  emboli and contrast opacification in the pulmonary arteries is  limited, no pulmonary embolism demonstrated. Extensive consolidation  throughout the right lower lobe with a cavitary lesion laterally  measuring 5 cm, posteriorly measuring 3.2 cm inferiorly. Patchy  infiltrates in the right upper lobe. Mild patchy infiltrates in the  left  upper lobe. Lobar compressive atelectasis and/or infiltrate in  the left lower lobe. Small left effusion.    MEDIASTINUM/AXILLAE: Mildly enlarged lymph nodes in the chest which  are nonspecific, but likely reactive in this setting. No aneurysm.    CORONARY ARTERY CALCIFICATIONS: None.    UPPER ABDOMEN: No acute findings.    MUSCULOSKELETAL: Normal.      Impression    IMPRESSION:    1. Little to no demonstrated enhancement in the parenchyma of the  right lower lobe, vascularity appears intact with opacification of  pulmonary veins, this lack of apparent enhancement is likely related  to severe edema/pneumonitis.  2. At least two cavitary lesions in the right lower lobe measuring 5  cm laterally, and 3.2 cm posteriorly concerning for pulmonary  abscesses.  3. Suboptimal contrast bolus timing for pulmonary emboli, no definite  pulmonary emboli demonstrated.  4. Lobar atelectasis and/or consolidation in the left lower lobe and  small left effusion.  5. Patchy bilateral upper lobe infiltrates, right worse than left.

## 2022-12-19 NOTE — PLAN OF CARE
Goal Outcome Evaluation:      Plan of Care Reviewed With: patient    Overall Patient Progress: no changeOverall Patient Progress: no change    Outcome Evaluation: No vasopressors needed.  Stable respiratory status overnight.  Midline catheter secure/intact. IV antibiotic therapy ongoing.  Minimal secretions noted. Slow titration downward of sedation in progress. CXR this AM completed. ETT 5 cm above demar.

## 2022-12-19 NOTE — DISCHARGE SUMMARY
[facility]  Hospitalist Discharge Summary      Date of Admission:  12/13/2022  Date of Discharge:  12/13/2022  Discharging Provider: Raffaele Gannon MD  Discharge Service: Hospitalist Service    Discharge Diagnoses     Principal Problem:    DKA (diabetic ketoacidosis) (H)  Active Problems:    Diabetic ketoacidosis without coma associated with type 1 diabetes mellitus (H)    Pneumonia due to 2019 novel coronavirus    Hypophosphatemia    Esophagitis    Bacterial pneumonia      Follow-ups Needed After Discharge   Follow up with primary care provider in one week    Discharge Disposition   Patient signed AMA to leave the hospital. She was in a stable condition at the time of discharge.     Hospital Course     Deidre N Aasen is a 27 year old female with history of type I diabetes and polysubstance abuse who presented to [facility] ER on 12/23/22 for evaluation of elevated blood sugar, generalized weakness, chest pain, abdominal pain, nausea, and vomiting for several days. She was found to have DKA. Due to lack of ICU bed in [facility], she was transferred to [facility] for further management. She was also tested positive for COVID19 infection. Chest XR showed  airspace opacities within the right base laterally concerning for pneumonia. Further CT chest demonstrated no pulmonary embolism, but multisegment consolidation in the basal right lower lobe including medial, lateral, and posterior basal segments associated with endoluminal airway debris. Patient was not hypoxic. She received insulin drip and fluid hydration. Her DKA and electrolyte abnormalities resolved. She was also started Remdesivir and Zosyn for COVID19 infection superimposed with bacterial infection. Patient reported having coffee-ground emesis prior to coming to ER. Gastric content occult blood was positive. The bleeding is likely due to Aydee-Dunn tear associated with her nausea and vomiting. Her  hemoglobin was stable. CTA of the abdomen revealed no active bleeding, but esophagitis. She was given protonix. She did not have episodes of hematemesis after admission. At the night of 12/13/22, patient insisted on leaving the hospital without further treatment. She left AMA in a stable condition.        Consultations This Hospital Stay   DIABETES EDUCATION IP CONSULT    Code Status   Full Code    Time Spent on this Encounter   Patient left AMA without being seen by the provider       Raffaele Gannon MD  [address] [address]  Phone: [phone]  Fax: [phone]  ______________________________________________________________________    Physical Exam   Vital Signs:                  Weight: 96 lbs 1.93 oz    General appearance: not in acute distress  HEENT: PERRL, EOMI  Lungs: Clear breath sounds in bilateral lung fields  Cardiovascular: Regular rate and rhythm, normal S1-S2  Abdomen: Soft, non tender, no distension  Musculoskeletal: No joint swelling  Skin: No rash and no edema  Neurology: AAO ×3.  Cranial nerves II - XII normal.  Normal muscle strength in all four extremities.       Primary Care Physician   Physician No Ref-Primary    Discharge Orders   No discharge procedures on file.    Significant Results and Procedures   Most Recent 3 CBC's:Recent Labs   Lab Test 12/19/22  0814 12/18/22  0558 12/17/22  1842 12/17/22  0417   WBC 21.2* 30.3*  --  12.6*   HGB 7.9* 8.3* 6.3* 8.0*   MCV 84 83  --  85   * 153  --  162     Most Recent 3 BMP's:Recent Labs   Lab Test 12/19/22  1414 12/19/22  1412 12/19/22  0919 12/19/22  0814 12/18/22  2141 12/18/22  1925 12/18/22  1446 12/18/22  1439   NA  --   --   --  150*  --  147*  --  147*   POTASSIUM  --  3.4  --  3.2*  --  3.5  --  3.4   CHLORIDE  --   --   --  118*  --  117*  --  115*   CO2  --   --   --  25  --  19*  --  25   BUN  --   --   --  43*  --  47*  --  48*   CR  --   --   --  1.15*  --  1.24*   --  1.29*   ANIONGAP  --   --   --  7  --  11  --  7   AGNES  --   --   --  6.7*  --  6.2*  --  6.5*   *  --  189* 202*   < > 159*   < > 131*    < > = values in this interval not displayed.     Most Recent 2 LFT's:Recent Labs   Lab Test 12/16/22  0243 12/13/22  0003   AST 27 24   ALT 13 18   ALKPHOS 173* 160*   BILITOTAL <0.2 0.3       EXAM: CT ABDOMEN AND PELVIS WITHOUT AND WITH CONTRAST  LOCATION: [facility]  DATE/TIME: 12/13/2022 4:11 AM     INDICATION: Coffee ground emesis.  COMPARISON: None.     TECHNIQUE: CT abdomen and pelvis prior to the administration of intravenous contrast. CT angiogram abdomen and pelvis following the injection of IV contrast during arterial and renal excretory phases. 2D and 3D MIP reconstructions were performed by the   CT technologist. Dose reduction techniques were used.  CONTRAST: 60 mL Isovue 370.     FINDINGS:     GASTROINTESTINAL TRACT: Partial visualization of apparent moderate wall thickening of the distal esophagus. No dilatation of the stomach, small or large bowel. A small amount of stool is scattered within the colon. The appendix is likely visualized and   unremarkable. No pneumatosis or free intraperitoneal gas. No CT evidence of active gastrointestinal bleeding.     LOWER CHEST: Partial visualization of a moderate-sized region of patchy consolidation in the right lower lobe.     HEPATOBILIARY: Unremarkable.     SPLEEN: Unremarkable.     PANCREAS: Unremarkable.     ADRENAL GLANDS: Unremarkable.     KIDNEYS/BLADDER: Unremarkable.     LYMPH NODES: Unremarkable.     PELVIC ORGANS: No acute findings.     MUSCULOSKELETAL: No acute findings.     OTHER: None.                                                                    IMPRESSION:   1.  Partial visualization of apparent moderate wall thickening of the distal esophagus. This is nonspecific, but could relate to esophagitis. If clinically relevant, an upper endoscopy could be considered for  further evaluation.  2.  Partial visualization of a moderate-sized region of patchy consolidation in the right lower lobe. This most likely represents pneumonia.   3.  No other acute abnormality identified in the abdomen or pelvis. No CT evidence of active gastrointestinal bleeding.    EXAM: CT CHEST PULMONARY EMBOLISM W CONTRAST  LOCATION: [facility]  DATE/TIME: 12/13/2022 7:48 PM     INDICATION: COVID positive and elevated d dimer. has been having persistent tachycardia  COMPARISON: None.  TECHNIQUE: CT chest pulmonary angiogram during arterial phase injection of IV contrast. Multiplanar reformats and MIP reconstructions were performed. Dose reduction techniques were used.   CONTRAST: isovue 370 100ml     FINDINGS:  ANGIOGRAM CHEST: Pulmonary arteries are normal caliber and negative for pulmonary emboli. Thoracic aorta is negative for dissection.     LUNGS AND PLEURA: Multisegment consolidation in the basal right lower lobe including medial, lateral, and posterior basal segments associated with endoluminal airway debris. In the right lower lobe superior segment and to a lesser extent posterior right   middle lobe there are airway centric inflammatory nodules. The left lung is well-expanded. Minimal subpleural opacities in the basal left lower lobe near the diaphragmatic pleura and similar opacities in the right upper lobe along the anterior pleura   consistent with COVID related pneumonitis. No pleural effusion.     MEDIASTINUM: Mildly enlarged lymph nodes in the right hilum. No mediastinal or left hilar adenopathy. Decompressed esophagus. Cardiac chambers are normal in size. Normal caliber thoracic aorta. Age-appropriate thymic tissue in the anterior mediastinum.     CORONARY ARTERY CALCIFICATION: None.     UPPER ABDOMEN: Normal.     MUSCULOSKELETAL: Normal.                                                                    IMPRESSION:     1.  No acute pulmonary embolism.  2.  Sparse foci  of subpleural groundglass attenuation anterior right upper lobe and left lower lobe near the diaphragmatic pleura, typical for COVID related pneumonitis.  3.  Multisegment right lower lobe consolidation with extensive airway debris and airway centric inflammatory nodules right lower lobe superior segment and lesser extent right middle lobe consistent with pneumonia, likely bacterial.  4.  Mildly enlarged, reactive lymph nodes in the right hilum secondary to #3.    EXAM: XR CHEST PORT 1 VIEW  LOCATION: [facility]  DATE/TIME: 12/13/2022 1:28 AM     INDICATION: SOB  COMPARISON: None.                                                                    IMPRESSION: Heart size and pulmonary vascularity normal. Airspace opacities within the right base laterally concerning for pneumonia. Follow-up exam recommended to ensure resolution. The left lung is clear. No effusions.      Discharge Medications   Discharge Medication List as of 12/13/2022 11:35 PM      CONTINUE these medications which have NOT CHANGED    Details   insulin aspart (NOVOLOG PEN) 100 UNIT/ML pen Inject Subcutaneous 3 times daily (with meals) Sliding scale, Historical      insulin glargine (BASAGLAR KWIKPEN) 100 UNIT/ML pen Inject 20 Units Subcutaneous daily, Disp-6 mL, R-0, Local PrintIf Basaglar is not covered by insurance, may substitute Lantus at same dose and frequency.        blood glucose (NO BRAND SPECIFIED) test strip Use to test blood sugar four times daily before meals and before bedtime., Disp-120 strip, R-0, Local Print           Allergies   Allergies   Allergen Reactions     Ciprofloxacin Other (See Comments)     Vomiting; diarrhea

## 2022-12-19 NOTE — PROGRESS NOTES
[facility] ICU RESPIRATORY NOTE        Date of Admission: 12/16/2022    Date of Intubation (most recent): 12/16/2022    Reason for Mechanical Ventilation: Respiratory failure    Number of Days on Mechanical Ventilation: 4    Met Criteria for Spontaneous Breathing Trial: No    Reason for No Spontaneous Breathing Trial: Per MD    Significant Events Today: None overnight     ABG Results:   Recent Labs   Lab 12/18/22  0559 12/18/22  0214 12/17/22  2315 12/17/22  1842   PH 7.45 7.49* 7.50* 7.41   PCO2 35 32* 23* 24*   PO2 83 72* 106* 122*   HCO3 24 24 18* 15*   O2PER 40 40 40 40       Current Vent Settings: Vent Mode: CMV/AC  (Continuous Mandatory Ventilation/ Assist Control)  FiO2 (%): 40 %  Resp Rate (Set): 18 breaths/min  Tidal Volume (Set, mL): 300 mL  PEEP (cm H2O): 10 cmH2O  Resp: 21        Yao Lerma, RT on 12/19/2022 at 5:06 AM

## 2022-12-19 NOTE — PROGRESS NOTES
[facility] ICU RESPIRATORY NOTE        Date of Admission: 12/16/2022    Date of Intubation (most recent):  12/16/22    Reason for Mechanical Ventilation: Resp failure    Number of Days on Mechanical Ventilation: 4    Met Criteria for Spontaneous Breathing Trial: No    Reason for No Spontaneous Breathing Trial: Per MD    Significant Events Today: None    ABG Results:   Recent Labs   Lab 12/18/22  0559 12/18/22  0214 12/17/22  2315 12/17/22  1842   PH 7.45 7.49* 7.50* 7.41   PCO2 35 32* 23* 24*   PO2 83 72* 106* 122*   HCO3 24 24 18* 15*   O2PER 40 40 40 40       Current Vent Settings: Vent Mode: CMV/AC  (Continuous Mandatory Ventilation/ Assist Control)  FiO2 (%): 35 %  Resp Rate (Set): 20 breaths/min  Tidal Volume (Set, mL): 320 mL  PEEP (cm H2O): 6 cmH2O  Resp: 14      Skin Assessment: Intact    Plan: Pt to remain on full vent support overnight    Lawrence Green, RT

## 2022-12-19 NOTE — PROGRESS NOTES
[facility]    Infectious Disease Progress Note    Date of Service (when I saw the patient): 12/19/2022     Assessment & Plan   Deidre N Aasen is a 27 year old female who was admitted on 12/16/2022.     Impression:  1. 27 y.o patient with polysubstance abuse, IVDU  2. Type 1 diabetes  3. History of medical noncompliance   4. Recent presentation to OSH (epic records reviewed ) left AMA  5. Admitted this occasion with diabetic ketoacidosis (DKA) w/ severe acidosis, acute hypoxic respiratory failure, acute encephalopathy, (+) COVID-19, and pneumonia possible cavitation and abscess.   6. On remdesivir, vancomycin and zosyn   7. Cultures in the blood MRSA   8. resp cultures MRSA       Recommendations:   Blood cultures with MRSA which explains almost all the findings     Vancomycin per pharmacy, close eye of the kidney function, will ask lab to run dapto susc   Unasyn for possible aspiration pneumonia, but suspect the lung findings are also MRSA     Daily blood cultures till clears   TTE noted will need CATHY        Max pulmonary support as in place.         Rosy Davis MD    Interval History   Intubated   MRSA In the blood   Central line removed     Physical Exam   Temp: 99.7  F (37.6  C) Temp src: Bladder BP: 105/67 Pulse: 105   Resp: 22 SpO2: 93 % O2 Device: Mechanical Ventilator    Vitals:    12/17/22 0600 12/18/22 0237 12/19/22 0230   Weight: 48.9 kg (107 lb 12.9 oz) 39.4 kg (86 lb 13.8 oz) 45.4 kg (100 lb 1.4 oz)     Vital Signs with Ranges  Temp:  [98.1  F (36.7  C)-99.7  F (37.6  C)] 99.7  F (37.6  C)  Pulse:  [] 105  Resp:  [10-28] 22  BP: ()/(46-91) 105/67  MAP:  [60 mmHg-74 mmHg] 60 mmHg  Arterial Line BP: (78-96)/(48-62) 78/48  FiO2 (%):  [40 %] 40 %  SpO2:  [92 %-100 %] 93 %    Constitutional: intubated   Lungs: Clear to auscultation bilaterally, no crackles or wheezing  Cardiovascular: Regular rate and rhythm, normal S1 and S2, and no murmur noted  Abdomen: Normal bowel  sounds, soft, non-distended, non-tender  Skin:multiple track marks   Other:    Medications     dextrose       fentaNYL 25 mcg/hr (12/19/22 1036)     propofol Stopped (12/17/22 0416)    And     - MEDICATION INSTRUCTIONS -       - MEDICATION INSTRUCTIONS -       midazolam 1 mg/hr (12/19/22 0734)     norepinephrine Stopped (12/17/22 1650)     phenylephrine Stopped (12/18/22 1145)     sodium chloride 20 mL/hr at 12/19/22 0540     vasopressin Stopped (12/18/22 1245)       ampicillin-sulbactam  3 g Intravenous Q6H     chlorhexidine  15 mL Mouth/Throat Q12H     heparin ANTICOAGULANT  5,000 Units Subcutaneous Q12H     hydrocortisone sodium succinate PF  100 mg Intravenous Q8H     insulin aspart  1-6 Units Subcutaneous Q4H     insulin glargine  20 Units Subcutaneous At Bedtime     pantoprazole  40 mg Intravenous BID     sodium chloride (PF)  10 mL Intracatheter Q8H     vancomycin  750 mg Intravenous Q24H       Data   All microbiology laboratory data reviewed.  Recent Labs   Lab Test 12/19/22  0814 12/18/22  0558 12/17/22  1842 12/17/22  0417   WBC 21.2* 30.3*  --  12.6*   HGB 7.9* 8.3* 6.3* 8.0*   HCT 21.4* 23.0*  --  22.1*   MCV 84 83  --  85   * 153  --  162     Recent Labs   Lab Test 12/19/22  0814 12/18/22  1925 12/18/22  1439   CR 1.15* 1.24* 1.29*     No lab results found.  No lab results found.    Invalid input(s):     All cultures:  Recent Labs   Lab 12/16/22  1309 12/16/22  0356 12/16/22  0321 12/13/22  0423   CULTURE 4+ Normal aaron  4+ Staphylococcus aureus MRSA* Positive on the 1st day of incubation*  Staphylococcus aureus MRSA* Positive on the 1st day of incubation*  Staphylococcus aureus MRSA* No Growth  No Growth      Blood culture:  Results for orders placed or performed during the hospital encounter of 12/16/22   Blood Culture Arm, Right    Specimen: Arm, Right; Blood   Result Value Ref Range    Culture Positive on the 1st day of incubation (A)     Culture Staphylococcus aureus MRSA (AA)     Blood Culture Peripheral Blood    Specimen: Peripheral Blood   Result Value Ref Range    Culture Positive on the 1st day of incubation (A)     Culture Staphylococcus aureus MRSA (AA)        Susceptibility    Staphylococcus aureus MRSA - SAMANTHA*     Oxacillin* >=4 Resistant ug/mL      * Oxacillin susceptible isolates are susceptible to cephalosporins (example: cefazolin and cephalexin) and beta lactam combination agents. Oxacillin resistant isolates are resistant to these agents.     Gentamicin <=0.5 Susceptible ug/mL     Erythromycin >=8 Resistant ug/mL     Clindamycin* <=0.25 Susceptible ug/mL      * This isolate DOES NOT demonstrate inducible clindamycin resistance in vitro. Clindamycin is susceptible and could be used when indicated, however, erythromycin is resistant and should not be used.     Linezolid 2 Susceptible ug/mL     Vancomycin 1 Susceptible ug/mL     Tetracycline <=1 Susceptible ug/mL     Trimethoprim/Sulfamethoxazole <=0.5/9.5 Susceptible ug/mL     * MRSA requires contact precautions.   Results for orders placed or performed during the hospital encounter of 12/13/22   Blood Culture Arm, Right    Specimen: Arm, Right; Blood   Result Value Ref Range    Culture No Growth    Blood Culture Arm, Left    Specimen: Arm, Left; Blood   Result Value Ref Range    Culture No Growth       Urine culture:  No results found for this or any previous visit.

## 2022-12-19 NOTE — PROGRESS NOTES
[facility] ICU RESPIRATORY NOTE        Date of Admission: 12/16/2022    Date of Intubation (most recent): 12/16/22    Reason for Mechanical Ventilation: Resp Failure    Number of Days on Mechanical Ventilation: 3    Met Criteria for Spontaneous Breathing Trial: No    Reason for No Spontaneous Breathing Trial: PEEP +10    Significant Events Today: none    ABG Results:   Recent Labs   Lab 12/18/22  0559 12/18/22  0214 12/17/22  2315 12/17/22  1842   PH 7.45 7.49* 7.50* 7.41   PCO2 35 32* 23* 24*   PO2 83 72* 106* 122*   HCO3 24 24 18* 15*   O2PER 40 40 40 40       Current Vent Settings: Vent Mode: CMV/AC  (Continuous Mandatory Ventilation/ Assist Control)  FiO2 (%): 40 %  Resp Rate (Set): 18 breaths/min  Tidal Volume (Set, mL): 300 mL  PEEP (cm H2O): 10 cmH2O  Resp: 21      Skin Assessment: Clean, dry, intact    Plan: Maintain full vent support, wean as able.  Bite block placed today.    Carina Martinez RT on 12/18/2022 at 6:07 PM

## 2022-12-20 ENCOUNTER — APPOINTMENT (OUTPATIENT)
Dept: GENERAL RADIOLOGY | Facility: CLINIC | Age: 27
End: 2022-12-20
Attending: INTERNAL MEDICINE
Payer: COMMERCIAL

## 2022-12-20 ENCOUNTER — APPOINTMENT (OUTPATIENT)
Dept: CARDIOLOGY | Facility: CLINIC | Age: 27
End: 2022-12-20
Attending: INTERNAL MEDICINE
Payer: COMMERCIAL

## 2022-12-20 LAB
ALLEN'S TEST: YES
ALLEN'S TEST: YES
ANION GAP SERPL CALCULATED.3IONS-SCNC: 4 MMOL/L (ref 3–14)
ANION GAP SERPL CALCULATED.3IONS-SCNC: 5 MMOL/L (ref 3–14)
BACTERIA BLD CULT: ABNORMAL
BACTERIA BLD CULT: ABNORMAL
BASE EXCESS BLDA CALC-SCNC: 1.5 MMOL/L (ref -9–1.8)
BASE EXCESS BLDA CALC-SCNC: 2.6 MMOL/L (ref -9–1.8)
BUN SERPL-MCNC: 37 MG/DL (ref 7–30)
BUN SERPL-MCNC: 41 MG/DL (ref 7–30)
CALCIUM SERPL-MCNC: 6.6 MG/DL (ref 8.5–10.1)
CALCIUM SERPL-MCNC: 7.1 MG/DL (ref 8.5–10.1)
CHLORIDE BLD-SCNC: 121 MMOL/L (ref 94–109)
CHLORIDE BLD-SCNC: 122 MMOL/L (ref 94–109)
CO2 SERPL-SCNC: 24 MMOL/L (ref 20–32)
CO2 SERPL-SCNC: 27 MMOL/L (ref 20–32)
CREAT SERPL-MCNC: 0.92 MG/DL (ref 0.52–1.04)
CREAT SERPL-MCNC: 1.01 MG/DL (ref 0.52–1.04)
ERYTHROCYTE [DISTWIDTH] IN BLOOD BY AUTOMATED COUNT: 15.5 % (ref 10–15)
GFR SERPL CREATININE-BSD FRML MDRD: 79 ML/MIN/1.73M2
GFR SERPL CREATININE-BSD FRML MDRD: 88 ML/MIN/1.73M2
GLUCOSE BLD-MCNC: 182 MG/DL (ref 70–99)
GLUCOSE BLD-MCNC: 211 MG/DL (ref 70–99)
GLUCOSE BLDC GLUCOMTR-MCNC: 110 MG/DL (ref 70–99)
GLUCOSE BLDC GLUCOMTR-MCNC: 121 MG/DL (ref 70–99)
GLUCOSE BLDC GLUCOMTR-MCNC: 170 MG/DL (ref 70–99)
GLUCOSE BLDC GLUCOMTR-MCNC: 199 MG/DL (ref 70–99)
GLUCOSE BLDC GLUCOMTR-MCNC: 208 MG/DL (ref 70–99)
GLUCOSE BLDC GLUCOMTR-MCNC: 215 MG/DL (ref 70–99)
GLUCOSE BLDC GLUCOMTR-MCNC: 228 MG/DL (ref 70–99)
HCO3 BLD-SCNC: 26 MMOL/L (ref 21–28)
HCO3 BLD-SCNC: 27 MMOL/L (ref 21–28)
HCT VFR BLD AUTO: 23 % (ref 35–47)
HGB BLD-MCNC: 7.8 G/DL (ref 11.7–15.7)
MAGNESIUM SERPL-MCNC: 2.6 MG/DL (ref 1.6–2.3)
MAGNESIUM SERPL-MCNC: 2.7 MG/DL (ref 1.6–2.3)
MCH RBC QN AUTO: 30.6 PG (ref 26.5–33)
MCHC RBC AUTO-ENTMCNC: 33.9 G/DL (ref 31.5–36.5)
MCV RBC AUTO: 90 FL (ref 78–100)
O2/TOTAL GAS SETTING VFR VENT: 30 %
O2/TOTAL GAS SETTING VFR VENT: 50 %
OXYHGB MFR BLD: 97 % (ref 92–100)
OXYHGB MFR BLD: 99 % (ref 92–100)
PCO2 BLD: 38 MM HG (ref 35–45)
PCO2 BLD: 42 MM HG (ref 35–45)
PH BLD: 7.42 [PH] (ref 7.35–7.45)
PH BLD: 7.44 [PH] (ref 7.35–7.45)
PLATELET # BLD AUTO: 177 10E3/UL (ref 150–450)
PO2 BLD: 100 MM HG (ref 80–105)
PO2 BLD: 170 MM HG (ref 80–105)
POTASSIUM BLD-SCNC: 3.2 MMOL/L (ref 3.4–5.3)
POTASSIUM BLD-SCNC: 3.4 MMOL/L (ref 3.4–5.3)
POTASSIUM BLD-SCNC: 4.1 MMOL/L (ref 3.4–5.3)
RBC # BLD AUTO: 2.55 10E6/UL (ref 3.8–5.2)
SODIUM SERPL-SCNC: 150 MMOL/L (ref 133–144)
SODIUM SERPL-SCNC: 153 MMOL/L (ref 133–144)
VANCOMYCIN SERPL-MCNC: 13.2 MG/L
WBC # BLD AUTO: 9.2 10E3/UL (ref 4–11)

## 2022-12-20 PROCEDURE — 93312 ECHO TRANSESOPHAGEAL: CPT | Mod: 26 | Performed by: INTERNAL MEDICINE

## 2022-12-20 PROCEDURE — 36600 WITHDRAWAL OF ARTERIAL BLOOD: CPT

## 2022-12-20 PROCEDURE — 94640 AIRWAY INHALATION TREATMENT: CPT | Mod: 76

## 2022-12-20 PROCEDURE — 250N000013 HC RX MED GY IP 250 OP 250 PS 637: Performed by: INTERNAL MEDICINE

## 2022-12-20 PROCEDURE — 82947 ASSAY GLUCOSE BLOOD QUANT: CPT | Performed by: INTERNAL MEDICINE

## 2022-12-20 PROCEDURE — 250N000009 HC RX 250

## 2022-12-20 PROCEDURE — 83735 ASSAY OF MAGNESIUM: CPT | Performed by: INTERNAL MEDICINE

## 2022-12-20 PROCEDURE — 99233 SBSQ HOSP IP/OBS HIGH 50: CPT | Performed by: INTERNAL MEDICINE

## 2022-12-20 PROCEDURE — 250N000013 HC RX MED GY IP 250 OP 250 PS 637: Performed by: STUDENT IN AN ORGANIZED HEALTH CARE EDUCATION/TRAINING PROGRAM

## 2022-12-20 PROCEDURE — 93325 DOPPLER ECHO COLOR FLOW MAPG: CPT

## 2022-12-20 PROCEDURE — 80202 ASSAY OF VANCOMYCIN: CPT | Performed by: INTERNAL MEDICINE

## 2022-12-20 PROCEDURE — 258N000003 HC RX IP 258 OP 636: Performed by: INTERNAL MEDICINE

## 2022-12-20 PROCEDURE — 999N000065 XR ABDOMEN PORT 1 VIEW

## 2022-12-20 PROCEDURE — 94003 VENT MGMT INPAT SUBQ DAY: CPT

## 2022-12-20 PROCEDURE — 93325 DOPPLER ECHO COLOR FLOW MAPG: CPT | Mod: 26 | Performed by: INTERNAL MEDICINE

## 2022-12-20 PROCEDURE — 82805 BLOOD GASES W/O2 SATURATION: CPT | Performed by: INTERNAL MEDICINE

## 2022-12-20 PROCEDURE — 200N000001 HC R&B ICU

## 2022-12-20 PROCEDURE — 250N000011 HC RX IP 250 OP 636: Performed by: SURGERY

## 2022-12-20 PROCEDURE — 999N000040 HC STATISTIC CONSULT NO CHARGE VASC ACCESS

## 2022-12-20 PROCEDURE — 85027 COMPLETE CBC AUTOMATED: CPT | Performed by: SURGERY

## 2022-12-20 PROCEDURE — C9113 INJ PANTOPRAZOLE SODIUM, VIA: HCPCS | Performed by: SURGERY

## 2022-12-20 PROCEDURE — 250N000011 HC RX IP 250 OP 636: Performed by: STUDENT IN AN ORGANIZED HEALTH CARE EDUCATION/TRAINING PROGRAM

## 2022-12-20 PROCEDURE — 71045 X-RAY EXAM CHEST 1 VIEW: CPT

## 2022-12-20 PROCEDURE — 84132 ASSAY OF SERUM POTASSIUM: CPT | Performed by: INTERNAL MEDICINE

## 2022-12-20 PROCEDURE — 94640 AIRWAY INHALATION TREATMENT: CPT

## 2022-12-20 PROCEDURE — 99291 CRITICAL CARE FIRST HOUR: CPT | Mod: GC | Performed by: INTERNAL MEDICINE

## 2022-12-20 PROCEDURE — 250N000011 HC RX IP 250 OP 636: Performed by: INTERNAL MEDICINE

## 2022-12-20 PROCEDURE — 250N000009 HC RX 250: Performed by: INTERNAL MEDICINE

## 2022-12-20 PROCEDURE — 82374 ASSAY BLOOD CARBON DIOXIDE: CPT | Performed by: INTERNAL MEDICINE

## 2022-12-20 PROCEDURE — 82805 BLOOD GASES W/O2 SATURATION: CPT | Performed by: STUDENT IN AN ORGANIZED HEALTH CARE EDUCATION/TRAINING PROGRAM

## 2022-12-20 PROCEDURE — 93320 DOPPLER ECHO COMPLETE: CPT | Mod: 26 | Performed by: INTERNAL MEDICINE

## 2022-12-20 PROCEDURE — 999N000157 HC STATISTIC RCP TIME EA 10 MIN

## 2022-12-20 PROCEDURE — 250N000009 HC RX 250: Performed by: STUDENT IN AN ORGANIZED HEALTH CARE EDUCATION/TRAINING PROGRAM

## 2022-12-20 PROCEDURE — 250N000013 HC RX MED GY IP 250 OP 250 PS 637: Performed by: SURGERY

## 2022-12-20 RX ORDER — LIDOCAINE 40 MG/G
CREAM TOPICAL
Status: DISCONTINUED | OUTPATIENT
Start: 2022-12-20 | End: 2022-12-20

## 2022-12-20 RX ORDER — NALOXONE HYDROCHLORIDE 0.4 MG/ML
0.2 INJECTION, SOLUTION INTRAMUSCULAR; INTRAVENOUS; SUBCUTANEOUS
Status: DISCONTINUED | OUTPATIENT
Start: 2022-12-20 | End: 2022-12-20

## 2022-12-20 RX ORDER — POTASSIUM CHLORIDE 20MEQ/15ML
20 LIQUID (ML) ORAL ONCE
Status: COMPLETED | OUTPATIENT
Start: 2022-12-20 | End: 2022-12-20

## 2022-12-20 RX ORDER — VECURONIUM BROMIDE 1 MG/ML
8 INJECTION, POWDER, LYOPHILIZED, FOR SOLUTION INTRAVENOUS ONCE
Status: COMPLETED | OUTPATIENT
Start: 2022-12-20 | End: 2022-12-20

## 2022-12-20 RX ORDER — DEXTROSE MONOHYDRATE 25 G/50ML
9.5 INJECTION, SOLUTION INTRAVENOUS
Status: DISCONTINUED | OUTPATIENT
Start: 2022-12-20 | End: 2022-12-20

## 2022-12-20 RX ORDER — ACETAMINOPHEN 325 MG/10.15ML
650 LIQUID ORAL EVERY 4 HOURS
Status: DISCONTINUED | OUTPATIENT
Start: 2022-12-20 | End: 2022-12-20 | Stop reason: CLARIF

## 2022-12-20 RX ORDER — NALOXONE HYDROCHLORIDE 0.4 MG/ML
0.2 INJECTION, SOLUTION INTRAMUSCULAR; INTRAVENOUS; SUBCUTANEOUS
Status: DISCONTINUED | OUTPATIENT
Start: 2022-12-20 | End: 2023-01-04 | Stop reason: HOSPADM

## 2022-12-20 RX ORDER — NALOXONE HYDROCHLORIDE 0.4 MG/ML
0.4 INJECTION, SOLUTION INTRAMUSCULAR; INTRAVENOUS; SUBCUTANEOUS
Status: DISCONTINUED | OUTPATIENT
Start: 2022-12-20 | End: 2023-01-04 | Stop reason: HOSPADM

## 2022-12-20 RX ORDER — LIDOCAINE HYDROCHLORIDE 20 MG/ML
JELLY TOPICAL ONCE
Status: COMPLETED | OUTPATIENT
Start: 2022-12-20 | End: 2022-12-20

## 2022-12-20 RX ORDER — NALOXONE HYDROCHLORIDE 0.4 MG/ML
0.4 INJECTION, SOLUTION INTRAMUSCULAR; INTRAVENOUS; SUBCUTANEOUS
Status: DISCONTINUED | OUTPATIENT
Start: 2022-12-20 | End: 2022-12-20

## 2022-12-20 RX ORDER — WATER 10 ML/10ML
INJECTION INTRAMUSCULAR; INTRAVENOUS; SUBCUTANEOUS
Status: COMPLETED
Start: 2022-12-20 | End: 2022-12-20

## 2022-12-20 RX ORDER — DEXMEDETOMIDINE HYDROCHLORIDE 4 UG/ML
.1-1.2 INJECTION, SOLUTION INTRAVENOUS CONTINUOUS
Status: DISCONTINUED | OUTPATIENT
Start: 2022-12-20 | End: 2022-12-21

## 2022-12-20 RX ORDER — FENTANYL CITRATE 50 UG/ML
25 INJECTION, SOLUTION INTRAMUSCULAR; INTRAVENOUS
Status: DISCONTINUED | OUTPATIENT
Start: 2022-12-20 | End: 2022-12-20

## 2022-12-20 RX ORDER — LIDOCAINE HYDROCHLORIDE 40 MG/ML
1.5 SOLUTION TOPICAL ONCE
Status: COMPLETED | OUTPATIENT
Start: 2022-12-20 | End: 2022-12-20

## 2022-12-20 RX ORDER — FENTANYL CITRATE 50 UG/ML
50 INJECTION, SOLUTION INTRAMUSCULAR; INTRAVENOUS ONCE
Status: COMPLETED | OUTPATIENT
Start: 2022-12-20 | End: 2022-12-20

## 2022-12-20 RX ORDER — ACETAMINOPHEN 650 MG/1
650 SUPPOSITORY RECTAL EVERY 4 HOURS
Status: DISCONTINUED | OUTPATIENT
Start: 2022-12-20 | End: 2022-12-20

## 2022-12-20 RX ORDER — ACETAMINOPHEN 325 MG/10.15ML
650 LIQUID ORAL EVERY 4 HOURS
Status: DISCONTINUED | OUTPATIENT
Start: 2022-12-20 | End: 2022-12-20

## 2022-12-20 RX ORDER — ACETAMINOPHEN 650 MG/1
650 SUPPOSITORY RECTAL EVERY 4 HOURS
Status: DISCONTINUED | OUTPATIENT
Start: 2022-12-20 | End: 2022-12-20 | Stop reason: CLARIF

## 2022-12-20 RX ORDER — GLYCOPYRROLATE 0.2 MG/ML
0.1 INJECTION, SOLUTION INTRAMUSCULAR; INTRAVENOUS ONCE
Status: COMPLETED | OUTPATIENT
Start: 2022-12-20 | End: 2022-12-20

## 2022-12-20 RX ORDER — FLUMAZENIL 0.1 MG/ML
0.2 INJECTION, SOLUTION INTRAVENOUS
Status: DISCONTINUED | OUTPATIENT
Start: 2022-12-20 | End: 2022-12-20

## 2022-12-20 RX ORDER — POTASSIUM CHLORIDE 7.45 MG/ML
10 INJECTION INTRAVENOUS
Status: COMPLETED | OUTPATIENT
Start: 2022-12-20 | End: 2022-12-20

## 2022-12-20 RX ORDER — SODIUM CHLORIDE 9 MG/ML
INJECTION, SOLUTION INTRAVENOUS CONTINUOUS PRN
Status: DISCONTINUED | OUTPATIENT
Start: 2022-12-20 | End: 2022-12-20

## 2022-12-20 RX ORDER — METOCLOPRAMIDE HYDROCHLORIDE 5 MG/ML
10 INJECTION INTRAMUSCULAR; INTRAVENOUS ONCE
Status: COMPLETED | OUTPATIENT
Start: 2022-12-20 | End: 2022-12-20

## 2022-12-20 RX ORDER — LIDOCAINE 50 MG/G
OINTMENT TOPICAL ONCE
Status: COMPLETED | OUTPATIENT
Start: 2022-12-20 | End: 2022-12-20

## 2022-12-20 RX ORDER — ACETAMINOPHEN 500 MG
1000 TABLET ORAL EVERY 6 HOURS
Status: DISCONTINUED | OUTPATIENT
Start: 2022-12-20 | End: 2022-12-20

## 2022-12-20 RX ADMIN — VECURONIUM BROMIDE 8 MG: 1 INJECTION, POWDER, LYOPHILIZED, FOR SOLUTION INTRAVENOUS at 13:25

## 2022-12-20 RX ADMIN — INSULIN ASPART 2 UNITS: 100 INJECTION, SOLUTION INTRAVENOUS; SUBCUTANEOUS at 22:06

## 2022-12-20 RX ADMIN — POTASSIUM CHLORIDE 10 MEQ: 7.46 INJECTION, SOLUTION INTRAVENOUS at 14:35

## 2022-12-20 RX ADMIN — INSULIN ASPART 2 UNITS: 100 INJECTION, SOLUTION INTRAVENOUS; SUBCUTANEOUS at 01:56

## 2022-12-20 RX ADMIN — TOPICAL ANESTHETIC 0.5 ML: 200 SPRAY DENTAL; PERIODONTAL at 12:11

## 2022-12-20 RX ADMIN — METOCLOPRAMIDE 10 MG: 5 INJECTION, SOLUTION INTRAMUSCULAR; INTRAVENOUS at 14:32

## 2022-12-20 RX ADMIN — PANTOPRAZOLE SODIUM 40 MG: 40 INJECTION, POWDER, FOR SOLUTION INTRAVENOUS at 08:13

## 2022-12-20 RX ADMIN — VANCOMYCIN HYDROCHLORIDE 750 MG: 1 INJECTION, POWDER, LYOPHILIZED, FOR SOLUTION INTRAVENOUS at 20:30

## 2022-12-20 RX ADMIN — HEPARIN SODIUM 5000 UNITS: 5000 INJECTION, SOLUTION INTRAVENOUS; SUBCUTANEOUS at 20:18

## 2022-12-20 RX ADMIN — INSULIN GLARGINE 20 UNITS: 100 INJECTION, SOLUTION SUBCUTANEOUS at 22:06

## 2022-12-20 RX ADMIN — ACETAMINOPHEN 650 MG: 325 SUSPENSION ORAL at 09:31

## 2022-12-20 RX ADMIN — LIDOCAINE: 50 OINTMENT TOPICAL at 12:11

## 2022-12-20 RX ADMIN — CHLORHEXIDINE GLUCONATE 0.12% ORAL RINSE 15 ML: 1.2 LIQUID ORAL at 08:13

## 2022-12-20 RX ADMIN — Medication 175 MCG/HR: at 11:20

## 2022-12-20 RX ADMIN — POTASSIUM CHLORIDE 20 MEQ: 20 SOLUTION ORAL at 06:09

## 2022-12-20 RX ADMIN — PROPOFOL 10 MCG/KG/MIN: 10 INJECTION, EMULSION INTRAVENOUS at 18:07

## 2022-12-20 RX ADMIN — WATER 10 ML: 1 INJECTION INTRAMUSCULAR; INTRAVENOUS; SUBCUTANEOUS at 10:00

## 2022-12-20 RX ADMIN — HEPARIN SODIUM 5000 UNITS: 5000 INJECTION, SOLUTION INTRAVENOUS; SUBCUTANEOUS at 08:12

## 2022-12-20 RX ADMIN — GLYCOPYRROLATE 0.1 MG: 0.2 INJECTION, SOLUTION INTRAMUSCULAR; INTRAVENOUS at 11:45

## 2022-12-20 RX ADMIN — DEXMEDETOMIDINE HYDROCHLORIDE 0.2 MCG/KG/HR: 400 INJECTION INTRAVENOUS at 09:30

## 2022-12-20 RX ADMIN — PANTOPRAZOLE SODIUM 40 MG: 40 INJECTION, POWDER, FOR SOLUTION INTRAVENOUS at 20:18

## 2022-12-20 RX ADMIN — LIDOCAINE HYDROCHLORIDE: 20 JELLY TOPICAL at 14:32

## 2022-12-20 RX ADMIN — POTASSIUM CHLORIDE 10 MEQ: 7.46 INJECTION, SOLUTION INTRAVENOUS at 16:26

## 2022-12-20 RX ADMIN — INSULIN ASPART 2 UNITS: 100 INJECTION, SOLUTION INTRAVENOUS; SUBCUTANEOUS at 18:24

## 2022-12-20 RX ADMIN — WATER 10 ML: 1 INJECTION INTRAMUSCULAR; INTRAVENOUS; SUBCUTANEOUS at 13:30

## 2022-12-20 RX ADMIN — LEVALBUTEROL HYDROCHLORIDE 0.63 MG: 1.25 SOLUTION, CONCENTRATE RESPIRATORY (INHALATION) at 23:52

## 2022-12-20 RX ADMIN — VECURONIUM BROMIDE 8 MG: 1 INJECTION, POWDER, LYOPHILIZED, FOR SOLUTION INTRAVENOUS at 09:38

## 2022-12-20 RX ADMIN — VANCOMYCIN HYDROCHLORIDE 750 MG: 1 INJECTION, POWDER, LYOPHILIZED, FOR SOLUTION INTRAVENOUS at 09:31

## 2022-12-20 RX ADMIN — CHLORHEXIDINE GLUCONATE 0.12% ORAL RINSE 15 ML: 1.2 LIQUID ORAL at 20:18

## 2022-12-20 RX ADMIN — LEVALBUTEROL HYDROCHLORIDE 0.63 MG: 1.25 SOLUTION, CONCENTRATE RESPIRATORY (INHALATION) at 16:11

## 2022-12-20 RX ADMIN — INSULIN ASPART 1 UNITS: 100 INJECTION, SOLUTION INTRAVENOUS; SUBCUTANEOUS at 05:10

## 2022-12-20 RX ADMIN — CEFEPIME HYDROCHLORIDE 2000 MG: 2 INJECTION, POWDER, FOR SOLUTION INTRAVENOUS at 05:58

## 2022-12-20 RX ADMIN — MIDAZOLAM HYDROCHLORIDE 6 MG/HR: 1 INJECTION, SOLUTION INTRAVENOUS at 11:50

## 2022-12-20 RX ADMIN — Medication 200 MCG/HR: at 22:14

## 2022-12-20 RX ADMIN — CEFEPIME HYDROCHLORIDE 2000 MG: 2 INJECTION, POWDER, FOR SOLUTION INTRAVENOUS at 18:06

## 2022-12-20 RX ADMIN — LEVALBUTEROL HYDROCHLORIDE 0.63 MG: 1.25 SOLUTION, CONCENTRATE RESPIRATORY (INHALATION) at 07:48

## 2022-12-20 ASSESSMENT — ACTIVITIES OF DAILY LIVING (ADL)
ADLS_ACUITY_SCORE: 36
ADLS_ACUITY_SCORE: 40
ADLS_ACUITY_SCORE: 36
ADLS_ACUITY_SCORE: 36
ADLS_ACUITY_SCORE: 40
ADLS_ACUITY_SCORE: 40
ADLS_ACUITY_SCORE: 36
ADLS_ACUITY_SCORE: 36
ADLS_ACUITY_SCORE: 40
ADLS_ACUITY_SCORE: 40
ADLS_ACUITY_SCORE: 36
ADLS_ACUITY_SCORE: 36

## 2022-12-20 NOTE — PHARMACY-VANCOMYCIN DOSING SERVICE
Pharmacy Vancomycin Note  Date of Service 2022  Patient's  1995   27 year old, female    Indication: Sepsis  Day of Therapy: 4  Current vancomycin regimen:  750 mg IV q24h  Current vancomycin monitoring method: AUC  Current vancomycin therapeutic monitoring goal: 400-600 mg*h/L    [employer] Prediction of Current Vancomycin Regimen    Regimen: 750 mg IV every 24 hours.  Start time: 08:50 on 2022  Exposure target: AUC24 (range)400-600 mg/L.hr   AUC24,ss: 307 mg/L.hr  Probability of AUC24 > 400: 4 %  Ctrough,ss: 7.7 mg/L  Probability of Ctrough,ss > 20: 0 %  Probability of nephrotoxicity (Lodise LEONORA ): 4 %    Current estimated CrCl = Estimated Creatinine Clearance: 58.7 mL/min (based on SCr of 1.01 mg/dL).    Creatinine for last 3 days  2022:  9:39 AM Creatinine 1.19 mg/dL;  3:08 PM Creatinine 1.29 mg/dL;  6:38 PM Creatinine 1.33 mg/dL  2022:  2:08 AM Creatinine 1.32 mg/dL;  5:58 AM Creatinine 1.28 mg/dL; 10:25 AM Creatinine 1.23 mg/dL;  2:39 PM Creatinine 1.29 mg/dL;  7:25 PM Creatinine 1.24 mg/dL  2022:  8:14 AM Creatinine 1.15 mg/dL  2022:  5:08 AM Creatinine 1.01 mg/dL    Recent Vancomycin Levels (past 3 days)  2022:  6:38 PM Vancomycin 20.1 mg/L  2022:  2:40 PM Vancomycin 14.1 mg/L  2022:  5:08 AM Vancomycin 13.2 mg/L    Vancomycin IV Administrations (past 72 hours)                   vancomycin (VANCOCIN) 750 mg in sodium chloride 0.9 % 250 mL intermittent infusion (mg) 750 mg New Bag 22 1704     750 mg New Bag 22 1642    vancomycin (VANCOCIN) 500 mg vial to attach to  mL bag (mg) 500 mg New Bag 22 2302    vancomycin (VANCOCIN) 750 mg in sodium chloride 0.9 % 250 mL intermittent infusion (mg) 750 mg New Bag 22 1033                Nephrotoxins and other renal medications (From now, onward)    Start     Dose/Rate Route Frequency Ordered Stop    22 0900  vancomycin (VANCOCIN) 750 mg in sodium chloride 0.9 %  250 mL intermittent infusion         750 mg  over 90 Minutes Intravenous EVERY 12 HOURS 12/20/22 0754               Contrast Orders - past 72 hours (72h ago, onward)    Start     Dose/Rate Route Frequency Stop    12/19/22 0830  iopamidol (ISOVUE-370) solution 50 mL         50 mL Intravenous ONCE 12/19/22 0827          Interpretation of levels and current regimen:  Vancomycin level is reflective of AUC less than 400    Has serum creatinine changed greater than 50% in last 72 hours: No    Urine output:  Improving     Renal Function: Improving    [employer] Prediction of Planned New Vancomycin Regimen    Regimen: 750 mg IV every 12 hours.  Start time: 08:50 on 12/20/2022  Exposure target: AUC24 (range)400-600 mg/L.hr   AUC24,ss: 592 mg/L.hr  Probability of AUC24 > 400: 100 %  Ctrough,ss: 18.3 mg/L  Probability of Ctrough,ss > 20: 29 %  Probability of nephrotoxicity (Lodise LEONORA 2009): 15 %      Plan:  1. Increase Dose to 750 mg IV q 12h   2. Vancomycin monitoring method: AUC  3. Vancomycin therapeutic monitoring goal: 400-600 mg*h/L  4. Pharmacy will check vancomycin levels as appropriate in 1-3 Days.  5. Serum creatinine levels will be ordered daily for the first week of therapy and at least twice weekly for subsequent weeks.    Aleena Crump RPH

## 2022-12-20 NOTE — PLAN OF CARE
Vented, sedated on Fentanyl, Versed gtts. Vent settings changed this AM, now 35% FiO2, PEEP 6, , RR 20. Pt tolerating, but has periods of asynchronous breathing, elevated RR. Versed bolus given PRN, gtt increased per episodes; see MAR. Chest CT this AM, see results. HR elevated, 100-110's, tele ST. Febrile, max T 101.1, improving with APAP. Opens eyes sporadically, moves head/neck, withdraws to pain at times, but does not follow commands. TF running at [employer], plan to stop at midnight for TTE in AM. IV Unasyn stopped, Vanco continued. ID following. K+ replaced X 2. Midline with blood return RUE. Father Jeronimo given update per MD. Continue to monitor.

## 2022-12-20 NOTE — PROGRESS NOTES
[facility] ICU RESPIRATORY NOTE        Date of Admission: 12/16/2022    Date of Intubation (most recent): 12/16/2022.    Reason for Mechanical Ventilation: AMS, Respiratory failure.     Number of Days on Mechanical Ventilation: Day 5.     Met Criteria for Spontaneous Breathing Trial: No.    Reason for No Spontaneous Breathing Trial: Per MD.     Significant Events Today: Bedside CATHY, ABGs obtained, ETAD replaced with ETAD slim, bite block removed.     ABG Results:   Recent Labs   Lab 12/20/22  1620 12/20/22  1157 12/18/22  0559 12/18/22  0214   PH 7.44 7.42 7.45 7.49*   PCO2 38 42 35 32*   PO2 100 170* 83 72*   HCO3 26 27 24 24   O2PER 50 30 40 40       Current Vent Settings: Vent Mode: CMV/AC  (Continuous Mandatory Ventilation/ Assist Control)  FiO2 (%): 50 %  Resp Rate (Set): 20 breaths/min  Tidal Volume (Set, mL): 320 mL  PEEP (cm H2O): 6 cmH2O  Resp: 27      Skin Assessment: Clean/Dry/Intact.    Plan: Continue to monitor patient on full ventilatory support and assess for weaning daily. Respiratory to continue to monitor.     Gordo Ray, RRT on 12/20/2022 at 5:34 PM

## 2022-12-20 NOTE — PROGRESS NOTES
[facility] ICU RESPIRATORY NOTE        Date of Admission: 12/16/2022    Date of Intubation (most recent): 12/16/2022    Reason for Mechanical Ventilation: Resp failure    Number of Days on Mechanical Ventilation: 5    Met Criteria for Spontaneous Breathing Trial: No    Reason for No Spontaneous Breathing Trial: Per MD    Significant Events Today: None    ABG Results:   Recent Labs   Lab 12/18/22  0559 12/18/22  0214 12/17/22  2315 12/17/22  1842   PH 7.45 7.49* 7.50* 7.41   PCO2 35 32* 23* 24*   PO2 83 72* 106* 122*   HCO3 24 24 18* 15*   O2PER 40 40 40 40       Current Vent Settings: Vent Mode: CMV/AC  (Continuous Mandatory Ventilation/ Assist Control)  FiO2 (%): 30 %  Resp Rate (Set): 20 breaths/min  Tidal Volume (Set, mL): 320 mL  PEEP (cm H2O): 6 cmH2O  Resp: 23      Skin Assessment: Intact, no issues    Plan: Remain on full vent support, wean as tolerated    Manny Khan, RT on 12/20/2022 at 4:19 AM

## 2022-12-20 NOTE — PROGRESS NOTES
Polo's test performed prior to radial ABG draw. Collateral circulation confirmed. Sample obtained from patient's right radial artery while on 30% FiO2. Sample sent to lab for analysis.    Gordo Ray, RRT on 12/20/2022 at 12:33 PM

## 2022-12-20 NOTE — PRE-PROCEDURE
GENERAL PRE-PROCEDURE:   Procedure:  Transesophageal echocardiogram.  Date/Time:  12/20/2022 11:49 AM    Verbal consent obtained?: No    Written consent obtained?: Yes    Risks and benefits: Risks, benefits and alternatives were discussed    Consent given by:  Parent  Patient states understanding of procedure being performed: No    Patient's understanding of procedure matches consent: No    Procedure consent matches procedure scheduled: Yes    Expected level of sedation:  Moderate  Appropriately NPO:  Yes  ASA Class:  2  Mallampati  :  Grade 2- soft palate, base of uvula, tonsillar pillars, and portion of posterior pharyngeal wall visible  Lungs:  Crackles left base and crackles right base  Heart:  Normal heart sounds and rate  History & Physical reviewed:  History and physical reviewed and no updates needed  Statement of review:  I have reviewed the lab findings, diagnostic data, medications, and the plan for sedation

## 2022-12-20 NOTE — PROGRESS NOTES
[facility]  12/20/2022    Progress Note  Reason for Critical Care Admission: DKA, respiratory failure, covid and bacterial pneumonia  Admitting Physician: Joao Krishna DO  Date of Admission:  12/16/2022    Assessment: Critical Care   Deidre N Aasen is a 27 year old female admitted on 12/16/2022 for DKA, respiratory failure from concurrent covid, bacterial pneumonia.   After arrival to the ICU she developed acute respiratory failure requiring intubation and development of septic shock requiring initation of dual vasopressor support and stress dose steroids in addition to broad spectrum antimicrobial therapy. She has ongoing fluid resuscitation needs, management of severe electrolyte abnormalities and ongoing treatment for septic shock and DKA per protocol. She remains severely critically ill but downtrending support needed. Found on CT Chest w/ contrast to have decreased blood flow to RLL w/ cavitations.       Plan: Critical Care   Neuro/ pain/ sedation:  # Toxic metabolic encephalopathy  # Agitation  # Polysubstance abuse- meth and heroin use. At risk for withdrawal  # IVDU  # Meth intoxication  - precedex gtt  - Wean fent and versed gtts as able, will seek for multimodality approach  - will ultilize PRNs    Pulmonary:  # Acute respiratory failure with refractory hypoxia  # Complex cavitary pneumonia most likely 2/2 to MRSA pneumonia given sudden onset and progression based on comparison of chest imaging (12/13 vs 12/16). Less likely due to fungal or COVID PNA. Echo shows no vegetations but high suspicion and may consider CATHY.  # Acute respiratory acidosis  # severe ARDS, P/F ratio 78 at time of intubation, worsening to 41  Acute respiratory distress. Trial of bipap but continued to have evidence of respiratory collapse. Intubated on 12/16/22. + covid with new cavitary lung infection consistent with concurrent superimposed bacterial pneumonia. Adjusting vent settings to address  hyperventilation. Trending ABGs for vent management. Received remdesivir on arrival but discontinued after intubation. Initated on stress dose steroids for refractory septic shock.   Plan  Vent Mode: CMV/AC  (Continuous Mandatory Ventilation/ Assist Control)  FiO2 (%): 30 %  Resp Rate (Set): 20 breaths/min  Tidal Volume (Set, mL): 320 mL  PEEP (cm H2O): 6 cmH2O  Resp: 20  - Continue intubation and mechanical ventilation  - Thoracic surgery consult for concern of RLL hypoperfusion and cavitary lesion  - supplemental oxygen to keep saturation about 92%  - may need bronch for secretions.    Cardiovascular:  # septic shock, resolved  # Sinus tachycardia  - CATHY today w/ cardiology as suspicious for endocarditis    GI/Nutrition:  # Abdominal pain. Present prior to intubation. Large gastric bubble on am CXR. Unclear etiology. May be component of heroin with drawl symptoms vs. DKA. Mild alk phosphatemia on admission but otherwise hepatic panel normal.   # ?Upper GI bleed. melenotic stools. Hem occult positive. Suspect upper GI bleed but no recurrence. Needed a unit of pRBC's last night. On PPIs  #protein-calorie malnutrition  - Diet: NPO for Medical/Clinical Reasons Except for: Meds, Ice Chips  Adult Formula Drip Feeding: Continuous Osmolite 1.5; Nasogastric tube; Goal Rate: 30; mL/hr; Begin TF at 10 mL/hr.  Hold at this rate.  Will assess daily for ability to increase.    - BID PPI  - Restart TFs after CATHY    Malnutrition:   % Weight Loss:  Large wt fluctuations - unable to assess true wt loss  % Intake: Pt intubated, no diet hx - however, suspect decreased po intake since   presentation to ED on 12/13  Subcutaneous Fat Loss:  Unable to complete NFPA - pt in COVID isolation  Muscle Loss:  Unable to complete NFPA - pt in COVID isolation  Fluid Retention:  None noted  Malnutrition Diagnosis: Suspect Severe malnutrition  In Context of:  Acute illness or injury on Chronic illness or disease (DKA, COVID -   polysubstance abuse,  DM)       Renal/ Fluid Balance:   #Diabetic ketoacidosis- Resolved and DKA protocol discontinued  #Hypokalemia- replacement per protocol  # hypophosphatemia- replacement per protocol  # high anion gap metabolic acidosis 2/2 to DKA- resolved  # Ketonuria- resolved  #acute kidney injury, resolved  - Gap is closed and DKA protocol transitioned to subcutaneous insulin  - Will monitor intake and output  - ICU electrolyte replacement protocol  - hyperNa, will increase FWF    Endocrine:  #Type 1 diabetes with insulin non complicance, complicated by DKA. History of prior admission for DKA  - continue glargine 20 at bedtime, no changes  - continue sliding scale insulin  - stress dose steroids course 12/16-12/19    ID:  # Septic shock and MRSA bacteremia  # Complicated pneumonia due to MRSA pneumonia and aspiration  # Covid positive  #MRSA bacteremia  - ID consulted and appreciate recs  - Abx course: vanco 12/16- present; unasyn 12/16-12/19; cefepime 12/19-present  - Last positive blood cx: MRSA 12/16  - Last positive BAL 12/16  - CATHY today    Hematology:  # DVT ppx  # melenotic stools, hem occult positive  #anemia of chronic disease  #acute blood loss anemia  - subcutaneous heparin  - possible previous upper GI bleed. On PPI. Monitor clinically at this time. No acute indication for GI intervention.    MSK:   - PT and OT consulted. Appreciate recommendations.     Lines/ tubes/ drains:  Sheffield Catheter: PRESENT, indication: Strict 1-2 Hour I&O  Central Lines: PICC removed due to bacteremia    Prophylaxis:  - DVT Prophylaxis: Heparin SQ and Pneumatic Compression Devices  - PUD Prophylaxis: PPI    Code Status: Full Code      Disposition:  - ICU  - Jeronimo Sultana ([phone])      Clinically Significant Risk Factors        # Hypokalemia: Lowest K = 3.2 mmol/L in last 2 days, will replace as needed  # Hypernatremia: Highest Na = 153 mmol/L in last 2 days, will monitor as appropriate      # Hypoalbuminemia: Lowest albumin = 2.8 g/dL  at 12/16/2022  2:43 AM, will monitor as appropriate                       Discussed with staff, Dr. Hira Tran MD  Surgical Critical Care Fellow    ______________________________________________________________________    Interval events:  -agitation and vent dyssynchrony overnight, versed and fent gtts increased    Physical Exam   Vital Signs: Temp: 97.9  F (36.6  C) Temp src: Bladder BP: 109/75 Pulse: 93   Resp: 20 SpO2: 91 % O2 Device: Mechanical Ventilator    Weight: 96 lbs 5.46 oz     Intake/Output Summary (Last 24 hours) at 12/16/2022 1630  Last data filed at 12/16/2022 1600  Gross per 24 hour   Intake 5830.15 ml   Output 2100 ml   Net 3730.15 ml       General: agitated and coughing  Neuro: sedated, not following commands or opening eyes  CV: sinus tachycardia, non mottled appearance.  Pulm: course, diminished breath sounds on the right, clear on the left, on mechanical ventilation  Abd: soft, non distended. NG in place  : degroot in place with clear pale yellow urine in the bag  Extremities: thin, covered in needle stick marks in ACs  Skin: non mottled     Data   I reviewed all medications, new labs and imaging results over the last 24 hours.  Arterial Blood Gases   Recent Labs   Lab 12/18/22  0559 12/18/22  0214 12/17/22  2315 12/17/22  1842   PH 7.45 7.49* 7.50* 7.41   PCO2 35 32* 23* 24*   PO2 83 72* 106* 122*   HCO3 24 24 18* 15*       Complete Blood Count   Recent Labs   Lab 12/20/22  0508 12/19/22  0814 12/18/22  0558 12/17/22  1842 12/17/22  0417   WBC 9.2 21.2* 30.3*  --  12.6*   HGB 7.8* 7.9* 8.3* 6.3* 8.0*    145* 153  --  162       Basic Metabolic Panel  Recent Labs   Lab 12/20/22  0510 12/20/22  0508 12/20/22  0155 12/19/22  2138 12/19/22  2002 12/19/22  1414 12/19/22  1412 12/19/22  0919 12/19/22  0814 12/18/22  2141 12/18/22  1925 12/18/22  1446 12/18/22  1439   NA  --  153*  --   --   --   --   --   --  150*  --  147*  --  147*   POTASSIUM  --  3.4  --   --  3.5  --  3.4  --   3.2*  --  3.5  --  3.4   CHLORIDE  --  121*  --   --   --   --   --   --  118*  --  117*  --  115*   CO2  --  27  --   --   --   --   --   --  25  --  19*  --  25   BUN  --  41*  --   --   --   --   --   --  43*  --  47*  --  48*   CR  --  1.01  --   --   --   --   --   --  1.15*  --  1.24*  --  1.29*   * 182* 215* 239*  --    < >  --    < > 202*   < > 159*   < > 131*    < > = values in this interval not displayed.       Liver Function Tests  Recent Labs   Lab 12/16/22  1015 12/16/22  0243   AST  --  27   ALT  --  13   ALKPHOS  --  173*   BILITOTAL  --  <0.2   ALBUMIN  --  2.8*   INR 1.82* 1.68*       Pancreatic Enzymes  Recent Labs   Lab 12/16/22  1230   LIPASE 47*       Coagulation Profile  Recent Labs   Lab 12/16/22  1015 12/16/22  0243   INR 1.82* 1.68*       IMAGING:  Recent Results (from the past 24 hour(s))   CT Chest w Contrast    Narrative    CT CHEST WITH CONTRAST 12/19/2022 8:55 AM     HISTORY: Cavitary right lung lesion.    COMPARISON: December 13, 2022    TECHNIQUE: Volumetric helical acquisition of CT images of the chest  from the clavicles to the kidneys were acquired after the  administration of 50mL Isovue-370 IV contrast. Radiation dose for this  scan was reduced using automated exposure control, adjustment of the  mA and/or kV according to patient size, or iterative reconstruction  technique.    FINDINGS:     LUNGS AND PLEURA: The examination was not tailored for pulmonary  emboli and contrast opacification in the pulmonary arteries is  limited, no pulmonary embolism demonstrated. Extensive consolidation  throughout the right lower lobe with a cavitary lesion laterally  measuring 5 cm, posteriorly measuring 3.2 cm inferiorly. Patchy  infiltrates in the right upper lobe. Mild patchy infiltrates in the  left upper lobe. Lobar compressive atelectasis and/or infiltrate in  the left lower lobe. Small left effusion.    MEDIASTINUM/AXILLAE: Mildly enlarged lymph nodes in the chest which  are  nonspecific, but likely reactive in this setting. No aneurysm.    CORONARY ARTERY CALCIFICATIONS: None.    UPPER ABDOMEN: No acute findings.    MUSCULOSKELETAL: Normal.      Impression    IMPRESSION:    1. Little to no demonstrated enhancement in the parenchyma of the  right lower lobe, vascularity appears intact with opacification of  pulmonary veins, this lack of apparent enhancement is likely related  to severe edema/pneumonitis.  2. At least two cavitary lesions in the right lower lobe measuring 5  cm laterally, and 3.2 cm posteriorly concerning for pulmonary  abscesses.  3. Suboptimal contrast bolus timing for pulmonary emboli, no definite  pulmonary emboli demonstrated.  4. Lobar atelectasis and/or consolidation in the left lower lobe and  small left effusion.  5. Patchy bilateral upper lobe infiltrates, right worse than left.    LALO STARR MD         SYSTEM ID:  [id number]

## 2022-12-20 NOTE — PROGRESS NOTES
[facility]    Infectious Disease Progress Note    Date of Service (when I saw the patient): 12/20/2022     Assessment & Plan   Deidre N Aasen is a 27 year old female who was admitted on 12/16/2022.     Impression:  1. 27 y.o patient with polysubstance abuse, IVDU  2. Type 1 diabetes  3. History of medical noncompliance   4. Recent presentation to OSH (epic records reviewed ) left AMA  5. Admitted this occasion with diabetic ketoacidosis (DKA) w/ severe acidosis, acute hypoxic respiratory failure, acute encephalopathy, (+) COVID-19, and pneumonia possible cavitation and abscess.   6. On remdesivir, vancomycin and zosyn   7. Cultures in the blood MRSA   8. resp cultures MRSA       Recommendations:   1 Blood cultures with MRSA which explains almost all the findings     2 Vancomycin per pharmacy, close eye of the kidney function, will ask lab to run dapto susc   3 Unasyn now cefepime for bacterial/noso pneumonia, but suspect the lung findings are also MRSA and COVID only 12/16 sputum only MRSA    Daily blood cultures 12/18 and 19 neg so far, ? cleared   TTE neg CATHY probably indicated I have great note 1213 initial blood cultures were negative making endocarditis is an underlying condition here very unlikely       Max pulmonary support as in place.         Cole Torres MD    Interval History   Intubated Clinically stable, temp down, no obvious new secondary involved site  MRSA In the blood   Central line removed     Physical Exam   Temp: 97.9  F (36.6  C) Temp src: Bladder BP: 109/75 Pulse: 93   Resp: 20 SpO2: 99 % O2 Device: Mechanical Ventilator    Vitals:    12/18/22 0237 12/19/22 0230 12/20/22 0600   Weight: 39.4 kg (86 lb 13.8 oz) 45.4 kg (100 lb 1.4 oz) 43.7 kg (96 lb 5.5 oz)     Vital Signs with Ranges  Temp:  [97.5  F (36.4  C)-101.1  F (38.4  C)] 97.9  F (36.6  C)  Pulse:  [] 93  Resp:  [16-30] 20  BP: ()/(59-86) 109/75  FiO2 (%):  [30 %-40 %] 40 %  SpO2:  [91 %-100 %] 99  %    Constitutional: intubated   Lungs: Clear to auscultation bilaterally, no crackles or wheezing  Cardiovascular: Regular rate and rhythm, normal S1 and S2, and no murmur noted  Abdomen: Normal bowel sounds, soft, non-distended, non-tender  Skin:multiple track marks   Other:    Medications     dexmedetomidine 0.2 mcg/kg/hr (12/20/22 0930)     dextrose       fentaNYL 175 mcg/hr (12/20/22 0936)     propofol Stopped (12/17/22 0416)    And     - MEDICATION INSTRUCTIONS -       - MEDICATION INSTRUCTIONS -       midazolam 4 mg/hr (12/19/22 1654)     sodium chloride 20 mL/hr at 12/19/22 0540       acetaminophen  650 mg Oral Q4H    Or     acetaminophen  650 mg Rectal Q4H     ceFEPIme  2,000 mg Intravenous Q12H     chlorhexidine  15 mL Mouth/Throat Q12H     heparin ANTICOAGULANT  5,000 Units Subcutaneous Q12H     insulin aspart  1-6 Units Subcutaneous Q4H     insulin glargine  20 Units Subcutaneous At Bedtime     levalbuterol  0.63 mg Nebulization Q8H     pantoprazole  40 mg Intravenous BID     sodium chloride (PF)  10 mL Intracatheter Q8H     sodium chloride (PF)  3 mL Intravenous Q8H     sterile water (preservative free)         vancomycin  750 mg Intravenous Q12H       Data   All microbiology laboratory data reviewed.  Recent Labs   Lab Test 12/20/22  0508 12/19/22  0814 12/18/22  0558   WBC 9.2 21.2* 30.3*   HGB 7.8* 7.9* 8.3*   HCT 23.0* 21.4* 23.0*   MCV 90 84 83    145* 153     Recent Labs   Lab Test 12/20/22  0508 12/19/22  0814 12/18/22  1925   CR 1.01 1.15* 1.24*     No lab results found.  No lab results found.    Invalid input(s): UC    All cultures:  Recent Labs   Lab 12/19/22  1538 12/19/22  1515 12/18/22  1925 12/16/22  1309 12/16/22  0356 12/16/22  0321   CULTURE No growth after 12 hours No growth after 12 hours No growth after 1 day  No growth after 1 day 4+ Normal aaron  4+ Staphylococcus aureus MRSA* Positive on the 1st day of incubation*  Staphylococcus aureus MRSA* Positive on the 1st day of  incubation*  Staphylococcus aureus MRSA*      Blood culture:  Results for orders placed or performed during the hospital encounter of 12/16/22   Blood Culture Hand, Right    Specimen: Hand, Right; Blood   Result Value Ref Range    Culture No growth after 12 hours    Blood Culture Line, venous    Specimen: Line, venous; Blood   Result Value Ref Range    Culture No growth after 12 hours    Blood Culture Wrist, Right    Specimen: Wrist, Right; Blood   Result Value Ref Range    Culture No growth after 1 day    Blood Culture Hand, Right    Specimen: Hand, Right; Blood   Result Value Ref Range    Culture No growth after 1 day    Results for orders placed or performed during the hospital encounter of 12/16/22   Blood Culture Arm, Right    Specimen: Arm, Right; Blood   Result Value Ref Range    Culture Positive on the 1st day of incubation (A)     Culture Staphylococcus aureus MRSA (AA)    Blood Culture Peripheral Blood    Specimen: Peripheral Blood   Result Value Ref Range    Culture Positive on the 1st day of incubation (A)     Culture Staphylococcus aureus MRSA (AA)        Susceptibility    Staphylococcus aureus MRSA - SAMANTHA*     Oxacillin* >=4 Resistant ug/mL      * Oxacillin susceptible isolates are susceptible to cephalosporins (example: cefazolin and cephalexin) and beta lactam combination agents. Oxacillin resistant isolates are resistant to these agents.     Gentamicin <=0.5 Susceptible ug/mL     Erythromycin >=8 Resistant ug/mL     Clindamycin* <=0.25 Susceptible ug/mL      * This isolate DOES NOT demonstrate inducible clindamycin resistance in vitro. Clindamycin is susceptible and could be used when indicated, however, erythromycin is resistant and should not be used.     Linezolid 2 Susceptible ug/mL     Vancomycin 1 Susceptible ug/mL     Tetracycline <=1 Susceptible ug/mL     Trimethoprim/Sulfamethoxazole <=0.5/9.5 Susceptible ug/mL     * MRSA requires contact precautions.   Results for orders placed or  performed during the hospital encounter of 12/13/22   Blood Culture Arm, Right    Specimen: Arm, Right; Blood   Result Value Ref Range    Culture No Growth    Blood Culture Arm, Left    Specimen: Arm, Left; Blood   Result Value Ref Range    Culture No Growth       Urine culture:  No results found for this or any previous visit.

## 2022-12-20 NOTE — PROGRESS NOTES
Shift 4898-1973    Neuro: Pt a RASS of -2 to -3. Will open eyes spontaneously, move head side to side, move arms and legs. Pt does not follow commands. Sedation as follows Fentanyl @ 175 and Versed @ 4. Soft limb restraints remain in place, order good until 1200.    Cards: Beginning of shift pt sinus tach in the low 100's. T-Max 101.8, Tylenol given once per order, fever resolved at this time. Pt now sinus rhythm 80's to 90's.    Resp: Pt remains on vent at this time, settings as follows FiO2 @ 30%, , Resp 20 and PEEP 6. Lung sounds coarse throughout, R lung expiratory stridor was heard throughout. Copious amounts of creamy/tan secretions from ETT. Pt would have coughing fits and start to desat into the mid to low 80's. Pt calms down once suctioned 2 to 3 times. ETT moved q2h during shift.    GI/: Tube feeds turned off at 0000 for TTE on 12/20. Pt had 4 soft, brown BM's during shift. Labia's remain swollen and degroot remains in place with 625 output during shift.     Skin: Pt repositioned q2h during shift. No new skin issues noted during shift.    Other: Potassium replaced this AM with 20mEq of Potassium, recheck @ 1100. Father called and phone held up to pt's ear for father to speak to pt. S.O. called and also updated during shift.    Nighat Cordero RN

## 2022-12-21 LAB
ANION GAP SERPL CALCULATED.3IONS-SCNC: 3 MMOL/L (ref 3–14)
BUN SERPL-MCNC: 33 MG/DL (ref 7–30)
CALCIUM SERPL-MCNC: 7.1 MG/DL (ref 8.5–10.1)
CHLORIDE BLD-SCNC: 121 MMOL/L (ref 94–109)
CK SERPL-CCNC: 89 U/L (ref 30–225)
CO2 SERPL-SCNC: 28 MMOL/L (ref 20–32)
CREAT SERPL-MCNC: 0.85 MG/DL (ref 0.52–1.04)
ERYTHROCYTE [DISTWIDTH] IN BLOOD BY AUTOMATED COUNT: 15.4 % (ref 10–15)
GFR SERPL CREATININE-BSD FRML MDRD: >90 ML/MIN/1.73M2
GLUCOSE BLD-MCNC: 171 MG/DL (ref 70–99)
GLUCOSE BLDC GLUCOMTR-MCNC: 117 MG/DL (ref 70–99)
GLUCOSE BLDC GLUCOMTR-MCNC: 119 MG/DL (ref 70–99)
GLUCOSE BLDC GLUCOMTR-MCNC: 156 MG/DL (ref 70–99)
GLUCOSE BLDC GLUCOMTR-MCNC: 168 MG/DL (ref 70–99)
GLUCOSE BLDC GLUCOMTR-MCNC: 259 MG/DL (ref 70–99)
HCT VFR BLD AUTO: 22.9 % (ref 35–47)
HGB BLD-MCNC: 7.6 G/DL (ref 11.7–15.7)
MAGNESIUM SERPL-MCNC: 2.4 MG/DL (ref 1.6–2.3)
MCH RBC QN AUTO: 30.3 PG (ref 26.5–33)
MCHC RBC AUTO-ENTMCNC: 33.2 G/DL (ref 31.5–36.5)
MCV RBC AUTO: 91 FL (ref 78–100)
PHOSPHATE SERPL-MCNC: 2.6 MG/DL (ref 2.5–4.5)
PLATELET # BLD AUTO: 159 10E3/UL (ref 150–450)
POTASSIUM BLD-SCNC: 3.9 MMOL/L (ref 3.4–5.3)
RBC # BLD AUTO: 2.51 10E6/UL (ref 3.8–5.2)
SODIUM SERPL-SCNC: 151 MMOL/L (ref 133–144)
SODIUM SERPL-SCNC: 152 MMOL/L (ref 133–144)
TRIGL SERPL-MCNC: 168 MG/DL
WBC # BLD AUTO: 8 10E3/UL (ref 4–11)

## 2022-12-21 PROCEDURE — 99291 CRITICAL CARE FIRST HOUR: CPT | Mod: GC | Performed by: INTERNAL MEDICINE

## 2022-12-21 PROCEDURE — 250N000011 HC RX IP 250 OP 636: Performed by: INTERNAL MEDICINE

## 2022-12-21 PROCEDURE — 82550 ASSAY OF CK (CPK): CPT | Performed by: INTERNAL MEDICINE

## 2022-12-21 PROCEDURE — 250N000009 HC RX 250: Performed by: STUDENT IN AN ORGANIZED HEALTH CARE EDUCATION/TRAINING PROGRAM

## 2022-12-21 PROCEDURE — P9045 ALBUMIN (HUMAN), 5%, 250 ML: HCPCS | Performed by: INTERNAL MEDICINE

## 2022-12-21 PROCEDURE — 83735 ASSAY OF MAGNESIUM: CPT | Performed by: INTERNAL MEDICINE

## 2022-12-21 PROCEDURE — 94640 AIRWAY INHALATION TREATMENT: CPT | Mod: 76

## 2022-12-21 PROCEDURE — 84478 ASSAY OF TRIGLYCERIDES: CPT | Performed by: INTERNAL MEDICINE

## 2022-12-21 PROCEDURE — 272N000452 HC KIT SHRLOCK 5FR POWER PICC TRIPLE LUMEN

## 2022-12-21 PROCEDURE — 94640 AIRWAY INHALATION TREATMENT: CPT

## 2022-12-21 PROCEDURE — 99233 SBSQ HOSP IP/OBS HIGH 50: CPT | Performed by: INTERNAL MEDICINE

## 2022-12-21 PROCEDURE — 200N000001 HC R&B ICU

## 2022-12-21 PROCEDURE — 87040 BLOOD CULTURE FOR BACTERIA: CPT | Performed by: INTERNAL MEDICINE

## 2022-12-21 PROCEDURE — 250N000013 HC RX MED GY IP 250 OP 250 PS 637: Performed by: SURGERY

## 2022-12-21 PROCEDURE — 250N000013 HC RX MED GY IP 250 OP 250 PS 637: Performed by: INTERNAL MEDICINE

## 2022-12-21 PROCEDURE — C9113 INJ PANTOPRAZOLE SODIUM, VIA: HCPCS | Performed by: SURGERY

## 2022-12-21 PROCEDURE — 999N000157 HC STATISTIC RCP TIME EA 10 MIN

## 2022-12-21 PROCEDURE — 250N000013 HC RX MED GY IP 250 OP 250 PS 637: Performed by: STUDENT IN AN ORGANIZED HEALTH CARE EDUCATION/TRAINING PROGRAM

## 2022-12-21 PROCEDURE — 250N000009 HC RX 250: Performed by: INTERNAL MEDICINE

## 2022-12-21 PROCEDURE — 94003 VENT MGMT INPAT SUBQ DAY: CPT

## 2022-12-21 PROCEDURE — 250N000011 HC RX IP 250 OP 636: Performed by: SURGERY

## 2022-12-21 PROCEDURE — 258N000003 HC RX IP 258 OP 636: Performed by: INTERNAL MEDICINE

## 2022-12-21 PROCEDURE — 84100 ASSAY OF PHOSPHORUS: CPT | Performed by: INTERNAL MEDICINE

## 2022-12-21 PROCEDURE — 250N000011 HC RX IP 250 OP 636: Performed by: STUDENT IN AN ORGANIZED HEALTH CARE EDUCATION/TRAINING PROGRAM

## 2022-12-21 PROCEDURE — 84295 ASSAY OF SERUM SODIUM: CPT | Performed by: STUDENT IN AN ORGANIZED HEALTH CARE EDUCATION/TRAINING PROGRAM

## 2022-12-21 PROCEDURE — 80048 BASIC METABOLIC PNL TOTAL CA: CPT | Performed by: INTERNAL MEDICINE

## 2022-12-21 PROCEDURE — G0463 HOSPITAL OUTPT CLINIC VISIT: HCPCS

## 2022-12-21 PROCEDURE — 36569 INSJ PICC 5 YR+ W/O IMAGING: CPT

## 2022-12-21 PROCEDURE — 258N000003 HC RX IP 258 OP 636: Performed by: STUDENT IN AN ORGANIZED HEALTH CARE EDUCATION/TRAINING PROGRAM

## 2022-12-21 PROCEDURE — 36415 COLL VENOUS BLD VENIPUNCTURE: CPT | Performed by: INTERNAL MEDICINE

## 2022-12-21 PROCEDURE — 85027 COMPLETE CBC AUTOMATED: CPT | Performed by: SURGERY

## 2022-12-21 RX ORDER — SODIUM CHLORIDE, SODIUM LACTATE, POTASSIUM CHLORIDE, CALCIUM CHLORIDE 600; 310; 30; 20 MG/100ML; MG/100ML; MG/100ML; MG/100ML
INJECTION, SOLUTION INTRAVENOUS CONTINUOUS
Status: DISCONTINUED | OUTPATIENT
Start: 2022-12-21 | End: 2022-12-25

## 2022-12-21 RX ORDER — KETAMINE HCL IN NACL, ISO-OSM 100MG/10ML
30 SYRINGE (ML) INJECTION ONCE
Status: DISCONTINUED | OUTPATIENT
Start: 2022-12-21 | End: 2022-12-21 | Stop reason: RX

## 2022-12-21 RX ORDER — NOREPINEPHRINE BITARTRATE 0.02 MG/ML
.01-.6 INJECTION, SOLUTION INTRAVENOUS CONTINUOUS
Status: DISCONTINUED | OUTPATIENT
Start: 2022-12-21 | End: 2022-12-22

## 2022-12-21 RX ORDER — LIDOCAINE 40 MG/G
CREAM TOPICAL
Status: ACTIVE | OUTPATIENT
Start: 2022-12-21 | End: 2022-12-24

## 2022-12-21 RX ORDER — GUAIFENESIN 600 MG/1
15 TABLET, EXTENDED RELEASE ORAL DAILY
Status: DISCONTINUED | OUTPATIENT
Start: 2022-12-21 | End: 2023-01-04 | Stop reason: HOSPADM

## 2022-12-21 RX ORDER — OXYCODONE HCL 5 MG/5 ML
10 SOLUTION, ORAL ORAL EVERY 6 HOURS
Status: DISCONTINUED | OUTPATIENT
Start: 2022-12-21 | End: 2022-12-22

## 2022-12-21 RX ORDER — FUROSEMIDE 10 MG/ML
20 INJECTION INTRAMUSCULAR; INTRAVENOUS DAILY
Status: DISCONTINUED | OUTPATIENT
Start: 2022-12-21 | End: 2022-12-26

## 2022-12-21 RX ORDER — METOLAZONE 5 MG/1
5 TABLET ORAL
Status: DISCONTINUED | OUTPATIENT
Start: 2022-12-21 | End: 2022-12-25

## 2022-12-21 RX ADMIN — CEFEPIME HYDROCHLORIDE 2000 MG: 2 INJECTION, POWDER, FOR SOLUTION INTRAVENOUS at 05:39

## 2022-12-21 RX ADMIN — OXYCODONE HYDROCHLORIDE 10 MG: 5 SOLUTION ORAL at 20:17

## 2022-12-21 RX ADMIN — Medication 100 MCG: at 12:41

## 2022-12-21 RX ADMIN — Medication 100 MCG: at 09:30

## 2022-12-21 RX ADMIN — MIDAZOLAM HYDROCHLORIDE 3 MG/HR: 1 INJECTION, SOLUTION INTRAVENOUS at 17:58

## 2022-12-21 RX ADMIN — VANCOMYCIN HYDROCHLORIDE 750 MG: 1 INJECTION, POWDER, LYOPHILIZED, FOR SOLUTION INTRAVENOUS at 21:37

## 2022-12-21 RX ADMIN — Medication 100 MCG: at 18:27

## 2022-12-21 RX ADMIN — PANTOPRAZOLE SODIUM 40 MG: 40 INJECTION, POWDER, FOR SOLUTION INTRAVENOUS at 20:16

## 2022-12-21 RX ADMIN — CHLORHEXIDINE GLUCONATE 0.12% ORAL RINSE 15 ML: 1.2 LIQUID ORAL at 08:19

## 2022-12-21 RX ADMIN — INSULIN GLARGINE 20 UNITS: 100 INJECTION, SOLUTION SUBCUTANEOUS at 21:12

## 2022-12-21 RX ADMIN — PANTOPRAZOLE SODIUM 40 MG: 40 INJECTION, POWDER, FOR SOLUTION INTRAVENOUS at 08:19

## 2022-12-21 RX ADMIN — LEVALBUTEROL HYDROCHLORIDE 0.63 MG: 1.25 SOLUTION, CONCENTRATE RESPIRATORY (INHALATION) at 07:11

## 2022-12-21 RX ADMIN — Medication 100 MCG: at 17:42

## 2022-12-21 RX ADMIN — HEPARIN SODIUM 5000 UNITS: 5000 INJECTION, SOLUTION INTRAVENOUS; SUBCUTANEOUS at 20:17

## 2022-12-21 RX ADMIN — Medication 200 MCG/HR: at 08:27

## 2022-12-21 RX ADMIN — INSULIN ASPART 1 UNITS: 100 INJECTION, SOLUTION INTRAVENOUS; SUBCUTANEOUS at 05:26

## 2022-12-21 RX ADMIN — CHLORHEXIDINE GLUCONATE 0.12% ORAL RINSE 15 ML: 1.2 LIQUID ORAL at 20:17

## 2022-12-21 RX ADMIN — SODIUM CHLORIDE, POTASSIUM CHLORIDE, SODIUM LACTATE AND CALCIUM CHLORIDE: 600; 310; 30; 20 INJECTION, SOLUTION INTRAVENOUS at 15:09

## 2022-12-21 RX ADMIN — LEVALBUTEROL HYDROCHLORIDE 0.63 MG: 1.25 SOLUTION, CONCENTRATE RESPIRATORY (INHALATION) at 16:34

## 2022-12-21 RX ADMIN — Medication 50 MCG: at 16:38

## 2022-12-21 RX ADMIN — Medication 200 MCG/HR: at 20:09

## 2022-12-21 RX ADMIN — LIDOCAINE HYDROCHLORIDE ANHYDROUS 3 ML: 10 INJECTION, SOLUTION INFILTRATION at 10:12

## 2022-12-21 RX ADMIN — MIDAZOLAM HYDROCHLORIDE 6 MG/HR: 1 INJECTION, SOLUTION INTRAVENOUS at 03:27

## 2022-12-21 RX ADMIN — OXYCODONE HYDROCHLORIDE 10 MG: 5 SOLUTION ORAL at 08:19

## 2022-12-21 RX ADMIN — METOLAZONE 5 MG: 5 TABLET ORAL at 16:09

## 2022-12-21 RX ADMIN — INSULIN ASPART 3 UNITS: 100 INJECTION, SOLUTION INTRAVENOUS; SUBCUTANEOUS at 21:11

## 2022-12-21 RX ADMIN — FUROSEMIDE 20 MG: 10 INJECTION, SOLUTION INTRAMUSCULAR; INTRAVENOUS at 16:35

## 2022-12-21 RX ADMIN — MICAFUNGIN SODIUM 100 MG: 50 INJECTION, POWDER, LYOPHILIZED, FOR SOLUTION INTRAVENOUS at 12:49

## 2022-12-21 RX ADMIN — Medication 50 MCG: at 08:30

## 2022-12-21 RX ADMIN — SODIUM CHLORIDE, POTASSIUM CHLORIDE, SODIUM LACTATE AND CALCIUM CHLORIDE: 600; 310; 30; 20 INJECTION, SOLUTION INTRAVENOUS at 15:10

## 2022-12-21 RX ADMIN — Medication 0.03 MCG/KG/MIN: at 06:14

## 2022-12-21 RX ADMIN — SODIUM CHLORIDE 0.3 MG/KG/HR: 9 INJECTION, SOLUTION INTRAVENOUS at 13:25

## 2022-12-21 RX ADMIN — OXYCODONE HYDROCHLORIDE 10 MG: 5 SOLUTION ORAL at 13:24

## 2022-12-21 RX ADMIN — Medication 30 MG: at 09:03

## 2022-12-21 RX ADMIN — MULTIVITAMIN 15 ML: LIQUID ORAL at 15:16

## 2022-12-21 RX ADMIN — PROPOFOL 20 MCG/KG/MIN: 10 INJECTION, EMULSION INTRAVENOUS at 21:46

## 2022-12-21 RX ADMIN — LEVALBUTEROL HYDROCHLORIDE 0.63 MG: 1.25 SOLUTION, CONCENTRATE RESPIRATORY (INHALATION) at 23:03

## 2022-12-21 RX ADMIN — ALBUMIN HUMAN 500 ML: 0.05 INJECTION, SOLUTION INTRAVENOUS at 05:05

## 2022-12-21 RX ADMIN — PROPOFOL 20 MCG/KG/MIN: 10 INJECTION, EMULSION INTRAVENOUS at 08:18

## 2022-12-21 RX ADMIN — CEFEPIME HYDROCHLORIDE 500 MG: 2 INJECTION, POWDER, FOR SOLUTION INTRAVENOUS at 17:43

## 2022-12-21 RX ADMIN — VANCOMYCIN HYDROCHLORIDE 750 MG: 1 INJECTION, POWDER, LYOPHILIZED, FOR SOLUTION INTRAVENOUS at 08:19

## 2022-12-21 RX ADMIN — HEPARIN SODIUM 5000 UNITS: 5000 INJECTION, SOLUTION INTRAVENOUS; SUBCUTANEOUS at 08:19

## 2022-12-21 RX ADMIN — INSULIN ASPART 1 UNITS: 100 INJECTION, SOLUTION INTRAVENOUS; SUBCUTANEOUS at 02:36

## 2022-12-21 ASSESSMENT — ACTIVITIES OF DAILY LIVING (ADL)
ADLS_ACUITY_SCORE: 40
ADLS_ACUITY_SCORE: 38
ADLS_ACUITY_SCORE: 40
ADLS_ACUITY_SCORE: 38
ADLS_ACUITY_SCORE: 38
ADLS_ACUITY_SCORE: 40
ADLS_ACUITY_SCORE: 40
ADLS_ACUITY_SCORE: 38
ADLS_ACUITY_SCORE: 40

## 2022-12-21 NOTE — PLAN OF CARE
Pt remains sedated. Not able to follow commands. Deep sedation required for ventilator compliance.  Coughing with stimulation.  Thick secretions. CATHY today. 2 small black appearing BM's. Adequate UOP.  Father updated via phone with plan of care this evening by nursing. Cont to monitor.       Problem: Sepsis/Septic Shock  Goal: Optimal Coping  Outcome: Not Progressing     Problem: Plan of Care - These are the overarching goals to be used throughout the patient stay.    Goal: Optimal Comfort and Wellbeing  Outcome: Progressing  Intervention: Monitor Pain and Promote Comfort  Recent Flowsheet Documentation  Taken 12/20/2022 1800 by Asha Gonzalez RN  Pain Management Interventions:   medication (see MAR)   repositioned  Taken 12/20/2022 1600 by Asha Gonzalez RN  Pain Management Interventions:   medication (see MAR)   repositioned  Taken 12/20/2022 1400 by Asha Gonzalez RN  Pain Management Interventions:   medication (see MAR)   repositioned  Taken 12/20/2022 1200 by Asha Gonzalez RN  Pain Management Interventions:   medication (see MAR)   repositioned  Taken 12/20/2022 1000 by Asha Gonzalez RN  Pain Management Interventions:   medication (see MAR)   repositioned  Taken 12/20/2022 0800 by Asha Gonzalez RN  Pain Management Interventions:   medication (see MAR)   repositioned     Problem: Sepsis/Septic Shock  Goal: Blood Glucose Level Within Targeted Range  Outcome: Progressing   Goal Outcome Evaluation:

## 2022-12-21 NOTE — PROGRESS NOTES
Shift [id number]    Neuro: Pt remains sedated at this time at a RASS of -4. Pupils equal and reactive. Pt has +3 to +4 edema in all extremities.    Cards: Pt remains sinus rhythm during shift. Blood pressures began to become on the softer side. Provider ordered 500mg Albumin and Levo if needed. Albumin given per order. Blood pressure increased from 1 hour to 30 minutes.    Resp: Pt remains on vent. Settings as follows FiO2 50%, , Resp 20 and PEEP 6. Pt has strong cough, during coughing episodes pt will thrash head back and forth and become dyssynchronous with the vent. Sedation medication increased.     GI/: Tube feed remains @ 10. Free water flushes increased from 100 to 200 q4h per provider order. Pt had one BM during shift. Sheffield remains in place with adequate output during shift. Levo started this AM for MAP's less than 65.    Skin: Pt repositioned q2h during shift. ETT repositioned q2h with no issues. No new skin issues noted during shift.    Other: Sedation as follows, Fentanyl @ 225, Versed @ 7, Propofol @ 20, TKO x2 @ 20 and Levo @ 0.03. Vascular access order placed for PICC placement this AM. Potassium, Phos and Mag protocols all within range, redraws for 12/22 AM. Lab having difficulty drawing blood cultures on pt, per provider to draw once PICC is placed if still needed.      Nighat Cordero RN

## 2022-12-21 NOTE — PROGRESS NOTES
.Notified provider about indwelling degroot catheter discussed removal or continued need.    Did provider choose to remove indwelling degroot catheter? No    Provider's degroot indication for keeping indwelling degroot catheter: Deep Sedation/Paralysis.    Is there an order for indwelling degroot catheter? Yes    *If there is a plan to keep degroot catheter in place at discharge daily notification with provider is not necessary.

## 2022-12-21 NOTE — PROGRESS NOTES
[facility] ICU RESPIRATORY NOTE        Date of Admission: 12/16/2022    Date of Intubation (most recent): 12/16/2022.    Reason for Mechanical Ventilation: Respiratory Failure.    Number of Days on Mechanical Ventilation: Day 6.     Met Criteria for Spontaneous Breathing Trial: No.    Reason for No Spontaneous Breathing Trial: Per MD.     Significant Events Today: None.    ABG Results:   Recent Labs   Lab 12/20/22  1620 12/20/22  1157 12/18/22  0559 12/18/22  0214   PH 7.44 7.42 7.45 7.49*   PCO2 38 42 35 32*   PO2 100 170* 83 72*   HCO3 26 27 24 24   O2PER 50 30 40 40       Current Vent Settings: Vent Mode: CMV/AC  (Continuous Mandatory Ventilation/ Assist Control)  FiO2 (%): 35 %  Resp Rate (Set): 20 breaths/min  Tidal Volume (Set, mL): 320 mL  PEEP (cm H2O): 6 cmH2O  Resp: 20    Plan: Continue to monitor patient on full ventilatory support and assess for weaning daily.     Gordo Ray, RRT on 12/21/2022 at 5:20 PM

## 2022-12-21 NOTE — PROGRESS NOTES
[facility]  12/21/2022    Progress Note  Reason for Critical Care Admission: DKA, respiratory failure, covid and bacterial pneumonia  Admitting Physician: Joao Krishna DO  Date of Admission:  12/16/2022    Assessment: Critical Care   Deidre N Aasen is a 27 year old female admitted on 12/16/2022 for DKA, respiratory failure from concurrent covid, bacterial pneumonia.   After arrival to the ICU she developed acute respiratory failure requiring intubation and development of septic shock requiring initation of dual vasopressor support and stress dose steroids in addition to broad spectrum antimicrobial therapy. She has ongoing fluid resuscitation needs, management of severe electrolyte abnormalities and ongoing treatment for septic shock and DKA per protocol. She remains severely critically ill but downtrending support needed. Found on CT Chest w/ contrast to have  RLL w/ cavitations.       Plan: Critical Care   Neuro/ pain/ sedation:  # Toxic metabolic encephalopathy  # Agitation  # Polysubstance abuse- meth and heroin use. Likely in withdrawal  # IVDU  # Meth intoxication  - will trial ketamine today for sedation/pain  - Wean fent, propofol, and versed gtts as able, cap adjusted  - will ultilize PRNs    Pulmonary:  # Acute respiratory failure with refractory hypoxia  # Complex cavitary pneumonia most likely 2/2 to MRSA pneumonia given sudden onset and progression based on comparison of chest imaging (12/13 vs 12/16). Less likely due to fungal or COVID PNA. Echo shows no vegetations but high suspicion and may consider CATHY.  # Acute respiratory acidosis  # severe ARDS, resolved  Plan  Vent Mode: CMV/AC  (Continuous Mandatory Ventilation/ Assist Control)  FiO2 (%): 40 %  Resp Rate (Set): 20 breaths/min  Tidal Volume (Set, mL): 320 mL  PEEP (cm H2O): 6 cmH2O  Resp: 19  - Continue intubation and mechanical ventilation  - Thoracic surgery consult for concern of RLL hypoperfusion and cavitary  lesion, no acute interventions  - supplemental oxygen to keep saturation about 92%  - continuing to wean    Cardiovascular:  # septic shock, intermittent  # Sinus tachycardia  - CATHY 12/20 negative for vegetations and negative bubble    GI/Nutrition:  # Abdominal pain. Present prior to intubation. Large gastric bubble on am CXR. Unclear etiology. May be component of heroin with drawl symptoms vs. DKA. Mild alk phosphatemia on admission but otherwise hepatic panel normal.   # ?Upper GI bleed. melenotic stools. Hem occult positive. Suspect upper GI bleed but no recurrence. Needed a unit of pRBC's last night. On PPIs  #protein-calorie malnutrition  - Diet: Adult Formula Drip Feeding: Continuous Osmolite 1.5; Nasogastric tube; Goal Rate: 30; mL/hr; Begin TF at 10 mL/hr.  Hold at this rate.  Will assess daily for ability to increase.  NPO for Medical/Clinical Reasons Except for: Other; Specify: NPO for 1 hour after CATHY then Advance diet as tolerated to Adult Basic Diet    - BID PPI for melanotic stools  - continue TFs    Malnutrition:   % Weight Loss:  Large wt fluctuations - unable to assess true wt loss  % Intake: Pt intubated, no diet hx - however, suspect decreased po intake since   presentation to ED on 12/13  Subcutaneous Fat Loss:  Unable to complete NFPA - pt in COVID isolation  Muscle Loss:  Unable to complete NFPA - pt in COVID isolation  Fluid Retention:  None noted  Malnutrition Diagnosis: Suspect Severe malnutrition  In Context of:  Acute illness or injury on Chronic illness or disease (DKA, COVID -   polysubstance abuse, DM)     Renal/ Fluid Balance:   #Diabetic ketoacidosis- Resolved and DKA protocol discontinued  #Hypokalemia- replacement per protocol  # hypophosphatemia- replacement per protocol  # high anion gap metabolic acidosis 2/2 to DKA- resolved  # Ketonuria- resolved  #acute kidney injury, resolved  - Gap is closed and DKA protocol transitioned to subcutaneous insulin  - Will monitor intake and  output  - ICU electrolyte replacement protocol  - hyperNa,  q4    Endocrine:  #Type 1 diabetes with insulin non complicance, complicated by DKA. History of prior admission for DKA  - glargine 20 units, PM  - continue sliding scale insulin  - stress dose steroids course 12/16-12/19    ID:  # Septic shock and MRSA bacteremia  # Complicated pneumonia due to MRSA pneumonia and aspiration  # Covid positive  #MRSA bacteremia  - ID consulted and appreciate recs  - Abx course: vanco 12/16- present; unasyn 12/16-12/19; cefepime 12/19-present  - Last positive blood cx: MRSA 12/16  - Last positive BAL 12/16  - CATHY negative    Hematology:  # DVT ppx  # melenotic stools, hem occult positive  #anemia of chronic disease  #acute blood loss anemia  - subcutaneous heparin  - possible previous upper GI bleed. On PPI. Monitor clinically at this time. No acute indication for GI intervention.    MSK:   - not appropriate for PT/OT at this time     Lines/ tubes/ drains:  [place] Catheter: PRESENT, indication: Strict 1-2 Hour I&O  Central Lines: PICC removed due to bacteremia    Prophylaxis:  - DVT Prophylaxis: Heparin SQ and Pneumatic Compression Devices  - PUD Prophylaxis: PPI    Code Status: Full Code      Disposition:  - ICU  - FatherJeronimo ([phone])      Clinically Significant Risk Factors        # Hypokalemia: Lowest K = 3.2 mmol/L in last 2 days, will replace as needed  # Hypernatremia: Highest Na = 153 mmol/L in last 2 days, will monitor as appropriate      # Hypoalbuminemia: Lowest albumin = 2.8 g/dL at 12/16/2022  2:43 AM, will monitor as appropriate                       Discussed with staff, Dr. Hira Tran MD  Surgical Critical Care Fellow    ______________________________________________________________________    Interval events:  -agitation and vent dyssynchrony overnight, versed and fent gtts increased    Physical Exam   Vital Signs: Temp: 97.3  F (36.3  C) Temp src: Bladder BP: 99/71 Pulse: 76   Resp:  19 SpO2: 97 % O2 Device: Mechanical Ventilator    Weight: 94 lbs 12.76 oz     Intake/Output Summary (Last 24 hours) at 12/16/2022 1630  Last data filed at 12/16/2022 1600  Gross per 24 hour   Intake 5830.15 ml   Output 2100 ml   Net 3730.15 ml       General: agitated and coughing, vagal episode with suctioning  Neuro: sedated, not following commands or opening eyes  CV: sinus RRR  Pulm: still course, diminished breath sounds on the right, clear on the left, on mechanical ventilation  Abd: non distended. NJT in place  : degroot in place with clear pale yellow urine in the bag  Extremities: thin, no appreciable edema    Data   I reviewed all medications, new labs and imaging results over the last 24 hours.  Arterial Blood Gases   Recent Labs   Lab 12/20/22  1620 12/20/22  1157 12/18/22  0559 12/18/22  0214   PH 7.44 7.42 7.45 7.49*   PCO2 38 42 35 32*   PO2 100 170* 83 72*   HCO3 26 27 24 24       Complete Blood Count   Recent Labs   Lab 12/21/22  0431 12/20/22  0508 12/19/22  0814 12/18/22  0558   WBC 8.0 9.2 21.2* 30.3*   HGB 7.6* 7.8* 7.9* 8.3*    177 145* 153       Basic Metabolic Panel  Recent Labs   Lab 12/21/22  0525 12/21/22  0431 12/21/22  0233 12/20/22  2205 12/20/22  1820 12/20/22  1805 12/20/22  1233 12/20/22  1228 12/20/22  0510 12/20/22  0508 12/19/22  0919 12/19/22  0814   NA  --  152*  --   --   --  150*  --   --   --  153*  --  150*   POTASSIUM  --  3.9  --   --   --  4.1  --  3.2*  --  3.4   < > 3.2*   CHLORIDE  --  121*  --   --   --  122*  --   --   --  121*  --  118*   CO2  --  28  --   --   --  24  --   --   --  27  --  25   BUN  --  33*  --   --   --  37*  --   --   --  41*  --  43*   CR  --  0.85  --   --   --  0.92  --   --   --  1.01  --  1.15*   * 171* 168* 228*   < > 211*  199*   < >  --    < > 182*   < > 202*    < > = values in this interval not displayed.       Liver Function Tests  Recent Labs   Lab 12/16/22  1015 12/16/22  0243   AST  --  27   ALT  --  13   ALKPHOS  --   173*   BILITOTAL  --  <0.2   ALBUMIN  --  2.8*   INR 1.82* 1.68*       Pancreatic Enzymes  Recent Labs   Lab 22  1230   LIPASE 47*       Coagulation Profile  Recent Labs   Lab 22  1015 22  0243   INR 1.82* 1.68*       IMAGING:  Recent Results (from the past 24 hour(s))   Transesophageal Echocardiogram    Narrative    [id number]  [id number]  [id number]^HILDA^STACIA     [facility]  Echocardiography Laboratory  [address] [place] [address]     Name: AASEN, DEIDRE N  MRN: [id number]  : 1995  Study Date: 2022 11:23 AM  Age: 27 yrs  Gender: Female  Patient Location: [facility]  Reason For Study: Endocarditis  Ordering Physician: STACIA STEVENS  Referring Physician: STACIA STEVENS  Performed By: Bronwyn Rolle     BSA: 1.4 m2  Height: 62 in  Weight: 96 lb  HR: 89  BP: 105/69 mmHg  ______________________________________________________________________________  Procedure  Complete CATHY Adult. CATHY Probe serial #[id number] was used during the procedure.  The heart rate, respiratory rate and response to care were monitored  throughout the procedure with the assistance of the nurse.  ______________________________________________________________________________  Interpretation Summary     1. No intracardiac mass or thrombus.  2. Normal left ventricular size and systolic function. LVEF 60-65%.  3. Normal right ventricular size and systolic function.  4. Normal cardiac valves. No endocarditis.  5. Bubble study was negative for inter-atrial shunt.     ______________________________________________________________________________  CATHY  I determined this patient to be an appropriate candidate for the planned  sedation and procedure and have reassessed the patient immediately prior to  sedation and procedure. Total sedation time: 13 minutes of continuous bedside  1:1 monitoring. CATHY done in the ICU under sedation. The transesophageal probe  was passed without difficulty. There were no  complications associated with  this procedure. Prior to the exam, the oral cavity was checked and no  overcrowding was noted. Consent to the procedure was obtained prior to  sedation.     Left Ventricle  The left ventricle is normal in size. Left ventricular systolic function is  normal. No regional wall motion abnormalities noted. There is no thrombus seen  in the left ventricle.     Right Ventricle  The right ventricle is normal size. The right ventricular systolic function is  normal. There is no mass or thrombus in the right ventricle.     Atria  Normal left atrial size. No left atrial mass or thrombus visualized. Right  atrial size is normal. No evidence of right atrial mass/thrombus. Intact  atrial septum. There is no color Doppler evidence of an atrial shunt. A  contrast injection (Bubble Study) was performed that was negative for flow  across the interatrial septum. The left atrial appendage was well visualized  and free of thrombus.     Mitral Valve  The mitral valve leaflets appear normal. There is no evidence of stenosis,  fluttering, or prolapse. There is trace mitral regurgitation. There is no  mitral valve stenosis.     Tricuspid Valve  Normal tricuspid valve. There is trace tricuspid regurgitation. This degree of  valvular regurgitation is within normal limits. There is no tricuspid  stenosis.     Aortic Valve  The aortic valve is trileaflet. No aortic regurgitation is present. No aortic  stenosis is present.     Pulmonic Valve  Normal pulmonic valve. This degree of valvular regurgitation is within normal  limits. There is no pulmonic valvular stenosis.     Vessels  The aortic root is normal size. 2.8 cm. Normal size ascending aorta. 2.4 cm.  Normal ascending, transverse (arch), and descending aorta. Normal pulmonary  venous drainage.     Pericardial/Pleural  There is no pericardial effusion.  ______________________________________________________________________________  MMode/2D Measurements &  Calculations  Ao root diam: 2.8 cm     asc Aorta Diam: 2.4 cm     ______________________________________________________________________________  Report approved by: Dr Lazarus Chowdhury 12/20/2022 12:48 PM         XR Chest Port 1 View    Narrative    CHEST ONE VIEW  12/20/2022 2:18 PM     HISTORY: Inability to maintain O2 sats.    COMPARISON: December 19, 2022      Impression    IMPRESSION: Endotracheal tube tip 3.4 cm from the demar. Cavitary  lesion again noted at the right base. Extensive right-sided  consolidation similar to previous. Left lower lobe consolidation  probably not significantly changed given differences in rotation.    LALO STARR MD         SYSTEM ID:  [id number]   XR Abdomen Port 1 View    Narrative    XR ABDOMEN PORT 1 VIEW 12/20/2022 3:51 PM    HISTORY: feeding tube placement    COMPARISON: None.      Impression    IMPRESSION: Feeding tube in good position with tip at the duodenal  jejunal junction.    CARLY SULLIVAN MD         SYSTEM ID:  [id number]

## 2022-12-21 NOTE — PROGRESS NOTES
[facility] ICU RESPIRATORY NOTE        Date of Admission: 12/16/2022    Date of Intubation (most recent): 12/16/2022    Reason for Mechanical Ventilation:Resp failure     Number of Days on Mechanical Ventilation: 6    Met Criteria for Spontaneous Breathing Trial:  NO    Reason for No Spontaneous Breathing Trial: Per MD    Significant Events Today: None    ABG Results:   Recent Labs   Lab 12/20/22  1620 12/20/22  1157 12/18/22  0559 12/18/22  0214   PH 7.44 7.42 7.45 7.49*   PCO2 38 42 35 32*   PO2 100 170* 83 72*   HCO3 26 27 24 24   O2PER 50 30 40 40       Current Vent Settings: Vent Mode: CMV/AC  (Continuous Mandatory Ventilation/ Assist Control)  FiO2 (%): 50 %  Resp Rate (Set): 20 breaths/min  Tidal Volume (Set, mL): 320 mL  PEEP (cm H2O): 6 cmH2O  Resp: 20      Skin Assessment: intact    Plan: Continue Vent management protocol    Migel Dash RT on 12/21/2022 at 6:11 AM

## 2022-12-21 NOTE — PROGRESS NOTES
[facility] ICU RESPIRATORY NOTE        Date of Admission: 12/16/2022    Date of Intubation (most recent): 12/16/2022.     Reason for Mechanical Ventilation: Respiratory Failure.    Number of Days on Mechanical Ventilation: Day 6.     Met Criteria for Spontaneous Breathing Trial: No.    Reason for No Spontaneous Breathing Trial: Per MD.     Significant Events Today: Lumbar puncture at bedside.     ABG Results:   Recent Labs   Lab 12/20/22  1620 12/20/22  1157 12/18/22  0559 12/18/22  0214   PH 7.44 7.42 7.45 7.49*   PCO2 38 42 35 32*   PO2 100 170* 83 72*   HCO3 26 27 24 24   O2PER 50 30 40 40       Current Vent Settings: Vent Mode: CMV/AC  (Continuous Mandatory Ventilation/ Assist Control)  FiO2 (%): 35 %  Resp Rate (Set): 20 breaths/min  Tidal Volume (Set, mL): 320 mL  PEEP (cm H2O): 6 cmH2O  Resp: 20      Plan: Continue to monitor patient on full ventilatory support overnight. Respiratory will continue to monitor.     Gordo Ray, RRT on 12/21/2022 at 5:17 PM

## 2022-12-21 NOTE — PROGRESS NOTES
"Brief intensivist note  Patient with SBP in 80's and occasional MAP's < 65  500 mls 5% albumin ordered  Levophed available if needed  Free water increased (100 mls to 200 mls q4h) with Na 150 and she may still be on \"dry\" side at present.   She has sepsis from likely severe MRSA pneumonia and consolidation and remains on vent 50%, +6 PEEP.    justina avendaño  December 21, 2022      "

## 2022-12-21 NOTE — PROGRESS NOTES
[facility]    Infectious Disease Progress Note    Date of Service (when I saw the patient): 12/21/2022     Assessment & Plan   Deidre N Aasen is a 27 year old female who was admitted on 12/16/2022.     Impression:  1. 27 y.o patient with polysubstance abuse, IVDU  2. Type 1 diabetes  3. History of medical noncompliance   4. Recent presentation to OSH (epic records reviewed ) left AMA  5. Admitted this occasion with diabetic ketoacidosis (DKA) w/ severe acidosis, acute hypoxic respiratory failure, acute encephalopathy, (+) COVID-19, and pneumonia possible cavitation and abscess.   6. On remdesivir, vancomycin and zosyn   7. Cultures in the blood MRSA   8. resp cultures MRSA       Recommendations:   1 Blood cultures with MRSA, which explains almost all the findings Including lung situation, necrotizing staph pneumonia with associated viral infection quite typical and classic    2 Vancomycin per pharmacy, levels and renal fct OK, will ask lab to run dapto susc   3 Unasyn now cefepime for bacterial/noso pneumonia, but suspect the lung findings are all MRSA and COVID only, 12/16 sputum only MRSA likely short course     Daily blood cultures, 12/18 and 19 neg so far, ? cleared relatively easily  TTE neg, CATHY probably indicated. However of great [id number] initial blood cultures were negative making endocarditis as an underlying condition here very unlikely       Max pulmonary support as in place.         Cole Torres MD    Interval History   Intubated Clinically stable, temp down, no obvious new secondary involved site  MRSA In the blood   Central line removed     Physical Exam   Temp: 97.5  F (36.4  C) Temp src: Bladder BP: 117/67 Pulse: 83   Resp: 19 SpO2: 94 % O2 Device: Mechanical Ventilator    Vitals:    12/19/22 0230 12/20/22 0600 12/21/22 0200   Weight: 45.4 kg (100 lb 1.4 oz) 43.7 kg (96 lb 5.5 oz) 43 kg (94 lb 12.8 oz)     Vital Signs with Ranges  Temp:  [97  F (36.1  C)-99  F  (37.2  C)] 97.5  F (36.4  C)  Pulse:  [66-98] 83  Resp:  [17-31] 19  BP: ()/(54-79) 117/67  FiO2 (%):  [40 %-50 %] 40 %  SpO2:  [87 %-100 %] 94 %    Constitutional: intubated   Lungs: Clear to auscultation bilaterally, no crackles or wheezing  Cardiovascular: Regular rate and rhythm, normal S1 and S2, and no murmur noted  Abdomen: Normal bowel sounds, soft, non-distended, non-tender  Skin:multiple track marks   Other:    Medications     dextrose       fentaNYL 200 mcg/hr (12/21/22 0827)     propofol 20 mcg/kg/min (12/21/22 0818)    And     - MEDICATION INSTRUCTIONS -       - MEDICATION INSTRUCTIONS -       midazolam 7 mg/hr (12/21/22 0819)     norepinephrine 0.03 mcg/kg/min (12/21/22 0614)     sodium chloride 20 mL/hr at 12/19/22 0540       ceFEPIme  2,000 mg Intravenous Q8H     chlorhexidine  15 mL Mouth/Throat Q12H     heparin ANTICOAGULANT  5,000 Units Subcutaneous Q12H     insulin aspart  1-6 Units Subcutaneous Q4H     insulin glargine  20 Units Subcutaneous At Bedtime     levalbuterol  0.63 mg Nebulization Q8H     oxyCODONE  10 mg Oral or Feeding Tube Q6H     pantoprazole  40 mg Intravenous BID     sodium chloride (PF)  10 mL Intracatheter Q8H     sodium chloride (PF)  3 mL Intravenous Q8H     vancomycin  750 mg Intravenous Q12H       Data   All microbiology laboratory data reviewed.  Recent Labs   Lab Test 12/21/22  0431 12/20/22  0508 12/19/22  0814   WBC 8.0 9.2 21.2*   HGB 7.6* 7.8* 7.9*   HCT 22.9* 23.0* 21.4*   MCV 91 90 84    177 145*     Recent Labs   Lab Test 12/21/22  0431 12/20/22  1805 12/20/22  0508   CR 0.85 0.92 1.01     No lab results found.  No lab results found.    Invalid input(s): UC    All cultures:  Recent Labs   Lab 12/19/22  1538 12/19/22  1515 12/18/22  1925 12/16/22  1309 12/16/22  0356 12/16/22  0321   CULTURE Positive on the 1st day of incubation*  Budding Yeast* No growth after 1 day No growth after 2 days  No growth after 2 days 4+ Normal aaron  4+ Staphylococcus  aureus MRSA* Positive on the 1st day of incubation*  Staphylococcus aureus MRSA* Positive on the 1st day of incubation*  Staphylococcus aureus MRSA*      Blood culture:  Results for orders placed or performed during the hospital encounter of 12/16/22   Blood Culture Hand, Right    Specimen: Hand, Right; Blood   Result Value Ref Range    Culture Positive on the 1st day of incubation (A)     Culture Budding Yeast (AA)    Blood Culture Line, venous    Specimen: Line, venous; Blood   Result Value Ref Range    Culture No growth after 1 day    Blood Culture Wrist, Right    Specimen: Wrist, Right; Blood   Result Value Ref Range    Culture No growth after 2 days    Blood Culture Hand, Right    Specimen: Hand, Right; Blood   Result Value Ref Range    Culture No growth after 2 days    Results for orders placed or performed during the hospital encounter of 12/16/22   Blood Culture Arm, Right    Specimen: Arm, Right; Blood   Result Value Ref Range    Culture Positive on the 1st day of incubation (A)     Culture Staphylococcus aureus MRSA (AA)    Blood Culture Peripheral Blood    Specimen: Peripheral Blood   Result Value Ref Range    Culture Positive on the 1st day of incubation (A)     Culture Staphylococcus aureus MRSA (AA)        Susceptibility    Staphylococcus aureus MRSA - SAMANTHA*     Oxacillin* >=4 Resistant ug/mL      * Oxacillin susceptible isolates are susceptible to cephalosporins (example: cefazolin and cephalexin) and beta lactam combination agents. Oxacillin resistant isolates are resistant to these agents.     Gentamicin <=0.5 Susceptible ug/mL     Erythromycin >=8 Resistant ug/mL     Clindamycin* <=0.25 Susceptible ug/mL      * This isolate DOES NOT demonstrate inducible clindamycin resistance in vitro. Clindamycin is susceptible and could be used when indicated, however, erythromycin is resistant and should not be used.     Linezolid 2 Susceptible ug/mL     Vancomycin 1 Susceptible ug/mL     Tetracycline <=1  Susceptible ug/mL     Trimethoprim/Sulfamethoxazole <=0.5/9.5 Susceptible ug/mL     Daptomycin 0.75 Susceptible ug/mL     * MRSA requires contact precautions.     MRSA requires contact precautions.   Results for orders placed or performed during the hospital encounter of 12/13/22   Blood Culture Arm, Right    Specimen: Arm, Right; Blood   Result Value Ref Range    Culture No Growth    Blood Culture Arm, Left    Specimen: Arm, Left; Blood   Result Value Ref Range    Culture No Growth       Urine culture:  No results found for this or any previous visit.

## 2022-12-21 NOTE — PROGRESS NOTES
THORACIC & FOREGUT SURGERY    Most recent labs and Imaging Reviewed  CT chest 12/19/2022:  Very consolidated right lower lobe with cavitation due to severe pneumonia. There is good pulmonary venous contrast noted, so I don't think there is a perfusion problem to this lobe. This is a bad infection which will take time to heal. Can consider image-guided pigtail catheter to drain small effusion.    Martin Layne MD

## 2022-12-21 NOTE — PROGRESS NOTES
CLINICAL NUTRITION SERVICES - REASSESSMENT NOTE      Recommendations Ordered by Registered Dietitian (RD): Increase TF to goal of Osmolite 1.5 at 35 mL/hr= 1260 kcal, 53 g protein (1.3 g/kg), 171 g CHO, 0 g fiber, 640 mL H2O  Total with Propofol = 1397 kcal (35 kcal/kg)  Certavite daily    Malnutrition: % Weight Loss:  Large wt fluctuations - unable to assess true wt loss  % Intake:  </= 50% for >/= 5 days (severe malnutrition)  Subcutaneous Fat Loss:  Orbital region severe depletion (visual only d/t COVID isolation)  Muscle Loss:  Temporal region severe depletion (visual only d/t COVID isolation)  Fluid Retention:  Severe (4+ generalized)    Malnutrition Diagnosis: Severe malnutrition  In Context of:  Acute illness or injury  Chronic illness or disease  Environmental or social circumstances       EVALUATION OF PROGRESS TOWARD GOALS   Diet:  NPO on vent     Nutrition Support:  Patient started on trickle TF 12/18 and continues as follows ~    Nutrition Support Enteral:  Type of Feeding Tube: Tip at D-J junction   Enteral Frequency:  Continuous  Enteral Regimen: Osmolite 1.5 at 10 mL/hr  Total Enteral Provisions: 360 kcal, 15 g protein (0.4 g/kg), 49 g CHO, 0 g fiber, 183 mL H2O  Free Water Flush: 200 mL every 4 hours   Propofol at 5.2 mL/hr= 137 kcal   Total = 497 kcal (13 kcal/kg)      Intake/Tolerance:    Na 151 (H) - Flushes increased today   K normal, Mg 2.4 (H)  -228 with Medium sliding scale insulin and Lantus 20 units at HS   I/O 2297/1260, wt 43 kg (up overall)      Patient now day #6 sub-optimal     ASSESSED NUTRITION NEEDS:  Dosing Weight 39.4 kg  Estimated Energy Needs: 2178-9221 kcals (30-35 Kcal/Kg)  Justification: Underweight, vented   Estimated Protein Needs: 50-60 grams protein (1.2-1.5 g pro/Kg)   Justification: Repletion  Estimated Fluid Needs: Per MD for fluid balance      NEW FINDINGS:   12/20:  FT tip at D-J junction     12/21:  WOCN = Coccyx   Pressure injury (wound be hospital  acquired) vs viral lesion, requesting PCR testing    Patient on Norepi drip     Previous Goals (12/18):   EN to reach initial goal rate in the next 2-3 days  Evaluation: Not met    Previous Nutrition Diagnosis (12/18):   Inadequate protein-energy intake related to NPO status on vent as evidenced by pt not meeting estimated needs  Evaluation: Improving      MALNUTRITION  % Weight Loss:  Large wt fluctuations - unable to assess true wt loss  % Intake:  </= 50% for >/= 5 days (severe malnutrition)  Subcutaneous Fat Loss:  Orbital region severe depletion (visual only d/t COVID isolation)  Muscle Loss:  Temporal region severe depletion (visual only d/t COVID isolation)  Fluid Retention:  Severe (4+ generalized)    Malnutrition Diagnosis: Severe malnutrition  In Context of:  Acute illness or injury  Chronic illness or disease  Environmental or social circumstances    CURRENT NUTRITION DIAGNOSIS  Inadequate enteral nutrition infusion related to TF at 10 mL/hr as evidenced by meeting     INTERVENTIONS  Recommendations / Nutrition Prescription  Increase TF to goal of Osmolite 1.5 at 35 mL/hr= 1260 kcal, 53 g protein (1.3 g/kg), 171 g CHO, 0 g fiber, 640 mL H2O  Total with Propofol = 1397 kcal (35 kcal/kg)  Certavite daily     Implementation  EN Composition, EN Schedule, Multivitamin/Mineral:  Orders written to increase TF to 20 mL/hr now and then after 24 hours advance to goal.  Certavite as above.   Collaboration and Referral of Nutrition care:  Patient discussed today during interdisciplinary bedside rounds.     Goals  TF + Propofol will meet % needs while NPO     MONITORING AND EVALUATION:  Progress towards goals will be monitored and evaluated per protocol and Practice Guidelines    Chrissie Avery RD, LD, CNSC   Clinical Dietitian - [facility]

## 2022-12-21 NOTE — PROCEDURES
[facility]    Triple Lumen PICC Placement    Date/Time: 12/21/2022 10:32 AM  Performed by: Mairam Finn RN  Authorized by: Venkatesh Pool MD   Indications: vascular access      UNIVERSAL PROTOCOL   Site Marked: Yes  Prior Images Obtained and Reviewed:  No  Required items: Required blood products, implants, devices and special equipment available    Patient identity confirmed:  Arm band and hospital-assigned identification number  NA - No sedation, light sedation, or local anesthesia  Confirmation Checklist:  Patient's identity using two indicators, relevant allergies, procedure was appropriate and matched the consent or emergent situation and correct equipment/implants were available  Time out: Immediately prior to the procedure a time out was called    Universal Protocol: the Joint Commission Universal Protocol was followed    Preparation: Patient was prepped and draped in usual sterile fashion       ANESTHESIA    Anesthesia: Local infiltration  Local Anesthetic:  Lidocaine 1% without epinephrine  Anesthetic Total (mL):  3      SEDATION    Patient Sedated: No        Preparation: skin prepped with 2% chlorhexidine and skin prepped with ChloraPrep  Skin prep agent: skin prep agent completely dried prior to procedure  Sterile barriers: maximum sterile barriers were used: cap, mask, sterile gown, sterile gloves, and large sterile sheet  Hand hygiene: hand hygiene performed prior to central venous catheter insertion  Type of line used: PICC  Catheter type: triple lumen  Lumen type: power PICC and valved  Catheter size: 5 Fr  Brand: Bard  Lot number: [id number]  Placement method: MST and ultrasound  Number of attempts: 1  Difficulty threading catheter: no  Successful placement: yes  Orientation: right    Location: basilic vein  Arm circumference: adults 10 cm  Extremity circumference: 21  Visible catheter length: 7  Total catheter length: 41  Internal Lumen Volume: 34 mL    Dressing  and securement: blood removed, chlorhexidine disc applied, occlusive dressing applied and subcutaneous anchor securement system  Post procedure assessment: placement verified by 3CG technology  PROCEDURE   Patient Tolerance:  Patient tolerated the procedure well with no immediate complicationsDescribe Procedure: Right upper arm picc line placed for blood draws and access for pressors.  Line successfully placed and ok to use.  Unit RN updated.

## 2022-12-21 NOTE — CONSULTS
[facility] Nurse Inpatient Assessment     Consulted for: Coccyx    Patient History (according to provider note(s):      Deidre N Aasen is a 27 year old female admitted on 12/16/2022 for DKA, respiratory failure from concurrent covid, bacterial pneumonia.   After arrival to the ICU she developed acute respiratory failure requiring intubation and development of septic shock requiring initation of dual vasopressor support and stress dose steroids in addition to broad spectrum antimicrobial therapy. She has ongoing fluid resuscitation needs, management of severe electrolyte abnormalities and ongoing treatment for septic shock and DKA per protocol. She remains severely critically     Areas Assessed:      Areas visualized during today's visit: Sacrum/coccyx    Wound location: Coccyx    Last photo: 12/21/22        Wound due to: Pressure injury (wound be hospital acquired) vs viral lesion, requesting PCR testing  Wound history/plan of care: Patient with history of substance abuse and covid positive. Sedated in the ICU on initial assessment. Patient has approximately 4 other small scabbed areas scattered on legs and buttock and [place] scars of varying ages some fully heale around area that are auspicious for viral lesion. Patient has had been unable to position on her right side as often due to blood pressure.    Wound base: 100% non-blanchable, purple and epidermis     Palpation of the wound bed: normal      Drainage: none     Description of drainage: none     Measurements (length x width x depth, in cm): 1cm  x 1cm  x  0 cm      Tunneling: N/A     Undermining: N/A  Periwound skin: Intact, circular blanchable purple epidermis scattered      Color: pink and purple      Temperature: normal   Odor: none  Pain: no grimacing or signs of discomfort, none  Pain interventions prior to dressing change: patient tolerated well  Treatment goal: Heal  and Protection  STATUS: initial assessment  Supplies  "ordered: supplies stored on unit      Treatment Plan:     Coccyx wound(s): Daily    1. Clean wound with saline or MicroKlenz Spray, pat dry  2. Wipe / \"clean\" the surrounding periwound tissue with skin prep (Cavilon No Sting Skin Prep #298235) and allow to dry. This will help protect periwound and help dressing adherence  3. Press a Mepilex Sacral to the area, making sure to conform nicely to skin curvatures.   4. Time and date dressing change  Pressure Injury prevention (please order supplies if not in room)  1. Turn/reposition every 1-2 hrs  2.   Float heels off bed with use of pillow and if needed Heel Lift boots (Prevalon #231043)  3.   If incontinent Cleanse with incontinent cleanser (Juan Daniel spray #010909) followed by skin barrier protectant (Critic Aid paste)  BID and after each incontinent episode  4.   Prevent sliding and shear by limiting HOB to 30 degrees or less unless contraindicated, use knee gatch first if not contraindicated  5.   Chair cushion pressure redistribution as needed (#[id number]): please limit sitting to an hour at a time  6.   Optimize nutrition   7.   Consider PULSATE low air loss mattress    Orders: Written    RECOMMEND PRIMARY TEAM ORDER: Requested PCR testing for viral lesion from MD  Education provided: importance of repositioning, plan of care, Moisture management, Hygiene and Off-loading pressure  Discussed plan of care with: Patient and Nurse  WOC nurse follow-up plan: twice weekly  Notify WOC if wound(s) deteriorate.  Nursing to notify the Provider(s) and re-consult the WOC Nurse if new skin concern.    DATA:     Current support surface: Standard  Low air loss (JERMAINE pump, Isolibrium, Pulsate, skin guard, etc)  Containment of urine/stool: Incontinent pad in bed and Indwelling catheter  BMI: Body mass index is 16.8 kg/m .   Active diet order: Orders Placed This Encounter      NPO for Medical/Clinical Reasons Except for: Other; Specify: NPO for 1 hour after CATHY then Advance diet as " tolerated to Adult Basic Diet     Output: I/O last 3 completed shifts:  In: 2297.95 [I.V.:1147.95; NG/GT:500]  Out: 1260 [Urine:1260]     Labs: Recent Labs   Lab 12/21/22  0431 12/17/22  0417 12/16/22  1015 12/16/22  0243   ALBUMIN  --   --   --  2.8*   HGB 7.6*   < > 9.2* 11.8   INR  --   --  1.82* 1.68*   WBC 8.0   < > 3.3* 16.0*   CRP  --   --  277.0* 468.79*    < > = values in this interval not displayed.     Pressure injury risk assessment:   Sensory Perception: 1-->completely limited  Moisture: 4-->rarely moist  Activity: 1-->bedfast  Mobility: 1-->completely immobile  Nutrition: 1-->very poor  Friction and Shear: 1-->problem  Sedrick Score: 9    Charley ZENG   Dept. Pager: [phone]  Dept. Office Number: [phone]

## 2022-12-22 LAB
ALBUMIN SERPL-MCNC: 1.2 G/DL (ref 3.4–5)
ALP SERPL-CCNC: 78 U/L (ref 40–150)
ALT SERPL W P-5'-P-CCNC: 32 U/L (ref 0–50)
ANION GAP SERPL CALCULATED.3IONS-SCNC: 5 MMOL/L (ref 3–14)
APTT PPP: 38 SECONDS (ref 22–38)
AST SERPL W P-5'-P-CCNC: 46 U/L (ref 0–45)
BILIRUB SERPL-MCNC: 0.3 MG/DL (ref 0.2–1.3)
BUN SERPL-MCNC: 21 MG/DL (ref 7–30)
CALCIUM SERPL-MCNC: 6.9 MG/DL (ref 8.5–10.1)
CHLORIDE BLD-SCNC: 111 MMOL/L (ref 94–109)
CO2 SERPL-SCNC: 29 MMOL/L (ref 20–32)
CREAT SERPL-MCNC: 0.79 MG/DL (ref 0.52–1.04)
ERYTHROCYTE [DISTWIDTH] IN BLOOD BY AUTOMATED COUNT: 15.1 % (ref 10–15)
FIBRINOGEN PPP-MCNC: NORMAL MG/DL
GFR SERPL CREATININE-BSD FRML MDRD: >90 ML/MIN/1.73M2
GLUCOSE BLD-MCNC: 218 MG/DL (ref 70–99)
GLUCOSE BLDC GLUCOMTR-MCNC: 179 MG/DL (ref 70–99)
GLUCOSE BLDC GLUCOMTR-MCNC: 181 MG/DL (ref 70–99)
GLUCOSE BLDC GLUCOMTR-MCNC: 228 MG/DL (ref 70–99)
GLUCOSE BLDC GLUCOMTR-MCNC: 242 MG/DL (ref 70–99)
GLUCOSE BLDC GLUCOMTR-MCNC: 268 MG/DL (ref 70–99)
GLUCOSE BLDC GLUCOMTR-MCNC: 297 MG/DL (ref 70–99)
HCT VFR BLD AUTO: 22 % (ref 35–47)
HGB BLD-MCNC: 7.3 G/DL (ref 11.7–15.7)
INR PPP: NORMAL
MAGNESIUM SERPL-MCNC: 2.1 MG/DL (ref 1.6–2.3)
MCH RBC QN AUTO: 30.9 PG (ref 26.5–33)
MCHC RBC AUTO-ENTMCNC: 33.2 G/DL (ref 31.5–36.5)
MCV RBC AUTO: 93 FL (ref 78–100)
PHOSPHATE SERPL-MCNC: 2.9 MG/DL (ref 2.5–4.5)
PLATELET # BLD AUTO: 221 10E3/UL (ref 150–450)
POTASSIUM BLD-SCNC: 3.1 MMOL/L (ref 3.4–5.3)
POTASSIUM BLD-SCNC: 3.1 MMOL/L (ref 3.4–5.3)
POTASSIUM BLD-SCNC: 4 MMOL/L (ref 3.4–5.3)
PROT SERPL-MCNC: 4.8 G/DL (ref 6.8–8.8)
RBC # BLD AUTO: 2.36 10E6/UL (ref 3.8–5.2)
SODIUM SERPL-SCNC: 142 MMOL/L (ref 133–144)
SODIUM SERPL-SCNC: 145 MMOL/L (ref 133–144)
VANCOMYCIN SERPL-MCNC: 18.9 MG/L
WBC # BLD AUTO: 8.6 10E3/UL (ref 4–11)

## 2022-12-22 PROCEDURE — 85610 PROTHROMBIN TIME: CPT | Performed by: STUDENT IN AN ORGANIZED HEALTH CARE EDUCATION/TRAINING PROGRAM

## 2022-12-22 PROCEDURE — 80202 ASSAY OF VANCOMYCIN: CPT | Performed by: INTERNAL MEDICINE

## 2022-12-22 PROCEDURE — 83735 ASSAY OF MAGNESIUM: CPT | Performed by: INTERNAL MEDICINE

## 2022-12-22 PROCEDURE — 250N000009 HC RX 250: Performed by: INTERNAL MEDICINE

## 2022-12-22 PROCEDURE — 87040 BLOOD CULTURE FOR BACTERIA: CPT | Performed by: INTERNAL MEDICINE

## 2022-12-22 PROCEDURE — 250N000013 HC RX MED GY IP 250 OP 250 PS 637: Performed by: INTERNAL MEDICINE

## 2022-12-22 PROCEDURE — 85027 COMPLETE CBC AUTOMATED: CPT | Performed by: SURGERY

## 2022-12-22 PROCEDURE — 258N000003 HC RX IP 258 OP 636: Performed by: INTERNAL MEDICINE

## 2022-12-22 PROCEDURE — 200N000001 HC R&B ICU

## 2022-12-22 PROCEDURE — 250N000011 HC RX IP 250 OP 636: Performed by: STUDENT IN AN ORGANIZED HEALTH CARE EDUCATION/TRAINING PROGRAM

## 2022-12-22 PROCEDURE — 258N000003 HC RX IP 258 OP 636: Performed by: STUDENT IN AN ORGANIZED HEALTH CARE EDUCATION/TRAINING PROGRAM

## 2022-12-22 PROCEDURE — 85730 THROMBOPLASTIN TIME PARTIAL: CPT | Performed by: STUDENT IN AN ORGANIZED HEALTH CARE EDUCATION/TRAINING PROGRAM

## 2022-12-22 PROCEDURE — 84100 ASSAY OF PHOSPHORUS: CPT | Performed by: INTERNAL MEDICINE

## 2022-12-22 PROCEDURE — 36415 COLL VENOUS BLD VENIPUNCTURE: CPT | Performed by: INTERNAL MEDICINE

## 2022-12-22 PROCEDURE — 250N000011 HC RX IP 250 OP 636: Performed by: SURGERY

## 2022-12-22 PROCEDURE — 94640 AIRWAY INHALATION TREATMENT: CPT

## 2022-12-22 PROCEDURE — 250N000013 HC RX MED GY IP 250 OP 250 PS 637: Performed by: STUDENT IN AN ORGANIZED HEALTH CARE EDUCATION/TRAINING PROGRAM

## 2022-12-22 PROCEDURE — C9113 INJ PANTOPRAZOLE SODIUM, VIA: HCPCS | Performed by: SURGERY

## 2022-12-22 PROCEDURE — 250N000011 HC RX IP 250 OP 636: Performed by: INTERNAL MEDICINE

## 2022-12-22 PROCEDURE — 86036 ANCA SCREEN EACH ANTIBODY: CPT | Performed by: INTERNAL MEDICINE

## 2022-12-22 PROCEDURE — 84132 ASSAY OF SERUM POTASSIUM: CPT | Performed by: INTERNAL MEDICINE

## 2022-12-22 PROCEDURE — 94640 AIRWAY INHALATION TREATMENT: CPT | Mod: 76

## 2022-12-22 PROCEDURE — 999N000157 HC STATISTIC RCP TIME EA 10 MIN

## 2022-12-22 PROCEDURE — 250N000013 HC RX MED GY IP 250 OP 250 PS 637: Performed by: SURGERY

## 2022-12-22 PROCEDURE — 99233 SBSQ HOSP IP/OBS HIGH 50: CPT | Performed by: INTERNAL MEDICINE

## 2022-12-22 PROCEDURE — 80053 COMPREHEN METABOLIC PANEL: CPT | Performed by: INTERNAL MEDICINE

## 2022-12-22 PROCEDURE — 85384 FIBRINOGEN ACTIVITY: CPT | Performed by: STUDENT IN AN ORGANIZED HEALTH CARE EDUCATION/TRAINING PROGRAM

## 2022-12-22 PROCEDURE — 84295 ASSAY OF SERUM SODIUM: CPT | Performed by: STUDENT IN AN ORGANIZED HEALTH CARE EDUCATION/TRAINING PROGRAM

## 2022-12-22 PROCEDURE — 94003 VENT MGMT INPAT SUBQ DAY: CPT

## 2022-12-22 PROCEDURE — 99291 CRITICAL CARE FIRST HOUR: CPT | Mod: GC | Performed by: INTERNAL MEDICINE

## 2022-12-22 RX ORDER — ACETAMINOPHEN 325 MG/10.15ML
650 LIQUID ORAL EVERY 4 HOURS PRN
Status: DISCONTINUED | OUTPATIENT
Start: 2022-12-22 | End: 2022-12-30

## 2022-12-22 RX ORDER — POTASSIUM CHLORIDE 29.8 MG/ML
20 INJECTION INTRAVENOUS
Status: COMPLETED | OUTPATIENT
Start: 2022-12-22 | End: 2022-12-22

## 2022-12-22 RX ORDER — ACETAMINOPHEN 325 MG/1
650 TABLET ORAL EVERY 4 HOURS PRN
Status: DISCONTINUED | OUTPATIENT
Start: 2022-12-22 | End: 2022-12-30

## 2022-12-22 RX ORDER — POTASSIUM CHLORIDE 1.5 G/1.58G
40 POWDER, FOR SOLUTION ORAL ONCE
Status: COMPLETED | OUTPATIENT
Start: 2022-12-22 | End: 2022-12-22

## 2022-12-22 RX ADMIN — MIDAZOLAM HYDROCHLORIDE 8 MG/HR: 1 INJECTION, SOLUTION INTRAVENOUS at 12:53

## 2022-12-22 RX ADMIN — VANCOMYCIN HYDROCHLORIDE 750 MG: 1 INJECTION, POWDER, LYOPHILIZED, FOR SOLUTION INTRAVENOUS at 11:26

## 2022-12-22 RX ADMIN — CHLORHEXIDINE GLUCONATE 0.12% ORAL RINSE 15 ML: 1.2 LIQUID ORAL at 20:05

## 2022-12-22 RX ADMIN — VANCOMYCIN HYDROCHLORIDE 750 MG: 1 INJECTION, POWDER, LYOPHILIZED, FOR SOLUTION INTRAVENOUS at 22:28

## 2022-12-22 RX ADMIN — LEVALBUTEROL HYDROCHLORIDE 0.63 MG: 1.25 SOLUTION, CONCENTRATE RESPIRATORY (INHALATION) at 23:55

## 2022-12-22 RX ADMIN — POTASSIUM CHLORIDE 20 MEQ: 29.8 INJECTION, SOLUTION INTRAVENOUS at 13:12

## 2022-12-22 RX ADMIN — CEFEPIME HYDROCHLORIDE 500 MG: 1 INJECTION, POWDER, FOR SOLUTION INTRAMUSCULAR; INTRAVENOUS at 17:58

## 2022-12-22 RX ADMIN — CEFEPIME HYDROCHLORIDE 500 MG: 1 INJECTION, POWDER, FOR SOLUTION INTRAMUSCULAR; INTRAVENOUS at 06:09

## 2022-12-22 RX ADMIN — POTASSIUM CHLORIDE 40 MEQ: 1.5 POWDER, FOR SOLUTION ORAL at 06:56

## 2022-12-22 RX ADMIN — PROPOFOL 10 MCG/KG/MIN: 10 INJECTION, EMULSION INTRAVENOUS at 14:58

## 2022-12-22 RX ADMIN — INSULIN GLARGINE 20 UNITS: 100 INJECTION, SOLUTION SUBCUTANEOUS at 21:44

## 2022-12-22 RX ADMIN — LEVALBUTEROL HYDROCHLORIDE 0.63 MG: 1.25 SOLUTION, CONCENTRATE RESPIRATORY (INHALATION) at 07:59

## 2022-12-22 RX ADMIN — METOLAZONE 5 MG: 5 TABLET ORAL at 15:50

## 2022-12-22 RX ADMIN — MULTIVITAMIN 15 ML: LIQUID ORAL at 08:00

## 2022-12-22 RX ADMIN — Medication 200 MCG/HR: at 21:42

## 2022-12-22 RX ADMIN — METOLAZONE 5 MG: 5 TABLET ORAL at 07:49

## 2022-12-22 RX ADMIN — INSULIN ASPART 2 UNITS: 100 INJECTION, SOLUTION INTRAVENOUS; SUBCUTANEOUS at 04:02

## 2022-12-22 RX ADMIN — HEPARIN SODIUM 5000 UNITS: 5000 INJECTION, SOLUTION INTRAVENOUS; SUBCUTANEOUS at 08:00

## 2022-12-22 RX ADMIN — OXYCODONE HYDROCHLORIDE 10 MG: 5 SOLUTION ORAL at 02:12

## 2022-12-22 RX ADMIN — FUROSEMIDE 20 MG: 10 INJECTION, SOLUTION INTRAMUSCULAR; INTRAVENOUS at 08:01

## 2022-12-22 RX ADMIN — POTASSIUM CHLORIDE 20 MEQ: 29.8 INJECTION, SOLUTION INTRAVENOUS at 14:58

## 2022-12-22 RX ADMIN — HEPARIN SODIUM 5000 UNITS: 5000 INJECTION, SOLUTION INTRAVENOUS; SUBCUTANEOUS at 20:06

## 2022-12-22 RX ADMIN — CHLORHEXIDINE GLUCONATE 0.12% ORAL RINSE 15 ML: 1.2 LIQUID ORAL at 07:33

## 2022-12-22 RX ADMIN — INSULIN ASPART 3 UNITS: 100 INJECTION, SOLUTION INTRAVENOUS; SUBCUTANEOUS at 00:15

## 2022-12-22 RX ADMIN — INSULIN ASPART 1 UNITS: 100 INJECTION, SOLUTION INTRAVENOUS; SUBCUTANEOUS at 11:32

## 2022-12-22 RX ADMIN — PANTOPRAZOLE SODIUM 40 MG: 40 INJECTION, POWDER, FOR SOLUTION INTRAVENOUS at 08:01

## 2022-12-22 RX ADMIN — INSULIN ASPART 4 UNITS: 100 INJECTION, SOLUTION INTRAVENOUS; SUBCUTANEOUS at 20:06

## 2022-12-22 RX ADMIN — MICAFUNGIN SODIUM 100 MG: 50 INJECTION, POWDER, LYOPHILIZED, FOR SOLUTION INTRAVENOUS at 13:57

## 2022-12-22 RX ADMIN — ACETAMINOPHEN 650 MG: 325 SUSPENSION ORAL at 22:28

## 2022-12-22 RX ADMIN — PANTOPRAZOLE SODIUM 40 MG: 40 INJECTION, POWDER, FOR SOLUTION INTRAVENOUS at 20:05

## 2022-12-22 RX ADMIN — INSULIN ASPART 1 UNITS: 100 INJECTION, SOLUTION INTRAVENOUS; SUBCUTANEOUS at 07:53

## 2022-12-22 RX ADMIN — OXYCODONE HYDROCHLORIDE 10 MG: 5 SOLUTION ORAL at 07:49

## 2022-12-22 RX ADMIN — INSULIN ASPART 3 UNITS: 100 INJECTION, SOLUTION INTRAVENOUS; SUBCUTANEOUS at 15:52

## 2022-12-22 RX ADMIN — Medication 200 MCG/HR: at 08:31

## 2022-12-22 ASSESSMENT — ACTIVITIES OF DAILY LIVING (ADL)
ADLS_ACUITY_SCORE: 38
ADLS_ACUITY_SCORE: 40
ADLS_ACUITY_SCORE: 38
ADLS_ACUITY_SCORE: 40
ADLS_ACUITY_SCORE: 38
ADLS_ACUITY_SCORE: 40
ADLS_ACUITY_SCORE: 38
ADLS_ACUITY_SCORE: 40
ADLS_ACUITY_SCORE: 38
ADLS_ACUITY_SCORE: 40

## 2022-12-22 NOTE — PROGRESS NOTES
[facility] ICU RESPIRATORY NOTE        Date of Admission: 12/16/2022    Date of Intubation (most recent): 12/16/2022    Reason for Mechanical Ventilation: Respiratory failure    Number of Days on Mechanical Ventilation: 7    Met Criteria for Spontaneous Breathing Trial: No    Reason for No Spontaneous Breathing Trial: Per MD    Significant Events Today: ETAD changed, bite block added. No other events.     ABG Results:   Recent Labs   Lab 12/20/22  1620 12/20/22  1157 12/18/22  0559 12/18/22  0214   PH 7.44 7.42 7.45 7.49*   PCO2 38 42 35 32*   PO2 100 170* 83 72*   HCO3 26 27 24 24   O2PER 50 30 40 40       Current Vent Settings: Vent Mode: CMV/AC  (Continuous Mandatory Ventilation/ Assist Control)  FiO2 (%): 40 %  Resp Rate (Set): 20 breaths/min  Tidal Volume (Set, mL): 320 mL  PEEP (cm H2O): 6 cmH2O  Resp: 19      Yao Lerma, RT on 12/22/2022 at 6:08 AM

## 2022-12-22 NOTE — PROGRESS NOTES
Notified provider about indwelling degroot catheter discussed removal or continued need.    Did provider choose to remove indwelling degroot catheter? No    Provider's degroot indication for keeping indwelling degroot catheter: Deep sedation/ Paralysis    Is there an order for indwelling degroot catheter? Yes    *If there is a plan to keep degroot catheter in place at discharge daily notification with provider is not necessary.

## 2022-12-22 NOTE — PROGRESS NOTES
[facility]    Infectious Disease Progress Note    Date of Service (when I saw the patient): 12/22/2022     Assessment & Plan   Deidre N Aasen is a 27 year old female who was admitted on 12/16/2022.     Impression:  1. 27 y.o patient with polysubstance abuse, IVDU  2. Type 1 diabetes  3. History of medical noncompliance   4. Recent presentation to OSH (epic records reviewed ) left AMA  5. Admitted this occasion with diabetic ketoacidosis (DKA) w/ severe acidosis, acute hypoxic respiratory failure, acute encephalopathy, (+) COVID-19, and pneumonia possible cavitation and abscess.   6. On remdesivir, vancomycin and zosyn   7. Cultures in the blood MRSA   8. resp cultures MRSA       Recommendations:   1 Blood cultures with MRSA, which explains almost all the findings Including lung situation, necrotizing staph pneumonia with associated viral infection quite typical and classic    2 Vancomycin per pharmacy, levels and renal fct OK, will ask lab to run dapto susc   3 Unasyn then cefepime for bacterial/noso pneumonia, but suspect the lung findings are all MRSA and COVID only, 12/16 sputum only MRSA,  likely short course of gram-negative coverage    4 Daily blood cultures, 12/18 and 19 neg for MRSA, ? cleared relatively easily  TTE neg, CATHY probably indicated. However of great [id number] initial blood cultures were negative making endocarditis as an underlying condition here very unlikely  5 now on follow-up blood cultures no staph present but new candidemia, almost certainly line related infection, has a new PICC line and now follow this closely change it out in the near future, serial blood cultures.  Micafungin started, await full info on the new blood culture       Max pulmonary support as in place.         Cole Torres MD    Interval History   Intubated Clinically stable, temp down, no obvious new secondary involved site  MRSA In the blood but cleared relatively easily, now follow-up  blood cultures positive for Candida almost certainly a line infection nothing for a more generalized Candida infection  Central line removed initially, has a PICC line in place currently    Physical Exam   Temp: 99.1  F (37.3  C) Temp src: Bladder BP: 94/44 Pulse: 101   Resp: 19 SpO2: 91 % O2 Device: Mechanical Ventilator    Vitals:    12/20/22 0600 12/21/22 0200 12/22/22 0400   Weight: 43.7 kg (96 lb 5.5 oz) 43 kg (94 lb 12.8 oz) 57.9 kg (127 lb 10.3 oz)     Vital Signs with Ranges  Temp:  [97.5  F (36.4  C)-99.5  F (37.5  C)] 99.1  F (37.3  C)  Pulse:  [] 101  Resp:  [13-38] 19  BP: ()/(44-88) 94/44  FiO2 (%):  [35 %-50 %] 40 %  SpO2:  [88 %-100 %] 91 %    Constitutional: intubated   Lungs: Clear to auscultation bilaterally, no crackles or wheezing  Cardiovascular: Regular rate and rhythm, normal S1 and S2, and no murmur noted  Abdomen: Normal bowel sounds, soft, non-distended, non-tender  Skin:multiple track marks   Other:    Medications     dextrose       fentaNYL 200 mcg/hr (12/22/22 0831)     ketamine 0.5 mg/kg/hr (12/22/22 0739)     lactated ringers 20 mL/hr at 12/22/22 0739     propofol 20 mcg/kg/min (12/22/22 0806)    And     - MEDICATION INSTRUCTIONS -       - MEDICATION INSTRUCTIONS -       midazolam 7 mg/hr (12/22/22 0806)       ceFEPIme (MAXIPIME) in D5W intermittent infusion  500 mg Intravenous Q12H     chlorhexidine  15 mL Mouth/Throat Q12H     furosemide  20 mg Intravenous Daily     heparin ANTICOAGULANT  5,000 Units Subcutaneous Q12H     insulin aspart  1-6 Units Subcutaneous Q4H     insulin glargine  20 Units Subcutaneous At Bedtime     levalbuterol  0.63 mg Nebulization Q8H     metolazone  5 mg Per Feeding Tube BID     IV BOLUS builder WITH LARGE additive list  100 mg Intravenous Q24H     multivitamins w/minerals  15 mL Per Feeding Tube Daily     oxyCODONE  10 mg Oral or Feeding Tube Q6H     pantoprazole  40 mg Intravenous BID     sodium chloride (PF)  10 mL Intracatheter Q8H      sodium chloride (PF)  3 mL Intravenous Q8H     IV BOLUS builder WITH LARGE additive list  750 mg Intravenous Q12H       Data   All microbiology laboratory data reviewed.  Recent Labs   Lab Test 12/22/22  0502 12/21/22  0431 12/20/22  0508   WBC 8.6 8.0 9.2   HGB 7.3* 7.6* 7.8*   HCT 22.0* 22.9* 23.0*   MCV 93 91 90    159 177     Recent Labs   Lab Test 12/22/22  0502 12/21/22  0431 12/20/22  1805   CR 0.79 0.85 0.92     No lab results found.  No lab results found.    Invalid input(s):     All cultures:  Recent Labs   Lab 12/21/22  1626 12/21/22  1620 12/19/22  1538 12/19/22  1515 12/18/22  1925 12/16/22  1309 12/16/22  0356 12/16/22  0321   CULTURE No growth after 12 hours No growth after 12 hours Positive on the 1st day of incubation*  Budding Yeast* Positive on the 1st day of incubation*  Budding Yeast* No growth after 3 days  No growth after 3 days 4+ Normal aaron  4+ Staphylococcus aureus MRSA* Positive on the 1st day of incubation*  Staphylococcus aureus MRSA* Positive on the 1st day of incubation*  Staphylococcus aureus MRSA*      Blood culture:  Results for orders placed or performed during the hospital encounter of 12/16/22   Blood Culture Line, venous    Specimen: Line, venous; Blood   Result Value Ref Range    Culture No growth after 12 hours    Blood Culture Line, venous    Specimen: Line, venous; Blood   Result Value Ref Range    Culture No growth after 12 hours    Blood Culture Hand, Right    Specimen: Hand, Right; Blood   Result Value Ref Range    Culture Positive on the 1st day of incubation (A)     Culture Budding Yeast (AA)    Blood Culture Line, venous    Specimen: Line, venous; Blood   Result Value Ref Range    Culture Positive on the 1st day of incubation (A)     Culture Budding Yeast (AA)    Blood Culture Wrist, Right    Specimen: Wrist, Right; Blood   Result Value Ref Range    Culture No growth after 3 days    Blood Culture Hand, Right    Specimen: Hand, Right; Blood   Result  Value Ref Range    Culture No growth after 3 days    Results for orders placed or performed during the hospital encounter of 12/16/22   Blood Culture Arm, Right    Specimen: Arm, Right; Blood   Result Value Ref Range    Culture Positive on the 1st day of incubation (A)     Culture Staphylococcus aureus MRSA (AA)    Blood Culture Peripheral Blood    Specimen: Peripheral Blood   Result Value Ref Range    Culture Positive on the 1st day of incubation (A)     Culture Staphylococcus aureus MRSA (AA)        Susceptibility    Staphylococcus aureus MRSA - SAMANTHA*     Oxacillin* >=4 Resistant ug/mL      * Oxacillin susceptible isolates are susceptible to cephalosporins (example: cefazolin and cephalexin) and beta lactam combination agents. Oxacillin resistant isolates are resistant to these agents.     Gentamicin <=0.5 Susceptible ug/mL     Erythromycin >=8 Resistant ug/mL     Clindamycin* <=0.25 Susceptible ug/mL      * This isolate DOES NOT demonstrate inducible clindamycin resistance in vitro. Clindamycin is susceptible and could be used when indicated, however, erythromycin is resistant and should not be used.     Linezolid 2 Susceptible ug/mL     Vancomycin 1 Susceptible ug/mL     Tetracycline <=1 Susceptible ug/mL     Trimethoprim/Sulfamethoxazole <=0.5/9.5 Susceptible ug/mL     Daptomycin 0.75 Susceptible ug/mL     * MRSA requires contact precautions.     MRSA requires contact precautions.   Results for orders placed or performed during the hospital encounter of 12/13/22   Blood Culture Arm, Right    Specimen: Arm, Right; Blood   Result Value Ref Range    Culture No Growth    Blood Culture Arm, Left    Specimen: Arm, Left; Blood   Result Value Ref Range    Culture No Growth       Urine culture:  No results found for this or any previous visit.

## 2022-12-22 NOTE — PROGRESS NOTES
[facility]  12/22/2022    Progress Note  Reason for Critical Care Admission: DKA, respiratory failure, covid and bacterial pneumonia  Admitting Physician: Joao Krishna DO  Date of Admission:  12/16/2022    Assessment: Critical Care   Deidre N Aasen is a 27 year old female admitted on 12/16/2022 for DKA, respiratory failure from concurrent covid, bacterial pneumonia.   After arrival to the ICU she developed acute respiratory failure requiring intubation and development of septic shock requiring initation of dual vasopressor support and stress dose steroids in addition to broad spectrum antimicrobial therapy. She has ongoing fluid resuscitation needs, management of severe electrolyte abnormalities and ongoing treatment for septic shock and DKA per protocol. She remains severely critically ill but downtrending support needed. Found on CT Chest w/ contrast to have  RLL w/ cavitations.     Plan: Critical Care   Neuro/ pain/ sedation:  # Toxic metabolic encephalopathy  # Agitation  # Polysubstance abuse- meth and heroin use. Likely in withdrawal  # IVDU  # Meth intoxication  - continue ketamine today for sedation/pain  - wean fent, propofol, and versed gtts as able, cap adjusted  - will ultilize PRNs    Pulmonary:  # Acute respiratory failure with refractory hypoxia  # Complex cavitary pneumonia most likely 2/2 to MRSA pneumonia given sudden onset and progression based on comparison of chest imaging (12/13 vs 12/16). Less likely due to fungal or COVID PNA. Echo shows no vegetations but high suspicion and may consider CATHY.  # Acute respiratory acidosis  # severe ARDS, resolved  Plan  Vent Mode: CMV/AC  (Continuous Mandatory Ventilation/ Assist Control)  FiO2 (%): 40 %  Resp Rate (Set): 20 breaths/min  Tidal Volume (Set, mL): 320 mL  PEEP (cm H2O): 6 cmH2O  Resp: 20  - Continue intubation and mechanical ventilation  - Thoracic surgery consult for concern of RLL hypoperfusion and cavitary lesion,  no acute interventions  - supplemental oxygen to keep saturation about 92%  - continuing to wean  - repeat CT chest Friday for interval eval of cavitary lesion in setting of clinical worsening    Cardiovascular:  # septic shock, resolved  # Sinus tachycardia  - CATHY 12/20 negative for vegetations and negative bubble     GI/Nutrition:  # Abdominal pain. Present prior to intubation. Large gastric bubble on am CXR. Unclear etiology. May be component of heroin with drawl symptoms vs. DKA. Mild alk phosphatemia on admission but otherwise hepatic panel normal.   # ?Upper GI bleed. melenotic stools. Hem occult positive. Suspect upper GI bleed but no recurrence. Needed a unit of pRBC's last night. On PPIs  #protein-calorie malnutrition  - Diet: NPO for Medical/Clinical Reasons Except for: Other; Specify: NPO for 1 hour after CATHY then Advance diet as tolerated to Adult Basic Diet  Adult Formula Drip Feeding: Continuous Osmolite 1.5; Nasogastric tube; Goal Rate: 35; mL/hr; Increase TF rate to 20mL/hr now and then after 24 hours advance to goal    - BID PPI for melanotic stools  - continue TFs    Malnutrition:   % Weight Loss:  Large wt fluctuations - unable to assess true wt loss  % Intake: Pt intubated, no diet hx - however, suspect decreased po intake since   presentation to ED on 12/13  Subcutaneous Fat Loss:  Unable to complete NFPA - pt in COVID isolation  Muscle Loss:  Unable to complete NFPA - pt in COVID isolation  Fluid Retention:  None noted  Malnutrition Diagnosis: Suspect Severe malnutrition  In Context of:  Acute illness or injury on Chronic illness or disease (DKA, COVID -   polysubstance abuse, DM)     Renal/ Fluid Balance:   #Diabetic ketoacidosis- Resolved and DKA protocol discontinued  #Hypokalemia- replacement per protocol  # hypophosphatemia- replacement per protocol  # high anion gap metabolic acidosis 2/2 to DKA- resolved  # Ketonuria- resolved  #acute kidney injury, resolved  #hypervolemia  - Gap is  closed and DKA protocol transitioned to subcutaneous insulin  - Will monitor intake and output  - ICU electrolyte replacement protocol  - hyperNa,  q4 today  - continue metolazone 5mg BID  - continue lasix 20mg daily    Endocrine:  #Type 1 diabetes with insulin non complicance, complicated by DKA. History of prior admission for DKA  - glargine 20 units, PM  - continue sliding scale insulin  - stress dose steroids course 12/16-12/19    ID:  # Septic shock and MRSA bacteremia  # Complicated pneumonia due to MRSA pneumonia and aspiration  # Covid positive  #MRSA bacteremia  #fungemia  - ID consulted and appreciate recs  - Abx course: vanco 12/16- present; unasyn 12/16-12/19; cefepime 12/19-present  - Anti-fungal course: micafungin 12/21-present, plan for 14 day course  - Last positive blood cx: MRSA 12/16; budding yeast 12/19  - Last positive BAL: MRSA 12/16  - CATHY negative    Hematology:  # DVT ppx  # melenotic stools, hem occult positive  #anemia of chronic disease  #acute blood loss anemia  - subcutaneous heparin  - possible previous upper GI bleed. On PPI. Monitor clinically at this time. No acute indication for GI intervention.    MSK:   - not appropriate for PT/OT at this time given vent dyssynchrony     Lines/ tubes/ drains:  Sheffield Catheter: PRESENT, indication: Strict 1-2 Hour I&O;Deep Sedation/Paralysis  Central Lines: PICC removed due to bacteremia    Prophylaxis:  - DVT Prophylaxis: Heparin SQ and Pneumatic Compression Devices  - PUD Prophylaxis: PPI    Code Status: Full Code      Disposition:  - ICU  - Jeronimo Sultana ([phone])      Clinically Significant Risk Factors        # Hypokalemia: Lowest K = 3.1 mmol/L in last 2 days, will replace as needed  # Hypernatremia: Highest Na = 152 mmol/L in last 2 days, will monitor as appropriate      # Hypoalbuminemia: Lowest albumin = 1.2 g/dL at 12/22/2022  5:02 AM, will monitor as appropriate             # Severe Malnutrition: based on nutrition assessment            Discussed with staff, Dr. Hira Tran MD  Surgical Critical Care Fellow    ______________________________________________________________________    Interval events:  -agitation and vent dyssynchrony overnight, increased gtts     Physical Exam   Vital Signs: Temp: 99.3  F (37.4  C)   BP: 109/54 Pulse: 90   Resp: 20 SpO2: 95 % O2 Device: Mechanical Ventilator    Weight: 127 lbs 10.34 oz     Intake/Output Summary (Last 24 hours) at 12/16/2022 1630  Last data filed at 12/16/2022 1600  Gross per 24 hour   Intake 5830.15 ml   Output 2100 ml   Net 3730.15 ml       General: agitated intermittently  Neuro: sedated, not following commands but spontaneously opens eyes  CV: sinus RRR  Pulm: still course, diminished breath sounds on the right but slightly imrpoved, clear on the left, on mechanical ventilation  Abd: non distended. NJT in place  : degroot in place with clear pale yellow urine in the bag  Extremities: thin, no appreciable edema    Data   I reviewed all medications, new labs and imaging results over the last 24 hours.  Arterial Blood Gases   Recent Labs   Lab 12/20/22  1620 12/20/22  1157 12/18/22  0559 12/18/22  0214   PH 7.44 7.42 7.45 7.49*   PCO2 38 42 35 32*   PO2 100 170* 83 72*   HCO3 26 27 24 24       Complete Blood Count   Recent Labs   Lab 12/22/22  0502 12/21/22  0431 12/20/22  0508 12/19/22  0814   WBC 8.6 8.0 9.2 21.2*   HGB 7.3* 7.6* 7.8* 7.9*    159 177 145*       Basic Metabolic Panel  Recent Labs   Lab 12/22/22  0502 12/22/22  0358 12/22/22  0013 12/21/22  2110 12/21/22  1507 12/21/22  1242 12/21/22  0525 12/21/22  0431 12/20/22  1820 12/20/22  1805 12/20/22  1233 12/20/22  1228 12/20/22  0510 12/20/22  0508   *  --   --   --   --  151*  --  152*  --  150*  --   --   --  153*   POTASSIUM 3.1*  --   --   --   --   --   --  3.9  --  4.1  --  3.2*  --  3.4   CHLORIDE 111*  --   --   --   --   --   --  121*  --  122*  --   --   --  121*   CO2 29  --   --   --   --    --   --  28  --  24  --   --   --  27   BUN 21  --   --   --   --   --   --  33*  --  37*  --   --   --  41*   CR 0.79  --   --   --   --   --   --  0.85  --  0.92  --   --   --  1.01   * 228* 242* 259*   < >  --    < > 171*   < > 211*  199*   < >  --    < > 182*    < > = values in this interval not displayed.       Liver Function Tests  Recent Labs   Lab 12/22/22  0502 12/16/22  1015 12/16/22  0243   AST 46*  --  27   ALT 32  --  13   ALKPHOS 78  --  173*   BILITOTAL 0.3  --  <0.2   ALBUMIN 1.2*  --  2.8*   INR  --  1.82* 1.68*       Pancreatic Enzymes  Recent Labs   Lab 12/16/22  1230   LIPASE 47*       Coagulation Profile  Recent Labs   Lab 12/16/22  1015 12/16/22  0243   INR 1.82* 1.68*       IMAGING:  No results found for this or any previous visit (from the past 24 hour(s)).

## 2022-12-22 NOTE — PHARMACY-VANCOMYCIN DOSING SERVICE
Pharmacy Vancomycin Note  Date of Service 2022  Patient's  1995   27 year old, female    Indication: Sepsis  Day of Therapy: 6   Current vancomycin regimen:  750 mg IV q12h  Current vancomycin monitoring method: AUC  Current vancomycin therapeutic monitoring goal: 400-600 mg*h/L    [employer] Prediction of Current Vancomycin Regimen    Regimen: 750 mg IV every 12 hours.  Start time: 12:11 on 2022  Exposure target: AUC24 (range)400-600 mg/L.hr   AUC24,ss: 443 mg/L.hr  Probability of AUC24 > 400: 80 %  Ctrough,ss: 13.7 mg/L  Probability of Ctrough,ss > 20: 1 %  Probability of nephrotoxicity (Lodise LEONORA ): 9 %      Current estimated CrCl = Estimated Creatinine Clearance: 99.5 mL/min (based on SCr of 0.79 mg/dL).    Creatinine for last 3 days  2022:  5:08 AM Creatinine 1.01 mg/dL;  6:05 PM Creatinine 0.92 mg/dL  2022:  4:31 AM Creatinine 0.85 mg/dL  2022:  5:02 AM Creatinine 0.79 mg/dL    Recent Vancomycin Levels (past 3 days)  2022:  5:08 AM Vancomycin 13.2 mg/L  2022:  9:11 AM Vancomycin 18.9 mg/L    Vancomycin IV Administrations (past 72 hours)                   Vancomycin 750 mg in D5W 250 mL intermittent infusion (mg) 750 mg New Bag 22 2137    vancomycin (VANCOCIN) 750 mg in sodium chloride 0.9 % 250 mL intermittent infusion (mg) 750 mg New Bag 22 0819     750 mg New Bag 22 2030     750 mg New Bag  0931    vancomycin (VANCOCIN) 750 mg in sodium chloride 0.9 % 250 mL intermittent infusion (mg) 750 mg New Bag 22 1704                Nephrotoxins and other renal medications (From now, onward)    Start     Dose/Rate Route Frequency Ordered Stop    22 2100  Vancomycin 750 mg in D5W 250 mL intermittent infusion         750 mg  166.7 mL/hr over 90 Minutes Intravenous EVERY 12 HOURS 22 1341      22 1600  furosemide (LASIX) injection 20 mg         20 mg  over 1-3 Minutes Intravenous DAILY 22 1553               Contrast  Orders - past 72 hours (72h ago, onward)    None          Interpretation of levels and current regimen:  Vancomycin level is reflective of -600    Has serum creatinine changed greater than 50% in last 72 hours: No    Urine output:  good urine output    Renal Function: Improving      Plan:  1. Continue Current Dose  2. Vancomycin monitoring method: AUC  3. Vancomycin therapeutic monitoring goal: 400-600 mg*h/L  4. Pharmacy will check vancomycin levels as appropriate in 1-3 Days.  5. Serum creatinine levels will be ordered daily for the first week of therapy and at least twice weekly for subsequent weeks.    Aleena Crump, GAB

## 2022-12-22 NOTE — PROGRESS NOTES
Notified provider about indwelling degroot catheter discussed removal or continued need.    Did provider choose to remove indwelling degroot catheter? No    Provider's degroot indication for keeping indwelling degroot catheter: Deep Sedation/Paralysis.    Is there an order for indwelling degroot catheter? Yes    *If there is a plan to keep degroot catheter in place at discharge daily notification with provider is not necessary.

## 2022-12-22 NOTE — PROGRESS NOTES
Right wrist and Left wrist restraints continued 12/22/2022    Clinical Justification: Pulling lines, pulling tubes, and pulling equipment  Less Restrictive Alternative: 1:1 patient care, Repositioning, Pain management, Reorientation  Attending Physician Notified: Yes, Attending Physician's Name: Hira Jack orders placed Yes  Length of Order: 1 Day      Génesis Smith RN

## 2022-12-22 NOTE — PLAN OF CARE
Goal Outcome Evaluation:    Neuro/RASS: RASS -2 to -3 at times, inconsistently -4. Intermittently follows commands, but can only grasp and wiggle toes inconsistently. Spontaneously opens eyes and/or responds to voice. PERRL.     Tele/CV: SR/ST at times w/ turns, cares or suctioning. VSS. MAP >65.      Resp/Pulm: LS coarse, diminished w/ Rhonchi in RLL and RML. Remains vented FiO2 40%, , rate 20 PEEP 6. O2 saturation 100%.     GI/: Voiding adequately via degroot w/ Clear yellow UOP. X2 loose dark brown BM's this shift.     Integumentary: Scattered bruises. Mepilex to coccyx for suspected pressure. Blotchy purple bruises to coccyx and arms.    Infusion/IV access: infusing fentanyl, Ketamine, and Versed. Propofol was restarted per MD from day shift.    Additional: Pt vagals down during suctioning, but returns back immediately. MDs aware from yesterday.

## 2022-12-22 NOTE — PLAN OF CARE
End of Shift Nursing Summary    Vitals: Stable, afebrile.      Neuro:  sedated. Some spontaneous eye opening and spontaneous movement noted but nothing purposeful. Ketamine added for sedation in attempt to decrease other drips. Continues on fentanyl and versed drips, propofol weaned off.   CV:  SR when calm, Frequent ectopy seen when agitated and coughing, with several second pause this AM after suctioning. On levophed earlier in shift, titrated off.   Pulm: remains on vent. No SBT. 50%/peep 6. Large/copious amount of yellow secretion from ETT. Oral secretions also increasing after ketamine started.   GI/: large loose stool this AM. Sheffield in place, diuretic given with brisk output. Tube feeding increased per order.  Skin: new small wound noted on coccyx, WOC consulted - see note for details.   Family: spoke briefly with father via phone this AM. Updated on POC, questions answered.  POC: continue to wean sedation as able. Repeat CT of chest in the next few days to assess lungs.     *See EPIC flowsheet for full nursing assessment findings

## 2022-12-22 NOTE — PLAN OF CARE
Neuro: arouses to voice, moves all extremities spontaneously, PERRL.   CV: Map remained >65 without pressors  Resp: tolerating vent settings, large thick creamy secretions from ETT. Tolerated suctioning well.   GI/: Sheffield with good UOP- diuresed. Smear x2, MD aware of dark stools.   Skin: Lesions on coccyx and hips. Generalized edema.   No call from family this shift. Continue to monitor.

## 2022-12-23 ENCOUNTER — APPOINTMENT (OUTPATIENT)
Dept: CT IMAGING | Facility: CLINIC | Age: 27
End: 2022-12-23
Attending: INTERNAL MEDICINE
Payer: COMMERCIAL

## 2022-12-23 LAB
ABO/RH(D): NORMAL
ANION GAP SERPL CALCULATED.3IONS-SCNC: 6 MMOL/L (ref 3–14)
ANTIBODY SCREEN: NEGATIVE
ATRIAL RATE - MUSE: 155 BPM
BACTERIA BLD CULT: NO GROWTH
BACTERIA BLD CULT: NO GROWTH
BLD PROD TYP BPU: NORMAL
BLOOD COMPONENT TYPE: NORMAL
BUN SERPL-MCNC: 14 MG/DL (ref 7–30)
CALCIUM SERPL-MCNC: 7.1 MG/DL (ref 8.5–10.1)
CHLORIDE BLD-SCNC: 105 MMOL/L (ref 94–109)
CO2 SERPL-SCNC: 31 MMOL/L (ref 20–32)
CODING SYSTEM: NORMAL
CREAT SERPL-MCNC: 0.74 MG/DL (ref 0.52–1.04)
CROSSMATCH: NORMAL
DIASTOLIC BLOOD PRESSURE - MUSE: NORMAL MMHG
ERYTHROCYTE [DISTWIDTH] IN BLOOD BY AUTOMATED COUNT: 14.6 % (ref 10–15)
ERYTHROCYTE [DISTWIDTH] IN BLOOD BY AUTOMATED COUNT: 14.9 % (ref 10–15)
GFR SERPL CREATININE-BSD FRML MDRD: >90 ML/MIN/1.73M2
GLUCOSE BLD-MCNC: 210 MG/DL (ref 70–99)
GLUCOSE BLDC GLUCOMTR-MCNC: 169 MG/DL (ref 70–99)
GLUCOSE BLDC GLUCOMTR-MCNC: 175 MG/DL (ref 70–99)
GLUCOSE BLDC GLUCOMTR-MCNC: 192 MG/DL (ref 70–99)
GLUCOSE BLDC GLUCOMTR-MCNC: 232 MG/DL (ref 70–99)
GLUCOSE BLDC GLUCOMTR-MCNC: 270 MG/DL (ref 70–99)
GLUCOSE BLDC GLUCOMTR-MCNC: 309 MG/DL (ref 70–99)
HCT VFR BLD AUTO: 20.1 % (ref 35–47)
HCT VFR BLD AUTO: 26.3 % (ref 35–47)
HGB BLD-MCNC: 6.6 G/DL (ref 11.7–15.7)
HGB BLD-MCNC: 8.9 G/DL (ref 11.7–15.7)
INTERPRETATION ECG - MUSE: NORMAL
ISSUE DATE AND TIME: NORMAL
MCH RBC QN AUTO: 30.4 PG (ref 26.5–33)
MCH RBC QN AUTO: 30.7 PG (ref 26.5–33)
MCHC RBC AUTO-ENTMCNC: 32.8 G/DL (ref 31.5–36.5)
MCHC RBC AUTO-ENTMCNC: 33.8 G/DL (ref 31.5–36.5)
MCV RBC AUTO: 90 FL (ref 78–100)
MCV RBC AUTO: 94 FL (ref 78–100)
P AXIS - MUSE: 81 DEGREES
PLATELET # BLD AUTO: 309 10E3/UL (ref 150–450)
PLATELET # BLD AUTO: 331 10E3/UL (ref 150–450)
POTASSIUM BLD-SCNC: 3.5 MMOL/L (ref 3.4–5.3)
POTASSIUM BLD-SCNC: 3.6 MMOL/L (ref 3.4–5.3)
PR INTERVAL - MUSE: 158 MS
QRS DURATION - MUSE: 68 MS
QT - MUSE: 320 MS
QTC - MUSE: 514 MS
R AXIS - MUSE: 80 DEGREES
RBC # BLD AUTO: 2.15 10E6/UL (ref 3.8–5.2)
RBC # BLD AUTO: 2.93 10E6/UL (ref 3.8–5.2)
SODIUM SERPL-SCNC: 142 MMOL/L (ref 133–144)
SPECIMEN EXPIRATION DATE: NORMAL
SYSTOLIC BLOOD PRESSURE - MUSE: NORMAL MMHG
T AXIS - MUSE: 67 DEGREES
UNIT ABO/RH: NORMAL
UNIT NUMBER: NORMAL
UNIT STATUS: NORMAL
UNIT TYPE ISBT: 5100
VENTRICULAR RATE- MUSE: 155 BPM
WBC # BLD AUTO: 10.1 10E3/UL (ref 4–11)
WBC # BLD AUTO: 13.9 10E3/UL (ref 4–11)

## 2022-12-23 PROCEDURE — P9016 RBC LEUKOCYTES REDUCED: HCPCS | Performed by: INTERNAL MEDICINE

## 2022-12-23 PROCEDURE — 250N000011 HC RX IP 250 OP 636: Performed by: STUDENT IN AN ORGANIZED HEALTH CARE EDUCATION/TRAINING PROGRAM

## 2022-12-23 PROCEDURE — 86850 RBC ANTIBODY SCREEN: CPT | Performed by: INTERNAL MEDICINE

## 2022-12-23 PROCEDURE — 36415 COLL VENOUS BLD VENIPUNCTURE: CPT | Performed by: SPECIALIST

## 2022-12-23 PROCEDURE — 250N000013 HC RX MED GY IP 250 OP 250 PS 637: Performed by: INTERNAL MEDICINE

## 2022-12-23 PROCEDURE — 250N000011 HC RX IP 250 OP 636: Performed by: INTERNAL MEDICINE

## 2022-12-23 PROCEDURE — 94003 VENT MGMT INPAT SUBQ DAY: CPT

## 2022-12-23 PROCEDURE — 250N000011 HC RX IP 250 OP 636: Performed by: SURGERY

## 2022-12-23 PROCEDURE — 85027 COMPLETE CBC AUTOMATED: CPT | Performed by: INTERNAL MEDICINE

## 2022-12-23 PROCEDURE — 94640 AIRWAY INHALATION TREATMENT: CPT

## 2022-12-23 PROCEDURE — 99233 SBSQ HOSP IP/OBS HIGH 50: CPT | Performed by: INTERNAL MEDICINE

## 2022-12-23 PROCEDURE — 80048 BASIC METABOLIC PNL TOTAL CA: CPT | Performed by: INTERNAL MEDICINE

## 2022-12-23 PROCEDURE — 94640 AIRWAY INHALATION TREATMENT: CPT | Mod: 76

## 2022-12-23 PROCEDURE — 999N000185 HC STATISTIC TRANSPORT TIME EA 15 MIN

## 2022-12-23 PROCEDURE — 999N000157 HC STATISTIC RCP TIME EA 10 MIN

## 2022-12-23 PROCEDURE — G0463 HOSPITAL OUTPT CLINIC VISIT: HCPCS

## 2022-12-23 PROCEDURE — 250N000009 HC RX 250: Performed by: INTERNAL MEDICINE

## 2022-12-23 PROCEDURE — 258N000003 HC RX IP 258 OP 636: Performed by: STUDENT IN AN ORGANIZED HEALTH CARE EDUCATION/TRAINING PROGRAM

## 2022-12-23 PROCEDURE — 250N000013 HC RX MED GY IP 250 OP 250 PS 637: Performed by: SURGERY

## 2022-12-23 PROCEDURE — 200N000001 HC R&B ICU

## 2022-12-23 PROCEDURE — 258N000003 HC RX IP 258 OP 636: Performed by: INTERNAL MEDICINE

## 2022-12-23 PROCEDURE — 86923 COMPATIBILITY TEST ELECTRIC: CPT | Performed by: INTERNAL MEDICINE

## 2022-12-23 PROCEDURE — 84132 ASSAY OF SERUM POTASSIUM: CPT | Performed by: INTERNAL MEDICINE

## 2022-12-23 PROCEDURE — C9113 INJ PANTOPRAZOLE SODIUM, VIA: HCPCS | Performed by: SURGERY

## 2022-12-23 PROCEDURE — 85027 COMPLETE CBC AUTOMATED: CPT | Performed by: SURGERY

## 2022-12-23 PROCEDURE — 71260 CT THORAX DX C+: CPT

## 2022-12-23 PROCEDURE — 87040 BLOOD CULTURE FOR BACTERIA: CPT | Performed by: SPECIALIST

## 2022-12-23 RX ORDER — IOPAMIDOL 755 MG/ML
64 INJECTION, SOLUTION INTRAVASCULAR ONCE
Status: COMPLETED | OUTPATIENT
Start: 2022-12-23 | End: 2022-12-23

## 2022-12-23 RX ORDER — IOPAMIDOL 755 MG/ML
64 INJECTION, SOLUTION INTRAVASCULAR ONCE
Status: DISCONTINUED | OUTPATIENT
Start: 2022-12-23 | End: 2022-12-23

## 2022-12-23 RX ORDER — SODIUM CHLORIDE 9 MG/ML
INJECTION, SOLUTION INTRAVENOUS CONTINUOUS
Status: DISCONTINUED | OUTPATIENT
Start: 2022-12-23 | End: 2023-01-04 | Stop reason: HOSPADM

## 2022-12-23 RX ADMIN — METOLAZONE 5 MG: 5 TABLET ORAL at 09:14

## 2022-12-23 RX ADMIN — Medication 200 MCG/HR: at 08:42

## 2022-12-23 RX ADMIN — MULTIVITAMIN 15 ML: LIQUID ORAL at 08:27

## 2022-12-23 RX ADMIN — IOPAMIDOL 64 ML: 755 INJECTION, SOLUTION INTRAVENOUS at 11:49

## 2022-12-23 RX ADMIN — INSULIN ASPART 2 UNITS: 100 INJECTION, SOLUTION INTRAVENOUS; SUBCUTANEOUS at 09:12

## 2022-12-23 RX ADMIN — LEVALBUTEROL HYDROCHLORIDE 0.63 MG: 1.25 SOLUTION, CONCENTRATE RESPIRATORY (INHALATION) at 14:51

## 2022-12-23 RX ADMIN — VANCOMYCIN HYDROCHLORIDE 750 MG: 1 INJECTION, POWDER, LYOPHILIZED, FOR SOLUTION INTRAVENOUS at 23:25

## 2022-12-23 RX ADMIN — PANTOPRAZOLE SODIUM 40 MG: 40 INJECTION, POWDER, FOR SOLUTION INTRAVENOUS at 08:23

## 2022-12-23 RX ADMIN — PANTOPRAZOLE SODIUM 40 MG: 40 INJECTION, POWDER, FOR SOLUTION INTRAVENOUS at 20:07

## 2022-12-23 RX ADMIN — SODIUM CHLORIDE 60 ML: 9 INJECTION, SOLUTION INTRAVENOUS at 11:49

## 2022-12-23 RX ADMIN — INSULIN ASPART 2 UNITS: 100 INJECTION, SOLUTION INTRAVENOUS; SUBCUTANEOUS at 04:28

## 2022-12-23 RX ADMIN — CHLORHEXIDINE GLUCONATE 0.12% ORAL RINSE 15 ML: 1.2 LIQUID ORAL at 20:07

## 2022-12-23 RX ADMIN — INSULIN ASPART 4 UNITS: 100 INJECTION, SOLUTION INTRAVENOUS; SUBCUTANEOUS at 21:14

## 2022-12-23 RX ADMIN — Medication 150 MCG/HR: at 20:24

## 2022-12-23 RX ADMIN — Medication 10 MG: at 20:55

## 2022-12-23 RX ADMIN — FUROSEMIDE 20 MG: 10 INJECTION, SOLUTION INTRAMUSCULAR; INTRAVENOUS at 08:25

## 2022-12-23 RX ADMIN — ACETAMINOPHEN 650 MG: 325 SUSPENSION ORAL at 20:07

## 2022-12-23 RX ADMIN — LEVALBUTEROL HYDROCHLORIDE 0.63 MG: 1.25 SOLUTION, CONCENTRATE RESPIRATORY (INHALATION) at 08:53

## 2022-12-23 RX ADMIN — MIDAZOLAM HYDROCHLORIDE 8 MG/HR: 1 INJECTION, SOLUTION INTRAVENOUS at 00:56

## 2022-12-23 RX ADMIN — HEPARIN SODIUM 5000 UNITS: 5000 INJECTION, SOLUTION INTRAVENOUS; SUBCUTANEOUS at 20:08

## 2022-12-23 RX ADMIN — INSULIN ASPART 1 UNITS: 100 INJECTION, SOLUTION INTRAVENOUS; SUBCUTANEOUS at 13:21

## 2022-12-23 RX ADMIN — CEFEPIME HYDROCHLORIDE 500 MG: 1 INJECTION, POWDER, FOR SOLUTION INTRAMUSCULAR; INTRAVENOUS at 06:14

## 2022-12-23 RX ADMIN — CHLORHEXIDINE GLUCONATE 0.12% ORAL RINSE 15 ML: 1.2 LIQUID ORAL at 09:16

## 2022-12-23 RX ADMIN — LEVALBUTEROL HYDROCHLORIDE 0.63 MG: 1.25 SOLUTION, CONCENTRATE RESPIRATORY (INHALATION) at 22:35

## 2022-12-23 RX ADMIN — PROPOFOL 15 MCG/KG/MIN: 10 INJECTION, EMULSION INTRAVENOUS at 03:07

## 2022-12-23 RX ADMIN — MICAFUNGIN SODIUM 100 MG: 50 INJECTION, POWDER, LYOPHILIZED, FOR SOLUTION INTRAVENOUS at 15:25

## 2022-12-23 RX ADMIN — MIDAZOLAM HYDROCHLORIDE 8 MG/HR: 1 INJECTION, SOLUTION INTRAVENOUS at 13:16

## 2022-12-23 RX ADMIN — INSULIN ASPART 3 UNITS: 100 INJECTION, SOLUTION INTRAVENOUS; SUBCUTANEOUS at 00:57

## 2022-12-23 RX ADMIN — INSULIN GLARGINE 20 UNITS: 100 INJECTION, SOLUTION SUBCUTANEOUS at 21:14

## 2022-12-23 RX ADMIN — METOLAZONE 5 MG: 5 TABLET ORAL at 15:40

## 2022-12-23 RX ADMIN — INSULIN ASPART 1 UNITS: 100 INJECTION, SOLUTION INTRAVENOUS; SUBCUTANEOUS at 15:30

## 2022-12-23 RX ADMIN — VANCOMYCIN HYDROCHLORIDE 750 MG: 1 INJECTION, POWDER, LYOPHILIZED, FOR SOLUTION INTRAVENOUS at 11:21

## 2022-12-23 RX ADMIN — HEPARIN SODIUM 5000 UNITS: 5000 INJECTION, SOLUTION INTRAVENOUS; SUBCUTANEOUS at 08:28

## 2022-12-23 RX ADMIN — PROPOFOL 35 MCG/KG/MIN: 10 INJECTION, EMULSION INTRAVENOUS at 13:47

## 2022-12-23 RX ADMIN — CEFEPIME HYDROCHLORIDE 500 MG: 1 INJECTION, POWDER, FOR SOLUTION INTRAMUSCULAR; INTRAVENOUS at 17:14

## 2022-12-23 ASSESSMENT — ACTIVITIES OF DAILY LIVING (ADL)
ADLS_ACUITY_SCORE: 40
ADLS_ACUITY_SCORE: 36
ADLS_ACUITY_SCORE: 44
ADLS_ACUITY_SCORE: 40
ADLS_ACUITY_SCORE: 40
ADLS_ACUITY_SCORE: 44
ADLS_ACUITY_SCORE: 40
ADLS_ACUITY_SCORE: 44
ADLS_ACUITY_SCORE: 44

## 2022-12-23 NOTE — PROGRESS NOTES
[facility] ICU Note      Progress Note  Reason for Critical Care Admission: DKA, respiratory failure, covid and bacterial pneumonia    Assessment: Critical Care   Deidre N Aasen is a 27 year old female admitted on 12/16/2022 for DKA, respiratory failure from concurrent covid and bacterial pneumonia.  After arrival to the ICU she developed acute respiratory failure requiring intubation and development of septic shock requiring initation of dual vasopressor support and stress dose steroids in addition to broad spectrum antimicrobial therapy.  She remains severely critically ill but downtrending support needed. Found on CT Chest w/ contrast to have numerous cavitary lesions.           Plan: Critical Care   Neuro/ pain/ sedation:  # Toxic metabolic encephalopathy  # Agitation in setting of polysubstance abuse - IV meth and heroin use. Likely in withdrawal  - Trial of increasing goal RASS as patient has a very low GVS     Pulmonary:  # Acute respiratory failure with refractory hypoxia  # Complex cavitary pneumonia most likely 2/2 to MRSA pneumonia given sudden onset and progression based on comparison of chest imaging (12/13 vs 12/16). Less likely due to fungal or COVID PNA  # severe ARDS, improved  Plan  Vent Mode: CMV/AC  (Continuous Mandatory Ventilation/ Assist Control)  FiO2 (%): 40 %  Resp Rate (Set): 20 breaths/min  Tidal Volume (Set, mL): 320 mL  PEEP (cm H2O): 6 cmH2O  Resp: 20  - Continue intubation and mechanical ventilation.   - Re image chest with any clinical worsening    Cardiovascular:  # septic shock, resolved  # Sinus tachycardia  - CATHY 12/20 negative for vegetations and negative bubble     GI/Nutrition:  # Abdominal pain. Present prior to intubation. Large gastric bubble on am CXR. Unclear etiology. May be component of heroin with drawl symptoms vs. DKA. Mild alk phosphatemia on admission but otherwise hepatic panel normal.   # ?Upper GI bleed. melenotic stools. Hem occult  positive.has now required two transfusions  #protein-calorie malnutrition  - Diet: NPO for Medical/Clinical Reasons Except for: Other; Specify: NPO for 1 hour after CATHY then Advance diet as tolerated to Adult Basic Diet  Adult Formula Drip Feeding: Continuous Osmolite 1.5; Nasogastric tube; Goal Rate: 35; mL/hr; Increase TF rate to 20mL/hr now and then after 24 hours advance to goal    - BID PPI for melanotic stools  - continue TFs    Malnutrition:   % Weight Loss:  Large wt fluctuations - unable to assess true wt loss  % Intake: Pt intubated, no diet hx - however, suspect decreased po intake since   presentation to ED on 12/13  Subcutaneous Fat Loss:  Unable to complete NFPA - pt in COVID isolation  Muscle Loss:  Unable to complete NFPA - pt in COVID isolation  Fluid Retention:  None noted  Malnutrition Diagnosis: Suspect Severe malnutrition  In Context of:  Acute illness or injury on Chronic illness or disease (DKA, COVID -   polysubstance abuse, DM)     Renal/ Fluid Balance:   #Diabetic ketoacidosis- Resolved and DKA protocol discontinued  #Hypokalemia- replacement per protocol  # hypophosphatemia- replacement per protocol  #acute kidney injury, resolved  # hypernatremia, improved  - ICU electrolyte replacement protocol  - continue free water as ordered (100ml every 4 hours)  - continue metolazone 5mg BID  - continue lasix 20mg daily    Endocrine:  #Type 1 diabetes with insulin non complicance, complicated by DKA. Hemoglobin A1C 16.4 (!). History of prior admission for DKA  - glargine 20 units, PM  - continue sliding scale insulin      ID:  # Septic shock and MRSA bacteremia. Blood cultures have now cleared  # Complicated pneumonia with cavitary lesions  # Covid positive  #MRSA bacteremia  #fungemia  - ID consulted and appreciate recs  - Abx course: vanco 12/16- present; unasyn 12/16-12/19; cefepime 12/19-present  - Anti-fungal course: micafungin 12/21-present, plan for 14 day course  - Last positive blood cx:  MRSA 12/16; budding yeast 12/19  - Last positive BAL: MRSA 12/16  - Monitoring cavitary lesions for necessity of chest tube drainage or other interventions    Hematology:  # DVT ppx  # melenotic stools, hem occult positive  #anemia of chronic disease  #acute blood loss anemia. Needing transfusion on 12/23  - subcutaneous heparin  - recheck hemoglobin this afternoon and monitor for more bleeding    MSK:   - not appropriate for PT/OT at this time      Lines/ tubes/ drains:  Degroot Catheter: PRESENT, indication: Strict 1-2 Hour I&O  Central Lines: PICC removed due to bacteremia    Prophylaxis:  - DVT Prophylaxis: Heparin SQ and Pneumatic Compression Devices  - PUD Prophylaxis: PPI    Code Status: Full Code      Disposition:  - ICU      Clinically Significant Risk Factors        # Hypokalemia: Lowest K = 3.1 mmol/L in last 2 days, will replace as needed  # Hypernatremia: Highest Na = 151 mmol/L in last 2 days, will monitor as appropriate      # Hypoalbuminemia: Lowest albumin = 1.2 g/dL at 12/22/2022  5:02 AM, will monitor as appropriate             # Severe Malnutrition: based on nutrition assessment             ______________________________________________________________________    Interval events:  -agitation and vent dyssynchrony overnight, increased gtts     Physical Exam   Vital Signs: Temp: 100.2  F (37.9  C) Temp src: Bladder BP: 112/61 Pulse: 112   Resp: 20 SpO2: 97 % O2 Device: Mechanical Ventilator    Weight: 127 lbs 10.34 oz       Intake/Output Summary (Last 24 hours) at 12/23/2022 1725  Last data filed at 12/23/2022 1600  Gross per 24 hour   Intake 3186.43 ml   Output 4880 ml   Net -1693.57 ml     General: Stated age, intubated  Neuro: heavily sedated  CV: sinus RRR  Pulm: synchronous  Abd: non distended. NJT in place  : degroot in place with clear pale yellow urine in the bag  Extremities: thin generalized edema, very small dark petechial like lesions on some of her digits  Data   I reviewed all  medications, new labs and imaging results over the last 24 hours.  Arterial Blood Gases   Recent Labs   Lab 12/20/22  1620 12/20/22  1157 12/18/22  0559 12/18/22  0214   PH 7.44 7.42 7.45 7.49*   PCO2 38 42 35 32*   PO2 100 170* 83 72*   HCO3 26 27 24 24       Complete Blood Count   Recent Labs   Lab 12/23/22  0438 12/22/22  0502 12/21/22  0431 12/20/22  0508   WBC 10.1 8.6 8.0 9.2   HGB 6.6* 7.3* 7.6* 7.8*    221 159 177       Basic Metabolic Panel  Recent Labs   Lab 12/23/22  0844 12/23/22  0438 12/23/22  0427 12/23/22  0055 12/22/22 2005 12/22/22  1803 12/22/22  1552 12/22/22  1357 12/22/22  1131 12/22/22  0753 12/22/22  0502 12/21/22  1507 12/21/22  1242 12/21/22  0525 12/21/22  0431 12/20/22  1820 12/20/22  1805   NA  --  142  --   --   --   --   --  142  --   --  145*  --  151*  --  152*  --  150*   POTASSIUM  --  3.6  3.5  --   --   --  4.0  --   --  3.1*  --  3.1*  --   --   --  3.9  --  4.1   CHLORIDE  --  105  --   --   --   --   --   --   --   --  111*  --   --   --  121*  --  122*   CO2  --  31  --   --   --   --   --   --   --   --  29  --   --   --  28  --  24   BUN  --  14  --   --   --   --   --   --   --   --  21  --   --   --  33*  --  37*   CR  --  0.74  --   --   --   --   --   --   --   --  0.79  --   --   --  0.85  --  0.92   * 210* 232* 270*   < >  --    < >  --   --    < > 218*   < >  --    < > 171*   < > 211*  199*    < > = values in this interval not displayed.       Liver Function Tests  Recent Labs   Lab 12/22/22  0502   AST 46*   ALT 32   ALKPHOS 78   BILITOTAL 0.3   ALBUMIN 1.2*       Pancreatic Enzymes  Recent Labs   Lab 12/16/22  1230   LIPASE 47*       Coagulation Profile  Recent Labs   Lab 12/22/22  1357   PTT 38       IMAGING:  No results found for this or any previous visit (from the past 24 hour(s)).

## 2022-12-23 NOTE — PROGRESS NOTES
[facility] ICU RESPIRATORY NOTE        Date of Admission: 12/16/2022    Date of Intubation (most recent): 12/16/22    Reason for Mechanical Ventilation: Resp Failure    Number of Days on Mechanical Ventilation: 7    Met Criteria for Spontaneous Breathing Trial: No    Reason for No Spontaneous Breathing Trial: Per MD    Significant Events Today: none    ABG Results:   Recent Labs   Lab 12/20/22  1620 12/20/22  1157 12/18/22  0559 12/18/22  0214   PH 7.44 7.42 7.45 7.49*   PCO2 38 42 35 32*   PO2 100 170* 83 72*   HCO3 26 27 24 24   O2PER 50 30 40 40       Current Vent Settings: Vent Mode: CMV/AC  (Continuous Mandatory Ventilation/ Assist Control)  FiO2 (%): 40 %  Resp Rate (Set): 20 breaths/min  Tidal Volume (Set, mL): 320 mL  PEEP (cm H2O): 6 cmH2O  Resp: 14      Skin Assessment: intact    Plan: Continue full vent support; reassess daily for SBT.    Carina Martinez RT on 12/22/2022 at 6:03 PM

## 2022-12-23 NOTE — PROGRESS NOTES
[facility]    Infectious Disease Progress Note    Date of Service (when I saw the patient): 12/23/2022     Assessment & Plan   Deidre N Aasen is a 27 year old female who was admitted on 12/16/2022.     Impression:  1. 27 y.o patient with polysubstance abuse, IVDU  2. Type 1 diabetes  3. History of medical noncompliance   4. Recent presentation to OSH (epic records reviewed ) left AMA  5. Admitted this occasion with diabetic ketoacidosis (DKA) w/ severe acidosis, acute hypoxic respiratory failure, acute encephalopathy, (+) COVID-19, and pneumonia possible cavitation and abscess.   6. On remdesivir, vancomycin and zosyn   7. Cultures in the blood MRSA   8. resp cultures MRSA       Recommendations:   1 Blood cultures  MRSA, which explains almost all the findings Including lung situation, necrotizing staph pneumonia with associated viral infection quite typical and classic    2 Vancomycin , levels and renal fct OK, will ask lab to run dapto susc in case needed   3 Unasyn , now cefepime for bacterial/noso pneumonia, but suspect the lung findings are all MRSA and COVID only, 12/16 sputum only MRSA,  likely short course of gram-negative coverage at most    4 Daily blood cultures, 12/18 and 19 neg for MRSA, ? cleared relatively easily  TTE neg, CATHY probably indicated. However of great [id number] initial blood cultures were negative making endocarditis as an underlying condition here very unlikely  5 now on follow-up blood cultures no staph present but new candidemia, almost certainly line related infection, has a new PICC line and now placed 12/21, serial blood cultures.  Micafungin started, is C glabrata await sens       Max pulmonary support as in place.         Cole Torres MD    Interval History   Intubated Clinically stable, temp down, no obvious new secondary involved site awakens not interactive  MRSA In the blood but cleared relatively easily, now follow-up blood cultures positive  for Candida almost certainly a line infection nothing for a more generalized Candida infection  Central line removed initially, has a PICC line in place 12/21 on R and L midline both ? After 12/17 candida +     Physical Exam   Temp: 100.2  F (37.9  C) Temp src: Bladder BP: 112/61 Pulse: 112   Resp: 20 SpO2: 97 % O2 Device: Mechanical Ventilator    Vitals:    12/20/22 0600 12/21/22 0200 12/22/22 0400   Weight: 43.7 kg (96 lb 5.5 oz) 43 kg (94 lb 12.8 oz) 57.9 kg (127 lb 10.3 oz)     Vital Signs with Ranges  Temp:  [98.4  F (36.9  C)-101.3  F (38.5  C)] 100.2  F (37.9  C)  Pulse:  [] 112  Resp:  [11-38] 20  BP: ()/(44-79) 112/61  FiO2 (%):  [40 %] 40 %  SpO2:  [91 %-100 %] 97 %    Constitutional: intubated   Lungs: Clear to auscultation bilaterally, no crackles or wheezing  Cardiovascular: Regular rate and rhythm, normal S1 and S2, and no murmur noted  Abdomen: Normal bowel sounds, soft, non-distended, non-tender  Skin:multiple track marks   Other:    Medications     dextrose       fentaNYL 200 mcg/hr (12/23/22 0842)     ketamine 0.5 mg/kg/hr (12/23/22 0821)     lactated ringers 10 mL/hr at 12/23/22 0700     propofol 35 mcg/kg/min (12/23/22 0925)    And     - MEDICATION INSTRUCTIONS -       - MEDICATION INSTRUCTIONS -       midazolam 8 mg/hr (12/23/22 0904)     sodium chloride 10 mL/hr (12/23/22 0700)       ceFEPIme (MAXIPIME) in D5W intermittent infusion  500 mg Intravenous Q12H     chlorhexidine  15 mL Mouth/Throat Q12H     furosemide  20 mg Intravenous Daily     heparin ANTICOAGULANT  5,000 Units Subcutaneous Q12H     insulin aspart  1-6 Units Subcutaneous Q4H     insulin glargine  20 Units Subcutaneous At Bedtime     levalbuterol  0.63 mg Nebulization Q8H     metolazone  5 mg Per Feeding Tube BID     IV BOLUS builder WITH LARGE additive list  100 mg Intravenous Q24H     multivitamins w/minerals  15 mL Per Feeding Tube Daily     pantoprazole  40 mg Intravenous BID     sodium chloride (PF)  10 mL  Intracatheter Q8H     sodium chloride (PF)  3 mL Intravenous Q8H     IV BOLUS builder WITH LARGE additive list  750 mg Intravenous Q12H       Data   All microbiology laboratory data reviewed.  Recent Labs   Lab Test 12/23/22  0438 12/22/22  0502 12/21/22  0431   WBC 10.1 8.6 8.0   HGB 6.6* 7.3* 7.6*   HCT 20.1* 22.0* 22.9*   MCV 94 93 91    221 159     Recent Labs   Lab Test 12/23/22  0438 12/22/22  0502 12/21/22  0431   CR 0.74 0.79 0.85     No lab results found.  No lab results found.    Invalid input(s):     All cultures:  Recent Labs   Lab 12/22/22  0614 12/21/22  1626 12/21/22  1620 12/19/22  1538 12/19/22  1515 12/18/22  1925 12/16/22  1309   CULTURE No growth after 12 hours No growth after 1 day No growth after 1 day Positive on the 1st day of incubation*  Candida glabrata complex* Positive on the 1st day of incubation*  Candida glabrata complex* No growth after 4 days  No growth after 4 days 4+ Normal aaron  4+ Staphylococcus aureus MRSA*      Blood culture:  Results for orders placed or performed during the hospital encounter of 12/16/22   Blood Culture Hand, Left    Specimen: Hand, Left; Blood   Result Value Ref Range    Culture No growth after 12 hours    Blood Culture Line, venous    Specimen: Line, venous; Blood   Result Value Ref Range    Culture No growth after 1 day    Blood Culture Line, venous    Specimen: Line, venous; Blood   Result Value Ref Range    Culture No growth after 1 day    Blood Culture Hand, Right    Specimen: Hand, Right; Blood   Result Value Ref Range    Culture Positive on the 1st day of incubation (A)     Culture Candida glabrata complex (AA)    Blood Culture Line, venous    Specimen: Line, venous; Blood   Result Value Ref Range    Culture Positive on the 1st day of incubation (A)     Culture Candida glabrata complex (AA)    Blood Culture Wrist, Right    Specimen: Wrist, Right; Blood   Result Value Ref Range    Culture No growth after 4 days    Blood Culture Hand,  Right    Specimen: Hand, Right; Blood   Result Value Ref Range    Culture No growth after 4 days    Results for orders placed or performed during the hospital encounter of 12/16/22   Blood Culture Arm, Right    Specimen: Arm, Right; Blood   Result Value Ref Range    Culture Positive on the 1st day of incubation (A)     Culture Staphylococcus aureus MRSA (AA)    Blood Culture Peripheral Blood    Specimen: Peripheral Blood   Result Value Ref Range    Culture Positive on the 1st day of incubation (A)     Culture Staphylococcus aureus MRSA (AA)        Susceptibility    Staphylococcus aureus MRSA - SAMANTHA*     Oxacillin* >=4 Resistant ug/mL      * Oxacillin susceptible isolates are susceptible to cephalosporins (example: cefazolin and cephalexin) and beta lactam combination agents. Oxacillin resistant isolates are resistant to these agents.     Gentamicin <=0.5 Susceptible ug/mL     Erythromycin >=8 Resistant ug/mL     Clindamycin* <=0.25 Susceptible ug/mL      * This isolate DOES NOT demonstrate inducible clindamycin resistance in vitro. Clindamycin is susceptible and could be used when indicated, however, erythromycin is resistant and should not be used.     Linezolid 2 Susceptible ug/mL     Vancomycin 1 Susceptible ug/mL     Tetracycline <=1 Susceptible ug/mL     Trimethoprim/Sulfamethoxazole <=0.5/9.5 Susceptible ug/mL     Daptomycin 0.75 Susceptible ug/mL     * MRSA requires contact precautions.     MRSA requires contact precautions.   Results for orders placed or performed during the hospital encounter of 12/13/22   Blood Culture Arm, Right    Specimen: Arm, Right; Blood   Result Value Ref Range    Culture No Growth    Blood Culture Arm, Left    Specimen: Arm, Left; Blood   Result Value Ref Range    Culture No Growth       Urine culture:  No results found for this or any previous visit.

## 2022-12-23 NOTE — PROGRESS NOTES
[facility] ICU RESPIRATORY NOTE        Date of Admission: 12/16/2022    Date of Intubation (most recent): 12/16/2022    Reason for Mechanical Ventilation: Resp failure    Number of Days on Mechanical Ventilation: 8    Met Criteria for Spontaneous Breathing Trial: No    Reason for No Spontaneous Breathing Trial: Per MD    Significant Events Today: None overnight    ABG Results:   Recent Labs   Lab 12/20/22  1620 12/20/22  1157 12/18/22  0559 12/18/22  0214   PH 7.44 7.42 7.45 7.49*   PCO2 38 42 35 32*   PO2 100 170* 83 72*   HCO3 26 27 24 24   O2PER 50 30 40 40       Current Vent Settings: Vent Mode: CMV/AC  (Continuous Mandatory Ventilation/ Assist Control)  FiO2 (%): 40 %  Resp Rate (Set): 20 breaths/min  Tidal Volume (Set, mL): 320 mL  PEEP (cm H2O): 6 cmH2O  Resp: 19      Skin Assessment: Intact, no issues    Plan: Continue full vent support, reassess daily for SBT readiness    Manny Khan, RT on 12/23/2022 at 5:22 AM

## 2022-12-23 NOTE — PLAN OF CARE
Goal Outcome Evaluation:    Neuro/RASS: RASS -3 at times, inconsistently -4. Intermittently follows commands.. Spontaneously opens eyes and/or responds to voice. PERRL.     Tele/CV: SR/ST at times w/ turns, cares or suctioning. VSS. MAP >65.      Resp/Pulm: LS coarse, diminished w/ Rhonchi in RLL and RML. Remains vented FiO2 40%, , rate 20 PEEP 6. O2 saturation 100%.      GI/: Voiding adequately via degroot w/ Clear yellow UOP.multiple loose dark/brwon/green stools during shift.      Integumentary: Scattered bruises. Mepilex to coccyx for suspected pressure injury, WOC following. Blotchy purple bruises to coccyx and arms as well as scattered scabs.      Infusion/IV access: infusing fentanyl, Ketamine, Versed and Propofol for sedation.     Additional: Hgb this am 6.6, transfuse orders in place.

## 2022-12-24 ENCOUNTER — APPOINTMENT (OUTPATIENT)
Dept: GENERAL RADIOLOGY | Facility: CLINIC | Age: 27
End: 2022-12-24
Attending: STUDENT IN AN ORGANIZED HEALTH CARE EDUCATION/TRAINING PROGRAM
Payer: COMMERCIAL

## 2022-12-24 LAB
ANION GAP SERPL CALCULATED.3IONS-SCNC: 6 MMOL/L (ref 3–14)
BUN SERPL-MCNC: 14 MG/DL (ref 7–30)
CALCIUM SERPL-MCNC: 7.3 MG/DL (ref 8.5–10.1)
CHLORIDE BLD-SCNC: 94 MMOL/L (ref 94–109)
CO2 SERPL-SCNC: 35 MMOL/L (ref 20–32)
CREAT SERPL-MCNC: 0.69 MG/DL (ref 0.52–1.04)
CRP SERPL-MCNC: 150 MG/L (ref 0–8)
ERYTHROCYTE [DISTWIDTH] IN BLOOD BY AUTOMATED COUNT: 14.6 % (ref 10–15)
GFR SERPL CREATININE-BSD FRML MDRD: >90 ML/MIN/1.73M2
GLUCOSE BLD-MCNC: 288 MG/DL (ref 70–99)
GLUCOSE BLDC GLUCOMTR-MCNC: 167 MG/DL (ref 70–99)
GLUCOSE BLDC GLUCOMTR-MCNC: 213 MG/DL (ref 70–99)
GLUCOSE BLDC GLUCOMTR-MCNC: 283 MG/DL (ref 70–99)
GLUCOSE BLDC GLUCOMTR-MCNC: 307 MG/DL (ref 70–99)
GLUCOSE BLDC GLUCOMTR-MCNC: 320 MG/DL (ref 70–99)
GLUCOSE BLDC GLUCOMTR-MCNC: 343 MG/DL (ref 70–99)
GLUCOSE BLDC GLUCOMTR-MCNC: 344 MG/DL (ref 70–99)
HCT VFR BLD AUTO: 25 % (ref 35–47)
HGB BLD-MCNC: 8.6 G/DL (ref 11.7–15.7)
MAGNESIUM SERPL-MCNC: 2.1 MG/DL (ref 1.6–2.3)
MCH RBC QN AUTO: 30.2 PG (ref 26.5–33)
MCHC RBC AUTO-ENTMCNC: 34.4 G/DL (ref 31.5–36.5)
MCV RBC AUTO: 88 FL (ref 78–100)
PHOSPHATE SERPL-MCNC: 3.8 MG/DL (ref 2.5–4.5)
PLATELET # BLD AUTO: 383 10E3/UL (ref 150–450)
POTASSIUM BLD-SCNC: 3.3 MMOL/L (ref 3.4–5.3)
POTASSIUM BLD-SCNC: 3.7 MMOL/L (ref 3.4–5.3)
RBC # BLD AUTO: 2.85 10E6/UL (ref 3.8–5.2)
SODIUM SERPL-SCNC: 135 MMOL/L (ref 133–144)
VANCOMYCIN SERPL-MCNC: 19 MG/L
WBC # BLD AUTO: 12 10E3/UL (ref 4–11)

## 2022-12-24 PROCEDURE — 87529 HSV DNA AMP PROBE: CPT | Performed by: INTERNAL MEDICINE

## 2022-12-24 PROCEDURE — 84100 ASSAY OF PHOSPHORUS: CPT | Performed by: INTERNAL MEDICINE

## 2022-12-24 PROCEDURE — 250N000013 HC RX MED GY IP 250 OP 250 PS 637: Performed by: INTERNAL MEDICINE

## 2022-12-24 PROCEDURE — 250N000013 HC RX MED GY IP 250 OP 250 PS 637: Performed by: SURGERY

## 2022-12-24 PROCEDURE — 84132 ASSAY OF SERUM POTASSIUM: CPT | Performed by: INTERNAL MEDICINE

## 2022-12-24 PROCEDURE — 999N000065 XR CHEST PORT 1 VIEW

## 2022-12-24 PROCEDURE — 94640 AIRWAY INHALATION TREATMENT: CPT | Mod: 76

## 2022-12-24 PROCEDURE — 83735 ASSAY OF MAGNESIUM: CPT | Performed by: INTERNAL MEDICINE

## 2022-12-24 PROCEDURE — 31500 INSERT EMERGENCY AIRWAY: CPT | Performed by: STUDENT IN AN ORGANIZED HEALTH CARE EDUCATION/TRAINING PROGRAM

## 2022-12-24 PROCEDURE — 250N000009 HC RX 250: Performed by: STUDENT IN AN ORGANIZED HEALTH CARE EDUCATION/TRAINING PROGRAM

## 2022-12-24 PROCEDURE — 250N000011 HC RX IP 250 OP 636: Performed by: INTERNAL MEDICINE

## 2022-12-24 PROCEDURE — 258N000003 HC RX IP 258 OP 636: Performed by: INTERNAL MEDICINE

## 2022-12-24 PROCEDURE — 94640 AIRWAY INHALATION TREATMENT: CPT

## 2022-12-24 PROCEDURE — 258N000003 HC RX IP 258 OP 636: Performed by: STUDENT IN AN ORGANIZED HEALTH CARE EDUCATION/TRAINING PROGRAM

## 2022-12-24 PROCEDURE — 80048 BASIC METABOLIC PNL TOTAL CA: CPT | Performed by: INTERNAL MEDICINE

## 2022-12-24 PROCEDURE — 250N000011 HC RX IP 250 OP 636: Performed by: STUDENT IN AN ORGANIZED HEALTH CARE EDUCATION/TRAINING PROGRAM

## 2022-12-24 PROCEDURE — 94003 VENT MGMT INPAT SUBQ DAY: CPT

## 2022-12-24 PROCEDURE — 86140 C-REACTIVE PROTEIN: CPT | Performed by: SPECIALIST

## 2022-12-24 PROCEDURE — 99233 SBSQ HOSP IP/OBS HIGH 50: CPT | Performed by: SPECIALIST

## 2022-12-24 PROCEDURE — 250N000012 HC RX MED GY IP 250 OP 636 PS 637: Performed by: STUDENT IN AN ORGANIZED HEALTH CARE EDUCATION/TRAINING PROGRAM

## 2022-12-24 PROCEDURE — 85027 COMPLETE CBC AUTOMATED: CPT | Performed by: SURGERY

## 2022-12-24 PROCEDURE — 80202 ASSAY OF VANCOMYCIN: CPT | Performed by: INTERNAL MEDICINE

## 2022-12-24 PROCEDURE — 87798 DETECT AGENT NOS DNA AMP: CPT | Performed by: INTERNAL MEDICINE

## 2022-12-24 PROCEDURE — 250N000011 HC RX IP 250 OP 636: Performed by: SURGERY

## 2022-12-24 PROCEDURE — 999N000009 HC STATISTIC AIRWAY CARE

## 2022-12-24 PROCEDURE — 250N000009 HC RX 250: Performed by: INTERNAL MEDICINE

## 2022-12-24 PROCEDURE — C9113 INJ PANTOPRAZOLE SODIUM, VIA: HCPCS | Performed by: SURGERY

## 2022-12-24 PROCEDURE — 999N000157 HC STATISTIC RCP TIME EA 10 MIN

## 2022-12-24 PROCEDURE — 200N000001 HC R&B ICU

## 2022-12-24 PROCEDURE — 99291 CRITICAL CARE FIRST HOUR: CPT | Performed by: INTERNAL MEDICINE

## 2022-12-24 RX ORDER — ETOMIDATE 2 MG/ML
20 INJECTION INTRAVENOUS ONCE
Status: COMPLETED | OUTPATIENT
Start: 2022-12-24 | End: 2022-12-24

## 2022-12-24 RX ORDER — POTASSIUM CHLORIDE 1.5 G/1.58G
20 POWDER, FOR SOLUTION ORAL ONCE
Status: COMPLETED | OUTPATIENT
Start: 2022-12-24 | End: 2022-12-24

## 2022-12-24 RX ADMIN — INSULIN GLARGINE 20 UNITS: 100 INJECTION, SOLUTION SUBCUTANEOUS at 21:09

## 2022-12-24 RX ADMIN — PROPOFOL 35 MCG/KG/MIN: 10 INJECTION, EMULSION INTRAVENOUS at 21:02

## 2022-12-24 RX ADMIN — INSULIN ASPART 2 UNITS: 100 INJECTION, SOLUTION INTRAVENOUS; SUBCUTANEOUS at 04:21

## 2022-12-24 RX ADMIN — MIDAZOLAM HYDROCHLORIDE 8 MG/HR: 1 INJECTION, SOLUTION INTRAVENOUS at 14:20

## 2022-12-24 RX ADMIN — HEPARIN SODIUM 5000 UNITS: 5000 INJECTION, SOLUTION INTRAVENOUS; SUBCUTANEOUS at 21:08

## 2022-12-24 RX ADMIN — PANTOPRAZOLE SODIUM 40 MG: 40 INJECTION, POWDER, FOR SOLUTION INTRAVENOUS at 08:28

## 2022-12-24 RX ADMIN — PANTOPRAZOLE SODIUM 40 MG: 40 INJECTION, POWDER, FOR SOLUTION INTRAVENOUS at 21:08

## 2022-12-24 RX ADMIN — PROPOFOL 15 MCG/KG/MIN: 10 INJECTION, EMULSION INTRAVENOUS at 03:58

## 2022-12-24 RX ADMIN — INSULIN ASPART 3 UNITS: 100 INJECTION, SOLUTION INTRAVENOUS; SUBCUTANEOUS at 00:33

## 2022-12-24 RX ADMIN — CHLORHEXIDINE GLUCONATE 0.12% ORAL RINSE 15 ML: 1.2 LIQUID ORAL at 08:27

## 2022-12-24 RX ADMIN — ETOMIDATE 20 MG: 2 INJECTION, SOLUTION INTRAVENOUS at 14:29

## 2022-12-24 RX ADMIN — MIDAZOLAM HYDROCHLORIDE 7 MG/HR: 1 INJECTION, SOLUTION INTRAVENOUS at 03:55

## 2022-12-24 RX ADMIN — LEVALBUTEROL HYDROCHLORIDE 0.63 MG: 1.25 SOLUTION, CONCENTRATE RESPIRATORY (INHALATION) at 08:14

## 2022-12-24 RX ADMIN — INSULIN ASPART 1 UNITS: 100 INJECTION, SOLUTION INTRAVENOUS; SUBCUTANEOUS at 08:42

## 2022-12-24 RX ADMIN — CEFEPIME HYDROCHLORIDE 500 MG: 1 INJECTION, POWDER, FOR SOLUTION INTRAMUSCULAR; INTRAVENOUS at 18:16

## 2022-12-24 RX ADMIN — FUROSEMIDE 20 MG: 10 INJECTION, SOLUTION INTRAMUSCULAR; INTRAVENOUS at 08:34

## 2022-12-24 RX ADMIN — METOLAZONE 5 MG: 5 TABLET ORAL at 16:22

## 2022-12-24 RX ADMIN — CEFEPIME HYDROCHLORIDE 500 MG: 1 INJECTION, POWDER, FOR SOLUTION INTRAMUSCULAR; INTRAVENOUS at 05:22

## 2022-12-24 RX ADMIN — INSULIN ASPART 5 UNITS: 100 INJECTION, SOLUTION INTRAVENOUS; SUBCUTANEOUS at 16:38

## 2022-12-24 RX ADMIN — CHLORHEXIDINE GLUCONATE 0.12% ORAL RINSE 15 ML: 1.2 LIQUID ORAL at 21:08

## 2022-12-24 RX ADMIN — ACETAMINOPHEN 650 MG: 325 SUSPENSION ORAL at 16:56

## 2022-12-24 RX ADMIN — VANCOMYCIN HYDROCHLORIDE 750 MG: 1 INJECTION, POWDER, LYOPHILIZED, FOR SOLUTION INTRAVENOUS at 08:51

## 2022-12-24 RX ADMIN — MULTIVITAMIN 15 ML: LIQUID ORAL at 08:27

## 2022-12-24 RX ADMIN — VANCOMYCIN HYDROCHLORIDE 750 MG: 1 INJECTION, POWDER, LYOPHILIZED, FOR SOLUTION INTRAVENOUS at 22:27

## 2022-12-24 RX ADMIN — LEVALBUTEROL HYDROCHLORIDE 0.63 MG: 1.25 SOLUTION, CONCENTRATE RESPIRATORY (INHALATION) at 14:38

## 2022-12-24 RX ADMIN — SODIUM CHLORIDE 0.05 UNITS/KG/HR: 9 INJECTION, SOLUTION INTRAVENOUS at 20:28

## 2022-12-24 RX ADMIN — POTASSIUM CHLORIDE 20 MEQ: 1.5 POWDER, FOR SOLUTION ORAL at 12:44

## 2022-12-24 RX ADMIN — PROPOFOL 40 MCG/KG/MIN: 10 INJECTION, EMULSION INTRAVENOUS at 14:21

## 2022-12-24 RX ADMIN — METOLAZONE 5 MG: 5 TABLET ORAL at 08:27

## 2022-12-24 RX ADMIN — MICAFUNGIN SODIUM 100 MG: 50 INJECTION, POWDER, LYOPHILIZED, FOR SOLUTION INTRAVENOUS at 13:18

## 2022-12-24 RX ADMIN — HEPARIN SODIUM 5000 UNITS: 5000 INJECTION, SOLUTION INTRAVENOUS; SUBCUTANEOUS at 08:31

## 2022-12-24 RX ADMIN — Medication 150 MCG/HR: at 12:41

## 2022-12-24 RX ADMIN — ACETAMINOPHEN 650 MG: 325 SUSPENSION ORAL at 22:27

## 2022-12-24 RX ADMIN — INSULIN ASPART 4 UNITS: 100 INJECTION, SOLUTION INTRAVENOUS; SUBCUTANEOUS at 12:37

## 2022-12-24 RX ADMIN — ROCURONIUM BROMIDE 30 MG: 10 SOLUTION INTRAVENOUS at 14:30

## 2022-12-24 ASSESSMENT — ACTIVITIES OF DAILY LIVING (ADL)
ADLS_ACUITY_SCORE: 36

## 2022-12-24 NOTE — PROGRESS NOTES
[facility] ICU RESPIRATORY NOTE        Date of Admission: 12/16/2022    Date of Intubation (most recent):12/16/2022    Reason for Mechanical Ventilation: Respiratory Failure    Number of Days on Mechanical Ventilation: 7    Met Criteria for Spontaneous Breathing Trial: No    Reason for No Spontaneous Breathing Trial: Per MD    Significant Events Today: Pt transported to CT on a transport vent safely.    ABG Results:   Recent Labs   Lab 12/20/22  1620 12/20/22  1157 12/18/22  0559 12/18/22  0214   PH 7.44 7.42 7.45 7.49*   PCO2 38 42 35 32*   PO2 100 170* 83 72*   HCO3 26 27 24 24   O2PER 50 30 40 40       Current Vent Settings: Vent Mode: CMV/AC  (Continuous Mandatory Ventilation/ Assist Control)  FiO2 (%): 40 %  Resp Rate (Set): 20 breaths/min  Tidal Volume (Set, mL): 320 mL  PEEP (cm H2O): 6 cmH2O  Resp: 20      Skin Assessment: intact    Plan: Continue Full ventilatory support and wean as tolerated.    TERESA ROY, RT on 12/23/2022 at 6:53 PM

## 2022-12-24 NOTE — PLAN OF CARE
Shift [id number]: VSS ex. low-grade fever-febrile. Neuro: GADIEL orientation, follows simple commands (squeeze hands, wiggles toes). Pulm: Lungs: coarse/diminished throughout (assessment may be inaccurate r/t PAPR PPE (difficult to hear), mechanically ventilated, large amounts of secretions both orally and w/ in-line suctioning. GI/: BS present, multiple loose BM's; [place] in place: WDL output. Access: PICC, PIV, midline; propofol gtt, versed gtt, fentanyl gtt, ketamine gtt, NS for TKO, LR for TKO.  (Modified) Airborne precautions maintained-- re-using PPE (PAPR) per [facility] policy    AM Shift:  - Unit of blood given x 1  - Multiple loose stools  - RASS goal modified to 0 to -1; sedation titrated down (see MAR)    Change of shift:  - Visitor identified self as (Edgar and friend of pt) showed up to visit pt

## 2022-12-24 NOTE — PROGRESS NOTES
Notified provider about indwelling degroot catheter discussed removal or continued need.    Did provider choose to remove indwelling degroot catheter? No    Provider's degroot indication for keeping indwelling degroot catheter:   Deep sedation/paralysis  Is there an order for indwelling degroot catheter? Yes    *If there is a plan to keep degroot catheter in place at discharge daily notification with provider is not necessary.

## 2022-12-24 NOTE — PROGRESS NOTES
[facility] ICU RESPIRATORY NOTE        Date of Admission: 12/16/2022    Date of Intubation (most recent):  12/16/22    Reason for Mechanical Ventilation: Resp failure    Number of Days on Mechanical Ventilation: 9    Met Criteria for Spontaneous Breathing Trial: No    Reason for No Spontaneous Breathing Trial: Per MD    Significant Events Today: Pt had cuff leak so tube was exchanged by MD with rosalba.  Pt tolerated well    ABG Results:   Recent Labs   Lab 12/20/22  1620 12/20/22  1157 12/18/22  0559 12/18/22  0214   PH 7.44 7.42 7.45 7.49*   PCO2 38 42 35 32*   PO2 100 170* 83 72*   HCO3 26 27 24 24   O2PER 50 30 40 40       Current Vent Settings: Vent Mode: CMV/AC  (Continuous Mandatory Ventilation/ Assist Control)  FiO2 (%): 40 %  Resp Rate (Set): 20 breaths/min  Tidal Volume (Set, mL): 320 mL  PEEP (cm H2O): 8 cmH2O  Resp: 20      Skin Assessment:  Intact    Plan: Pt to remain on full vent support overnight    Lawrence Green, RT

## 2022-12-24 NOTE — PROGRESS NOTES
"[facility] Nurse Inpatient Assessment     Consulted for: Coccyx      Areas Assessed:      Areas visualized during today's visit: Sacrum/coccyx    Wound location: Coccyx  Last photo: 12/23/22        Wound due to: Pressure injury (wound be hospital acquired) vs viral lesion, requesting PCR testing 12/23 spoke to ICU nurse in person states he will relay need for PCR testing to physician in person   Wound history/plan of care: per prior charting \"Patient with history of substance abuse and covid positive. Sedated in the ICU on initial assessment. Patient has approximately 4 other small scabbed areas scattered on legs and buttock and [place] scars of varying ages some fully heal around area that are auspicious for viral lesion. Patient has had been unable to position on her right side as often due to blood pressure.\"    Wound base: 100% non-blanchable, purple and epidermis     Palpation of the wound bed: normal      Drainage: none     Description of drainage: none     Measurements (length x width x depth, in cm): 0.8cm  x 0.8cm  x  0 cm      Tunneling: N/A     Undermining: N/A  Periwound skin: Intact, scattered suspect previously healed lesions with circular pale purple discoloration       Color: normal and consistent with surrounding tissue      Temperature: normal   Odor: none  Pain: no grimacing or signs of discomfort, none  Pain interventions prior to dressing change: patient tolerated well  Treatment goal: Heal  and Protection  STATUS: stable  Supplies ordered: supplies stored on unit      12/23 photo of patients vulva to capture image of vesicles to support concern for viral lesions noted to buttock     Treatment Plan:     Coccyx wound(s): Daily    1. Clean wound with saline or MicroKlenz Spray, pat dry  2. Wipe / \"clean\" the surrounding periwound tissue with skin prep (Cavilon No Sting Skin Prep #015689) and allow to dry. This will help protect periwound and help dressing " adherence  3. Press a Mepilex Sacral to the area, making sure to conform nicely to skin curvatures.   4. Time and date dressing change  Pressure Injury prevention (please order supplies if not in room)  1. Turn/reposition every 1-2 hrs  2.   Float heels off bed with use of pillow and if needed Heel Lift boots (Prevalon #[id number])  3.   If incontinent Cleanse with incontinent cleanser (Juan Daniel spray #[id number]) followed by skin barrier protectant (Critic Aid paste)  BID and after each incontinent episode  4.   Prevent sliding and shear by limiting HOB to 30 degrees or less unless contraindicated, use knee gatch first if not contraindicated  5.   Chair cushion pressure redistribution as needed (#[id number]): please limit sitting to an hour at a time  6.   Optimize nutrition   7.   Consider PULSATE low air loss mattress    Orders: Reviewed    RECOMMEND PRIMARY TEAM ORDER: Requested PCR testing for viral lesion from MD via primary RN   Education provided: importance of repositioning, plan of care, Moisture management, Hygiene and Off-loading pressure  Discussed plan of care with: Patient and Nurse  WOC nurse follow-up plan: twice weekly  Notify WOC if wound(s) deteriorate.  Nursing to notify the Provider(s) and re-consult the WOC Nurse if new skin concern.    DATA:     Current support surface: Standard  Low air loss (JERMAINE pump, Isolibrium, Pulsate, skin guard, etc)  Containment of urine/stool: Incontinent pad in bed and Indwelling catheter  BMI: Body mass index is 22.62 kg/m .   Active diet order: None     Output: I/O last 3 completed shifts:  In: 3054.94 [I.V.:1151.69; NG/GT:710]  Out: 4680 [Urine:4680]     Labs:   Recent Labs   Lab 12/23/22  1834 12/23/22  0438 12/22/22  0502   ALBUMIN  --   --  1.2*   HGB 8.9*   < > 7.3*   WBC 13.9*   < > 8.6    < > = values in this interval not displayed.     Pressure injury risk assessment:   Sensory Perception: 2-->very limited  Moisture: 3-->occasionally moist  Activity:  1-->bedfast  Mobility: 2-->very limited  Nutrition: 3-->adequate  Friction and Shear: 1-->problem  Sedrick Score: 12    Leslie ZENG   Dept. Pager: [phone]  Dept. Office Number: [phone]

## 2022-12-24 NOTE — PROCEDURES
[facility]    Intubation    Date/Time: 12/24/2022 2:40 PM  Performed by: Migel Orlando MD  Authorized by: Migel Orlando MD   Indications: respiratory failure (blown ETT cuff)  Intubation method: re-intubated over bougie       UNIVERSAL PROTOCOL   Site Marked: NA  Prior Images Obtained and Reviewed:  NA  Required items: Required blood products, implants, devices and special equipment available    Patient identity confirmed:  Arm band  Patient was reevaluated immediately before administering moderate or deep sedation or anesthesia  Confirmation Checklist:  Procedure was appropriate and matched the consent or emergent situation  Time out: Immediately prior to the procedure a time out was called    Universal Protocol: the Joint Commission Universal Protocol was followed    Preparation: Patient was prepped and draped in usual sterile fashion      Patient status: paralyzed (RSI)  Preoxygenation: ventilator.  Paralytic: rocuronium  Sedatives: etomidate  Tube size: 7.0 mm  Tube type: cuffed  Number of attempts: 1  Cricoid pressure: no  Post-procedure assessment: chest rise and CXR verification  Breath sounds: equal  Cuff inflated: yes  ETT to teeth: 24 cm  Tube secured with: ETT hunter        PROCEDURE    Patient Tolerance:  Patient tolerated the procedure well with no immediate complications  Length of time physician/provider present for 1:1 monitoring during sedation: 5

## 2022-12-24 NOTE — PLAN OF CARE
Neuro: Alert, RASS goal 0 to -1, moving all extremities, intermittent with commands   CV: SR/ST, BP WNL   Respiratory: thick secretions inline and oral, on mechanical vent, LS coarse   GI/: good UOP with degroot, loose BMs x 2 this shift   Skin: genital lesions weepy, see flowsheet for assessment   Activity: Turned q2h   Diet: NPO, TF @ 35/hr, 100 q4h  Drips: Fentanyl, Ketamine, Propofol, Versed, TKO   Other: Patient had visitor, Edgar (also claims to be other s/o), in evening at shift change. Informed of visitor policy.  Father updated via phone.   Plan: Wean sedation, vent as able. Continue plan of care.       Right wrist and Left wrist restraints continued 12/24/2022    Clinical Justification: Pulling lines, pulling tubes, and pulling equipment  Less Restrictive Alternative: 1:1 patient care, Repositioning, Pain management, Reorientation  Attending Physician Notified: Yes, Attending Physician's Name: Hira Jack orders placed 12/23/2022 0839  Length of Order: 1 Day      Sarah Zuniga RN

## 2022-12-24 NOTE — PROGRESS NOTES
[facility]    Infectious Disease Progress Note    Date of Service : 12/24/2022       Assessment:  27 year old female with polysubstance abuse who has been admitted since 12/16/2022 for DKA, respiratory failure from concurrent covid and found to have superimposed MRSA cavitary pneumonia with septic shock. She developed acute respiratory failure requiring intubation and pressor support as well as stress dose steroids for shock in addition to broad spectrum antimicrobial therapy.  She remains severely critically ill . Now has developed fungemia related to candida glabrata     -MRSA with septic shock, bacteremia is now controlled. No endocarditis noted on CATHY  -Severe lobar consolidations and cavitary pneumonia related to MRSA with hypoxic respiratory failure  -Candida glabrata fungemia  -Covid19 infection  -Uncontrolled type 1 diabetes with HbA1c > 16%  -DKA with NANNETTE and electrolyte abnormalities have improved  -Malnutrition  -substance abuse disorder    Recommendations:  1. Continue treatment with Vancomycin and Micafungin  2. Consider discontinuing Cefepime given no evidence of infection with gram negative pathogens.   3. Monitor for sites of secondary seeing  4. Remains at high risk for additional complications    Saima Mercado MD    Interval History   Patient was seen and examined, chart reviewed  Has had persistent fever and leukocytosis, blood cxs are negative since 12/21    Physical Exam   Temp: 100.2  F (37.9  C) Temp src: Bladder BP: 105/60 Pulse: 101   Resp: 19 SpO2: 93 % O2 Device: Mechanical Ventilator    Vitals:    12/21/22 0200 12/22/22 0400 12/24/22 0530   Weight: 43 kg (94 lb 12.8 oz) 57.9 kg (127 lb 10.3 oz) 36.8 kg (81 lb 2.1 oz)     Vital Signs with Ranges  Temp:  [98.8  F (37.1  C)-100.6  F (38.1  C)] 100.2  F (37.9  C)  Pulse:  [] 101  Resp:  [13-35] 19  BP: (100-137)/(54-79) 105/60  FiO2 (%):  [40 %] 40 %  SpO2:  [91 %-100 %] 93 %    Constitutional: sedated and  ventilated  Lungs:on vent  Cardiovascular: S1S2  Abdomen: soft  Skin: No rash    Other:    Medications     dextrose       fentaNYL 150 mcg/hr (12/24/22 0823)     ketamine 0.5 mg/kg/hr (12/24/22 0824)     lactated ringers 10 mL/hr at 12/23/22 0700     propofol 25 mcg/kg/min (12/24/22 1045)    And     - MEDICATION INSTRUCTIONS -       - MEDICATION INSTRUCTIONS -       midazolam 7 mg/hr (12/24/22 0822)     sodium chloride 10 mL/hr (12/23/22 0700)       ceFEPIme (MAXIPIME) in D5W intermittent infusion  500 mg Intravenous Q12H     chlorhexidine  15 mL Mouth/Throat Q12H     furosemide  20 mg Intravenous Daily     heparin ANTICOAGULANT  5,000 Units Subcutaneous Q12H     insulin aspart  1-6 Units Subcutaneous Q4H     insulin glargine  20 Units Subcutaneous At Bedtime     levalbuterol  0.63 mg Nebulization Q8H     metolazone  5 mg Per Feeding Tube BID     IV BOLUS builder WITH LARGE additive list  100 mg Intravenous Q24H     multivitamins w/minerals  15 mL Per Feeding Tube Daily     pantoprazole  40 mg Intravenous BID     sodium chloride (PF)  10 mL Intracatheter Q8H     sodium chloride (PF)  3 mL Intravenous Q8H     IV BOLUS builder WITH LARGE additive list  750 mg Intravenous Q12H       Data   All microbiology laboratory data reviewed.  Recent Labs   Lab Test 12/24/22  0523 12/23/22  1834 12/23/22  0438   WBC 12.0* 13.9* 10.1   HGB 8.6* 8.9* 6.6*   HCT 25.0* 26.3* 20.1*   MCV 88 90 94    331 309     Recent Labs   Lab Test 12/23/22  0438 12/22/22  0502 12/21/22  0431   CR 0.74 0.79 0.85     Microbiology  12/19 blood cx      Hand, Right; Blood    0 Result Notes  Culture Positive on the 1st day of incubation Abnormal        Candida glabrata complex Panic     2 of 2 bottles   Susceptibilities done on previous cultures        Susceptibility     Candida glabrata complex     SAMANTHA     Amphotericin B 1 ug/mL No interpretation available     Fluconazole 16 ug/mL Susceptible Dose Dependent     Micafungin <=0.03 ug/mL Susceptible      Voriconazole 0.5 ug/mL No interpretation available

## 2022-12-24 NOTE — PROGRESS NOTES
Count includes the [facility] ICU RESPIRATORY NOTE        Date of Admission: 12/16/2022    Date of Intubation (most recent): 12/16/2022    Reason for Mechanical Ventilation: Respiratory failure    Number of Days on Mechanical Ventilation: 8    Met Criteria for Spontaneous Breathing Trial: No    Reason for No Spontaneous Breathing Trial: Per MD    Significant Events Today: None overnight    ABG Results:   Recent Labs   Lab 12/20/22  1620 12/20/22  1157 12/18/22  0559 12/18/22  0214   PH 7.44 7.42 7.45 7.49*   PCO2 38 42 35 32*   PO2 100 170* 83 72*   HCO3 26 27 24 24   O2PER 50 30 40 40       Current Vent Settings: Vent Mode: CMV/AC  (Continuous Mandatory Ventilation/ Assist Control)  FiO2 (%): 40 %  Resp Rate (Set): 20 breaths/min  Tidal Volume (Set, mL): 320 mL  PEEP (cm H2O): 6 cmH2O  Resp: 19        Yao Lerma, RT on 12/24/2022 at 6:20 AM

## 2022-12-24 NOTE — PROGRESS NOTES
[facility] ICU Note      Progress Note  Reason for Critical Care Admission: DKA, respiratory failure, covid and bacterial pneumonia    Assessment: Critical Care   Deidre N Aasen is a 27 year old female admitted on 12/16/2022 for DKA, respiratory failure from concurrent covid and bacterial pneumonia.  After arrival to the ICU she developed acute respiratory failure requiring intubation and development of septic shock requiring initation of dual vasopressor support and stress dose steroids in addition to broad spectrum antimicrobial therapy.  She remains severely critically ill but downtrending support needed. Found on CT Chest w/ contrast to have numerous cavitary lesions.           Plan: Critical Care   Neuro/ pain/ sedation:  # Toxic metabolic encephalopathy  # Agitation in setting of polysubstance abuse - IV meth and heroin use.  Improved and tolerating RASS of -1  -Continue RASS 0 to -1  -Trial of up in chair if possible.     Pulmonary:  # Acute respiratory failure with refractory hypoxia  # Complex cavitary pneumonia most likely 2/2 to MRSA pneumonia given sudden onset and progression based on comparison of chest imaging (12/13 vs 12/16). Less likely due to fungal or COVID PNEUMONIA.  For the moment these do not appear to be amenable to or requiring of surgical intervention.  # severe ARDS, improved.  Trialed patient on pressure support mode December 24.  She did not tolerate this well at all.  Plan  Vent Mode: CMV/AC  (Continuous Mandatory Ventilation/ Assist Control)  FiO2 (%): 40 %  Resp Rate (Set): 20 breaths/min  Tidal Volume (Set, mL): 320 mL  PEEP (cm H2O): 6 cmH2O  Resp: 20  - Continue intubation and mechanical ventilation.  Defer further pressure support trials for today  - Re image chest with any clinical worsening and/or at frequency directed by thoracic surgeons.    Cardiovascular:  # septic shock, resolved  # Sinus tachycardia  #CATHY 12/20 negative for vegetations and  negative bubble     GI/Nutrition:  # Abdominal pain. Present prior to intubation. Large gastric bubble on am CXR. Unclear etiology. May be component of heroin with drawl symptoms vs. DKA. Mild alk phosphatemia on admission but otherwise hepatic panel normal.   # Upper GI bleed. melenotic stools. Hem occult positive.has now required two transfusions  #protein-calorie malnutrition  - Diet: Adult Formula Drip Feeding: Continuous Osmolite 1.5; Nasogastric tube; Goal Rate: 35; mL/hr; Increase TF rate to 20mL/hr now and then after 24 hours advance to goal    - BID PPI for melanotic stools  - continue TFs    Malnutrition:   % Weight Loss:  Large wt fluctuations - unable to assess true wt loss  % Intake: Pt intubated, no diet hx - however, suspect decreased po intake since   presentation to ED on 12/13  Subcutaneous Fat Loss:  Unable to complete NFPA - pt in COVID isolation  Muscle Loss:  Unable to complete NFPA - pt in COVID isolation  Fluid Retention:  None noted  Malnutrition Diagnosis: Suspect Severe malnutrition  In Context of:  Acute illness or injury on Chronic illness or disease (DKA, COVID -   polysubstance abuse, DM)     Renal/ Fluid Balance:   #Diabetic ketoacidosis- Resolved and DKA protocol discontinued  #Electrolyte abnormalities; improved- replacement per protocol  #acute kidney injury, resolved  # hypernatremia, improved  #Net positive fluid balance.  Good response to diuresis.  - ICU electrolyte replacement protocol  - continue free water as ordered (100ml every 4 hours)  - continue metolazone 5mg BID  - continue lasix 20mg daily    Endocrine:  #Type 1 diabetes with insulin non complicance, complicated by DKA. Hemoglobin A1C 16.4 (!).   - glargine 20 units, PM  - continue sliding scale insulin      ID:  # Septic shock and MRSA bacteremia. Blood cultures have now cleared.  CATHY did not show endocarditis  # Complicated pneumonia with cavitary lesions  # Covid positive  #MRSA bacteremia  #fungemia  # viral  infection?  - ID consulted and appreciate recs  - Abx course: vanco 12/16- present; unasyn 12/16-12/19; cefepime 12/19-present  - Anti-fungal course: micafungin 12/21-present, plan for 14 day course  - Last positive blood cx: MRSA 12/16; budding yeast 12/19  - Last positive BAL: MRSA 12/16  - Monitoring cavitary lesions for necessity of chest tube drainage or other interventions  -Have ordered HSV and varicella swab of her groin lesion    Hematology:  # DVT ppx  # melenotic stools, hem occult positive  #anemia of chronic disease  #acute blood loss anemia. Needing transfusion on 12/23.  Hemoglobin stable since.  - subcutaneous heparin    MSK:   - not appropriate for PT/OT at this time.       Lines/ tubes/ drains:  Sheffield Catheter: PRESENT, indication: Strict 1-2 Hour I&O  Central Lines: PICC    Prophylaxis:  - DVT Prophylaxis: Heparin SQ and Pneumatic Compression Devices  - PUD Prophylaxis: PPI    Code Status: Full Code      Disposition:  - ICU    Family update:  Left message for father.    Clinically Significant Risk Factors        # Hypokalemia: Lowest K = 3.1 mmol/L in last 2 days, will replace as needed       # Hypoalbuminemia: Lowest albumin = 1.2 g/dL at 12/22/2022  5:02 AM, will monitor as appropriate             # Severe Malnutrition: based on nutrition assessment             ______________________________________________________________________    Interval events:  Tolerating reduction in sedation    Physical Exam   Vital Signs: Temp: 99.9  F (37.7  C) Temp src: Bladder BP: 120/69 Pulse: 87   Resp: 20 SpO2: 93 % O2 Device: Mechanical Ventilator    Weight: 81 lbs 2.07 oz       Intake/Output Summary (Last 24 hours) at 12/24/2022 0945  Last data filed at 12/24/2022 0600  Gross per 24 hour   Intake 2906.02 ml   Output 4775 ml   Net -1868.98 ml   Net hospital: +10    Neuro: Drowsy but responsive and calm until she is put on pressure support at which point she gets agitated.  CV: sinus RRR  Pulm: Very high R SBI  and pressure support.  Copious secretions   Abd: non distended. NJT in place  : degroot in place with clear pale yellow urine in the bag  Extremities/skin: thin generalized edema, very small dark petechial like lesions on some of her digits.  Bandage over right groin    Data   I reviewed all medications, new labs and imaging results over the last 24 hours.  Arterial Blood Gases   Recent Labs   Lab 12/20/22  1620 12/20/22  1157 12/18/22  0559 12/18/22  0214   PH 7.44 7.42 7.45 7.49*   PCO2 38 42 35 32*   PO2 100 170* 83 72*   HCO3 26 27 24 24       Complete Blood Count   Recent Labs   Lab 12/24/22  0523 12/23/22  1834 12/23/22  0438 12/22/22  0502   WBC 12.0* 13.9* 10.1 8.6   HGB 8.6* 8.9* 6.6* 7.3*    331 309 221       Basic Metabolic Panel  Recent Labs   Lab 12/24/22  0840 12/24/22  0415 12/24/22  0031 12/23/22  2113 12/23/22  0844 12/23/22  0438 12/22/22  2005 12/22/22  1803 12/22/22  1552 12/22/22  1357 12/22/22  1131 12/22/22  0753 12/22/22  0502 12/21/22  1507 12/21/22  1242 12/21/22  0525 12/21/22  0431 12/20/22  1820 12/20/22  1805   NA  --   --   --   --   --  142  --   --   --  142  --   --  145*  --  151*  --  152*  --  150*   POTASSIUM  --   --   --   --   --  3.6  3.5  --  4.0  --   --  3.1*  --  3.1*  --   --   --  3.9  --  4.1   CHLORIDE  --   --   --   --   --  105  --   --   --   --   --   --  111*  --   --   --  121*  --  122*   CO2  --   --   --   --   --  31  --   --   --   --   --   --  29  --   --   --  28  --  24   BUN  --   --   --   --   --  14  --   --   --   --   --   --  21  --   --   --  33*  --  37*   CR  --   --   --   --   --  0.74  --   --   --   --   --   --  0.79  --   --   --  0.85  --  0.92   * 213* 283* 309*   < > 210*   < >  --    < >  --   --    < > 218*   < >  --    < > 171*   < > 211*  199*    < > = values in this interval not displayed.       Liver Function Tests  Recent Labs   Lab 12/22/22  0502   AST 46*   ALT 32   ALKPHOS 78   BILITOTAL 0.3   ALBUMIN 1.2*        Pancreatic Enzymes  No lab results found in last 7 days.    Coagulation Profile  Recent Labs   Lab 12/22/22  1357   PTT 38       IMAGING:  Recent Results (from the past 24 hour(s))   CT Chest w Contrast    Narrative    CT CHEST WITH CONTRAST  12/23/2022 12:02 PM    CLINICAL HISTORY: Interval evaluation of right chest  cavitations/infection.    TECHNIQUE: CT chest with IV contrast. Multiplanar reformats were  obtained. Dose reduction techniques were used.    CONTRAST:  64 mL isovue 370    COMPARISON: 12/19/2022    FINDINGS:   LUNGS AND PLEURA: Peribronchial infiltrates again seen throughout both  upper lobes and right middle lobe. In the right upper lobe, previously  seen nodular infiltrate has become cavitary. The right middle lobe  cavity has decreased fluid when compared to previous. There remains  dense consolidation both lower lobes, with air bronchograms. There is  a 4 cm cavity in the lateral right lower lobe, stable to slightly  smaller. Subcentimeter cavity in the medial right lower lobe, slightly  decreased. There are new small cavitary spaces in the anterior right  lower lobe. No significant pleural effusion on the right. Small left  pleural effusion.    MEDIASTINUM/AXILLAE: Endotracheal tube in good position above the  demar. Mildly enlarged lymph nodes should be reactive. No coronary  artery calcification.    UPPER ABDOMEN: No significant finding.    MUSCULOSKELETAL: Unremarkable.      Impression    IMPRESSION: Cavitary pneumonia bilaterally. The largest cavities in  the right lower lobe appear decreased in size; however, there are  multiple new small cavities in the right lung.    CARLY SULLIVAN MD         SYSTEM ID:  [id number]     CC time: 45 minutes excluding procedure time

## 2022-12-25 ENCOUNTER — APPOINTMENT (OUTPATIENT)
Dept: GENERAL RADIOLOGY | Facility: CLINIC | Age: 27
End: 2022-12-25
Attending: INTERNAL MEDICINE
Payer: COMMERCIAL

## 2022-12-25 LAB
CREAT SERPL-MCNC: 0.65 MG/DL (ref 0.52–1.04)
ERYTHROCYTE [DISTWIDTH] IN BLOOD BY AUTOMATED COUNT: 14.5 % (ref 10–15)
GFR SERPL CREATININE-BSD FRML MDRD: >90 ML/MIN/1.73M2
GLUCOSE BLDC GLUCOMTR-MCNC: 105 MG/DL (ref 70–99)
GLUCOSE BLDC GLUCOMTR-MCNC: 111 MG/DL (ref 70–99)
GLUCOSE BLDC GLUCOMTR-MCNC: 115 MG/DL (ref 70–99)
GLUCOSE BLDC GLUCOMTR-MCNC: 161 MG/DL (ref 70–99)
GLUCOSE BLDC GLUCOMTR-MCNC: 187 MG/DL (ref 70–99)
GLUCOSE BLDC GLUCOMTR-MCNC: 256 MG/DL (ref 70–99)
GLUCOSE BLDC GLUCOMTR-MCNC: 320 MG/DL (ref 70–99)
GLUCOSE BLDC GLUCOMTR-MCNC: 86 MG/DL (ref 70–99)
GLUCOSE BLDC GLUCOMTR-MCNC: 86 MG/DL (ref 70–99)
GLUCOSE BLDC GLUCOMTR-MCNC: 87 MG/DL (ref 70–99)
GLUCOSE BLDC GLUCOMTR-MCNC: 91 MG/DL (ref 70–99)
GLUCOSE BLDC GLUCOMTR-MCNC: 93 MG/DL (ref 70–99)
HCT VFR BLD AUTO: 26.5 % (ref 35–47)
HGB BLD-MCNC: 8.9 G/DL (ref 11.7–15.7)
HSV1 DNA SPEC QL NAA+PROBE: NOT DETECTED
HSV2 DNA SPEC QL NAA+PROBE: DETECTED
MAGNESIUM SERPL-MCNC: 2.2 MG/DL (ref 1.6–2.3)
MCH RBC QN AUTO: 30.8 PG (ref 26.5–33)
MCHC RBC AUTO-ENTMCNC: 33.6 G/DL (ref 31.5–36.5)
MCV RBC AUTO: 92 FL (ref 78–100)
PHOSPHATE SERPL-MCNC: 3.2 MG/DL (ref 2.5–4.5)
PLATELET # BLD AUTO: 444 10E3/UL (ref 150–450)
POTASSIUM BLD-SCNC: 3.4 MMOL/L (ref 3.4–5.3)
POTASSIUM BLD-SCNC: 3.4 MMOL/L (ref 3.4–5.3)
POTASSIUM BLD-SCNC: 4 MMOL/L (ref 3.4–5.3)
RBC # BLD AUTO: 2.89 10E6/UL (ref 3.8–5.2)
VZV DNA SPEC QL NAA+PROBE: NOT DETECTED
WBC # BLD AUTO: 12.9 10E3/UL (ref 4–11)

## 2022-12-25 PROCEDURE — 250N000013 HC RX MED GY IP 250 OP 250 PS 637: Performed by: INTERNAL MEDICINE

## 2022-12-25 PROCEDURE — 84132 ASSAY OF SERUM POTASSIUM: CPT | Performed by: INTERNAL MEDICINE

## 2022-12-25 PROCEDURE — 94640 AIRWAY INHALATION TREATMENT: CPT | Mod: 76

## 2022-12-25 PROCEDURE — 87040 BLOOD CULTURE FOR BACTERIA: CPT | Performed by: SPECIALIST

## 2022-12-25 PROCEDURE — 0B998ZZ DRAINAGE OF LINGULA BRONCHUS, VIA NATURAL OR ARTIFICIAL OPENING ENDOSCOPIC: ICD-10-PCS | Performed by: INTERNAL MEDICINE

## 2022-12-25 PROCEDURE — 93005 ELECTROCARDIOGRAM TRACING: CPT

## 2022-12-25 PROCEDURE — 99291 CRITICAL CARE FIRST HOUR: CPT | Mod: 25 | Performed by: INTERNAL MEDICINE

## 2022-12-25 PROCEDURE — 87077 CULTURE AEROBIC IDENTIFY: CPT | Performed by: INTERNAL MEDICINE

## 2022-12-25 PROCEDURE — 85027 COMPLETE CBC AUTOMATED: CPT | Performed by: SURGERY

## 2022-12-25 PROCEDURE — 250N000009 HC RX 250: Performed by: STUDENT IN AN ORGANIZED HEALTH CARE EDUCATION/TRAINING PROGRAM

## 2022-12-25 PROCEDURE — 87106 FUNGI IDENTIFICATION YEAST: CPT | Performed by: INTERNAL MEDICINE

## 2022-12-25 PROCEDURE — 250N000011 HC RX IP 250 OP 636

## 2022-12-25 PROCEDURE — 93010 ELECTROCARDIOGRAM REPORT: CPT | Performed by: INTERNAL MEDICINE

## 2022-12-25 PROCEDURE — 71045 X-RAY EXAM CHEST 1 VIEW: CPT

## 2022-12-25 PROCEDURE — 0B9D8ZZ DRAINAGE OF RIGHT MIDDLE LUNG LOBE, VIA NATURAL OR ARTIFICIAL OPENING ENDOSCOPIC: ICD-10-PCS | Performed by: INTERNAL MEDICINE

## 2022-12-25 PROCEDURE — 258N000003 HC RX IP 258 OP 636

## 2022-12-25 PROCEDURE — 250N000011 HC RX IP 250 OP 636: Performed by: STUDENT IN AN ORGANIZED HEALTH CARE EDUCATION/TRAINING PROGRAM

## 2022-12-25 PROCEDURE — 999N000190 HC STATISTIC VAT ROUNDS

## 2022-12-25 PROCEDURE — 87070 CULTURE OTHR SPECIMN AEROBIC: CPT | Performed by: INTERNAL MEDICINE

## 2022-12-25 PROCEDURE — 84100 ASSAY OF PHOSPHORUS: CPT | Performed by: INTERNAL MEDICINE

## 2022-12-25 PROCEDURE — 250N000009 HC RX 250

## 2022-12-25 PROCEDURE — 87102 FUNGUS ISOLATION CULTURE: CPT | Performed by: INTERNAL MEDICINE

## 2022-12-25 PROCEDURE — 250N000011 HC RX IP 250 OP 636: Performed by: SURGERY

## 2022-12-25 PROCEDURE — 258N000003 HC RX IP 258 OP 636: Performed by: STUDENT IN AN ORGANIZED HEALTH CARE EDUCATION/TRAINING PROGRAM

## 2022-12-25 PROCEDURE — 94640 AIRWAY INHALATION TREATMENT: CPT

## 2022-12-25 PROCEDURE — 250N000009 HC RX 250: Performed by: INTERNAL MEDICINE

## 2022-12-25 PROCEDURE — 31624 DX BRONCHOSCOPE/LAVAGE: CPT | Performed by: INTERNAL MEDICINE

## 2022-12-25 PROCEDURE — 99233 SBSQ HOSP IP/OBS HIGH 50: CPT | Performed by: SPECIALIST

## 2022-12-25 PROCEDURE — 250N000011 HC RX IP 250 OP 636: Performed by: INTERNAL MEDICINE

## 2022-12-25 PROCEDURE — 87206 SMEAR FLUORESCENT/ACID STAI: CPT | Performed by: INTERNAL MEDICINE

## 2022-12-25 PROCEDURE — 36415 COLL VENOUS BLD VENIPUNCTURE: CPT | Performed by: SPECIALIST

## 2022-12-25 PROCEDURE — 87116 MYCOBACTERIA CULTURE: CPT | Performed by: INTERNAL MEDICINE

## 2022-12-25 PROCEDURE — 94003 VENT MGMT INPAT SUBQ DAY: CPT

## 2022-12-25 PROCEDURE — 999N000009 HC STATISTIC AIRWAY CARE

## 2022-12-25 PROCEDURE — 99292 CRITICAL CARE ADDL 30 MIN: CPT | Mod: 25 | Performed by: INTERNAL MEDICINE

## 2022-12-25 PROCEDURE — 83735 ASSAY OF MAGNESIUM: CPT | Performed by: INTERNAL MEDICINE

## 2022-12-25 PROCEDURE — 31645 BRNCHSC W/THER ASPIR 1ST: CPT | Performed by: INTERNAL MEDICINE

## 2022-12-25 PROCEDURE — 999N000157 HC STATISTIC RCP TIME EA 10 MIN

## 2022-12-25 PROCEDURE — 87389 HIV-1 AG W/HIV-1&-2 AB AG IA: CPT | Performed by: SPECIALIST

## 2022-12-25 PROCEDURE — 82565 ASSAY OF CREATININE: CPT | Performed by: INTERNAL MEDICINE

## 2022-12-25 PROCEDURE — 250N000013 HC RX MED GY IP 250 OP 250 PS 637: Performed by: SURGERY

## 2022-12-25 PROCEDURE — C9113 INJ PANTOPRAZOLE SODIUM, VIA: HCPCS | Performed by: SURGERY

## 2022-12-25 PROCEDURE — 89051 BODY FLUID CELL COUNT: CPT | Performed by: INTERNAL MEDICINE

## 2022-12-25 PROCEDURE — 258N000001 HC RX 258: Performed by: STUDENT IN AN ORGANIZED HEALTH CARE EDUCATION/TRAINING PROGRAM

## 2022-12-25 PROCEDURE — 200N000001 HC R&B ICU

## 2022-12-25 RX ORDER — POTASSIUM CHLORIDE 1.5 G/1.58G
20 POWDER, FOR SOLUTION ORAL ONCE
Status: COMPLETED | OUTPATIENT
Start: 2022-12-25 | End: 2022-12-25

## 2022-12-25 RX ORDER — PROPOFOL 10 MG/ML
5-50 INJECTION, EMULSION INTRAVENOUS CONTINUOUS
Status: DISCONTINUED | OUTPATIENT
Start: 2022-12-25 | End: 2022-12-26

## 2022-12-25 RX ORDER — PROPOFOL 10 MG/ML
5-50 INJECTION, EMULSION INTRAVENOUS CONTINUOUS
Status: DISCONTINUED | OUTPATIENT
Start: 2022-12-25 | End: 2022-12-25

## 2022-12-25 RX ORDER — DEXMEDETOMIDINE HYDROCHLORIDE 4 UG/ML
1.2 INJECTION, SOLUTION INTRAVENOUS CONTINUOUS
Status: DISCONTINUED | OUTPATIENT
Start: 2022-12-25 | End: 2022-12-30

## 2022-12-25 RX ORDER — WATER 10 ML/10ML
INJECTION INTRAMUSCULAR; INTRAVENOUS; SUBCUTANEOUS
Status: COMPLETED
Start: 2022-12-25 | End: 2022-12-25

## 2022-12-25 RX ORDER — VECURONIUM BROMIDE 1 MG/ML
5 INJECTION, POWDER, LYOPHILIZED, FOR SOLUTION INTRAVENOUS ONCE
Status: COMPLETED | OUTPATIENT
Start: 2022-12-25 | End: 2022-12-25

## 2022-12-25 RX ORDER — QUETIAPINE FUMARATE 50 MG/1
50 TABLET, FILM COATED ORAL 3 TIMES DAILY
Status: DISCONTINUED | OUTPATIENT
Start: 2022-12-25 | End: 2022-12-26

## 2022-12-25 RX ORDER — ACYCLOVIR 200 MG/5ML
200 SUSPENSION ORAL
Status: COMPLETED | OUTPATIENT
Start: 2022-12-25 | End: 2022-12-28

## 2022-12-25 RX ADMIN — PROPOFOL 55 MCG/KG/MIN: 10 INJECTION, EMULSION INTRAVENOUS at 10:33

## 2022-12-25 RX ADMIN — MIDAZOLAM HYDROCHLORIDE 8 MG/HR: 1 INJECTION, SOLUTION INTRAVENOUS at 02:18

## 2022-12-25 RX ADMIN — ACYCLOVIR 200 MG: 200 SUSPENSION ORAL at 22:39

## 2022-12-25 RX ADMIN — DEXTROSE MONOHYDRATE 74 ML: 100 INJECTION, SOLUTION INTRAVENOUS at 09:39

## 2022-12-25 RX ADMIN — DEXMEDETOMIDINE HYDROCHLORIDE 1.2 MCG/KG/HR: 400 INJECTION INTRAVENOUS at 21:52

## 2022-12-25 RX ADMIN — VECURONIUM BROMIDE 5 MG: 1 INJECTION, POWDER, LYOPHILIZED, FOR SOLUTION INTRAVENOUS at 23:21

## 2022-12-25 RX ADMIN — PROPOFOL 50 MCG/KG/MIN: 10 INJECTION, EMULSION INTRAVENOUS at 03:58

## 2022-12-25 RX ADMIN — ACETAMINOPHEN 650 MG: 325 SUSPENSION ORAL at 12:58

## 2022-12-25 RX ADMIN — HEPARIN SODIUM 5000 UNITS: 5000 INJECTION, SOLUTION INTRAVENOUS; SUBCUTANEOUS at 08:21

## 2022-12-25 RX ADMIN — MULTIVITAMIN 15 ML: LIQUID ORAL at 08:08

## 2022-12-25 RX ADMIN — PROPOFOL 50 MCG/KG/MIN: 10 INJECTION, EMULSION INTRAVENOUS at 21:20

## 2022-12-25 RX ADMIN — Medication 150 MCG/HR: at 18:21

## 2022-12-25 RX ADMIN — METOLAZONE 5 MG: 5 TABLET ORAL at 08:08

## 2022-12-25 RX ADMIN — Medication 150 MCG/HR: at 01:53

## 2022-12-25 RX ADMIN — ACYCLOVIR 200 MG: 200 SUSPENSION ORAL at 12:17

## 2022-12-25 RX ADMIN — QUETIAPINE FUMARATE 50 MG: 50 TABLET ORAL at 21:53

## 2022-12-25 RX ADMIN — POTASSIUM CHLORIDE 20 MEQ: 1.5 POWDER, FOR SOLUTION ORAL at 08:11

## 2022-12-25 RX ADMIN — WATER 10 ML: 1 INJECTION INTRAMUSCULAR; INTRAVENOUS; SUBCUTANEOUS at 23:32

## 2022-12-25 RX ADMIN — ACETAMINOPHEN 650 MG: 325 SUSPENSION ORAL at 06:09

## 2022-12-25 RX ADMIN — POTASSIUM CHLORIDE 20 MEQ: 1.5 POWDER, FOR SOLUTION ORAL at 15:10

## 2022-12-25 RX ADMIN — PANTOPRAZOLE SODIUM 40 MG: 40 INJECTION, POWDER, FOR SOLUTION INTRAVENOUS at 08:03

## 2022-12-25 RX ADMIN — ACYCLOVIR 200 MG: 200 SUSPENSION ORAL at 15:10

## 2022-12-25 RX ADMIN — PROPOFOL 50 MCG/KG/MIN: 10 INJECTION, EMULSION INTRAVENOUS at 15:53

## 2022-12-25 RX ADMIN — LEVALBUTEROL HYDROCHLORIDE 0.63 MG: 1.25 SOLUTION, CONCENTRATE RESPIRATORY (INHALATION) at 23:41

## 2022-12-25 RX ADMIN — ACYCLOVIR 200 MG: 200 SUSPENSION ORAL at 18:22

## 2022-12-25 RX ADMIN — LEVALBUTEROL HYDROCHLORIDE 0.63 MG: 1.25 SOLUTION, CONCENTRATE RESPIRATORY (INHALATION) at 00:01

## 2022-12-25 RX ADMIN — CHLORHEXIDINE GLUCONATE 0.12% ORAL RINSE 15 ML: 1.2 LIQUID ORAL at 07:50

## 2022-12-25 RX ADMIN — SODIUM CHLORIDE 0.5 MG/KG/HR: 9 INJECTION, SOLUTION INTRAVENOUS at 07:48

## 2022-12-25 RX ADMIN — VANCOMYCIN HYDROCHLORIDE 750 MG: 1 INJECTION, POWDER, LYOPHILIZED, FOR SOLUTION INTRAVENOUS at 22:39

## 2022-12-25 RX ADMIN — LEVALBUTEROL HYDROCHLORIDE 0.63 MG: 1.25 SOLUTION, CONCENTRATE RESPIRATORY (INHALATION) at 07:49

## 2022-12-25 RX ADMIN — CHLORHEXIDINE GLUCONATE 0.12% ORAL RINSE 15 ML: 1.2 LIQUID ORAL at 20:22

## 2022-12-25 RX ADMIN — MIDAZOLAM HYDROCHLORIDE 6 MG/HR: 1 INJECTION, SOLUTION INTRAVENOUS at 15:21

## 2022-12-25 RX ADMIN — PANTOPRAZOLE SODIUM 40 MG: 40 INJECTION, POWDER, FOR SOLUTION INTRAVENOUS at 20:22

## 2022-12-25 RX ADMIN — FUROSEMIDE 20 MG: 10 INJECTION, SOLUTION INTRAMUSCULAR; INTRAVENOUS at 08:18

## 2022-12-25 RX ADMIN — HEPARIN SODIUM 5000 UNITS: 5000 INJECTION, SOLUTION INTRAVENOUS; SUBCUTANEOUS at 20:22

## 2022-12-25 RX ADMIN — SODIUM CHLORIDE, POTASSIUM CHLORIDE, SODIUM LACTATE AND CALCIUM CHLORIDE: 600; 310; 30; 20 INJECTION, SOLUTION INTRAVENOUS at 07:49

## 2022-12-25 RX ADMIN — MICAFUNGIN SODIUM 100 MG: 50 INJECTION, POWDER, LYOPHILIZED, FOR SOLUTION INTRAVENOUS at 13:00

## 2022-12-25 RX ADMIN — LEVALBUTEROL HYDROCHLORIDE 0.63 MG: 1.25 SOLUTION, CONCENTRATE RESPIRATORY (INHALATION) at 13:48

## 2022-12-25 RX ADMIN — VANCOMYCIN HYDROCHLORIDE 750 MG: 1 INJECTION, POWDER, LYOPHILIZED, FOR SOLUTION INTRAVENOUS at 09:59

## 2022-12-25 RX ADMIN — MIDAZOLAM HYDROCHLORIDE 8 MG/HR: 1 INJECTION, SOLUTION INTRAVENOUS at 07:48

## 2022-12-25 RX ADMIN — CEFEPIME HYDROCHLORIDE 500 MG: 2 INJECTION, POWDER, FOR SOLUTION INTRAVENOUS at 06:10

## 2022-12-25 ASSESSMENT — ACTIVITIES OF DAILY LIVING (ADL)
ADLS_ACUITY_SCORE: 36

## 2022-12-25 NOTE — PLAN OF CARE
Pt. On ventilator this 12 H shift, brief breathing trial with MD early in shift failed with pt. Very agitated, sedation of propfol increased somewhat over shift with pt. Alert/agitated and reaching for breathing tube, fentanyl, ketamine and versed basically unchanged, following commands and nodding to yes/no questions intermittently CV; ST, adequate perfusion, brisk urine output through degroot, particularly after lasix, large liquid stool, rectal tube placed, pt's father updated by phone X 1.

## 2022-12-25 NOTE — PROGRESS NOTES
[facility] ICU Note      Progress Note  Reason for Critical Care Admission: DKA, respiratory failure, covid and bacterial pneumonia    Assessment: Critical Care   Deidre N Aasen is a 27 year old female admitted on 12/16/2022 for DKA, respiratory failure from concurrent covid and bacterial pneumonia.  MRSA bacteremia and necrotizing pneumonia and candida bacteremia          Plan: Critical Care   Neuro/ pain/ sedation:  # Toxic metabolic encephalopathy  # Agitation in setting of polysubstance abuse - IV meth and heroin use.  On 4 sedatives   And awake and follows commands  P: wean ketamine first  Then versed then propofol with fentanyl     Pulmonary:  # Complex cavitary pneumonia 2/2 to MRSA pneumonia  Also COVID     # severe ARDS, improved.  Trialed patient on pressure support mode December 25---RSBI 110 within 5 minutes   Plan  Vent Mode: CMV/AC  (Continuous Mandatory Ventilation/ Assist Control)  FiO2 (%): 55 %  Resp Rate (Set): 20 breaths/min  Tidal Volume (Set, mL): 320 mL  PEEP (cm H2O): 8 cmH2O  Resp: 22  - Continue intubation and mechanical ventilation.  .    Cardiovascular:  # septic shock, resolved  # Sinus tachycardia  #CATHY 12/20 negative for vegetations and negative bubble     GI/Nutrition: at goal tube feeds at 35 ml/hr       Renal/ Fluid Balance:     #Net positive fluid balance.  Good response to diuresis.  - ICU electrolyte replacement protocol  - stop metolazone 5mg  - continue lasix 20mg daily    Endocrine:  #Type 1 diabetes with insulin non complicance, complicated by DKA. Hemoglobin A1C 16.4 (!).   Resolved DKA but then developed glucoses > 300 yesterday.  Now 100.  40 kg means lower dosing of insulin    - lantus 10 units now and medium sliding scale insulin. Stop drip.      ID:  # Septic shock and MRSA bacteremia. Blood cultures have now cleared.  CATHY did not show endocarditis  # Complicated pneumonia with cavitary lesions  # Covid positive  #MRSA bacteremia  #fungemia  -  Abx course: vanco 12/16- present; unasyn 12/16-12/19; cefepime 12/19-present  - Anti-fungal course: micafungin 12/21-present, plan for 14 day course  - Last positive blood cx: MRSA 12/16; budding yeast 12/19  - Last positive BAL: MRSA 12/16  + HSV swab from right groin pustules    P: stop cefipime   Acyclovir 200 5x/d for 3 days./     Hematology:  # DVT ppx  #anemia of chronic disease  #acute blood loss anemia. Needing transfusion on 12/23.  Hemoglobin stable since.  - subcutaneous heparin  .       Lines/ tubes/ drains:  Degroot Catheter: PRESENT, indication: Strict 1-2 Hour I&O  Central Lines: PICC and left midline     Prophylaxis:  - DVT Prophylaxis: Heparin SQ and Pneumatic Compression Devices  - PUD Prophylaxis: PPI    Code Status: Full Code      Disposition:  - ICU    Family update:  Left message for father.      ______________________________________________________________________    Interval events:  Awake on 4 sedatives   Off insulin drip     Physical Exam   Vital Signs: Temp: 100  F (37.8  C) Temp src: Bladder BP: 116/59 Pulse: 113   Resp: 22 SpO2: 97 % O2 Device: Mechanical Ventilator    Weight: 88 lbs 2.94 oz       -2.2 L with 5100 urinary output     Neuro: awake with wyes open and slowly follows commands, wiggles toes and lifts arms..  CV: sinus RRR  Pulm: clear   Abd: non distended. NJT in place  : degroot in place with clear pale yellow urine in the bag  Extremities/skin: thin generalized edema, very small dark petechial like lesions on some of her digits.  Bandage over right groin    Data   I reviewed all medications, new labs and imaging results over the last 24 hours.  Arterial Blood Gases   Recent Labs   Lab 12/20/22  1620 12/20/22  1157   PH 7.44 7.42   PCO2 38 42   PO2 100 170*   HCO3 26 27       Complete Blood Count   Recent Labs   Lab 12/25/22  0627 12/24/22  0523 12/23/22  1834 12/23/22  0438   WBC 12.9* 12.0* 13.9* 10.1   HGB 8.9* 8.6* 8.9* 6.6*    383 331 309       Basic Metabolic  Panel  Recent Labs   Lab 12/25/22  0957 12/25/22  0930 12/25/22  0758 12/25/22  0627 12/25/22  0625 12/24/22  2101 12/24/22  1637 12/24/22  1233 12/24/22  1052 12/23/22  0844 12/23/22  0438 12/22/22  1552 12/22/22  1357 12/22/22  0753 12/22/22  0502 12/21/22  0525 12/21/22  0431   NA  --   --   --   --   --   --   --   --  135  --  142  --  142  --  145*   < > 152*   POTASSIUM  --   --   --  3.4  --   --  3.7  --  3.3*  --  3.6  3.5   < >  --    < > 3.1*  --  3.9   CHLORIDE  --   --   --   --   --   --   --   --  94  --  105  --   --   --  111*  --  121*   CO2  --   --   --   --   --   --   --   --  35*  --  31  --   --   --  29  --  28   BUN  --   --   --   --   --   --   --   --  14  --  14  --   --   --  21  --  33*   CR  --   --   --  0.65  --   --   --   --  0.69  --  0.74  --   --   --  0.79  --  0.85   GLC 91 86 111*  --  161*   < > 320*   < > 288*   < > 210*   < >  --    < > 218*   < > 171*    < > = values in this interval not displayed.       Liver Function Tests  Recent Labs   Lab 12/22/22  0502   AST 46*   ALT 32   ALKPHOS 78   BILITOTAL 0.3   ALBUMIN 1.2*       Pancreatic Enzymes  No lab results found in last 7 days.    Coagulation Profile  Recent Labs   Lab 12/22/22  1357   PTT 38       IMAGING:  Recent Results (from the past 24 hour(s))   XR Chest Port 1 View    Narrative    EXAM: XR CHEST PORT 1 VIEW  LOCATION: [facility]  DATE/TIME: 12/24/2022 6:15 PM    INDICATION: Check ET tube placement.  COMPARISON: 12/20/2022      Impression    IMPRESSION: Endotracheal tube is 2.5 cm above the demar. There is enteric feeding tube tip below diaphragm and right-sided PICC tip in high right atrium. Heart size and vascularity are normal. Mid and lower lung predominant consolidative airspace   opacities have improved. Small bilateral pleural effusions unchanged. No pneumothorax.     CC time: 45 minutes excluding procedure time

## 2022-12-25 NOTE — PHARMACY-VANCOMYCIN DOSING SERVICE
Pharmacy Vancomycin Note  Date of Service 2022  Patient's  1995   27 year old, female    Indication: Bacteremia and Sepsis  Day of Therapy: 9  Current vancomycin regimen:  750 mg IV q12h  Current vancomycin monitoring method: AUC  Current vancomycin therapeutic monitoring goal: 400-600 mg*h/L    Insight[employer] Prediction of Current Vancomycin Regimen  Regimen: 750 mg IV every 12 hours.  Exposure target: AUC24 (range)400-600 mg/L.hr   AUC24,ss: 576 mg/L.hr  Probability of AUC24 > 400: 100 %  Ctrough,ss: 17.6 mg/L  Probability of Ctrough,ss > 20: 17 %  Probability of nephrotoxicity (Lodise LEONORA ): 13 %      Current estimated CrCl = Estimated Creatinine Clearance: 72.4 mL/min (based on SCr of 0.69 mg/dL).    Creatinine for last 3 days  2022:  5:02 AM Creatinine 0.79 mg/dL  2022:  4:38 AM Creatinine 0.74 mg/dL  2022: 10:52 AM Creatinine 0.69 mg/dL    Recent Vancomycin Levels (past 3 days)  2022:  9:11 AM Vancomycin 18.9 mg/L  2022:  9:04 PM Vancomycin 19.0 mg/L    Vancomycin IV Administrations (past 72 hours)                   Vancomycin 750 mg in D5W 250 mL intermittent infusion (mg) 750 mg New Bag 22 0851     750 mg New Bag 22 2325     750 mg New Bag  1121     750 mg New Bag 22 2228     750 mg New Bag  1126                Nephrotoxins and other renal medications (From now, onward)    Start     Dose/Rate Route Frequency Ordered Stop    22 2100  Vancomycin 750 mg in D5W 250 mL intermittent infusion         750 mg  166.7 mL/hr over 90 Minutes Intravenous EVERY 12 HOURS 22 1341      22 1600  furosemide (LASIX) injection 20 mg         20 mg  over 1-3 Minutes Intravenous DAILY 22 1553               Contrast Orders - past 72 hours (72h ago, onward)    Start     Dose/Rate Route Frequency Stop    22 1200  iopamidol (ISOVUE-370) solution 64 mL         64 mL Intravenous ONCE 22 1149    22 1130  iopamidol (ISOVUE-370)  solution 64 mL  Status:  Discontinued         64 mL Intravenous ONCE 12/23/22 5569          Interpretation of levels and current regimen:  Vancomycin level is reflective of -600    Has serum creatinine changed greater than 50% in last 72 hours: No but improving    Urine output:  good urine output    Renal Function: appears to be improving    Plan:  1. Continue Current Dose  2. Vancomycin monitoring method: AUC  3. Vancomycin therapeutic monitoring goal: 400-600 mg*h/L  4. Pharmacy will check vancomycin levels as appropriate in 1-3 Days.  5. Serum creatinine levels will be ordered daily for the first week of therapy and at least twice weekly for subsequent weeks.    Meli Johnson, [facility]

## 2022-12-25 NOTE — PLAN OF CARE
[facility] care [phone]    Neuro: Localizing movement in all four extremities. Inconsistently follows commands. Sedated- on four sedation medications.    CV: NSR/ST on telemetry. MAP>65.     Respiratory: Full ventilator support, no weaning overnight.     GI/: Tube feeding continued at goal rate. Rectal tube in place, moderate leaking. Sheffield catheter in place, good output.     Access: Right TL PICC and L single lumen midline.     Drips: Insulin, Ketamine, Fentanyl, Propofol, and Versed    Shift events: Initiated insulin drip (pediatric insulin algorithm). Updated patient's father over the phone.    Plan: Possibly try weaning ventilator support/wean sedation.

## 2022-12-25 NOTE — PROGRESS NOTES
[facility] ICU RESPIRATORY NOTE        Date of Admission: 12/16/2022    Date of Intubation (most recent):  12/16/22    Reason for Mechanical Ventilation: Resp failure    Number of Days on Mechanical Ventilation: 10    Met Criteria for Spontaneous Breathing Trial: No    Reason for No Spontaneous Breathing Trial: Per MD    Significant Events Today: None    ABG Results:   Recent Labs   Lab 12/20/22  1620 12/20/22  1157   PH 7.44 7.42   PCO2 38 42   PO2 100 170*   HCO3 26 27   O2PER 50 30       Current Vent Settings: Vent Mode: CMV/AC  (Continuous Mandatory Ventilation/ Assist Control)  FiO2 (%): 55 %  Resp Rate (Set): 20 breaths/min  Tidal Volume (Set, mL): 320 mL  PEEP (cm H2O): 8 cmH2O  Resp: 22      Skin Assessment: Intact    Plan: Pt to remain on full vent support overnight    Lawrence Green, RT

## 2022-12-25 NOTE — PROGRESS NOTES
[facility] ICU RESPIRATORY NOTE           Date of Admission: 12/16/2022     Date of Intubation (most recent):  12/16/22     Reason for Mechanical Ventilation: Resp failure     Number of Days on Mechanical Ventilation: 10     Met Criteria for Spontaneous Breathing Trial: No     Reason for No Spontaneous Breathing Trial: Per MD     Significant Events Today: None overnight     ABG Results:   Recent Labs   Lab 12/20/22  1620 12/20/22  1157 12/18/22  0559   PH 7.44 7.42 7.45   PCO2 38 42 35   PO2 100 170* 83   HCO3 26 27 24   O2PER 50 30 40     Vent Mode: CMV/AC  (Continuous Mandatory Ventilation/ Assist Control)  FiO2 (%): 55 %  Resp Rate (Set): 20 breaths/min  Tidal Volume (Set, mL): 320 mL  PEEP (cm H2O): 8 cmH2O  Resp: (!) 33    EVANGELIST Daly, RRT

## 2022-12-25 NOTE — PROGRESS NOTES
[facility]    Infectious Disease Progress Note    Date of Service : 12/25/2022     Assessment:  27 year old female with polysubstance abuse who has been admitted since 12/16/2022 for DKA, respiratory failure from concurrent covid and found to have superimposed MRSA cavitary pneumonia with septic shock. She developed acute respiratory failure requiring intubation and pressor support as well as stress dose steroids for shock in addition to broad spectrum antimicrobial therapy.  She remains severely critically ill . Has now developed fungemia related to candida glabrata. Lines have been changed     -MRSA with septic shock. No endocarditis noted on CATHY. Last blood cx positive from 12/19  -Severe lobar consolidations and cavitary pneumonia related to MRSA with hypoxic respiratory failure  -Candida glabrata fungemia  -Genital herpes  -Covid19 infection  -Uncontrolled type 1 diabetes with HbA1c > 16%  -DKA with NANNETTE and electrolyte abnormalities have improved  -Malnutrition  -substance abuse disorder     Recommendations:  1. Continue treatment with Vancomycin and Micafungin. Pharmacy managing vancomycin dosing, levels therapeutic, renal functions stable  2. Repeat blood cxs X 2 sets.   3. If leukocytosis worsens or bacteremia persists, consider MRI spine to assess for secondary infection   4. Remains at high risk for additional complications  5. ID will continue to follow. If additional blood cxs turn positive, add Ceftaroline 600 mg IVQ12H  6. Check HIV status  7. On acyclovir for genital herpes       Saima Mercado MD    Interval History   Remains intubated, moves all extremity and responds, ongoing low grade fever, lines were changed, 12/19 blood cx + for MRSA and candida glabrata    Physical Exam   Temp: (!) 101.1  F (38.4  C) Temp src: Bladder BP: 105/61 Pulse: 112   Resp: 29 SpO2: 97 % O2 Device: Mechanical Ventilator    Vitals:    12/22/22 0400 12/24/22 0530 12/25/22 0002   Weight: 57.9 kg (127  lb 10.3 oz) 36.8 kg (81 lb 2.1 oz) 40 kg (88 lb 2.9 oz)     Vital Signs with Ranges  Temp:  [98.2  F (36.8  C)-101.1  F (38.4  C)] 101.1  F (38.4  C)  Pulse:  [] 112  Resp:  [13-33] 29  BP: ()/(47-77) 105/61  FiO2 (%):  [55 %] 55 %  SpO2:  [83 %-100 %] 97 %    Constitutional: Patient seen from ICU glass door, intubated and appears comfortable  Discussed with nursing staff for updates      Other:    Medications     dextrose       fentaNYL 150 mcg/hr (12/25/22 0788)     - MEDICATION INSTRUCTIONS -       ketamine 0.3 mg/kg/hr (12/25/22 1418)     midazolam 6 mg/hr (12/25/22 1521)     propofol 50 mcg/kg/min (12/25/22 1553)     sodium chloride 20 mL/hr at 12/25/22 1148       acyclovir  200 mg Oral or Feeding Tube 5x Daily     chlorhexidine  15 mL Mouth/Throat Q12H     furosemide  20 mg Intravenous Daily     heparin ANTICOAGULANT  5,000 Units Subcutaneous Q12H     insulin aspart  1-6 Units Subcutaneous Q4H     insulin glargine  10 Units Subcutaneous Daily     levalbuterol  0.63 mg Nebulization Q8H     micafungin  100 mg Intravenous Q24H     multivitamins w/minerals  15 mL Per Feeding Tube Daily     pantoprazole  40 mg Intravenous BID     sodium chloride (PF)  10 mL Intracatheter Q8H     vancomycin  750 mg Intravenous Q12H       Data   All microbiology laboratory data reviewed.  Recent Labs   Lab Test 12/25/22  0627 12/24/22  0523 12/23/22  1834   WBC 12.9* 12.0* 13.9*   HGB 8.9* 8.6* 8.9*   HCT 26.5* 25.0* 26.3*   MCV 92 88 90    383 331     Recent Labs   Lab Test 12/25/22  0627 12/24/22  1052 12/23/22  0438   CR 0.65 0.69 0.74     Component      Latest Ref Rng & Units 12/24/2022   Vancomycin Level      mg/L 19.0     Microbiology  Blood cx 12/21, 12/22, 12/23, 12/25 no growth so far  12/19 blood cx  Line, venous; Blood    0 Result Notes  Culture Positive on the 1st day of incubation Abnormal        Candida glabrata complex Panic     2 of 2 bottles   Staphylococcus aureus MRSA Panic     1 of 2 bottles    Positive on the 5th day of incubation  Susceptibilities done on previous cultures        Resulting Agency: [facility]     Susceptibility     Candida glabrata complex     SAMANTHA     Amphotericin B 1 ug/mL No interpretation available     Fluconazole 16 ug/mL Susceptible Dose Dependent     Micafungin <=0.03 ug/mL Susceptible     Voriconazole 0.5 ug/mL No interpretation available

## 2022-12-26 ENCOUNTER — APPOINTMENT (OUTPATIENT)
Dept: MRI IMAGING | Facility: CLINIC | Age: 27
End: 2022-12-26
Attending: INTERNAL MEDICINE
Payer: COMMERCIAL

## 2022-12-26 LAB
% LINING CELLS, BODY FLUID: 1 %
ALLEN'S TEST: YES
ANCA AB PATTERN SER IF-IMP: NORMAL
ANION GAP SERPL CALCULATED.3IONS-SCNC: 3 MMOL/L (ref 3–14)
APPEARANCE FLD: ABNORMAL
BACTERIA BLD CULT: ABNORMAL
BACTERIA BLD CULT: NO GROWTH
BACTERIA BLD CULT: NO GROWTH
BASE EXCESS BLDA CALC-SCNC: 13.6 MMOL/L (ref -9–1.8)
BASE EXCESS BLDV CALC-SCNC: 10.3 MMOL/L (ref -7.7–1.9)
BUN SERPL-MCNC: 13 MG/DL (ref 7–30)
C-ANCA TITR SER IF: NORMAL {TITER}
CALCIUM SERPL-MCNC: 7.2 MG/DL (ref 8.5–10.1)
CELL COUNT BODY FLUID SOURCE: ABNORMAL
CHLORIDE BLD-SCNC: 100 MMOL/L (ref 94–109)
CK SERPL-CCNC: 36 U/L (ref 30–225)
CO2 SERPL-SCNC: 34 MMOL/L (ref 20–32)
COLOR FLD: COLORLESS
CREAT SERPL-MCNC: 0.52 MG/DL (ref 0.52–1.04)
EOSINOPHIL NFR FLD MANUAL: 1 %
ERYTHROCYTE [DISTWIDTH] IN BLOOD BY AUTOMATED COUNT: 14.5 % (ref 10–15)
GFR SERPL CREATININE-BSD FRML MDRD: >90 ML/MIN/1.73M2
GLUCOSE BLD-MCNC: 272 MG/DL (ref 70–99)
GLUCOSE BLDC GLUCOMTR-MCNC: 116 MG/DL (ref 70–99)
GLUCOSE BLDC GLUCOMTR-MCNC: 144 MG/DL (ref 70–99)
GLUCOSE BLDC GLUCOMTR-MCNC: 154 MG/DL (ref 70–99)
GLUCOSE BLDC GLUCOMTR-MCNC: 160 MG/DL (ref 70–99)
GLUCOSE BLDC GLUCOMTR-MCNC: 277 MG/DL (ref 70–99)
GLUCOSE BLDC GLUCOMTR-MCNC: 295 MG/DL (ref 70–99)
HCG UR QL: NEGATIVE
HCO3 BLD-SCNC: 38 MMOL/L (ref 21–28)
HCO3 BLDV-SCNC: 35 MMOL/L (ref 21–28)
HCT VFR BLD AUTO: 23.9 % (ref 35–47)
HGB BLD-MCNC: 7.9 G/DL (ref 11.7–15.7)
HIV 1+2 AB+HIV1 P24 AG SERPL QL IA: NONREACTIVE
LYMPHOCYTES NFR FLD MANUAL: NORMAL %
MAGNESIUM SERPL-MCNC: 2.1 MG/DL (ref 1.6–2.3)
MCH RBC QN AUTO: 30.7 PG (ref 26.5–33)
MCHC RBC AUTO-ENTMCNC: 33.1 G/DL (ref 31.5–36.5)
MCV RBC AUTO: 93 FL (ref 78–100)
MONOS+MACROS NFR FLD MANUAL: 1 %
NEUTS BAND NFR FLD MANUAL: 97 %
O2/TOTAL GAS SETTING VFR VENT: 65 %
O2/TOTAL GAS SETTING VFR VENT: 70 %
PCO2 BLD: 45 MM HG (ref 35–45)
PCO2 BLDV: 50 MM HG (ref 40–50)
PH BLD: 7.53 [PH] (ref 7.35–7.45)
PH BLDV: 7.46 [PH] (ref 7.32–7.43)
PHOSPHATE SERPL-MCNC: 3.4 MG/DL (ref 2.5–4.5)
PLATELET # BLD AUTO: 437 10E3/UL (ref 150–450)
PO2 BLD: 70 MM HG (ref 80–105)
PO2 BLDV: 38 MM HG (ref 25–47)
POTASSIUM BLD-SCNC: 4.2 MMOL/L (ref 3.4–5.3)
RBC # BLD AUTO: 2.57 10E6/UL (ref 3.8–5.2)
SODIUM SERPL-SCNC: 137 MMOL/L (ref 133–144)
TRIGL SERPL-MCNC: 477 MG/DL
WBC # BLD AUTO: 13.3 10E3/UL (ref 4–11)
WBC # FLD AUTO: 7111 /UL

## 2022-12-26 PROCEDURE — 250N000011 HC RX IP 250 OP 636: Performed by: INTERNAL MEDICINE

## 2022-12-26 PROCEDURE — 84100 ASSAY OF PHOSPHORUS: CPT | Performed by: INTERNAL MEDICINE

## 2022-12-26 PROCEDURE — 84478 ASSAY OF TRIGLYCERIDES: CPT | Performed by: INTERNAL MEDICINE

## 2022-12-26 PROCEDURE — 999N000157 HC STATISTIC RCP TIME EA 10 MIN

## 2022-12-26 PROCEDURE — C9113 INJ PANTOPRAZOLE SODIUM, VIA: HCPCS | Performed by: SURGERY

## 2022-12-26 PROCEDURE — 85027 COMPLETE CBC AUTOMATED: CPT | Performed by: SURGERY

## 2022-12-26 PROCEDURE — 82550 ASSAY OF CK (CPK): CPT | Performed by: INTERNAL MEDICINE

## 2022-12-26 PROCEDURE — 250N000011 HC RX IP 250 OP 636: Performed by: SURGERY

## 2022-12-26 PROCEDURE — 250N000013 HC RX MED GY IP 250 OP 250 PS 637: Performed by: INTERNAL MEDICINE

## 2022-12-26 PROCEDURE — 250N000013 HC RX MED GY IP 250 OP 250 PS 637: Performed by: SURGERY

## 2022-12-26 PROCEDURE — 72146 MRI CHEST SPINE W/O DYE: CPT

## 2022-12-26 PROCEDURE — 72148 MRI LUMBAR SPINE W/O DYE: CPT

## 2022-12-26 PROCEDURE — 99233 SBSQ HOSP IP/OBS HIGH 50: CPT | Performed by: SPECIALIST

## 2022-12-26 PROCEDURE — 94003 VENT MGMT INPAT SUBQ DAY: CPT

## 2022-12-26 PROCEDURE — 81025 URINE PREGNANCY TEST: CPT | Performed by: INTERNAL MEDICINE

## 2022-12-26 PROCEDURE — 94640 AIRWAY INHALATION TREATMENT: CPT | Mod: 76

## 2022-12-26 PROCEDURE — 99291 CRITICAL CARE FIRST HOUR: CPT | Performed by: INTERNAL MEDICINE

## 2022-12-26 PROCEDURE — 250N000009 HC RX 250: Performed by: INTERNAL MEDICINE

## 2022-12-26 PROCEDURE — 82803 BLOOD GASES ANY COMBINATION: CPT | Performed by: INTERNAL MEDICINE

## 2022-12-26 PROCEDURE — 72141 MRI NECK SPINE W/O DYE: CPT

## 2022-12-26 PROCEDURE — 258N000003 HC RX IP 258 OP 636

## 2022-12-26 PROCEDURE — 94640 AIRWAY INHALATION TREATMENT: CPT

## 2022-12-26 PROCEDURE — 36415 COLL VENOUS BLD VENIPUNCTURE: CPT | Performed by: SPECIALIST

## 2022-12-26 PROCEDURE — 250N000011 HC RX IP 250 OP 636

## 2022-12-26 PROCEDURE — 87040 BLOOD CULTURE FOR BACTERIA: CPT | Performed by: SPECIALIST

## 2022-12-26 PROCEDURE — 83735 ASSAY OF MAGNESIUM: CPT | Performed by: INTERNAL MEDICINE

## 2022-12-26 PROCEDURE — 80048 BASIC METABOLIC PNL TOTAL CA: CPT | Performed by: INTERNAL MEDICINE

## 2022-12-26 PROCEDURE — 200N000001 HC R&B ICU

## 2022-12-26 RX ORDER — FUROSEMIDE 20 MG
20 TABLET ORAL DAILY
Status: DISCONTINUED | OUTPATIENT
Start: 2022-12-27 | End: 2022-12-28

## 2022-12-26 RX ORDER — QUETIAPINE FUMARATE 25 MG/1
25 TABLET, FILM COATED ORAL 3 TIMES DAILY
Status: DISCONTINUED | OUTPATIENT
Start: 2022-12-26 | End: 2023-01-04 | Stop reason: HOSPADM

## 2022-12-26 RX ADMIN — DEXMEDETOMIDINE HYDROCHLORIDE 1.2 MCG/KG/HR: 400 INJECTION INTRAVENOUS at 02:20

## 2022-12-26 RX ADMIN — QUETIAPINE FUMARATE 25 MG: 25 TABLET ORAL at 15:17

## 2022-12-26 RX ADMIN — ACYCLOVIR 200 MG: 200 SUSPENSION ORAL at 08:10

## 2022-12-26 RX ADMIN — CHLORHEXIDINE GLUCONATE 0.12% ORAL RINSE 15 ML: 1.2 LIQUID ORAL at 08:10

## 2022-12-26 RX ADMIN — HEPARIN SODIUM 5000 UNITS: 5000 INJECTION, SOLUTION INTRAVENOUS; SUBCUTANEOUS at 22:27

## 2022-12-26 RX ADMIN — PANTOPRAZOLE SODIUM 40 MG: 40 INJECTION, POWDER, FOR SOLUTION INTRAVENOUS at 22:27

## 2022-12-26 RX ADMIN — QUETIAPINE FUMARATE 25 MG: 25 TABLET ORAL at 22:27

## 2022-12-26 RX ADMIN — VANCOMYCIN HYDROCHLORIDE 750 MG: 1 INJECTION, POWDER, LYOPHILIZED, FOR SOLUTION INTRAVENOUS at 22:32

## 2022-12-26 RX ADMIN — Medication 150 MCG/HR: at 06:31

## 2022-12-26 RX ADMIN — PANTOPRAZOLE SODIUM 40 MG: 40 INJECTION, POWDER, FOR SOLUTION INTRAVENOUS at 08:10

## 2022-12-26 RX ADMIN — ACYCLOVIR 200 MG: 200 SUSPENSION ORAL at 10:39

## 2022-12-26 RX ADMIN — ACYCLOVIR 200 MG: 200 SUSPENSION ORAL at 22:32

## 2022-12-26 RX ADMIN — CHLORHEXIDINE GLUCONATE 0.12% ORAL RINSE 15 ML: 1.2 LIQUID ORAL at 22:27

## 2022-12-26 RX ADMIN — DEXMEDETOMIDINE HYDROCHLORIDE 1.2 MCG/KG/HR: 400 INJECTION INTRAVENOUS at 17:55

## 2022-12-26 RX ADMIN — QUETIAPINE FUMARATE 50 MG: 50 TABLET ORAL at 08:11

## 2022-12-26 RX ADMIN — ACETAMINOPHEN 650 MG: 325 SUSPENSION ORAL at 12:02

## 2022-12-26 RX ADMIN — FUROSEMIDE 20 MG: 10 INJECTION, SOLUTION INTRAMUSCULAR; INTRAVENOUS at 08:10

## 2022-12-26 RX ADMIN — DEXMEDETOMIDINE HYDROCHLORIDE 1.2 MCG/KG/HR: 400 INJECTION INTRAVENOUS at 10:10

## 2022-12-26 RX ADMIN — LEVALBUTEROL HYDROCHLORIDE 0.63 MG: 1.25 SOLUTION, CONCENTRATE RESPIRATORY (INHALATION) at 07:05

## 2022-12-26 RX ADMIN — ACETAMINOPHEN 650 MG: 325 SUSPENSION ORAL at 02:21

## 2022-12-26 RX ADMIN — MIDAZOLAM HYDROCHLORIDE 6 MG/HR: 1 INJECTION, SOLUTION INTRAVENOUS at 00:35

## 2022-12-26 RX ADMIN — MULTIVITAMIN 15 ML: LIQUID ORAL at 08:11

## 2022-12-26 RX ADMIN — LEVALBUTEROL HYDROCHLORIDE 0.63 MG: 1.25 SOLUTION, CONCENTRATE RESPIRATORY (INHALATION) at 16:03

## 2022-12-26 RX ADMIN — Medication 150 MCG/HR: at 23:22

## 2022-12-26 RX ADMIN — VANCOMYCIN HYDROCHLORIDE 750 MG: 1 INJECTION, POWDER, LYOPHILIZED, FOR SOLUTION INTRAVENOUS at 10:16

## 2022-12-26 RX ADMIN — MICAFUNGIN SODIUM 100 MG: 50 INJECTION, POWDER, LYOPHILIZED, FOR SOLUTION INTRAVENOUS at 13:57

## 2022-12-26 RX ADMIN — ACYCLOVIR 200 MG: 200 SUSPENSION ORAL at 13:57

## 2022-12-26 RX ADMIN — MIDAZOLAM HYDROCHLORIDE 6 MG/HR: 1 INJECTION, SOLUTION INTRAVENOUS at 15:17

## 2022-12-26 RX ADMIN — HEPARIN SODIUM 5000 UNITS: 5000 INJECTION, SOLUTION INTRAVENOUS; SUBCUTANEOUS at 08:10

## 2022-12-26 RX ADMIN — ACYCLOVIR 200 MG: 200 SUSPENSION ORAL at 17:06

## 2022-12-26 RX ADMIN — PROPOFOL 50 MCG/KG/MIN: 10 INJECTION, EMULSION INTRAVENOUS at 02:20

## 2022-12-26 RX ADMIN — LEVALBUTEROL HYDROCHLORIDE 0.63 MG: 1.25 SOLUTION, CONCENTRATE RESPIRATORY (INHALATION) at 23:19

## 2022-12-26 ASSESSMENT — ACTIVITIES OF DAILY LIVING (ADL)
ADLS_ACUITY_SCORE: 36

## 2022-12-26 ASSESSMENT — ENCOUNTER SYMPTOMS: FEVER: 1

## 2022-12-26 NOTE — PROGRESS NOTES
Critical care updates    Called urgently to bedside around 2100 hours, patient was coughing then had bradycardia to 38 and associated hypotension. Subsequent hypoxemia on 20/320/5/55. So Ambu bagging was initiated, she is awake and not comfortable with the ETT despite 3 sedative infusions.   Patient seen and examined, in distress, marked reduced air entry on the left.       Assessment and plan:  1. Acute hypoxic respiratory failure  2. Bradycardia, likely hypoxia induced  3. Delirium   4. Bilateral cavitary pneumonia  5. Candida fungemia   6. MRSA bacteremia     Plan:   - Stat CXR r/o Left pneumothorax vs mucus plug, resulted and reviewed image, no interval change, hypoxia likely related to asynchrony and excessive cough with vasovagal event   - Stat EKG to recheck QTc, resulted 464  - Start seroquel 50 mg TID   - Versed 4 mg IV bolus  - Propofol 30 bolus   - Add precedex gtt at 1.2 flat rate to current propofol 50, versed 6 and fentanyl 150  - Titrate fentanyl gtt down for RASS -1 to -2 in the coming few hours if able    - Increase PEEP from 5 to 10 and titrate FiO2 for SpO2 > 90%  - Consider MRI brain if not clearing in the next 24-48 hours r/o brain abscess       D/w RN and RT   Critical care time not including procedures was 45 minutes

## 2022-12-26 NOTE — PROGRESS NOTES
[facility] ICU Note      Progress Note  Reason for Critical Care Admission: DKA, respiratory failure, covid and bacterial pneumonia    Assessment: Critical Care   Deidre N Aasen is a 27 year old female admitted on 12/16/2022 for DKA, respiratory failure from concurrent covid and bacterial pneumonia.  MRSA bacteremia and necrotizing pneumonia and candida bacteremia       Overnight:  Needed urgent bronchoscopy for hypoxemia and agitation.  Added dexmedetomidine for sedation.         Plan: Critical Care   Neuro/ pain/ sedation:  # Toxic metabolic encephalopathy  # Agitation in setting of polysubstance abuse - IV meth and heroin use.  On 4 sedatives   P: stop propofol seconday to high TG.   Continue versed, dexmedetomidine and fentanyl     Pulmonary:  # Complex cavitary pneumonia 2/2 to MRSA pneumonia  Also COVID     # severe ARDS, still needign 60-80% O2.   Not ready for weaning   Plan  Vent Mode: CMV/AC  (Continuous Mandatory Ventilation/ Assist Control)  FiO2 (%): 80 %  Resp Rate (Set): 16 breaths/min  Tidal Volume (Set, mL): 400 mL  PEEP (cm H2O): 10 cmH2O  Resp: 17  - Continue current settings   Check VBg as last ABG was alkalotic   .    Cardiovascular:  # septic shock, resolved  # Sinus tachycardia  #CATHY 12/20 negative for vegetations and negative bubble     GI/Nutrition: at goal tube feeds at 35 ml/hr       Renal/ Fluid Balance:     Good response to diuresis. Doesn't have much peripheral edema now     - change lasix to 20 mg enteral  daily    Endocrine:  #Type 1 diabetes with insulin non complicance, complicated by DKA. Hemoglobin A1C 16.4   Resolved DKA but then needed iv insulin drip--changed to subcutaneous Sunday but now glucoses 220.     - additional lantus 10 units now and increase to 20 tomorrow with high sliding scale insulin.      ID:  # Septic shock and MRSA bacteremia. Blood cultures have now cleared.  CATHY did not show endocarditis  # Complicated pneumonia with cavitary  lesions  # Covid positive  #MRSA bacteremia  #fungemia--no new positive cultures   - Abx course: vanco 12/16- present; unasyn 12/16-12/19; cefepime 12/19-stopped 12/24  - Anti-fungal course: micafungin 12/21-present, plan for 14 day course  - Last positive blood cx: MRSA 12/16; budding yeast 12/19  - Last positive BAL: MRSA 12/16  + HSV swab from right groin pustules--acyclovir enteral Day 2 of 3.     P: Acyclovir 200 5x/d for 3 days  Checking HIV  Might be able to remove COVID isolation as current respiratory infection is due to MRSA and not COVID and she's 13 days from initial positive COVID test.      Hematology:  # DVT ppx  #anemia of chronic disease  #acute blood loss anemia. Needing transfusion on 12/23.  Hemoglobin stable since.  - subcutaneous heparin BID   .       Lines/ tubes/ drains:  Degroot Catheter: PRESENT, indication: Strict 1-2 Hour I&O  Central Lines: PICC and left midline     Prophylaxis:  - DVT Prophylaxis: Heparin SQ and Pneumatic Compression Devices  - PUD Prophylaxis: PPI    Code Status: Full Code      Disposition:  - ICU    Family update:      ______________________________________________________________________    Interval events:  Needed urgent bronch--secretions cleared     Physical Exam   Vital Signs: Temp: 98.8  F (37.1  C) Temp src: Bladder BP: 96/54 Pulse: 82   Resp: 17 SpO2: 92 % O2 Device: Mechanical Ventilator    Weight: 89 lbs 4.58 oz       -1.9  L with 4100 urinary output     Neuro: not awake and no spontaneous movement .  CV: sinus RRR  Pulm: clear   Abd: non distended. NJT in place  : degroot in place with clear pale yellow urine   Extremities/skin: thin, less edema, very small dark petechial like lesions on some of her digits.  Bandage over right groin--> Inspected--no active pustules     Data   I reviewed all medications, new labs and imaging results over the last 24 hours.  Arterial Blood Gases   Recent Labs   Lab 12/26/22  0042 12/20/22  1620 12/20/22  1157   PH 7.53* 7.44  7.42   PCO2 45 38 42   PO2 70* 100 170*   HCO3 38* 26 27       Complete Blood Count   Recent Labs   Lab 12/26/22  0437 12/25/22  0627 12/24/22  0523 12/23/22  1834   WBC 13.3* 12.9* 12.0* 13.9*   HGB 7.9* 8.9* 8.6* 8.9*    444 383 331       Basic Metabolic Panel  Recent Labs   Lab 12/26/22  0821 12/26/22  0440 12/26/22  0437 12/25/22  2358 12/25/22  2034 12/25/22  1837 12/25/22  1256 12/25/22  1253 12/25/22  0758 12/25/22  0627 12/24/22  1233 12/24/22  1052 12/23/22  0844 12/23/22  0438 12/22/22  1552 12/22/22  1357 12/22/22  0753 12/22/22  0502   NA  --   --  137  --   --   --   --   --   --   --   --  135  --  142  --  142  --  145*   POTASSIUM  --   --  4.2  --   --  4.0  --  3.4  --  3.4   < > 3.3*  --  3.6  3.5   < >  --    < > 3.1*   CHLORIDE  --   --  100  --   --   --   --   --   --   --   --  94  --  105  --   --   --  111*   CO2  --   --  34*  --   --   --   --   --   --   --   --  35*  --  31  --   --   --  29   BUN  --   --  13  --   --   --   --   --   --   --   --  14  --  14  --   --   --  21   CR  --   --  0.52  --   --   --   --   --   --  0.65  --  0.69  --  0.74  --   --   --  0.79   * 277* 272* 160*   < >  --    < >  --    < >  --    < > 288*   < > 210*   < >  --    < > 218*    < > = values in this interval not displayed.       Liver Function Tests  Recent Labs   Lab 12/22/22  0502   AST 46*   ALT 32   ALKPHOS 78   BILITOTAL 0.3   ALBUMIN 1.2*       Pancreatic Enzymes  No lab results found in last 7 days.    Coagulation Profile  Recent Labs   Lab 12/22/22  1357   PTT 38       IMAGING:  Recent Results (from the past 24 hour(s))   XR Chest Port 1 View    Narrative    EXAM: XR CHEST PORT 1 VIEW  LOCATION: [facility]  DATE/TIME: 12/25/2022 9:20 PM    INDICATION: desatting  COMPARISON: 2/24/2022      Impression    IMPRESSION: ET tube remains in good position with tip 3.5 cm above demar. Feeding tube and right PICC line also appear in good position. Left PICC  line has its tip at the level of left subclavian vein, unchanged.    Heart size normal. No change in patchy dense bilateral lung consolidation consistent with pneumonia     CC time: 45 minutes excluding procedure time

## 2022-12-26 NOTE — PROGRESS NOTES
Pt desaturated to 65% post RN turn. Pt disconnected from vent, bagged with peep valve until SpO2 90%, placed back on vent. CXR done, bite block placed, peep increased to 10. SpO2 now 95% on 100% FiO2. Will continue to follow.     EVANGELIST Daly, RRT

## 2022-12-26 NOTE — PROCEDURES
[facility]    Procedure: Flexible fiberoptic bronchoscopy with bronchoalveolar lavage    Date/Time: 12/25/2022 11:16 PM  Performed by: Kaela Fonseca MD  Authorized by: Kaela Fonseca MD       UNIVERSAL PROTOCOL   Site Marked: NA  Prior Images Obtained and Reviewed:  Yes  Required items: Required blood products, implants, devices and special equipment available    Patient identity confirmed:  Arm band  Patient was reevaluated immediately before administering moderate or deep sedation or anesthesia  Confirmation Checklist:  Patient's identity using two indicators  Time out: Immediately prior to the procedure a time out was called    Universal Protocol: the [facility] Universal Protocol was followed    ESBL (mL):  0    SEDATION  Patient Sedated: Yes    Sedation:  Midazolam, propofol and fentanyl  Vital signs: Vital signs monitored during sedation      PROCEDURE  Describe Procedure: 1. Flexible fiberoptic bronchoscopy with bronchoalveolar lavage  2. Therapeutic bronchoscopic aspiration of right middle lone and lingula     Patient was sedated with above sedatives for mechanical ventilation. Vecuronium 5 mg IV was given x1. We advanced the bronchoscope down the ETT without difficulty. The trachea appeared to be of normal caliber and normal mucosa. Terra was sharp. We examined the right lung first, all lobes of the right lung were examined to the subsegmental level. There was complete obstruction of right middle lobe with white secretions that were suctioned.There was no endobronchial obstruction, intrinsic narrowing or extrinsic compression. Scope was wedged in RML and BAL was performed with 30 ml of saline, and having a return of 10 ml of turbid secretions. We then advanced the scope into the left lung, all lobes of the left lung were examined to the subsegmental level. The lingula was completely obstructed with white secretions that were suctioned. There was no endobronchial  obstruction, intrinsic narrowing or extrinsic compression. We then withdrew the scope. The patient tolerated the procedure without immediate complications.   Patient Tolerance:  Patient tolerated the procedure well with no immediate complications  Length of time physician/provider present for 1:1 monitoring during sedation: 10   Indications: Acute hypoxic respiratory failure suspect mucus plug  Contra-indications: None   Consent: Not obtained given emergent nature of procedure

## 2022-12-26 NOTE — PROGRESS NOTES
[facility] ICU RESPIRATORY NOTE           Date of Admission: 12/16/2022     Date of Intubation (most recent):  12/16/22     Reason for Mechanical Ventilation: Resp failure     Number of Days on Mechanical Ventilation: 11     Met Criteria for Spontaneous Breathing Trial: No     Reason for No Spontaneous Breathing Trial: Per MD     Significant Events Today: Pt with desaturation event overnight, vent adjusted. Bedside bronchoscopy performed with MD     ABG Results:   Recent Labs   Lab 12/26/22  0042 12/20/22  1620 12/20/22  1157   PH 7.53* 7.44 7.42   PCO2 45 38 42   PO2 70* 100 170*   HCO3 38* 26 27   O2PER 70 50 30     Vent Mode: CMV/AC  (Continuous Mandatory Ventilation/ Assist Control)  FiO2 (%): 75 %  Resp Rate (Set): 16 breaths/min  Tidal Volume (Set, mL): 400 mL  PEEP (cm H2O): 10 cmH2O  Resp: 15    EVANGELIST Daly, RRT

## 2022-12-26 NOTE — PROGRESS NOTES
[facility]    Infectious Disease Progress Note    Date of Service : 12/26/2022     Assessment:  27 year old female with polysubstance abuse who has been admitted since 12/16/2022 for DKA, respiratory failure from concurrent covid and found to have superimposed MRSA cavitary pneumonia with septic shock. She developed acute respiratory failure requiring intubation and pressor support as well as stress dose steroids for shock in addition to broad spectrum antimicrobial therapy.  She remains severely critically ill . Has now developed fungemia related to candida glabrata. Lines have been changed     -MRSA with septic shock. No endocarditis noted on CATHY. Last blood cx positive from 12/19  -Severe lobar consolidations and cavitary pneumonia related to MRSA with hypoxic respiratory failure  -Candida glabrata fungemia  -Genital herpes  -Covid19 infection  -Uncontrolled type 1 diabetes with HbA1c > 16%  -DKA with NANNETTE and electrolyte abnormalities have improved  -Malnutrition  -substance abuse disorder     Recommendations:  1. Continue treatment with Vancomycin and Micafungin. Pharmacy managing vancomycin dosing, levels therapeutic, renal functions stable  2. Blood cxs negative since 12/21   3. Given ongoing low grade fever and leukocytosis, consider MRI spine to assess for secondary infection   4. S/p bronchoscopy  5. ID will continue to follow. If additional blood cxs turn positive, add Ceftaroline 600 mg IVQ12H  6. HIV negative  7. On acyclovir for genital herpes     Discussed with the ICU team      Saima Mercado MD    Interval History   Remains intubated, ongoing low grade fever and leukocytosis    Physical Exam   Temp: 100.2  F (37.9  C) Temp src: Bladder BP: 94/73 Pulse: 84   Resp: 17 SpO2: 96 % O2 Device: Mechanical Ventilator    Vitals:    12/24/22 0530 12/25/22 0002 12/26/22 0200   Weight: 36.8 kg (81 lb 2.1 oz) 40 kg (88 lb 2.9 oz) 40.5 kg (89 lb 4.6 oz)     Vital Signs with Ranges  Temp:   [98.2  F (36.8  C)-100.9  F (38.3  C)] 100.2  F (37.9  C)  Pulse:  [] 84  Resp:  [15-31] 17  BP: ()/(44-75) 94/73  FiO2 (%):  [55 %-100 %] 65 %  SpO2:  [83 %-100 %] 96 %    Constitutional: on ventilator  Lungs: clear  Cardiovascular: S1S2  Skin: No rash    Other:    Medications     dexmedetomidine 1.2 mcg/kg/hr (12/26/22 1010)     dextrose       fentaNYL 150 mcg/hr (12/26/22 0631)     - MEDICATION INSTRUCTIONS -       midazolam 6 mg/hr (12/26/22 0600)     sodium chloride 20 mL/hr at 12/25/22 1148       acyclovir  200 mg Oral or Feeding Tube 5x Daily     chlorhexidine  15 mL Mouth/Throat Q12H     [START ON 12/27/2022] furosemide  20 mg Per Feeding Tube Daily     heparin ANTICOAGULANT  5,000 Units Subcutaneous Q12H     insulin aspart  1-12 Units Subcutaneous Q4H     [START ON 12/27/2022] insulin glargine  20 Units Subcutaneous Daily     levalbuterol  0.63 mg Nebulization Q8H     micafungin  100 mg Intravenous Q24H     multivitamins w/minerals  15 mL Per Feeding Tube Daily     pantoprazole  40 mg Intravenous BID     QUEtiapine  25 mg Oral or NG Tube TID     sodium chloride (PF)  10 mL Intracatheter Q8H     vancomycin  750 mg Intravenous Q12H       Data   All microbiology laboratory data reviewed.  Recent Labs   Lab Test 12/26/22  0437 12/25/22 0627 12/24/22  0523   WBC 13.3* 12.9* 12.0*   HGB 7.9* 8.9* 8.6*   HCT 23.9* 26.5* 25.0*   MCV 93 92 88    444 383     Recent Labs   Lab Test 12/26/22  0437 12/25/22  0627 12/24/22  1052   CR 0.52 0.65 0.69     Microbiology  Blood cx 12/21, 12/22, 12/23, 12/25 no growth so far  12/19 blood cx  Line, venous; Blood    0 Result Notes  Culture Positive on the 1st day of incubation Abnormal         Candida glabrata complex Panic     2 of 2 bottles   Staphylococcus aureus MRSA Panic     1 of 2 bottles   Positive on the 5th day of incubation  Susceptibilities done on previous cultures         Resulting Agency: [facility]      Susceptibility              [place] glabrata  complex       SAMANTHA       Amphotericin B 1 ug/mL No interpretation available       Fluconazole 16 ug/mL Susceptible Dose Dependent       Micafungin <=0.03 ug/mL Susceptible       Voriconazole 0.5 ug/mL No interpretation available

## 2022-12-26 NOTE — PLAN OF CARE
Assumed care [id number]    Neuro: Inconsistently follows commands (before paralyzing for bronchoscopy). Localizes in all four extremities.     CV: NSR, Sinus bradycardia when patient vasal vagaled at 2045.   Pulmonary: Full ventilator support. O2 desat very quickly- ended up doing bronchoscopy at bedside.  GI/: Tube feeding continued at goal rate. [place] in place, adequate output.     Shift events:   Around 2045, patient vasal vagaled- HR 37, O2 sat in 60s. Increased FiO2 to 100% on ventilator, ended up bagging patient with RT and RN flyer. CXray and EKG done, Seroquel and Precedex ordered. Bronchoscopy (washout and sent cultures) at bedside, paralyzed with vecuronium for procedure. Changed ventilator settings and ABG sent an hour after ventilator changes.     Plan: Continue to wean sedation/ wean ventilator as tolerated.

## 2022-12-26 NOTE — PROGRESS NOTES
[facility] ICU RESPIRATORY NOTE        Date of Admission: 12/16/2022    Date of Intubation (most recent): 12/16/2022.     Reason for Mechanical Ventilation: Respiratory Failure.    Number of Days on Mechanical Ventilation: Day 11.    Met Criteria for Spontaneous Breathing Trial: No.    Reason for No Spontaneous Breathing Trial: PEEP: 10 cm H2O, FiO2: 65%    Significant Events Today: ETAD replaced.    ABG Results:   Recent Labs   Lab 12/26/22  1009 12/26/22  0042 12/20/22  1620 12/20/22  1157   PH  --  7.53* 7.44 7.42   PCO2  --  45 38 42   PO2  --  70* 100 170*   HCO3  --  38* 26 27   O2PER 65 70 50 30       Current Vent Settings: Vent Mode: CMV/AC  (Continuous Mandatory Ventilation/ Assist Control)  FiO2 (%): 80 %  Resp Rate (Set): 16 breaths/min  Tidal Volume (Set, mL): 400 mL  PEEP (cm H2O): 10 cmH2O  Resp: 25      Plan: Continue to monitor patient on full ventilatory support.     Gordo Ray, RRT on 12/26/2022 at 2:00 PM

## 2022-12-26 NOTE — PLAN OF CARE
Pt. On ventilator this 12 H shift, brief breathing trial with MD early in shift failed with pt. Pt. Basically calm on current sedation, RASS of 0 to -1 achieved, sedation of propfol basically the same over shift with fentanyl unchanged, ketamine successfully discontinued and versed basically unchanged, following commands and nodding to yes/no questions intermittently CV; ST, adequate perfusion, brisk urine output through degroot, particularly after lasix, large liquid stool, rectal tube placed, pt's father updated by phone X 1 and talked to daughter via phone held up to her ear..

## 2022-12-26 NOTE — PLAN OF CARE
Notified provider about indwelling degroot catheter discussed removal or continued need.    Did provider choose to remove indwelling degroot catheter? No    Provider's degroot indication for keeping indwelling degroot catheter: Strict 1-2 Hour I & O if external catheters are not an option.    Is there an order for indwelling degroot catheter? Yes    *If there is a plan to keep degroot catheter in place at discharge daily notification with provider is not necessary.

## 2022-12-27 PROBLEM — J85.0: Status: ACTIVE | Noted: 2022-12-27

## 2022-12-27 PROBLEM — B49 FUNGEMIA: Status: ACTIVE | Noted: 2022-12-27

## 2022-12-27 PROBLEM — R78.81 MRSA BACTEREMIA: Status: ACTIVE | Noted: 2022-12-27

## 2022-12-27 PROBLEM — G93.40 ENCEPHALOPATHY: Status: ACTIVE | Noted: 2022-12-27

## 2022-12-27 PROBLEM — J80 ARDS (ADULT RESPIRATORY DISTRESS SYNDROME) (H): Status: ACTIVE | Noted: 2022-12-27

## 2022-12-27 PROBLEM — B95.62 MRSA BACTEREMIA: Status: ACTIVE | Noted: 2022-12-27

## 2022-12-27 LAB
BACTERIA BLD CULT: NO GROWTH
CREAT SERPL-MCNC: 0.6 MG/DL (ref 0.52–1.04)
ERYTHROCYTE [DISTWIDTH] IN BLOOD BY AUTOMATED COUNT: 14 % (ref 10–15)
GFR SERPL CREATININE-BSD FRML MDRD: >90 ML/MIN/1.73M2
GLUCOSE BLDC GLUCOMTR-MCNC: 146 MG/DL (ref 70–99)
GLUCOSE BLDC GLUCOMTR-MCNC: 161 MG/DL (ref 70–99)
GLUCOSE BLDC GLUCOMTR-MCNC: 184 MG/DL (ref 70–99)
GLUCOSE BLDC GLUCOMTR-MCNC: 261 MG/DL (ref 70–99)
GLUCOSE BLDC GLUCOMTR-MCNC: 294 MG/DL (ref 70–99)
HCT VFR BLD AUTO: 24.2 % (ref 35–47)
HGB BLD-MCNC: 7.9 G/DL (ref 11.7–15.7)
MAGNESIUM SERPL-MCNC: 2.2 MG/DL (ref 1.6–2.3)
MCH RBC QN AUTO: 30 PG (ref 26.5–33)
MCHC RBC AUTO-ENTMCNC: 32.6 G/DL (ref 31.5–36.5)
MCV RBC AUTO: 92 FL (ref 78–100)
PHOSPHATE SERPL-MCNC: 3.2 MG/DL (ref 2.5–4.5)
PLATELET # BLD AUTO: 496 10E3/UL (ref 150–450)
POTASSIUM BLD-SCNC: 4.5 MMOL/L (ref 3.4–5.3)
RBC # BLD AUTO: 2.63 10E6/UL (ref 3.8–5.2)
VANCOMYCIN SERPL-MCNC: 16.6 MG/L
WBC # BLD AUTO: 14.6 10E3/UL (ref 4–11)

## 2022-12-27 PROCEDURE — 94003 VENT MGMT INPAT SUBQ DAY: CPT

## 2022-12-27 PROCEDURE — C9113 INJ PANTOPRAZOLE SODIUM, VIA: HCPCS | Performed by: SURGERY

## 2022-12-27 PROCEDURE — 99291 CRITICAL CARE FIRST HOUR: CPT | Performed by: INTERNAL MEDICINE

## 2022-12-27 PROCEDURE — 258N000003 HC RX IP 258 OP 636

## 2022-12-27 PROCEDURE — 82565 ASSAY OF CREATININE: CPT | Performed by: INTERNAL MEDICINE

## 2022-12-27 PROCEDURE — 80202 ASSAY OF VANCOMYCIN: CPT | Performed by: INTERNAL MEDICINE

## 2022-12-27 PROCEDURE — 250N000009 HC RX 250: Performed by: INTERNAL MEDICINE

## 2022-12-27 PROCEDURE — 250N000013 HC RX MED GY IP 250 OP 250 PS 637: Performed by: INTERNAL MEDICINE

## 2022-12-27 PROCEDURE — 250N000011 HC RX IP 250 OP 636: Performed by: INTERNAL MEDICINE

## 2022-12-27 PROCEDURE — 200N000001 HC R&B ICU

## 2022-12-27 PROCEDURE — 999N000009 HC STATISTIC AIRWAY CARE

## 2022-12-27 PROCEDURE — 84100 ASSAY OF PHOSPHORUS: CPT | Performed by: INTERNAL MEDICINE

## 2022-12-27 PROCEDURE — 83735 ASSAY OF MAGNESIUM: CPT | Performed by: INTERNAL MEDICINE

## 2022-12-27 PROCEDURE — 85048 AUTOMATED LEUKOCYTE COUNT: CPT | Performed by: SURGERY

## 2022-12-27 PROCEDURE — 85027 COMPLETE CBC AUTOMATED: CPT | Performed by: SURGERY

## 2022-12-27 PROCEDURE — 250N000011 HC RX IP 250 OP 636

## 2022-12-27 PROCEDURE — 250N000013 HC RX MED GY IP 250 OP 250 PS 637: Performed by: SURGERY

## 2022-12-27 PROCEDURE — 258N000003 HC RX IP 258 OP 636: Performed by: INTERNAL MEDICINE

## 2022-12-27 PROCEDURE — 94640 AIRWAY INHALATION TREATMENT: CPT | Mod: 76

## 2022-12-27 PROCEDURE — 999N000157 HC STATISTIC RCP TIME EA 10 MIN

## 2022-12-27 PROCEDURE — 250N000011 HC RX IP 250 OP 636: Performed by: SURGERY

## 2022-12-27 PROCEDURE — 99233 SBSQ HOSP IP/OBS HIGH 50: CPT | Performed by: INTERNAL MEDICINE

## 2022-12-27 PROCEDURE — 84132 ASSAY OF SERUM POTASSIUM: CPT | Performed by: INTERNAL MEDICINE

## 2022-12-27 PROCEDURE — 94640 AIRWAY INHALATION TREATMENT: CPT

## 2022-12-27 RX ORDER — MIDAZOLAM HCL IN 0.9 % NACL/PF 1 MG/ML
1-4 PLASTIC BAG, INJECTION (ML) INTRAVENOUS CONTINUOUS
Status: DISCONTINUED | OUTPATIENT
Start: 2022-12-27 | End: 2022-12-30

## 2022-12-27 RX ADMIN — DEXMEDETOMIDINE HYDROCHLORIDE 1.2 MCG/KG/HR: 400 INJECTION INTRAVENOUS at 10:35

## 2022-12-27 RX ADMIN — QUETIAPINE FUMARATE 25 MG: 25 TABLET ORAL at 15:54

## 2022-12-27 RX ADMIN — MIDAZOLAM HYDROCHLORIDE 6 MG/HR: 1 INJECTION, SOLUTION INTRAVENOUS at 15:54

## 2022-12-27 RX ADMIN — MICAFUNGIN SODIUM 100 MG: 50 INJECTION, POWDER, LYOPHILIZED, FOR SOLUTION INTRAVENOUS at 12:43

## 2022-12-27 RX ADMIN — DEXMEDETOMIDINE HYDROCHLORIDE 1.2 MCG/KG/HR: 400 INJECTION INTRAVENOUS at 02:10

## 2022-12-27 RX ADMIN — HEPARIN SODIUM 5000 UNITS: 5000 INJECTION, SOLUTION INTRAVENOUS; SUBCUTANEOUS at 09:13

## 2022-12-27 RX ADMIN — LEVALBUTEROL HYDROCHLORIDE 0.63 MG: 1.25 SOLUTION, CONCENTRATE RESPIRATORY (INHALATION) at 08:16

## 2022-12-27 RX ADMIN — ACYCLOVIR 200 MG: 200 SUSPENSION ORAL at 12:43

## 2022-12-27 RX ADMIN — Medication 150 MCG/HR: at 15:52

## 2022-12-27 RX ADMIN — SODIUM CHLORIDE: 9 INJECTION, SOLUTION INTRAVENOUS at 09:37

## 2022-12-27 RX ADMIN — QUETIAPINE FUMARATE 25 MG: 25 TABLET ORAL at 09:13

## 2022-12-27 RX ADMIN — QUETIAPINE FUMARATE 25 MG: 25 TABLET ORAL at 22:06

## 2022-12-27 RX ADMIN — CHLORHEXIDINE GLUCONATE 0.12% ORAL RINSE 15 ML: 1.2 LIQUID ORAL at 20:09

## 2022-12-27 RX ADMIN — PANTOPRAZOLE SODIUM 40 MG: 40 INJECTION, POWDER, FOR SOLUTION INTRAVENOUS at 09:13

## 2022-12-27 RX ADMIN — ACYCLOVIR 200 MG: 200 SUSPENSION ORAL at 15:57

## 2022-12-27 RX ADMIN — LEVALBUTEROL HYDROCHLORIDE 0.63 MG: 1.25 SOLUTION, CONCENTRATE RESPIRATORY (INHALATION) at 16:05

## 2022-12-27 RX ADMIN — LEVALBUTEROL HYDROCHLORIDE 0.63 MG: 1.25 SOLUTION, CONCENTRATE RESPIRATORY (INHALATION) at 23:12

## 2022-12-27 RX ADMIN — CHLORHEXIDINE GLUCONATE 0.12% ORAL RINSE 15 ML: 1.2 LIQUID ORAL at 09:13

## 2022-12-27 RX ADMIN — ACETAMINOPHEN 650 MG: 325 SUSPENSION ORAL at 04:24

## 2022-12-27 RX ADMIN — VANCOMYCIN HYDROCHLORIDE 750 MG: 1 INJECTION, POWDER, LYOPHILIZED, FOR SOLUTION INTRAVENOUS at 10:30

## 2022-12-27 RX ADMIN — PANTOPRAZOLE SODIUM 40 MG: 40 INJECTION, POWDER, FOR SOLUTION INTRAVENOUS at 20:09

## 2022-12-27 RX ADMIN — VANCOMYCIN HYDROCHLORIDE 750 MG: 1 INJECTION, POWDER, LYOPHILIZED, FOR SOLUTION INTRAVENOUS at 22:06

## 2022-12-27 RX ADMIN — ACYCLOVIR 200 MG: 200 SUSPENSION ORAL at 20:09

## 2022-12-27 RX ADMIN — MULTIVITAMIN 15 ML: LIQUID ORAL at 09:13

## 2022-12-27 RX ADMIN — MIDAZOLAM HYDROCHLORIDE 6 MG/HR: 1 INJECTION, SOLUTION INTRAVENOUS at 02:23

## 2022-12-27 RX ADMIN — ACYCLOVIR 200 MG: 200 SUSPENSION ORAL at 09:13

## 2022-12-27 RX ADMIN — HEPARIN SODIUM 5000 UNITS: 5000 INJECTION, SOLUTION INTRAVENOUS; SUBCUTANEOUS at 20:09

## 2022-12-27 RX ADMIN — FUROSEMIDE 20 MG: 20 TABLET ORAL at 09:13

## 2022-12-27 RX ADMIN — DEXMEDETOMIDINE HYDROCHLORIDE 1.2 MCG/KG/HR: 400 INJECTION INTRAVENOUS at 18:55

## 2022-12-27 RX ADMIN — ACETAMINOPHEN 650 MG: 325 SUSPENSION ORAL at 20:33

## 2022-12-27 ASSESSMENT — ACTIVITIES OF DAILY LIVING (ADL)
ADLS_ACUITY_SCORE: 36
ADLS_ACUITY_SCORE: 40
ADLS_ACUITY_SCORE: 44
ADLS_ACUITY_SCORE: 44
ADLS_ACUITY_SCORE: 40
ADLS_ACUITY_SCORE: 44
ADLS_ACUITY_SCORE: 40
ADLS_ACUITY_SCORE: 40
ADLS_ACUITY_SCORE: 44
ADLS_ACUITY_SCORE: 44
ADLS_ACUITY_SCORE: 40
ADLS_ACUITY_SCORE: 44

## 2022-12-27 NOTE — PROGRESS NOTES
[facility] ICU RESPIRATORY NOTE        Date of Admission: 12/16/2022    Date of Intubation (most recent): 12/16/22    Reason for Mechanical Ventilation: Respiratory failure    Number of Days on Mechanical Ventilation: 11    Met Criteria for Spontaneous Breathing Trial:No    Reason for No Spontaneous Breathing Trial: Per MD,PEEP>8    Significant Events Today: Went to MRI    ABG Results:   Recent Labs   Lab 12/26/22  1009 12/26/22  0042 12/20/22  1620 12/20/22  1157   PH  --  7.53* 7.44 7.42   PCO2  --  45 38 42   PO2  --  70* 100 170*   HCO3  --  38* 26 27   O2PER 65 70 50 30       Current Vent Settings: Vent Mode: CMV/AC  (Continuous Mandatory Ventilation/ Assist Control)  FiO2 (%): 65 %  Resp Rate (Set): 16 breaths/min  Tidal Volume (Set, mL): 400 mL  PEEP (cm H2O): 10 cmH2O  Resp: 17          Plan: We will monitor on full ventilatory support    RT Ernst on 12/26/2022 at 10:39 PM

## 2022-12-27 NOTE — PROGRESS NOTES
Comprehensive Daily ICU Note        Deidre N Aasen MRN# [id number]   Age: 27 year old YOB: 1995     Date of Admission: 12/16/2022    Primary care provider: No Ref-Primary, Physician     CODE STATUS: FULL    Problem List:       Principal Problem:    Diabetic ketoacidosis without coma associated with type 1 diabetes mellitus (H)  Active Problems:    Acute kidney failure with tubular necrosis (H)    Infection due to 2019 novel coronavirus    Necrotic pneumonia (H)    Fungemia    ARDS (adult respiratory distress syndrome) (H)    MRSA bacteremia    Encephalopathy           Treatment goals for next 24 hours:   Pressure support if able  Keep awake as tolerated  Continue antimicrobials  Consult surgeons for tracheostomy    La Lira MD      Subjective/ Last 24 hours:   Events: No acute events.  Placed her on pressure support mode and she quickly appeared very uncomfortable.  This improved when placed back on CMV.           Mechanical Ventilation/Vitalsigns/IsandOs:       Temp:  [97.9  F (36.6  C)-100.9  F (38.3  C)] 100.9  F (38.3  C)  Pulse:  [60-94] 85  Resp:  [15-25] 17  BP: ()/(51-73) 114/66  FiO2 (%):  [65 %] 65 %  SpO2:  [89 %-100 %] 96 %      Vent Mode: CMV/AC  (Continuous Mandatory Ventilation/ Assist Control)  FiO2 (%): 65 %  Resp Rate (Set): 16 breaths/min  Tidal Volume (Set, mL): 400 mL  PEEP (cm H2O): 10 cmH2O  Resp: 17      Intake/Output Summary (Last 24 hours) at 12/27/2022 1511  Last data filed at 12/27/2022 1400  Gross per 24 hour   Intake 2621.8 ml   Output 2290 ml   Net 331.8 ml     Net IO Since Admission: 6,042.2 mL [12/27/22 1511]             Physical Examination:     General: Stated age  HEENT: ET tube, OG tube  Lungs: clear anteriorly  CVS: RRR  Abdomen: benign  Extremities/musculoskeletal: warm  Neurology: awakens and follows commands and nods yes and no  Skin: groin in  bandages  Psychiatry: calm other than when on PS  Exam of Line sites:  clean            Feeding/Glucose:      None      Recent Labs   Lab 12/27/22  1249 12/27/22  0921 12/27/22  0445 12/26/22  2315 12/26/22  1703 12/26/22  1352   * 261* 294* 154* 116* 144*            Medications:       acyclovir  200 mg Oral or Feeding Tube 5x Daily     chlorhexidine  15 mL Mouth/Throat Q12H     furosemide  20 mg Per Feeding Tube Daily     heparin ANTICOAGULANT  5,000 Units Subcutaneous Q12H     insulin aspart  1-12 Units Subcutaneous Q4H     [START ON 12/28/2022] insulin glargine  25 Units Subcutaneous Daily     insulin glargine  5 Units Subcutaneous Once     levalbuterol  0.63 mg Nebulization Q8H     micafungin  100 mg Intravenous Q24H     multivitamins w/minerals  15 mL Per Feeding Tube Daily     pantoprazole  40 mg Intravenous BID     QUEtiapine  25 mg Oral or NG Tube TID     sodium chloride (PF)  10 mL Intracatheter Q8H     vancomycin  750 mg Intravenous Q12H          dexmedetomidine 1.2 mcg/kg/hr (12/27/22 1035)     dextrose       fentaNYL       - MEDICATION INSTRUCTIONS -       midazolam       sodium chloride 20 mL/hr at 12/27/22 0937              Labs:         ROUTINE ICU LABS (Last four results)  CMP  Recent Labs   Lab 12/27/22  1249 12/27/22  0921 12/27/22  0445 12/26/22  2315 12/26/22  0440 12/26/22  0437 12/25/22  2034 12/25/22  1837 12/25/22  1256 12/25/22  1253 12/25/22  0758 12/25/22  0627 12/24/22  2101 12/24/22  1637 12/24/22  1233 12/24/22  1052 12/23/22  0844 12/23/22  0438 12/22/22  1552 12/22/22  1357 12/22/22  0753 12/22/22  0502   NA  --   --   --   --   --  137  --   --   --   --   --   --   --   --   --  135  --  142  --  142  --  145*   POTASSIUM  --   --  4.5  --   --  4.2  --  4.0  --  3.4  --  3.4  --  3.7  --  3.3*  --  3.6  3.5   < >  --    < > 3.1*   CHLORIDE  --   --   --   --   --  100  --   --   --   --   --   --   --   --   --  94  --  105  --   --   --  111*   CO2  --   --   --   --   --  34*  --   --   --   --   --   --   --   --   --  35*  --  31  --   --   --  29   ANIONGAP  --   --   --    --   --  3  --   --   --   --   --   --   --   --   --  6  --  6  --   --   --  5   * 261* 294* 154*   < > 272*   < >  --    < >  --    < >  --    < > 320*   < > 288*   < > 210*   < >  --    < > 218*   BUN  --   --   --   --   --  13  --   --   --   --   --   --   --   --   --  14  --  14  --   --   --  21   CR  --   --  0.60  --   --  0.52  --   --   --   --   --  0.65  --   --   --  0.69  --  0.74  --   --   --  0.79   GFRESTIMATED  --   --  >90  --   --  >90  --   --   --   --   --  >90  --   --   --  >90  --  >90  --   --   --  >90   AGNES  --   --   --   --   --  7.2*  --   --   --   --   --   --   --   --   --  7.3*  --  7.1*  --   --   --  6.9*   MAG  --   --  2.2  --   --  2.1  --   --   --   --   --  2.2  --  2.1  --   --   --   --   --   --   --  2.1   PHOS  --   --  3.2  --   --  3.4  --   --   --   --   --  3.2  --  3.8  --   --   --   --   --   --   --  2.9   PROTTOTAL  --   --   --   --   --   --   --   --   --   --   --   --   --   --   --   --   --   --   --   --   --  4.8*   ALBUMIN  --   --   --   --   --   --   --   --   --   --   --   --   --   --   --   --   --   --   --   --   --  1.2*   BILITOTAL  --   --   --   --   --   --   --   --   --   --   --   --   --   --   --   --   --   --   --   --   --  0.3   ALKPHOS  --   --   --   --   --   --   --   --   --   --   --   --   --   --   --   --   --   --   --   --   --  78   AST  --   --   --   --   --   --   --   --   --   --   --   --   --   --   --   --   --   --   --   --   --  46*   ALT  --   --   --   --   --   --   --   --   --   --   --   --   --   --   --   --   --   --   --   --   --  32    < > = values in this interval not displayed.     CBC  Recent Labs   Lab 12/27/22  0445 12/26/22  0437 12/25/22  0627 12/24/22  0523   WBC 14.6* 13.3* 12.9* 12.0*   RBC 2.63* 2.57* 2.89* 2.85*   HGB 7.9* 7.9* 8.9* 8.6*   HCT 24.2* 23.9* 26.5* 25.0*   MCV 92 93 92 88   MCH 30.0 30.7 30.8 30.2   MCHC 32.6 33.1 33.6 34.4   RDW 14.0 14.5 14.5 14.6    * 437 444 383     INRNo lab results found in last 7 days.  Arterial Blood Gas  Recent Labs   Lab 12/26/22  1009 12/26/22  0042 12/20/22  1620   PH  --  7.53* 7.44   PCO2  --  45 38   PO2  --  70* 100   HCO3  --  38* 26   O2PER 65 70 50         Cultures:      Recent Labs   Lab 12/26/22  0604 12/26/22  0559 12/25/22  2349 12/25/22  0519 12/23/22  1229 12/22/22  0614 12/21/22  1626 12/21/22  1620   CULTURE No growth after 1 day No growth after 1 day Culture in progress  2+ Staphylococcus aureus*  No growth after 1 day No growth after 2 days No growth after 3 days No Growth No Growth No Growth             Imaging/Other results:     Recent Results (from the past 24 hour(s))   MR Thoracic Spine w/o Contrast    Narrative    EXAM: MR CERVICAL SPINE W/O CONTRAST, MR LUMBAR SPINE W/O CONTRAST, MR THORACIC SPINE W/O CONTRAST  LOCATION: [facility]  DATE/TIME: 12/26/2022 9:55 PM    INDICATION: mrsa bacteremia, concern for spinal abscess diskitis  COMPARISON: None.  TECHNIQUE:   1) MRI Cervical Spine without IV contrast.  2) MRI Thoracic Spine without IV contrast.  3) MRI Lumbar Spine without IV contrast. Sagittal T2, T1, and STIR sequences performed.    FINDINGS:    CERVICAL SPINE:   Normal vertebral body heights. Straightening of cervical lordosis. No abnormal cord signal. Mild edema posterior paraspinal soft tissues cervicothoracic region.    Craniovertebral junction and C1-C2: Normal.    C2-C3: Normal disc height. No herniation. Normal facets. No spinal canal or neural foraminal stenosis.     C3-C4: Normal disc height. No herniation. Normal facets. No spinal canal or neural foraminal stenosis.     C4-C5: Normal disc height. No herniation. Normal facets. No spinal canal or neural foraminal stenosis.     C5-C6: Normal disc height. No herniation. Normal facets. No spinal canal or neural foraminal stenosis.     C6-C7: Normal disc height. No herniation. Normal facets. No spinal canal or neural  foraminal stenosis.     C7-T1: Normal disc height. No herniation. Normal facets. No spinal canal or neural foraminal stenosis.    THORACIC SPINE:  Normal vertebral body heights, alignment and marrow signal. Normal disc heights. No herniation. Normal facets. No spinal canal or neural foraminal stenosis. No abnormal cord signal.     Infiltrates, atelectasis, and effusion in lungs bilaterally, right more than left.  Prominent infiltrates, atelectasis, and pleural effusion in both lungs, right more than left. Mild edema posterior paraspinal soft tissues.    LUMBAR SPINE:   Normal vertebral body heights and alignment. Normal distal cord with cord was extending to L1-L2. No significant canal or foraminal narrowing at any level. Mild edema posterior paraspinal soft tissues. Axial images not performed.       Impression    IMPRESSION:    CERVICAL SPINE MRI:  1.  Normal cervical cord.  2.  No significant canal or foraminal narrowing at any level.  3.  With limitations of a noncontrast examination, no definite discitis osteomyelitis complex or an infectious process within the spinal canal.    THORACIC SPINE MRI:  1.  Normal thoracic cord.  2.  No significant canal or foraminal narrowing at any level.  3.  With limitations of a noncontrast examination, no definite discitis osteomyelitis complex or an infectious process within the spinal canal.    LUMBAR SPINE MRI:  1.  Normal distal cord.  2.  No significant canal or foraminal narrowing at any level.  3.  With limitations of a noncontrast examination, no definite discitis osteomyelitis complex or an infectious process within the spinal canal.   MR Cervical Spine w/o Contrast    Narrative    EXAM: MR CERVICAL SPINE W/O CONTRAST, MR LUMBAR SPINE W/O CONTRAST, MR THORACIC SPINE W/O CONTRAST  LOCATION: [facility]  DATE/TIME: 12/26/2022 9:55 PM    INDICATION: mrsa bacteremia, concern for spinal abscess diskitis  COMPARISON: None.  TECHNIQUE:   1) MRI  Cervical Spine without IV contrast.  2) MRI Thoracic Spine without IV contrast.  3) MRI Lumbar Spine without IV contrast. Sagittal T2, T1, and STIR sequences performed.    FINDINGS:    CERVICAL SPINE:   Normal vertebral body heights. Straightening of cervical lordosis. No abnormal cord signal. Mild edema posterior paraspinal soft tissues cervicothoracic region.    Craniovertebral junction and C1-C2: Normal.    C2-C3: Normal disc height. No herniation. Normal facets. No spinal canal or neural foraminal stenosis.     C3-C4: Normal disc height. No herniation. Normal facets. No spinal canal or neural foraminal stenosis.     C4-C5: Normal disc height. No herniation. Normal facets. No spinal canal or neural foraminal stenosis.     C5-C6: Normal disc height. No herniation. Normal facets. No spinal canal or neural foraminal stenosis.     C6-C7: Normal disc height. No herniation. Normal facets. No spinal canal or neural foraminal stenosis.     C7-T1: Normal disc height. No herniation. Normal facets. No spinal canal or neural foraminal stenosis.    THORACIC SPINE:  Normal vertebral body heights, alignment and marrow signal. Normal disc heights. No herniation. Normal facets. No spinal canal or neural foraminal stenosis. No abnormal cord signal.     Infiltrates, atelectasis, and effusion in lungs bilaterally, right more than left.  Prominent infiltrates, atelectasis, and pleural effusion in both lungs, right more than left. Mild edema posterior paraspinal soft tissues.    LUMBAR SPINE:   Normal vertebral body heights and alignment. Normal distal cord with cord was extending to L1-L2. No significant canal or foraminal narrowing at any level. Mild edema posterior paraspinal soft tissues. Axial images not performed.       Impression    IMPRESSION:    CERVICAL SPINE MRI:  1.  Normal cervical cord.  2.  No significant canal or foraminal narrowing at any level.  3.  With limitations of a noncontrast examination, no definite discitis  osteomyelitis complex or an infectious process within the spinal canal.    THORACIC SPINE MRI:  1.  Normal thoracic cord.  2.  No significant canal or foraminal narrowing at any level.  3.  With limitations of a noncontrast examination, no definite discitis osteomyelitis complex or an infectious process within the spinal canal.    LUMBAR SPINE MRI:  1.  Normal distal cord.  2.  No significant canal or foraminal narrowing at any level.  3.  With limitations of a noncontrast examination, no definite discitis osteomyelitis complex or an infectious process within the spinal canal.   MR Lumbar Spine w/o Contrast    Narrative    EXAM: MR CERVICAL SPINE W/O CONTRAST, MR LUMBAR SPINE W/O CONTRAST, MR THORACIC SPINE W/O CONTRAST  LOCATION: [facility]  DATE/TIME: 12/26/2022 9:55 PM    INDICATION: mrsa bacteremia, concern for spinal abscess diskitis  COMPARISON: None.  TECHNIQUE:   1) MRI Cervical Spine without IV contrast.  2) MRI Thoracic Spine without IV contrast.  3) MRI Lumbar Spine without IV contrast. Sagittal T2, T1, and STIR sequences performed.    FINDINGS:    CERVICAL SPINE:   Normal vertebral body heights. Straightening of cervical lordosis. No abnormal cord signal. Mild edema posterior paraspinal soft tissues cervicothoracic region.    Craniovertebral junction and C1-C2: Normal.    C2-C3: Normal disc height. No herniation. Normal facets. No spinal canal or neural foraminal stenosis.     C3-C4: Normal disc height. No herniation. Normal facets. No spinal canal or neural foraminal stenosis.     C4-C5: Normal disc height. No herniation. Normal facets. No spinal canal or neural foraminal stenosis.     C5-C6: Normal disc height. No herniation. Normal facets. No spinal canal or neural foraminal stenosis.     C6-C7: Normal disc height. No herniation. Normal facets. No spinal canal or neural foraminal stenosis.     C7-T1: Normal disc height. No herniation. Normal facets. No spinal canal or neural  foraminal stenosis.    THORACIC SPINE:  Normal vertebral body heights, alignment and marrow signal. Normal disc heights. No herniation. Normal facets. No spinal canal or neural foraminal stenosis. No abnormal cord signal.     Infiltrates, atelectasis, and effusion in lungs bilaterally, right more than left.  Prominent infiltrates, atelectasis, and pleural effusion in both lungs, right more than left. Mild edema posterior paraspinal soft tissues.    LUMBAR SPINE:   Normal vertebral body heights and alignment. Normal distal cord with cord was extending to L1-L2. No significant canal or foraminal narrowing at any level. Mild edema posterior paraspinal soft tissues. Axial images not performed.       Impression    IMPRESSION:    CERVICAL SPINE MRI:  1.  Normal cervical cord.  2.  No significant canal or foraminal narrowing at any level.  3.  With limitations of a noncontrast examination, no definite discitis osteomyelitis complex or an infectious process within the spinal canal.    THORACIC SPINE MRI:  1.  Normal thoracic cord.  2.  No significant canal or foraminal narrowing at any level.  3.  With limitations of a noncontrast examination, no definite discitis osteomyelitis complex or an infectious process within the spinal canal.    LUMBAR SPINE MRI:  1.  Normal distal cord.  2.  No significant canal or foraminal narrowing at any level.  3.  With limitations of a noncontrast examination, no definite discitis osteomyelitis complex or an infectious process within the spinal canal.                 Assessment and Plan:     Summary:  Deidre N Aasen is a 27 year old female admitted on 12/16/2022 for DKA, respiratory failure from concurrent covid and bacterial pneumonia.  MRSA bacteremia and necrotizing pneumonia and candida bacteremia.  Has been making some improvements but has reached a plateau    Neuro/ pain/ sedation:  # Toxic metabolic encephalopathy  # Agitation in setting of polysubstance abuse - IV meth and heroin  use.  Still on fairly robust doses of sedatives but is mostly awake.  # Substance use disorder  P: Continue current sedative regimen.  Consult addiction medicine regarding Suboxone prior to transfer.  Tracheostomy.  Hopefully this will allow us to further reduce her sedation    Pulmonary:   # Complex cavitary pneumonia 2/2 to MRSA pneumonia  Also COVID     # severe ARDS.  Episode of desaturation night of 12/25.  Now seems to be  to her most recent baseline.  #Very limited tolerance for ventilator weaning.  Plan   - Continue current settings.  Wean ventilator as able.  -Patient will need a tracheostomy.  Discussed this as well as LTAC with her father today who would like to proceed      Cardiovascular:  # septic shock, resolved  # Sinus tachycardia  #CATHY 12/20 negative for vegetations and negative bubble     Renal/Electrolytes:  Labs are stable.  Was significantly volume overloaded.  She responded well to diuresis.  Now back close to where she started.  -Aim for net even    ID:  # Septic shock and MRSA bacteremia. Blood cultures have now cleared.  CATHY did not show endocarditis - Abx course: vanco 12/16- present; unasyn 12/16-12/19; cefepime 12/19-stopped 12/24.  Last positive blood cx: MRSA 12/16;# Complicated pneumonia with cavitary lesions.  For the moment no need for any kind of an intervention. Last positive BAL: MRSA 12/16  # Covid positive. Unclear clinical significance  #fungemia--no new positive cultures-  budding yeast 12/19. Anti-fungal course: micafungin 12/21-present, plan for 14 day course  # HSV in right groin wounds  # Intermittent fevers.    P: Continuing very complicated antimicrobial regimen.  Appreciate advice of infectious disease experts.  Reevaluate for infection if develops a much higher fever.    GI//Nutrition  She is at goal tube feeds.  On GI prophylaxis      Musculoskeletal/Rheumatology:  Up in chair and PT if able    Endocrine:  #Type 1 diabetes with insulin non complicance,  complicated by DKA. Hemoglobin A1C 16.4  # Resolved DKA but labile insulin needs  -Increase Lantus a little bit today.  Continuing with sliding scale.  Given the fact that she is a type I diabetic would tolerate some hyper glycemia in the short run.    Hematology:  # DVT ppx  #anemia of chronic disease  #acute blood loss anemia. Needing transfusion on 12/23.  Hemoglobin stable since.  - subcutaneous heparin BID     ICU prophylaxis:      DVT: heparin     VAP: As above     Stress ulcer: ppi     Restraints needed: y     Lines   Central Line/PICC - placed 12/21 needed: y   Arterial line - placed n needed: n   Sheffield catheter.  Needed: y       Prognosis:    guarded      Family update: Father Jeronimo on the phone.  He requests that no else receive information or visit her in the hospital given her ongoing substance use disorder.    Billing: total time spend providing critical care was 45 min, excluding procedure time.    La Lira MD  [phone]                                         .

## 2022-12-27 NOTE — PLAN OF CARE
Shift Summary  Assumed cares from [id number].    Neuro: Follows commands inconsistently. Dopplar to pedal/post-tib pulses. PERRL. Pt febrile; tmax 100.4. Tylenol given and ice packs utilized. Due to ongoing fevers, MRI was ordered to rule out spinal abscess/diskitis.   Cardiac: SR. Soft pressures but normotensive.   Pulmonary: LS coarse throughout, Pt coughs but tolerates vent. Bit block in place. Pt has lots of oral thin secretions. Pt also has lots of inline/ETT secretions that are very thick.   GI: Ongoing liquid stool in rectal tube. Output 250mL. NJ in place and tube feed running at goal. Pt's lantus increased to 20u BID and was also changed to high dose sliding scale insulin.   : Sheffield in place and draining adequate output.   Integumentary: See flowsheet.   Restraints: Bilateral soft wrist restraints in place and checked q2h.   Activity: Bedrest    Plan of care has been explained to patient: Yes.     France Young RN

## 2022-12-27 NOTE — PROGRESS NOTES
[facility]    Infectious Disease Progress Note    Date of Service : 12/27/2022     Assessment:  27 year old female with polysubstance abuse who has been admitted since 12/16/2022 for DKA, respiratory failure from concurrent covid and found to have superimposed MRSA cavitary pneumonia with septic shock. She developed acute respiratory failure requiring intubation and pressor support as well as stress dose steroids for shock in addition to broad spectrum antimicrobial therapy.  She remains severely critically ill . Has now developed fungemia related to candida glabrata. Lines have been changed     -MRSA with septic shock. No endocarditis noted on CATHY. Last blood cx positive from 12/19  -Severe lobar consolidations and cavitary pneumonia related to MRSA with hypoxic respiratory failure  -Candida glabrata fungemia  -Genital herpes  -Covid19 infection  -Uncontrolled type 1 diabetes with HbA1c > 16%  -DKA with NANNETTE and electrolyte abnormalities have improved  -Malnutrition  -substance abuse disorder     Recommendations:  1. Continue treatment with Vancomycin and Micafungin. Pharmacy managing vancomycin dosing, levels therapeutic, renal functions stable  2. Blood cxs negative since 12/21   3. No discitis/ osteo on the  MRI spine to assess for secondary infection   4. S/p bronchoscopy  5. ID will continue to follow. If additional blood cxs turn positive, add Ceftaroline 600 mg IVQ12H  6. HIV negative  7. On acyclovir for genital herpes     Discussed with the ICU team      Rosy Davis MD    Interval History   Remains intubated, ongoing low grade fever and leukocytosis    Physical Exam   Temp: 98.6  F (37  C) Temp src: Bladder BP: 115/63 Pulse: 89   Resp: 21 SpO2: 99 % O2 Device: Mechanical Ventilator    Vitals:    12/25/22 0002 12/26/22 0200 12/27/22 0400   Weight: 40 kg (88 lb 2.9 oz) 40.5 kg (89 lb 4.6 oz) 37.8 kg (83 lb 5.3 oz)     Vital Signs with Ranges  Temp:  [97.9  F (36.6  C)-100.6  F (38.1   C)] 98.6  F (37  C)  Pulse:  [] 89  Resp:  [15-25] 21  BP: ()/(44-73) 115/63  FiO2 (%):  [65 %] 65 %  SpO2:  [89 %-100 %] 99 %    Constitutional: on ventilator  Lungs: clear  Cardiovascular: S1S2  Skin: No rash    Other:    Medications     dexmedetomidine 1.2 mcg/kg/hr (12/27/22 1035)     dextrose       fentaNYL 150 mcg/hr (12/26/22 2322)     - MEDICATION INSTRUCTIONS -       midazolam 6 mg/hr (12/27/22 0223)     sodium chloride 20 mL/hr at 12/27/22 0937       acyclovir  200 mg Oral or Feeding Tube 5x Daily     chlorhexidine  15 mL Mouth/Throat Q12H     furosemide  20 mg Per Feeding Tube Daily     heparin ANTICOAGULANT  5,000 Units Subcutaneous Q12H     insulin aspart  1-12 Units Subcutaneous Q4H     insulin glargine  20 Units Subcutaneous Daily     levalbuterol  0.63 mg Nebulization Q8H     micafungin  100 mg Intravenous Q24H     multivitamins w/minerals  15 mL Per Feeding Tube Daily     pantoprazole  40 mg Intravenous BID     QUEtiapine  25 mg Oral or NG Tube TID     sodium chloride (PF)  10 mL Intracatheter Q8H     vancomycin  750 mg Intravenous Q12H       Data   All microbiology laboratory data reviewed.  Recent Labs   Lab Test 12/27/22 0445 12/26/22 0437 12/25/22  0627   WBC 14.6* 13.3* 12.9*   HGB 7.9* 7.9* 8.9*   HCT 24.2* 23.9* 26.5*   MCV 92 93 92   * 437 444     Recent Labs   Lab Test 12/27/22 0445 12/26/22  0437 12/25/22  0627   CR 0.60 0.52 0.65     Microbiology  Blood cx 12/21, 12/22, 12/23, 12/25 no growth so far  12/19 blood cx  Line, venous; Blood    0 Result Notes  Culture Positive on the 1st day of incubation Abnormal         Candida glabrata complex Panic     2 of 2 bottles   Staphylococcus aureus MRSA Panic     1 of 2 bottles   Positive on the 5th day of incubation  Susceptibilities done on previous cultures         Resulting Agency: [facility]      Susceptibility              Candida glabrata complex       SAMANTHA       Amphotericin B 1 ug/mL No interpretation available        Fluconazole 16 ug/mL Susceptible Dose Dependent       Micafungin <=0.03 ug/mL Susceptible       Voriconazole 0.5 ug/mL No interpretation available

## 2022-12-27 NOTE — PLAN OF CARE
Goal Outcome Evaluation:    Neuro: Alert, on sedation. RASS 0 to -1, restless w/ cares. Pupils PERRL. Follows commands. Movement of all extremities.   Cardiac: Tele SR. MAP > 65.   Respiratory: Intubated. FiO2 65%, PEEP 10. LS coarse, rhonchi, wheezes. Copious secretions, frequent ETT/oral sxn.     GI/: NG tube, TF running @ 35 mL/hr, 100 mL H2O q4h. Rectal tube, leaked x1, 350 mL out overnight. Sheffield in place w/ good UOP.   Lines/Drips: Rt PICC. Left midline. Versed, fentanyl, precedex gtt.    Pain: Able to nod yes for pain. Repositioning provided.   Skin: Blanchable redness to sacrum, mepilex in place.   Activity: Assist of 2 w/ lift.   Additional/Plan: Fever, temp max 100.6. Improved w/ tylenol and ice packs. MRI done overnight, unable to complete due to movement and intolerance. Pt dad updated via phone. ID, WOC following. Will continue with plan of care.

## 2022-12-27 NOTE — PLAN OF CARE
Goal Outcome Evaluation:    Plan of Care Reviewed With: other (see comments) Patient was discussed during ICU interdisciplinary rounds this morning.     Overall Patient Progress: no change     Outcome Evaluation: With Propofol off, TF meeting lower end of estimated needs. BMI only 14.7 so have opted to increase TF rate slightly to optimize nutrition.      Bhavya Nicholson, RD, LD, CNSC

## 2022-12-27 NOTE — PROVIDER NOTIFICATION
[facility], [facility] ICU RESPIRATORY NOTE        Date of Admission: 12/16/2022     Date of Intubation (most recent): 12/16/22     Reason for Mechanical Ventilation: Respiratory failure     Number of Days on Mechanical Ventilation: 112     Met Criteria for Spontaneous Breathing Trial:No     Reason for No Spontaneous Breathing Trial: Per MD,PEEP>8     Significant Events Today: none    ABG Results:   Recent Labs   Lab 12/26/22  1009 12/26/22  0042 12/20/22  1620 12/20/22  1157   PH  --  7.53* 7.44 7.42   PCO2  --  45 38 42   PO2  --  70* 100 170*   HCO3  --  38* 26 27   O2PER 65 70 50 30       Current Vent Settings: Vent Mode: CMV/AC  (Continuous Mandatory Ventilation/ Assist Control)  FiO2 (%): 65 %  Resp Rate (Set): 16 breaths/min  Tidal Volume (Set, mL): 400 mL  PEEP (cm H2O): 10 cmH2O  Resp: 21      Plan: continue to monitor and assess for SBT.     ISIDRA ZAPATA, RT on 12/27/2022 at 5:19 AM

## 2022-12-27 NOTE — CONSULTS
Care Management Initial Consult    General Information  Assessment completed with: Family, VM-chart review, Patient's dad Jeronimo       Primary Care Provider verified and updated as needed: No   Readmission within the last 30 days:        Reason for Consult: discharge planning  Advance Care Planning:            Communication Assessment  Patient's communication style: spoken language (English or Bilingual)    Hearing Difficulty or Deaf: no   Wear Glasses or Blind: no    Cognitive  Cognitive/Neuro/Behavioral: .WDL except  Level of Consciousness: somnolent  Arousal Level: arouses to voice  Orientation: other (see comments) (GADIEL)  Mood/Behavior: calm, cooperative  Best Language:  (GADIEL)  Speech: endotracheal tube    Living Environment:   People in home: other (see comments) (unknown ? roommates vs S.O)     Current living Arrangements:        Able to return to prior arrangements: other (see comments) (father concerned for her return to home)       Family/Social Support:  Care provided by: self  Provides care for:    Marital Status: Single             Description of Support System:           Current Resources:   Patient receiving home care services: No     Community Resources:    Equipment currently used at home:    Supplies currently used at home:      Employment/Financial:  Employment Status:          Financial Concerns:             Lifestyle & Psychosocial Needs:  Social Determinants of Health     Tobacco Use: Low Risk      Smoking Tobacco Use: Never     Smokeless Tobacco Use: Never     Passive Exposure: Not on file   Alcohol Use: Not on file   Financial Resource Strain: Not on file   Food Insecurity: Not on file   Transportation Needs: Not on file   Physical Activity: Not on file   Stress: Not on file   Social Connections: Not on file   Intimate Partner Violence: Not on file   Depression: Not on file   Housing Stability: Not on file       Functional Status:  Prior to admission patient needed assistance:           "    Mental Health Status:          Chemical Dependency Status:                Values/Beliefs:  Spiritual, Cultural Beliefs, Yazidism Practices, Values that affect care:                 Additional Information:  Called to patient's father Jeronimo to discuss role in discharge planning.  Father lives out of state ([place]) and notes that he does not talk to her much and thus does not know much about her life.  He knows she has a history of heroin use and has been in chemical dependency treatment before ([facility] among others).  He notes that he hopes this is \"her bottom\" and she will realize she needs to change her lifestyle.  He is concerned about her being \"released to the streets\" where he feels she will go back to using drugs.  He notes she has been \"through the mill\"  And is concerned that visitors will bring her drugs.     Discussed recommendation that ICU MD feels she will need LTACH.  Explained LTACH and options.  Father is in agreement with LTACH and would like referral sent to [facility].     Referral sent via [facility] and updated Liaison Berenice to watch for readiness.    Jeronimo notes is final goal is to bring patient back to [place] to live with him but understands rehab/further care may be needed here.     CC/SW to follow for discharge planning.     Philly Alonso RN      "

## 2022-12-27 NOTE — PHARMACY-VANCOMYCIN DOSING SERVICE
Pharmacy Vancomycin Note  Date of Service 2022  Patient's  1995   27 year old, female    Indication: Bacteremia and Sepsis  Day of Therapy: 12  Current vancomycin regimen:  750 mg IV q12h  Current vancomycin monitoring method: AUC  Current vancomycin therapeutic monitoring goal: 400-600 mg*h/L    Insight[employer] Prediction of Current Vancomycin Regimen      Regimen: 750 mg IV every 12 hours.  Start time: 11:12 on 2022  Exposure target: AUC24 (range)400-600 mg/L.hr   AUC24,ss: 521 mg/L.hr  Probability of AUC24 > 400: 98 %  Ctrough,ss: 15.8 mg/L  Probability of Ctrough,ss > 20: 2 %  Probability of nephrotoxicity (Lodise LEONORA ): 11 %      Current estimated CrCl = Estimated Creatinine Clearance: 85.5 mL/min (based on SCr of 0.6 mg/dL).    Creatinine for last 3 days  2022: 10:52 AM Creatinine 0.69 mg/dL  2022:  6:27 AM Creatinine 0.65 mg/dL  2022:  4:37 AM Creatinine 0.52 mg/dL  2022:  4:45 AM Creatinine 0.60 mg/dL    Recent Vancomycin Levels (past 3 days)  2022:  9:04 PM Vancomycin 19.0 mg/L  2022:  9:19 AM Vancomycin 16.6 mg/L    Vancomycin IV Administrations (past 72 hours)                   vancomycin (VANCOCIN) 750 mg in sodium chloride 0.9 % 250 mL intermittent infusion (mg) 750 mg New Bag 22     750 mg New Bag  1016     750 mg New Bag 22 2239     750 mg New Bag  0959    Vancomycin 750 mg in D5W 250 mL intermittent infusion (mg) 750 mg New Bag 22 2227                Nephrotoxins and other renal medications (From now, onward)    Start     Dose/Rate Route Frequency Ordered Stop    22 0900  furosemide (LASIX) tablet 20 mg         20 mg Per Feeding Tube DAILY 22 0909      22 1100  acyclovir (ZOVIRAX) suspension 200 mg         200 mg Oral or Feeding Tube 5 TIMES DAILY 22 1051 22 1159    22 1000  vancomycin (VANCOCIN) 750 mg in sodium chloride 0.9 % 250 mL intermittent infusion         750 mg  over 90  Minutes Intravenous EVERY 12 HOURS 12/24/22 2240               Contrast Orders - past 72 hours (72h ago, onward)    None          Interpretation of levels and current regimen:  Vancomycin level is reflective of -600    Has serum creatinine changed greater than 50% in last 72 hours: No    Urine output:  good urine output    Renal Function: Stable      Plan:  1. Continue Current Dose  2. Vancomycin monitoring method: AUC  3. Vancomycin therapeutic monitoring goal: 400-600 mg*h/L  4. Pharmacy will check vancomycin levels as appropriate in 1-3 Days.  5. Serum creatinine levels will be ordered daily for the first week of therapy and at least twice weekly for subsequent weeks.    Aleena Crump RPH

## 2022-12-27 NOTE — PROGRESS NOTES
CLINICAL NUTRITION SERVICES - REASSESSMENT NOTE      Recommendations Ordered by Registered Dietitian (RD):   Increase TF Osmolite 1.5 to 40 mL/hr = 1440 kcals (38 kcal/kg and 109% energy needs), 60 gm pro (1.6 gm/kg), 732 mL H20, 196 gm CHO, no fiber.   Malnutrition: ()  % Weight Loss:  Large wt fluctuations - unable to assess true wt loss  % Intake:  </= 50% for >/= 5 days (severe malnutrition)  Subcutaneous Fat Loss:  Orbital region severe depletion (visual only d/t COVID isolation)  Muscle Loss:  Temporal region severe depletion (visual only d/t COVID isolation)  Fluid Retention:  Severe (4+ generalized)     Malnutrition Diagnosis: Severe malnutrition  In Context of:  Acute illness or injury  Chronic illness or disease  Environmental or social circumstances        EVALUATION OF PROGRESS TOWARD GOALS   Diet:  NPO on vent    Nutrition Support: On , TF rate was increased from 10 mL/hr --> 20 mL/hr and after 24 hrs increased to goal (achieved  at 1000) as below:    Nutrition Support Enteral:  Type of Feeding Tube: Nasoduodenal (tip at D-J junction)  Enteral Frequency:  Continuous  Enteral Regimen: Osmolite 1.5 at 35 mL/hr  Total Enteral Provisions: 1260 kcal (33 kcal/kg), 53 g protein (1.4 g/kg and 93% pro needs), 171 g CHO, 0 g fiber, 640 mL H2O   Free Water Flush: 100 mL every 4 hrs  Liquid MVI-M daily via FT    Propofol was discontinued .     Intake/Tolerance:    Stool Pattern (rectal tube placed )  :  x2  :  x2  :  x5  :  x2  :  750 mL  :  350 mL  :  100 mL  TG yesterday 477 (H)  Refeeding labs noted (-):  K:  3.1 (L) --> 3.6 --> 3.3 (L) --> 3.4 --> 4.2 --> 4.5 - acceptable  M.1 --> 2.1 --> 2.2 --> 2.1 --> 2.2 - acceptable  Phos:  2.9 --> 3.8 --> 3.2 --> 3.4 --> 3.2 - acceptable  BGM:  272, 295, 144, 116, 154, 294,261, 184 - Pt on High Resistant sliding scale insulin + Lantus 25 Units/day (increased today from 20).  I/O:  3841/3040. Wt:  37.8 kg (down 1.6 kg from 12/18). BMI: 14.7 (72% IBW). Pt with 2+ (mild) generalized and 3+ (moderate) bilateral hand and hip edema). Lasix for diuresis.    ASSESSED NUTRITION NEEDS: (Modified using lower DW and increased protein needs)  Dosing Weight:  37.8 kg (12/27)  Estimated Energy Needs: 4906-7350+ kcals (30-35+ Kcal/Kg)  Justification: Underweight, vented   Estimated Protein Needs: 57-68 grams protein (1.5-1.8 g pro/Kg)   Justification: Stress, Repletion    NEW FINDINGS:   12/21:  WOCN = Coccyx  Pressure injury (wound be hospital acquired) vs viral lesion, requesting PCR testing  12/23: WOCN  - Coccyx wound:  Stable    12/25: Procedure: Flexible fiberoptic bronchoscopy with bronchoalveolar lavage  Pt on Precedex and Versed.  Per provider discussion in rounds today, pt may need trach and LTACH.    Previous Goals (12/21):   TF + Propofol will meet % needs while NPO   Evaluation: Met (even with Propofol off)    Previous Nutrition Diagnosis (12/21):   Inadequate enteral nutrition infusion related to TF at 10 mL/hr as evidenced by meeting 42% energy and 30% protein needs via TF + Propofol.  Evaluation:  Resolved      CURRENT NUTRITION DIAGNOSIS  Predicted suboptimal nutrient intake (energy and protein) related to Propofol off, BMI only 14.7, and meeting lower end of estimated needs.    INTERVENTIONS  Recommendations / Nutrition Prescription  Increase TF Osmolite 1.5 to 40 mL/hr = 1440 kcals (38 kcal/kg and 109% energy needs), 60 gm pro (1.6 gm/kg), 732 mL H20, 196 gm CHO, no fiber.    Implementation  Collaboration and Referral of Nutrition care - Pt was discussed during ICU interdisciplinary rounds this morning.  EN Schedule - Entered order for slight TF rate increase as above.    Goals  TF will continue to meet % estimated needs.    MONITORING AND EVALUATION:  Progress towards goals will be monitored and evaluated per protocol and Practice Guidelines    Bhavya Nicholson, RD, LD, CNSC

## 2022-12-28 ENCOUNTER — PREP FOR PROCEDURE (OUTPATIENT)
Dept: SURGERY | Facility: CLINIC | Age: 27
End: 2022-12-28

## 2022-12-28 DIAGNOSIS — J96.00 ACUTE RESPIRATORY FAILURE, UNSPECIFIED WHETHER WITH HYPOXIA OR HYPERCAPNIA (H): Primary | ICD-10-CM

## 2022-12-28 LAB
ABO/RH(D): NORMAL
ANTIBODY SCREEN: NEGATIVE
ATRIAL RATE - MUSE: 96 BPM
BACTERIA BLD CULT: NO GROWTH
BACTERIA BRONCH: ABNORMAL
BACTERIA BRONCH: ABNORMAL
DIASTOLIC BLOOD PRESSURE - MUSE: NORMAL MMHG
ERYTHROCYTE [DISTWIDTH] IN BLOOD BY AUTOMATED COUNT: 13.7 % (ref 10–15)
GLUCOSE BLDC GLUCOMTR-MCNC: 109 MG/DL (ref 70–99)
GLUCOSE BLDC GLUCOMTR-MCNC: 118 MG/DL (ref 70–99)
GLUCOSE BLDC GLUCOMTR-MCNC: 139 MG/DL (ref 70–99)
GLUCOSE BLDC GLUCOMTR-MCNC: 178 MG/DL (ref 70–99)
GLUCOSE BLDC GLUCOMTR-MCNC: 186 MG/DL (ref 70–99)
GLUCOSE BLDC GLUCOMTR-MCNC: 200 MG/DL (ref 70–99)
GLUCOSE BLDC GLUCOMTR-MCNC: 216 MG/DL (ref 70–99)
GRAM STAIN RESULT: ABNORMAL
GRAM STAIN RESULT: ABNORMAL
HCT VFR BLD AUTO: 23.2 % (ref 35–47)
HGB BLD-MCNC: 7.4 G/DL (ref 11.7–15.7)
INR PPP: 1.11 (ref 0.85–1.15)
INTERPRETATION ECG - MUSE: NORMAL
MAGNESIUM SERPL-MCNC: 2.2 MG/DL (ref 1.6–2.3)
MCH RBC QN AUTO: 29.8 PG (ref 26.5–33)
MCHC RBC AUTO-ENTMCNC: 31.9 G/DL (ref 31.5–36.5)
MCV RBC AUTO: 94 FL (ref 78–100)
P AXIS - MUSE: 76 DEGREES
PHOSPHATE SERPL-MCNC: 3.9 MG/DL (ref 2.5–4.5)
PLATELET # BLD AUTO: 504 10E3/UL (ref 150–450)
POTASSIUM BLD-SCNC: 4.5 MMOL/L (ref 3.4–5.3)
PR INTERVAL - MUSE: 126 MS
QRS DURATION - MUSE: 64 MS
QT - MUSE: 368 MS
QTC - MUSE: 464 MS
R AXIS - MUSE: 84 DEGREES
RBC # BLD AUTO: 2.48 10E6/UL (ref 3.8–5.2)
SPECIMEN EXPIRATION DATE: NORMAL
SYSTOLIC BLOOD PRESSURE - MUSE: NORMAL MMHG
T AXIS - MUSE: 46 DEGREES
VENTRICULAR RATE- MUSE: 96 BPM
WBC # BLD AUTO: 12.1 10E3/UL (ref 4–11)

## 2022-12-28 PROCEDURE — G0463 HOSPITAL OUTPT CLINIC VISIT: HCPCS

## 2022-12-28 PROCEDURE — 94003 VENT MGMT INPAT SUBQ DAY: CPT

## 2022-12-28 PROCEDURE — 86901 BLOOD TYPING SEROLOGIC RH(D): CPT | Performed by: STUDENT IN AN ORGANIZED HEALTH CARE EDUCATION/TRAINING PROGRAM

## 2022-12-28 PROCEDURE — 83735 ASSAY OF MAGNESIUM: CPT | Performed by: STUDENT IN AN ORGANIZED HEALTH CARE EDUCATION/TRAINING PROGRAM

## 2022-12-28 PROCEDURE — 250N000009 HC RX 250: Performed by: INTERNAL MEDICINE

## 2022-12-28 PROCEDURE — 250N000011 HC RX IP 250 OP 636: Performed by: STUDENT IN AN ORGANIZED HEALTH CARE EDUCATION/TRAINING PROGRAM

## 2022-12-28 PROCEDURE — C9113 INJ PANTOPRAZOLE SODIUM, VIA: HCPCS | Performed by: STUDENT IN AN ORGANIZED HEALTH CARE EDUCATION/TRAINING PROGRAM

## 2022-12-28 PROCEDURE — 93010 ELECTROCARDIOGRAM REPORT: CPT | Performed by: INTERNAL MEDICINE

## 2022-12-28 PROCEDURE — 94640 AIRWAY INHALATION TREATMENT: CPT

## 2022-12-28 PROCEDURE — 200N000001 HC R&B ICU

## 2022-12-28 PROCEDURE — 84100 ASSAY OF PHOSPHORUS: CPT | Performed by: STUDENT IN AN ORGANIZED HEALTH CARE EDUCATION/TRAINING PROGRAM

## 2022-12-28 PROCEDURE — 999N000040 HC STATISTIC CONSULT NO CHARGE VASC ACCESS

## 2022-12-28 PROCEDURE — 99291 CRITICAL CARE FIRST HOUR: CPT | Mod: GC | Performed by: INTERNAL MEDICINE

## 2022-12-28 PROCEDURE — 999N000157 HC STATISTIC RCP TIME EA 10 MIN

## 2022-12-28 PROCEDURE — 85027 COMPLETE CBC AUTOMATED: CPT | Performed by: SURGERY

## 2022-12-28 PROCEDURE — 250N000011 HC RX IP 250 OP 636

## 2022-12-28 PROCEDURE — 258N000003 HC RX IP 258 OP 636

## 2022-12-28 PROCEDURE — 93005 ELECTROCARDIOGRAM TRACING: CPT

## 2022-12-28 PROCEDURE — 999N000009 HC STATISTIC AIRWAY CARE

## 2022-12-28 PROCEDURE — 86923 COMPATIBILITY TEST ELECTRIC: CPT | Performed by: SURGERY

## 2022-12-28 PROCEDURE — 250N000013 HC RX MED GY IP 250 OP 250 PS 637: Performed by: INTERNAL MEDICINE

## 2022-12-28 PROCEDURE — 94640 AIRWAY INHALATION TREATMENT: CPT | Mod: 76

## 2022-12-28 PROCEDURE — 250N000013 HC RX MED GY IP 250 OP 250 PS 637: Performed by: SURGERY

## 2022-12-28 PROCEDURE — 84132 ASSAY OF SERUM POTASSIUM: CPT | Performed by: STUDENT IN AN ORGANIZED HEALTH CARE EDUCATION/TRAINING PROGRAM

## 2022-12-28 PROCEDURE — 999N000190 HC STATISTIC VAT ROUNDS

## 2022-12-28 PROCEDURE — 258N000001 HC RX 258: Performed by: INTERNAL MEDICINE

## 2022-12-28 PROCEDURE — 85610 PROTHROMBIN TIME: CPT | Performed by: STUDENT IN AN ORGANIZED HEALTH CARE EDUCATION/TRAINING PROGRAM

## 2022-12-28 PROCEDURE — 250N000011 HC RX IP 250 OP 636: Performed by: SURGERY

## 2022-12-28 PROCEDURE — 250N000011 HC RX IP 250 OP 636: Performed by: INTERNAL MEDICINE

## 2022-12-28 PROCEDURE — 99233 SBSQ HOSP IP/OBS HIGH 50: CPT | Performed by: INTERNAL MEDICINE

## 2022-12-28 RX ADMIN — DEXTROSE MONOHYDRATE 1000 ML: 100 INJECTION, SOLUTION INTRAVENOUS at 23:20

## 2022-12-28 RX ADMIN — QUETIAPINE FUMARATE 25 MG: 25 TABLET ORAL at 09:02

## 2022-12-28 RX ADMIN — ACYCLOVIR 200 MG: 200 SUSPENSION ORAL at 09:02

## 2022-12-28 RX ADMIN — VANCOMYCIN HYDROCHLORIDE 750 MG: 1 INJECTION, POWDER, LYOPHILIZED, FOR SOLUTION INTRAVENOUS at 22:12

## 2022-12-28 RX ADMIN — Medication 1 PACKET: at 16:42

## 2022-12-28 RX ADMIN — HEPARIN SODIUM 5000 UNITS: 5000 INJECTION, SOLUTION INTRAVENOUS; SUBCUTANEOUS at 20:44

## 2022-12-28 RX ADMIN — HEPARIN SODIUM 5000 UNITS: 5000 INJECTION, SOLUTION INTRAVENOUS; SUBCUTANEOUS at 09:02

## 2022-12-28 RX ADMIN — CHLORHEXIDINE GLUCONATE 0.12% ORAL RINSE 15 ML: 1.2 LIQUID ORAL at 20:10

## 2022-12-28 RX ADMIN — CHLORHEXIDINE GLUCONATE 0.12% ORAL RINSE 15 ML: 1.2 LIQUID ORAL at 08:55

## 2022-12-28 RX ADMIN — VANCOMYCIN HYDROCHLORIDE 750 MG: 1 INJECTION, POWDER, LYOPHILIZED, FOR SOLUTION INTRAVENOUS at 10:23

## 2022-12-28 RX ADMIN — MICAFUNGIN SODIUM 100 MG: 50 INJECTION, POWDER, LYOPHILIZED, FOR SOLUTION INTRAVENOUS at 13:58

## 2022-12-28 RX ADMIN — LEVALBUTEROL HYDROCHLORIDE 0.63 MG: 1.25 SOLUTION, CONCENTRATE RESPIRATORY (INHALATION) at 15:15

## 2022-12-28 RX ADMIN — DEXMEDETOMIDINE HYDROCHLORIDE 1.2 MCG/KG/HR: 400 INJECTION INTRAVENOUS at 03:18

## 2022-12-28 RX ADMIN — Medication 125 MCG/HR: at 10:20

## 2022-12-28 RX ADMIN — QUETIAPINE FUMARATE 25 MG: 25 TABLET ORAL at 22:12

## 2022-12-28 RX ADMIN — Medication 1 PACKET: at 22:12

## 2022-12-28 RX ADMIN — ACYCLOVIR 200 MG: 200 SUSPENSION ORAL at 00:04

## 2022-12-28 RX ADMIN — DEXMEDETOMIDINE HYDROCHLORIDE 1.2 MCG/KG/HR: 400 INJECTION INTRAVENOUS at 12:15

## 2022-12-28 RX ADMIN — PANTOPRAZOLE SODIUM 40 MG: 40 INJECTION, POWDER, FOR SOLUTION INTRAVENOUS at 09:02

## 2022-12-28 RX ADMIN — MULTIVITAMIN 15 ML: LIQUID ORAL at 09:02

## 2022-12-28 RX ADMIN — DEXMEDETOMIDINE HYDROCHLORIDE 1.2 MCG/KG/HR: 400 INJECTION INTRAVENOUS at 20:50

## 2022-12-28 RX ADMIN — LEVALBUTEROL HYDROCHLORIDE 0.63 MG: 1.25 SOLUTION, CONCENTRATE RESPIRATORY (INHALATION) at 07:18

## 2022-12-28 RX ADMIN — MIDAZOLAM HYDROCHLORIDE 6 MG/HR: 1 INJECTION, SOLUTION INTRAVENOUS at 09:51

## 2022-12-28 RX ADMIN — QUETIAPINE FUMARATE 25 MG: 25 TABLET ORAL at 16:42

## 2022-12-28 RX ADMIN — LEVALBUTEROL HYDROCHLORIDE 0.63 MG: 1.25 SOLUTION, CONCENTRATE RESPIRATORY (INHALATION) at 23:23

## 2022-12-28 ASSESSMENT — ACTIVITIES OF DAILY LIVING (ADL)
ADLS_ACUITY_SCORE: 36
ADLS_ACUITY_SCORE: 36
ADLS_ACUITY_SCORE: 40
ADLS_ACUITY_SCORE: 36
ADLS_ACUITY_SCORE: 40
ADLS_ACUITY_SCORE: 40
ADLS_ACUITY_SCORE: 36
ADLS_ACUITY_SCORE: 40
ADLS_ACUITY_SCORE: 36
ADLS_ACUITY_SCORE: 40
ADLS_ACUITY_SCORE: 36
ADLS_ACUITY_SCORE: 36

## 2022-12-28 NOTE — PROGRESS NOTES
Chart reviewed, patient was seen for a complete reassessment yesterday -- see note dated 12/27/22 for details.     Current TF regimen running as follows ~    Nutrition Support Enteral:  Type of Feeding Tube: Nasoduodenal   Enteral Frequency:  Continuous  Enteral Regimen: Osmolite 1.5 at 40 mL/hr  Total Enteral Provisions: 1440 kcal (38 kcal/kg and 109% energy needs), 60 g protein (1.6 g/kg), 732 mL H2O, 196 g CHO, 0 g fiber   Free Water Flush: 100 mL every 4 hours     K/Mg/Phos normal  -216 with High sliding scale insulin and Lantus 25 units per day   I/O 2969/1830, wt 37.5 kg (down slightly)  Stool 275 mL - RN asking for banana flakes in hopes to thicken stool and be able to pull rectal tube     Noted WOCN eval today indicates that coccyx wound is healing     ASSESSED NUTRITION NEEDS: (Modified using lower DW and increased protein needs)  Dosing Weight:  37.8 kg (12/27)  Estimated Energy Needs: 8026-3249+ kcals (30-35+ Kcal/Kg)  Justification: Underweight, vented   Estimated Protein Needs: 57-68 grams protein (1.5-1.8 g pro/Kg)   Justification: Stress, Repletion    Orders placed for Banatrol 1 pkt TID = 120 kcal and 6 g fiber  Total (TF + Banatrol)= 1560 kcal (41 kcal/kg and 118% needs), 6 g fiber     Chrissie [facility], RD, LD, CNSC   Clinical Dietitian - [facility]

## 2022-12-28 NOTE — PLAN OF CARE
Goal Outcome Evaluation:    Neuro: Alert, drowsy between cares. Restless/coughing fits at times. Follows commands. Free movement of all extremities. Pupils PERRL.   Cardiac: Tele SR. MAP > 65.   Respiratory: Intubated. FiO2 50%, PEEP 10. Moderate, thin secretions, oral and ETT. Intermittent coughing fits over tube. 1 mg versed bolus given for restlessness/coughing.   GI/: TF running @ 40 mL/hr, 100 mL H2O q4h. Rectal tube in place, no output overnight. Flushed w/ 50 mL, no blockage noted. Sheffield in place.   Lines/Drips: Rt PICC, left midline. Versed, fentanyl, precedex gtt.    Pain: CPOT 1 to 2.   Skin: Blanchable redness to sacrum, mepilex in place.   Activity: Assist of 2 w/ lift.   Additional/Plan: COVID precautions discontinued. ID, WOC following. Plan for trach placement tomorrow. Will continue with plan of care.

## 2022-12-28 NOTE — PROGRESS NOTES
[facility] Nurse Inpatient Assessment     Consulted for: Coccyx    Patient History (according to provider note(s):      Deidre N Aasen is a 27 year old female admitted on 12/16/2022 for DKA, respiratory failure from concurrent covid, bacterial pneumonia.   After arrival to the ICU she developed acute respiratory failure requiring intubation and development of septic shock requiring initation of dual vasopressor support and stress dose steroids in addition to broad spectrum antimicrobial therapy. She has ongoing fluid resuscitation needs, management of severe electrolyte abnormalities and ongoing treatment for septic shock and DKA per protocol. 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 Sedated in the ICU on initial assessment. Patient had approximately 4 other small scabbed areas scattered on legs and buttock and Lum[place] scars of varying ages some fully healed on initial assessment. On acyclovir suspension     Wound base: 100% superficial scab     Palpation of the wound bed: normal      Drainage: none     Description of drainage: none     Measurements (length x width x depth, in cm): 0.8cm  x 1cm  x  0 cm      Tunneling: N/A     Undermining: N/A  Periwound skin: Intact, circular blanchable pink to purple epidermis scattered, improving      Color: pink and purple      Temperature: normal   Odor: none  Pain: no grimacing or signs of discomfort, none  Pain interventions prior to dressing change: patient tolerated well  Treatment goal: Heal  and Protection  STATUS: improving  Supplies ordered: supplies stored on unit      Treatment Plan:     Coccyx wound(s): Every other day   1. Clean wound with saline or  "MicroKlenz Spray, pat dry  2. Wipe / \"clean\" the surrounding periwound tissue with skin prep (Cavilon No Sting Skin Prep #[id number]) and allow to dry. This will help protect periwound and help dressing adherence  3. Press a Mepilex Sacral to the area, making sure to conform nicely to skin curvatures.   4. Time and date dressing change  Pressure Injury prevention (please order supplies if not in room)  1. Turn/reposition every 1-2 hrs  2.   Float heels off bed with use of pillow and if needed Heel Lift boots (Prevalon #[id number])  3.   If incontinent Cleanse with incontinent cleanser (Juan Daniel spray #[id number]) followed by skin barrier protectant (Critic Aid paste)  BID and after each incontinent episode  4.   Prevent sliding and shear by limiting HOB to 30 degrees or less unless contraindicated, use knee gatch first if not contraindicated  5.   Chair cushion pressure redistribution as needed (#[id number]): please limit sitting to an hour at a time  6.   Optimize nutrition   7.   Consider PULSATE low air loss mattress    Orders: Reviewed and Updated    RECOMMEND PRIMARY TEAM ORDER: None, at this time  Education provided: importance of repositioning, plan of care, Moisture management, Hygiene and Off-loading pressure  Discussed plan of care with: Patient and Nurse  WOC nurse follow-up plan: weekly  Notify WOC if wound(s) deteriorate.  Nursing to notify the Provider(s) and re-consult the WOC Nurse if new skin concern.    DATA:     Current support surface: Standard  Low air loss (JERMAINE pump, Isolibrium, Pulsate, skin guard, etc)  Containment of urine/stool: Incontinent pad in bed, Purewick external catheter  and Internal fecal management  BMI: Body mass index is 14.65 kg/m .   Active diet order: None     Output: I/O last 3 completed shifts:  In: 2969 [I.V.:1234; NG/GT:825]  Out: 1830 [Urine:1555; Stool:275]     Labs:   Recent Labs   Lab 12/28/22  0418 12/25/22  0627 12/24/22  1052 12/23/22  0438 12/22/22  0502   ALBUMIN  --   --   --   -- "  1.2*   HGB 7.4*   < >  --    < > 7.3*   WBC 12.1*   < >  --    < > 8.6   CRP  --   --  150.0*  --   --     < > = values in this interval not displayed.     Pressure injury risk assessment:   Sensory Perception: 3-->slightly limited  Moisture: 3-->occasionally moist  Activity: 1-->bedfast  Mobility: 2-->very limited  Nutrition: 3-->adequate  Friction and Shear: 1-->problem  Sedrick Score: 13    Charley Willis [facility] Rehabilitation CenterADELA   Dept. Pager: [phone]  Dept. Office Number: [phone]

## 2022-12-28 NOTE — PROGRESS NOTES
[facility] ICU RESPIRATORY NOTE           Date of Admission: 12/16/2022     Date of Intubation (most recent): 12/16/22     Reason for Mechanical Ventilation: Respiratory failure     Number of Days on Mechanical Ventilation: 13     Met Criteria for Spontaneous Breathing Trial:No     Reason for No Spontaneous Breathing Trial: Per MD,PEEP>8     Significant Events Today: Out of Covid Isolation     ABG Results:   Recent Labs   Lab 12/26/22  1009 12/26/22  0042   PH  --  7.53*   PCO2  --  45   PO2  --  70*   HCO3  --  38*   O2PER 65 70     Vent Mode: CMV/AC  (Continuous Mandatory Ventilation/ Assist Control)  FiO2 (%): 50 %  Resp Rate (Set): 16 breaths/min  Tidal Volume (Set, mL): 400 mL  PEEP (cm H2O): 10 cmH2O  Resp: 16    EVANGELIST Daly, RRT

## 2022-12-28 NOTE — PROGRESS NOTES
Comprehensive Daily ICU Note        Deidre N Aasen MRN# [id number]   Age: 27 year old YOB: 1995     Date of Admission: 12/16/2022    Primary care provider: No Ref-Primary, Physician     CODE STATUS: FULL    Problem List:       Principal Problem:    Diabetic ketoacidosis without coma associated with type 1 diabetes mellitus (H)  Active Problems:    Acute kidney failure with tubular necrosis (H)    Infection due to 2019 novel coronavirus    Necrotic pneumonia (H)    Fungemia    ARDS (adult respiratory distress syndrome) (H)    MRSA bacteremia    Encephalopathy           Treatment goals for next 24 hours:   - Pressure support if able  - Keep awake as tolerated  - Continue antimicrobials  - Tracheostomy timing        Subjective/ Last 24 hours:   Events: No acute events overnight.            Mechanical Ventilation/Vitalsigns/IsandOs:       Temp:  [97.9  F (36.6  C)-101.3  F (38.5  C)] 99.5  F (37.5  C)  Pulse:  [61-93] 63  Resp:  [15-40] 16  BP: (100-128)/(55-74) 122/67  FiO2 (%):  [50 %-65 %] 50 %  SpO2:  [89 %-100 %] 95 %      Vent Mode: CMV/AC  (Continuous Mandatory Ventilation/ Assist Control)  FiO2 (%): 50 %  Resp Rate (Set): 16 breaths/min  Tidal Volume (Set, mL): 400 mL  PEEP (cm H2O): 10 cmH2O  Resp: 16         Physical Examination:     General: sedated but nods head  HEENT: ET tube, OG tube  Lungs: clear but course bilaterally, anteriorly  CVS: RRR  Abdomen: not appreciably tender  Extremities/musculoskeletal: warm, non-edematous  Neurology: minimally responsive on sedation  Psychiatry: calm, no acute distress         Feeding/Glucose:     None      Recent Labs   Lab 12/28/22  0419 12/28/22  0015 12/27/22  2021 12/27/22  1601 12/27/22  1249 12/27/22  0921   * 200* 161* 146* 184* 261*            Medications:       acyclovir  200 mg Oral or Feeding Tube 5x Daily     chlorhexidine  15 mL Mouth/Throat Q12H     furosemide  20 mg Per Feeding Tube Daily     heparin ANTICOAGULANT  5,000 Units  Subcutaneous Q12H     insulin aspart  1-12 Units Subcutaneous Q4H     insulin glargine  25 Units Subcutaneous Daily     levalbuterol  0.63 mg Nebulization Q8H     micafungin  100 mg Intravenous Q24H     multivitamins w/minerals  15 mL Per Feeding Tube Daily     pantoprazole  40 mg Intravenous BID     QUEtiapine  25 mg Oral or NG Tube TID     sodium chloride (PF)  10 mL Intracatheter Q8H     vancomycin  750 mg Intravenous Q12H          dexmedetomidine 1.2 mcg/kg/hr (12/28/22 0318)     dextrose       fentaNYL 150 mcg/hr (12/27/22 2010)     - MEDICATION INSTRUCTIONS -       midazolam 6 mg/hr (12/27/22 2010)     sodium chloride 20 mL/hr at 12/27/22 0937              Labs:         ROUTINE ICU LABS (Last four results)  CMP  Recent Labs   Lab 12/28/22  0419 12/28/22  0418 12/28/22  0015 12/27/22 2021 12/27/22  1601 12/27/22  0921 12/27/22  0445 12/26/22  0440 12/26/22  0437 12/25/22  2034 12/25/22  1837 12/25/22  0758 12/25/22  0627 12/24/22  1233 12/24/22  1052 12/23/22  0844 12/23/22  0438 12/22/22  1552 12/22/22  1357 12/22/22  0753 12/22/22  0502   NA  --   --   --   --   --   --   --   --  137  --   --   --   --   --  135  --  142  --  142  --  145*   POTASSIUM  --  4.5  --   --   --   --  4.5  --  4.2  --  4.0   < > 3.4   < > 3.3*  --  3.6  3.5   < >  --    < > 3.1*   CHLORIDE  --   --   --   --   --   --   --   --  100  --   --   --   --   --  94  --  105  --   --   --  111*   CO2  --   --   --   --   --   --   --   --  34*  --   --   --   --   --  35*  --  31  --   --   --  29   ANIONGAP  --   --   --   --   --   --   --   --  3  --   --   --   --   --  6  --  6  --   --   --  5   *  --  200* 161* 146*   < > 294*   < > 272*   < >  --    < >  --    < > 288*   < > 210*   < >  --    < > 218*   BUN  --   --   --   --   --   --   --   --  13  --   --   --   --   --  14  --  14  --   --   --  21   CR  --   --   --   --   --   --  0.60  --  0.52  --   --   --  0.65  --  0.69  --  0.74  --   --   --  0.79    GFRESTIMATED  --   --   --   --   --   --  >90  --  >90  --   --   --  >90  --  >90  --  >90  --   --   --  >90   AGNES  --   --   --   --   --   --   --   --  7.2*  --   --   --   --   --  7.3*  --  7.1*  --   --   --  6.9*   MAG  --  2.2  --   --   --   --  2.2  --  2.1  --   --   --  2.2   < >  --   --   --   --   --   --  2.1   PHOS  --  3.9  --   --   --   --  3.2  --  3.4  --   --   --  3.2   < >  --   --   --   --   --   --  2.9   PROTTOTAL  --   --   --   --   --   --   --   --   --   --   --   --   --   --   --   --   --   --   --   --  4.8*   ALBUMIN  --   --   --   --   --   --   --   --   --   --   --   --   --   --   --   --   --   --   --   --  1.2*   BILITOTAL  --   --   --   --   --   --   --   --   --   --   --   --   --   --   --   --   --   --   --   --  0.3   ALKPHOS  --   --   --   --   --   --   --   --   --   --   --   --   --   --   --   --   --   --   --   --  78   AST  --   --   --   --   --   --   --   --   --   --   --   --   --   --   --   --   --   --   --   --  46*   ALT  --   --   --   --   --   --   --   --   --   --   --   --   --   --   --   --   --   --   --   --  32    < > = values in this interval not displayed.     CBC  Recent Labs   Lab 12/28/22  0418 12/27/22  0495 12/26/22  2268 12/25/22  0627   WBC 12.1* 14.6* 13.3* 12.9*   RBC 2.48* 2.63* 2.57* 2.89*   HGB 7.4* 7.9* 7.9* 8.9*   HCT 23.2* 24.2* 23.9* 26.5*   MCV 94 92 93 92   MCH 29.8 30.0 30.7 30.8   MCHC 31.9 32.6 33.1 33.6   RDW 13.7 14.0 14.5 14.5   * 496* 437 444     INRNo lab results found in last 7 days.  Arterial Blood Gas  Recent Labs   Lab 12/26/22  1009 12/26/22  0042   PH  --  7.53*   PCO2  --  45   PO2  --  70*   HCO3  --  38*   O2PER 65 70         Cultures:      Recent Labs   Lab 12/26/22  0604 12/26/22  0559 12/25/22  2349 12/25/22  0519 12/23/22  1229 12/22/22  0614 12/21/22  1626 12/21/22  1620   CULTURE No growth after 1 day No growth after 1 day 1+ Normal aaron  2+ Staphylococcus aureus*  No  growth after 2 days No growth after 2 days No growth after 4 days No Growth No Growth No Growth             Imaging/Other results:     No results found for this or any previous visit (from the past 24 hour(s)).            Assessment and Plan:     Summary:  Deidre N Aasen is a 27 year old female admitted on 12/16/2022 for DKA, respiratory failure from concurrent covid and bacterial pneumonia.  MRSA bacteremia and necrotizing pneumonia and candida bacteremia.  Has been making some improvements but has reached a plateau mostly limited by sedation/agitation and inability to wean from ventilator    Neuro/ pain/ sedation:  # Toxic metabolic encephalopathy  # Agitation in setting of polysubstance abuse - IV meth and heroin use.  Still on fairly robust doses of sedatives but is mostly awake.  # Substance use disorder  P: Continue current sedative regimen.  Consult addiction medicine regarding Suboxone prior to transfer.  Tracheostomy with hope for reduced sedation needs    Pulmonary:   # Complex cavitary pneumonia 2/2 to MRSA pneumonia  Also COVID     # severe ARDS.  Episode of desaturation night of 12/25.  Now seems to be  to her most recent baseline.  #Very limited tolerance for ventilator weaning.  Plan   - Continue current settings.  Wean ventilator as able w/ PSTs  - Patient will need a tracheostomy.  Discussed this as well as LTAC with her father who would like to proceed      Cardiovascular:  # septic shock, resolved  # Sinus tachycardia  #CATHY 12/20 negative for vegetations and negative bubble     Renal/Electrolytes:  Labs are stable.  Was significantly volume overloaded.  She responded well to diuresis.   -Aim for net even  - stopping lasix today    ID:  # Septic shock and MRSA bacteremia. Blood cultures have now cleared.  CATHY did not show endocarditis - Abx course: vanco 12/16- present; unasyn 12/16-12/19; cefepime 12/19-12/24.  Last positive blood cx: MRSA 12/16;# Complicated pneumonia with cavitary  "lesions.  For the moment no need for any kind of an intervention. Last positive BAL: MRSA 12/16  # Covid positive. Unclear clinical significance  #fungemia--no new positive cultures-  budding yeast 12/19. Anti-fungal course: micafungin 12/21-present, plan for 14 day course  # HSV in right groin wounds  # Intermittent fevers    P: Continuing very complicated antimicrobial regimen.  Appreciate advice of infectious disease experts.  Reevaluate for infection if develops a much higher fever.    GI//Nutrition  She is at goal tube feeds. On GI prophylaxis    Musculoskeletal/Rheumatology:  Up in chair and PT if able    Endocrine:  #Type 1 diabetes with insulin non complicance, complicated by DKA. Hemoglobin A1C 16.4  # Resolved DKA but labile insulin needs  -Lantus 25.  Continuing with sliding scale.  Given the fact that she is a type I diabetic would tolerate some hyper glycemia in the short run.    Hematology:  # DVT ppx  #anemia of chronic disease  #acute blood loss anemia. Needing transfusion on 12/23.  Hemoglobin stable since.  - subcutaneous heparin BID     ICU prophylaxis:      DVT: heparin BID due to weight     VAP: As above     Stress ulcer: ppi daily     Restraints needed: y     Lines   Central Line/PICC - placed 12/21 needed: y   Arterial line - placed n needed: n   Sheffield catheter.  Needed: y     Prognosis:  guarded      Family update: Father Jeronimo, no updates to any other \"friends\"                .    "

## 2022-12-28 NOTE — PROGRESS NOTES
Sedated to RASS 0/-1. Somnolent most of shift with intermittent restlessness. WALKER. Very weak. Neuros intact. BP and HR normal. Coarse breath sounds. Small secretions from ETT, moderate orally. Secretions are clear and thin. Rectal tube working well, 275 mL liquid brown output. Sheffield has good output, clear and yellow. Feeding tube increased from 35mL/hr to 40ml/hr. Denies pain. Only request is for suctioning. Plan for trach in coming days after surgeon speak to family.

## 2022-12-28 NOTE — PROGRESS NOTES
[facility]    Infectious Disease Progress Note    Date of Service : 12/28/2022     Assessment:  27 year old female with polysubstance abuse who has been admitted since 12/16/2022 for DKA, respiratory failure from concurrent covid and found to have superimposed MRSA cavitary pneumonia with septic shock. She developed acute respiratory failure requiring intubation and pressor support as well as stress dose steroids for shock in addition to broad spectrum antimicrobial therapy.  She remains severely critically ill . Has now developed fungemia related to candida glabrata. Lines have been changed     -MRSA with septic shock. No endocarditis noted on CATHY. Last blood cx positive from 12/19  -Severe lobar consolidations and cavitary pneumonia related to MRSA with hypoxic respiratory failure  -Candida glabrata fungemia  -Genital herpes  -Covid19 infection  -Uncontrolled type 1 diabetes with HbA1c > 16%  -DKA with NANNETTE and electrolyte abnormalities have improved  -Malnutrition  -substance abuse disorder     Recommendations:  1. Continue treatment with Vancomycin and Micafungin. Pharmacy managing vancomycin dosing, levels therapeutic, renal functions stable  2. Blood cxs negative since 12/21   3. No discitis/ osteo on the  MRI spine to assess for secondary infection   4. S/p bronchoscopy  5. ID will continue to follow. If additional blood cxs turn positive, add Ceftaroline 600 mg IVQ12H  6. HIV negative  7. On acyclovir for genital herpes     Discussed with the ICU team      Rosy Davis MD    Interval History   Remains intubated, ongoing low grade fever though slightly better   More awake today opening eyes   On vent wants to talk   WBC improved at 12k     Physical Exam   Temp: 100  F (37.8  C) Temp src: Bladder BP: 118/64 Pulse: 80   Resp: 21 SpO2: 91 % O2 Device: Mechanical Ventilator    Vitals:    12/26/22 0200 12/27/22 0400 12/28/22 0200   Weight: 40.5 kg (89 lb 4.6 oz) 37.8 kg (83 lb 5.3 oz) 37.5  kg (82 lb 11.2 oz)     Vital Signs with Ranges  Temp:  [99  F (37.2  C)-101.3  F (38.5  C)] 100  F (37.8  C)  Pulse:  [63-93] 80  Resp:  [15-40] 21  BP: (100-128)/(55-74) 118/64  FiO2 (%):  [50 %-65 %] 50 %  SpO2:  [91 %-100 %] 91 %    Constitutional: on ventilator  Lungs: clear  Cardiovascular: S1S2  Skin: No rash    Other:    Medications     dexmedetomidine 1.2 mcg/kg/hr (12/28/22 0318)     dextrose       fentaNYL 125 mcg/hr (12/28/22 1020)     - MEDICATION INSTRUCTIONS -       midazolam 6 mg/hr (12/28/22 0951)     sodium chloride 20 mL/hr at 12/27/22 0937       banatrol plus  1 packet Per Feeding Tube TID     chlorhexidine  15 mL Mouth/Throat Q12H     heparin ANTICOAGULANT  5,000 Units Subcutaneous Q12H     insulin aspart  1-12 Units Subcutaneous Q4H     insulin glargine  25 Units Subcutaneous Daily     levalbuterol  0.63 mg Nebulization Q8H     micafungin  100 mg Intravenous Q24H     multivitamins w/minerals  15 mL Per Feeding Tube Daily     pantoprazole  40 mg Intravenous Q24H     QUEtiapine  25 mg Oral or NG Tube TID     sodium chloride (PF)  10 mL Intracatheter Q8H     vancomycin  750 mg Intravenous Q12H       Data   All microbiology laboratory data reviewed.  Recent Labs   Lab Test 12/28/22  0418 12/27/22  0445 12/26/22  0437   WBC 12.1* 14.6* 13.3*   HGB 7.4* 7.9* 7.9*   HCT 23.2* 24.2* 23.9*   MCV 94 92 93   * 496* 437     Recent Labs   Lab Test 12/27/22  0445 12/26/22  0437 12/25/22  0627   CR 0.60 0.52 0.65     Microbiology  Blood cx 12/21, 12/22, 12/23, 12/25 no growth so far  12/19 blood cx  Line, venous; Blood    0 Result Notes  Culture Positive on the 1st day of incubation Abnormal         Candida glabrata complex Panic     2 of 2 bottles   Staphylococcus aureus MRSA Panic     1 of 2 bottles   Positive on the 5th day of incubation  Susceptibilities done on previous cultures         Resulting Agency: [facility]      Susceptibility              Candida glabrata complex       SAMANTHA       Amphotericin B 1  ug/mL No interpretation available       Fluconazole 16 ug/mL Susceptible Dose Dependent       Micafungin <=0.03 ug/mL Susceptible       Voriconazole 0.5 ug/mL No interpretation available

## 2022-12-29 ENCOUNTER — PREP FOR PROCEDURE (OUTPATIENT)
Dept: SURGERY | Facility: CLINIC | Age: 27
End: 2022-12-29

## 2022-12-29 ENCOUNTER — ANESTHESIA (OUTPATIENT)
Dept: INTENSIVE CARE | Facility: CLINIC | Age: 27
End: 2022-12-29
Payer: COMMERCIAL

## 2022-12-29 ENCOUNTER — ANESTHESIA EVENT (OUTPATIENT)
Dept: INTENSIVE CARE | Facility: CLINIC | Age: 27
End: 2022-12-29
Payer: COMMERCIAL

## 2022-12-29 ENCOUNTER — APPOINTMENT (OUTPATIENT)
Dept: PHYSICAL THERAPY | Facility: CLINIC | Age: 27
End: 2022-12-29
Attending: INTERNAL MEDICINE
Payer: COMMERCIAL

## 2022-12-29 DIAGNOSIS — J96.01 ACUTE RESPIRATORY FAILURE WITH HYPOXIA (H): Primary | ICD-10-CM

## 2022-12-29 LAB
BLD PROD TYP BPU: NORMAL
BLOOD COMPONENT TYPE: NORMAL
CODING SYSTEM: NORMAL
CREAT SERPL-MCNC: 0.53 MG/DL (ref 0.52–1.04)
CROSSMATCH: NORMAL
ERYTHROCYTE [DISTWIDTH] IN BLOOD BY AUTOMATED COUNT: 13.6 % (ref 10–15)
GFR SERPL CREATININE-BSD FRML MDRD: >90 ML/MIN/1.73M2
GLUCOSE BLDC GLUCOMTR-MCNC: 100 MG/DL (ref 70–99)
GLUCOSE BLDC GLUCOMTR-MCNC: 117 MG/DL (ref 70–99)
GLUCOSE BLDC GLUCOMTR-MCNC: 125 MG/DL (ref 70–99)
GLUCOSE BLDC GLUCOMTR-MCNC: 139 MG/DL (ref 70–99)
GLUCOSE BLDC GLUCOMTR-MCNC: 176 MG/DL (ref 70–99)
GLUCOSE BLDC GLUCOMTR-MCNC: 186 MG/DL (ref 70–99)
GLUCOSE BLDC GLUCOMTR-MCNC: 351 MG/DL (ref 70–99)
GLUCOSE BLDC GLUCOMTR-MCNC: 59 MG/DL (ref 70–99)
GLUCOSE BLDC GLUCOMTR-MCNC: 69 MG/DL (ref 70–99)
GLUCOSE BLDC GLUCOMTR-MCNC: 69 MG/DL (ref 70–99)
GLUCOSE BLDC GLUCOMTR-MCNC: 72 MG/DL (ref 70–99)
GLUCOSE BLDC GLUCOMTR-MCNC: 88 MG/DL (ref 70–99)
HCT VFR BLD AUTO: 20.4 % (ref 35–47)
HGB BLD-MCNC: 6.4 G/DL (ref 11.7–15.7)
ISSUE DATE AND TIME: NORMAL
MAGNESIUM SERPL-MCNC: 2 MG/DL (ref 1.6–2.3)
MCH RBC QN AUTO: 29.5 PG (ref 26.5–33)
MCHC RBC AUTO-ENTMCNC: 31.4 G/DL (ref 31.5–36.5)
MCV RBC AUTO: 94 FL (ref 78–100)
PHOSPHATE SERPL-MCNC: 4.1 MG/DL (ref 2.5–4.5)
PLATELET # BLD AUTO: 456 10E3/UL (ref 150–450)
POTASSIUM BLD-SCNC: 4.1 MMOL/L (ref 3.4–5.3)
RBC # BLD AUTO: 2.17 10E6/UL (ref 3.8–5.2)
UNIT ABO/RH: NORMAL
UNIT NUMBER: NORMAL
UNIT STATUS: NORMAL
UNIT TYPE ISBT: 5100
WBC # BLD AUTO: 11 10E3/UL (ref 4–11)

## 2022-12-29 PROCEDURE — 250N000011 HC RX IP 250 OP 636

## 2022-12-29 PROCEDURE — 84100 ASSAY OF PHOSPHORUS: CPT | Performed by: INTERNAL MEDICINE

## 2022-12-29 PROCEDURE — 258N000003 HC RX IP 258 OP 636: Performed by: INTERNAL MEDICINE

## 2022-12-29 PROCEDURE — 97162 PT EVAL MOD COMPLEX 30 MIN: CPT | Mod: GP

## 2022-12-29 PROCEDURE — 250N000013 HC RX MED GY IP 250 OP 250 PS 637: Performed by: STUDENT IN AN ORGANIZED HEALTH CARE EDUCATION/TRAINING PROGRAM

## 2022-12-29 PROCEDURE — 250N000011 HC RX IP 250 OP 636: Performed by: STUDENT IN AN ORGANIZED HEALTH CARE EDUCATION/TRAINING PROGRAM

## 2022-12-29 PROCEDURE — 250N000013 HC RX MED GY IP 250 OP 250 PS 637: Performed by: SURGERY

## 2022-12-29 PROCEDURE — C9113 INJ PANTOPRAZOLE SODIUM, VIA: HCPCS | Performed by: STUDENT IN AN ORGANIZED HEALTH CARE EDUCATION/TRAINING PROGRAM

## 2022-12-29 PROCEDURE — 94640 AIRWAY INHALATION TREATMENT: CPT

## 2022-12-29 PROCEDURE — 258N000003 HC RX IP 258 OP 636

## 2022-12-29 PROCEDURE — 250N000013 HC RX MED GY IP 250 OP 250 PS 637: Performed by: INTERNAL MEDICINE

## 2022-12-29 PROCEDURE — 97110 THERAPEUTIC EXERCISES: CPT | Mod: GP

## 2022-12-29 PROCEDURE — 94640 AIRWAY INHALATION TREATMENT: CPT | Mod: 76

## 2022-12-29 PROCEDURE — 83735 ASSAY OF MAGNESIUM: CPT | Performed by: INTERNAL MEDICINE

## 2022-12-29 PROCEDURE — 250N000012 HC RX MED GY IP 250 OP 636 PS 637: Performed by: STUDENT IN AN ORGANIZED HEALTH CARE EDUCATION/TRAINING PROGRAM

## 2022-12-29 PROCEDURE — 85027 COMPLETE CBC AUTOMATED: CPT | Performed by: SURGERY

## 2022-12-29 PROCEDURE — 250N000011 HC RX IP 250 OP 636: Performed by: SURGERY

## 2022-12-29 PROCEDURE — 999N000157 HC STATISTIC RCP TIME EA 10 MIN

## 2022-12-29 PROCEDURE — 200N000001 HC R&B ICU

## 2022-12-29 PROCEDURE — 97530 THERAPEUTIC ACTIVITIES: CPT | Mod: GP

## 2022-12-29 PROCEDURE — 99291 CRITICAL CARE FIRST HOUR: CPT | Mod: GC | Performed by: INTERNAL MEDICINE

## 2022-12-29 PROCEDURE — 999N000009 HC STATISTIC AIRWAY CARE

## 2022-12-29 PROCEDURE — 258N000001 HC RX 258: Performed by: INTERNAL MEDICINE

## 2022-12-29 PROCEDURE — 99221 1ST HOSP IP/OBS SF/LOW 40: CPT | Performed by: INTERNAL MEDICINE

## 2022-12-29 PROCEDURE — 250N000009 HC RX 250: Performed by: INTERNAL MEDICINE

## 2022-12-29 PROCEDURE — 94003 VENT MGMT INPAT SUBQ DAY: CPT

## 2022-12-29 PROCEDURE — P9016 RBC LEUKOCYTES REDUCED: HCPCS | Performed by: SURGERY

## 2022-12-29 PROCEDURE — 84132 ASSAY OF SERUM POTASSIUM: CPT | Performed by: INTERNAL MEDICINE

## 2022-12-29 PROCEDURE — 82565 ASSAY OF CREATININE: CPT | Performed by: INTERNAL MEDICINE

## 2022-12-29 RX ORDER — FUROSEMIDE 10 MG/ML
INJECTION INTRAMUSCULAR; INTRAVENOUS
Status: COMPLETED
Start: 2022-12-29 | End: 2022-12-29

## 2022-12-29 RX ORDER — FUROSEMIDE 10 MG/ML
20 INJECTION INTRAMUSCULAR; INTRAVENOUS ONCE
Status: COMPLETED | OUTPATIENT
Start: 2022-12-29 | End: 2022-12-29

## 2022-12-29 RX ADMIN — MULTIVITAMIN 15 ML: LIQUID ORAL at 08:56

## 2022-12-29 RX ADMIN — FUROSEMIDE 20 MG: 10 INJECTION, SOLUTION INTRAMUSCULAR; INTRAVENOUS at 08:56

## 2022-12-29 RX ADMIN — FUROSEMIDE 20 MG: 10 INJECTION INTRAMUSCULAR; INTRAVENOUS at 08:56

## 2022-12-29 RX ADMIN — QUETIAPINE FUMARATE 25 MG: 25 TABLET ORAL at 08:56

## 2022-12-29 RX ADMIN — DEXTROSE MONOHYDRATE 25 ML: 25 INJECTION, SOLUTION INTRAVENOUS at 01:10

## 2022-12-29 RX ADMIN — LEVALBUTEROL HYDROCHLORIDE 0.63 MG: 1.25 SOLUTION, CONCENTRATE RESPIRATORY (INHALATION) at 06:49

## 2022-12-29 RX ADMIN — PANTOPRAZOLE SODIUM 40 MG: 40 INJECTION, POWDER, FOR SOLUTION INTRAVENOUS at 08:40

## 2022-12-29 RX ADMIN — HEPARIN SODIUM 5000 UNITS: 5000 INJECTION, SOLUTION INTRAVENOUS; SUBCUTANEOUS at 21:21

## 2022-12-29 RX ADMIN — CHLORHEXIDINE GLUCONATE 0.12% ORAL RINSE 15 ML: 1.2 LIQUID ORAL at 08:40

## 2022-12-29 RX ADMIN — LEVALBUTEROL HYDROCHLORIDE 0.63 MG: 1.25 SOLUTION, CONCENTRATE RESPIRATORY (INHALATION) at 23:43

## 2022-12-29 RX ADMIN — DEXTROSE MONOHYDRATE 25 ML: 25 INJECTION, SOLUTION INTRAVENOUS at 04:13

## 2022-12-29 RX ADMIN — MICAFUNGIN SODIUM 100 MG: 50 INJECTION, POWDER, LYOPHILIZED, FOR SOLUTION INTRAVENOUS at 13:50

## 2022-12-29 RX ADMIN — DEXTROSE MONOHYDRATE 25 ML: 25 INJECTION, SOLUTION INTRAVENOUS at 07:57

## 2022-12-29 RX ADMIN — DEXMEDETOMIDINE HYDROCHLORIDE 1.2 MCG/KG/HR: 400 INJECTION INTRAVENOUS at 15:27

## 2022-12-29 RX ADMIN — SODIUM CHLORIDE: 9 INJECTION, SOLUTION INTRAVENOUS at 04:22

## 2022-12-29 RX ADMIN — VANCOMYCIN HYDROCHLORIDE 750 MG: 1 INJECTION, POWDER, LYOPHILIZED, FOR SOLUTION INTRAVENOUS at 10:23

## 2022-12-29 RX ADMIN — CHLORHEXIDINE GLUCONATE 0.12% ORAL RINSE 15 ML: 1.2 LIQUID ORAL at 19:55

## 2022-12-29 RX ADMIN — VANCOMYCIN HYDROCHLORIDE 750 MG: 1 INJECTION, POWDER, LYOPHILIZED, FOR SOLUTION INTRAVENOUS at 21:48

## 2022-12-29 RX ADMIN — BUPRENORPHINE HYDROCHLORIDE 450 MCG: 450 FILM, SOLUBLE BUCCAL at 21:21

## 2022-12-29 RX ADMIN — HEPARIN SODIUM 5000 UNITS: 5000 INJECTION, SOLUTION INTRAVENOUS; SUBCUTANEOUS at 08:56

## 2022-12-29 RX ADMIN — DEXMEDETOMIDINE HYDROCHLORIDE 1.2 MCG/KG/HR: 400 INJECTION INTRAVENOUS at 05:48

## 2022-12-29 RX ADMIN — QUETIAPINE FUMARATE 25 MG: 25 TABLET ORAL at 17:23

## 2022-12-29 RX ADMIN — LEVALBUTEROL HYDROCHLORIDE 0.63 MG: 1.25 SOLUTION, CONCENTRATE RESPIRATORY (INHALATION) at 14:55

## 2022-12-29 RX ADMIN — INSULIN GLARGINE 20 UNITS: 100 INJECTION, SOLUTION SUBCUTANEOUS at 11:19

## 2022-12-29 ASSESSMENT — ACTIVITIES OF DAILY LIVING (ADL)
ADLS_ACUITY_SCORE: 36
ADLS_ACUITY_SCORE: 36
ADLS_ACUITY_SCORE: 40
ADLS_ACUITY_SCORE: 40
ADLS_ACUITY_SCORE: 36
ADLS_ACUITY_SCORE: 40
ADLS_ACUITY_SCORE: 40
ADLS_ACUITY_SCORE: 36

## 2022-12-29 NOTE — PROGRESS NOTES
[facility] ICU RESPIRATORY NOTE        Date of Admission: 12/16/2022    Date of Intubation (most recent): 12/16/22    Reason for Mechanical Ventilation: Respiratory failure    Number of Days on Mechanical Ventilation: 13    Met Criteria for Spontaneous Breathing Trial: No    Reason for No Spontaneous Breathing Trial: Per MD    Significant Events Today: None    ABG Results:   Recent Labs   Lab 12/26/22  1009 12/26/22  0042   PH  --  7.53*   PCO2  --  45   PO2  --  70*   HCO3  --  38*   O2PER 65 70       Current Vent Settings: Vent Mode: CMV/AC  (Continuous Mandatory Ventilation/ Assist Control)  FiO2 (%): 55 %  Resp Rate (Set): 16 breaths/min  Tidal Volume (Set, mL): 400 mL  PEEP (cm H2O): 8 cmH2O  Resp: 20      Skin Assessment: intact    Plan: We will monitor the pt on full support and assess for weaning tomorrow.  Jaylin Colunga RT on 12/28/2022 at 9:36 PM

## 2022-12-29 NOTE — PROGRESS NOTES
[facility] ICU RESPIRATORY NOTE           Date of Admission: 12/16/2022     Date of Intubation (most recent): 12/16/2022    Reason for Mechanical Ventilation: Respiratory failure      Number of Days on Mechanical Ventilation: 13     Met Criteria for Spontaneous Breathing Trial: No     Significant event today :None     ABG Results:   Recent Labs   Lab 12/26/22  1009 12/26/22  0042   PH  --  7.53*   PCO2  --  45   PO2  --  70*   HCO3  --  38*   O2PER 65 70         Current Vent Settings:  Vent Mode: CMV/AC  (Continuous Mandatory Ventilation/ Assist Control)  FiO2 (%): 55 %  Resp Rate (Set): 16 breaths/min  Tidal Volume (Set, mL): 400 mL  PEEP (cm H2O): 8 cmH2O  Resp: 19     Skin Assessment :Intact      Plan: Continue full ventilatory support and wean as tolerated.     Mir Sorensen, RT

## 2022-12-29 NOTE — PROGRESS NOTES
Comprehensive Daily ICU Note  12/29/22        Deidre N Aasen MRN# [id number]   Age: 27 year old YOB: 1995     Date of Admission: 12/16/2022    Primary care provider: No Ref-Primary, Physician     CODE STATUS: FULL    Problem List:       Principal Problem:    Diabetic ketoacidosis without coma associated with type 1 diabetes mellitus (H)  Active Problems:    Acute kidney failure with tubular necrosis (H)    Infection due to 2019 novel coronavirus    Necrotic pneumonia (H)    Fungemia    ARDS (adult respiratory distress syndrome) (H)    MRSA bacteremia    Encephalopathy           Treatment goals for next 24 hours:   - pressure support if able  - weaning sedation  - addiction medicine consult  - up to chair        Subjective/ Last 24 hours:   Events: No acute events overnight but adamantly refusing tracheostomy this AM.         Mechanical Ventilation/Vitalsigns/IsandOs:     Temp:  [98.3  F (36.8  C)-100.2  F (37.9  C)] 98.6  F (37  C)  Pulse:  [64-90] 78  Resp:  [15-27] 17  BP: ()/(56-68) 121/64  FiO2 (%):  [50 %-55 %] 55 %  SpO2:  [91 %-100 %] 98 %      Vent Mode: CMV/AC  (Continuous Mandatory Ventilation/ Assist Control)  FiO2 (%): 55 %  Resp Rate (Set): 16 breaths/min  Tidal Volume (Set, mL): 400 mL  PEEP (cm H2O): 8 cmH2O  Resp: 17         Physical Examination:     General: writing on paper and interactive  HEENT: ET tube, OG tube  Lungs: clear but course bilaterally, anteriorly  CVS: RRR  Abdomen: mildly distended, non-tender  Extremities/musculoskeletal: warm, non-edematous  Neurology: minimally responsive on sedation  Psychiatry: answers questions appropriately but clearly frustrated         Feeding/Glucose:     None    Recent Labs   Lab 12/29/22  0738 12/29/22  0441 12/29/22  0407 12/29/22  0125 12/29/22  0106 12/29/22  0023   GLC 69* 351* 69* 139* 59* 72            Medications:       banatrol plus  1 packet Per Feeding Tube TID     chlorhexidine  15 mL Mouth/Throat Q12H     heparin  ANTICOAGULANT  5,000 Units Subcutaneous Q12H     insulin aspart  1-12 Units Subcutaneous Q4H     insulin glargine  10 Units Subcutaneous At Bedtime     insulin glargine  25 Units Subcutaneous Daily     levalbuterol  0.63 mg Nebulization Q8H     micafungin  100 mg Intravenous Q24H     multivitamins w/minerals  15 mL Per Feeding Tube Daily     pantoprazole  40 mg Intravenous Q24H     QUEtiapine  25 mg Oral or NG Tube TID     sodium chloride (PF)  10 mL Intracatheter Q8H     vancomycin  750 mg Intravenous Q12H          dexmedetomidine 1.2 mcg/kg/hr (12/29/22 0548)     dextrose 1,000 mL (12/28/22 2320)     fentaNYL 100 mcg/hr (12/28/22 1930)     - MEDICATION INSTRUCTIONS -       midazolam 4 mg/hr (12/28/22 1930)     sodium chloride 10 mL/hr at 12/29/22 0422            Labs:       ROUTINE ICU LABS (Last four results)  CMP  Recent Labs   Lab 12/29/22  0738 12/29/22  0441 12/29/22  0407 12/29/22  0125 12/28/22  0419 12/28/22  0418 12/27/22  0921 12/27/22  0445 12/26/22  0440 12/26/22  0437 12/25/22  0758 12/25/22  0627 12/24/22  1233 12/24/22  1052 12/23/22  0844 12/23/22  0438 12/22/22  1552 12/22/22  1357   NA  --   --   --   --   --   --   --   --   --  137  --   --   --  135  --  142  --  142   POTASSIUM  --  4.1  --   --   --  4.5  --  4.5  --  4.2   < > 3.4   < > 3.3*  --  3.6  3.5   < >  --    CHLORIDE  --   --   --   --   --   --   --   --   --  100  --   --   --  94  --  105  --   --    CO2  --   --   --   --   --   --   --   --   --  34*  --   --   --  35*  --  31  --   --    ANIONGAP  --   --   --   --   --   --   --   --   --  3  --   --   --  6  --  6  --   --    GLC 69* 351* 69* 139*   < >  --    < > 294*   < > 272*   < >  --    < > 288*   < > 210*   < >  --    BUN  --   --   --   --   --   --   --   --   --  13  --   --   --  14  --  14  --   --    CR  --   --   --   --   --   --   --  0.60  --  0.52  --  0.65  --  0.69  --  0.74   < >  --    GFRESTIMATED  --   --   --   --   --   --   --  >90  --  >90  --   >90  --  >90  --  >90   < >  --    AGNES  --   --   --   --   --   --   --   --   --  7.2*  --   --   --  7.3*  --  7.1*  --   --    MAG  --  2.0  --   --   --  2.2  --  2.2  --  2.1  --  2.2   < >  --   --   --   --   --    PHOS  --  4.1  --   --   --  3.9  --  3.2  --  3.4  --  3.2   < >  --   --   --   --   --     < > = values in this interval not displayed.     CBC  Recent Labs   Lab 12/29/22  0441 12/28/22  0418 12/27/22  0445 12/26/22  0437   WBC 11.0 12.1* 14.6* 13.3*   RBC 2.17* 2.48* 2.63* 2.57*   HGB 6.4* 7.4* 7.9* 7.9*   HCT 20.4* 23.2* 24.2* 23.9*   MCV 94 94 92 93   MCH 29.5 29.8 30.0 30.7   MCHC 31.4* 31.9 32.6 33.1   RDW 13.6 13.7 14.0 14.5   * 504* 496* 437     INR  Recent Labs   Lab 12/28/22  1651   INR 1.11     Arterial Blood Gas  Recent Labs   Lab 12/26/22  1009 12/26/22  0042   PH  --  7.53*   PCO2  --  45   PO2  --  70*   HCO3  --  38*   O2PER 65 70         Cultures:      Recent Labs   Lab 12/26/22  0604 12/26/22  0559 12/25/22  2349 12/25/22  0519 12/23/22  1229   CULTURE No growth after 2 days No growth after 2 days 1+ Normal aaron  2+ Staphylococcus aureus MRSA*  Yeast* No growth after 3 days No Growth             Imaging/Other results:     No results found for this or any previous visit (from the past 24 hour(s)).            Assessment and Plan:     Summary:  Deidre N Aasen is a 27 year old female admitted on 12/16/2022 for DKA, respiratory failure from concurrent covid and bacterial pneumonia.  MRSA bacteremia and necrotizing pneumonia and candida bacteremia.  Has been making some improvements but has reached a plateau mostly limited by sedation/agitation and inability to wean from ventilator    Patient adamantly refused trach 12/29 and wanting to leave AMA. Had extensive discussion and patient knows/understands she would not survive. At this time, do not feel she has capacity to make such a decision.     Neuro/ pain/ sedation:  # Toxic metabolic encephalopathy  # Agitation in  setting of polysubstance abuse - IV meth and heroin use.  Still on fairly robust doses of sedatives but is mostly awake.  # Substance use disorder  P: Continue current sedative regimen.  Consult addiction medicine regarding Suboxone prior to transfer.  Tracheostomy with hope for reduced sedation needs  - Psychiatry consult  - versed gtt weaning, plan for 1mg/day starting 12/29. Monitor for signs of w/d. May need enteral taper  - Continue precedex, may increase to 1.5 if needed  - Continue fentanyl weaning    Pulmonary:   # Complex cavitary pneumonia 2/2 to MRSA pneumonia  Also COVID     # severe ARDS.  Episode of desaturation night of 12/25.  Now seems to be  to her most recent baseline.  #Very limited tolerance for ventilator weaning.  Plan   - Continue current settings.  Wean ventilator as able w/ PSTs  - Patient will need a tracheostomy.  Discussed this as well as LTAC with her father who would like to proceed but patient adamantly refused day of surgery despite understanding what would happen and risks of continuing ETT.    Cardiovascular:  # septic shock, resolved  # Sinus tachycardia  #CATHY 12/20 negative for vegetations and negative bubble     Renal/Electrolytes:  Labs are stable.  Was significantly volume overloaded.  She responded well to diuresis.   - Aim for net even  - lasix 20 today    ID:  # Septic shock and MRSA bacteremia. Blood cultures have now cleared.  CATHY did not show endocarditis - Abx course: vanco 12/16- present; unasyn 12/16-12/19; cefepime 12/19-12/24.  Last positive blood cx: MRSA 12/16;# Complicated pneumonia with cavitary lesions.  For the moment no need for any kind of an intervention. Last positive BAL: MRSA 12/16  # Covid positive. Unclear clinical significance  #fungemia--no new positive cultures-  budding yeast 12/19. 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  Appreciate advice of infectious disease experts.  Reevaluate for infection if develops a much higher fever. Thus far, fevers resolved, WBC normalized    GI//Nutrition  She is at goal tube feeds. On GI prophylaxis    Musculoskeletal/Rheumatology:  Up in chair and PT today    Endocrine:  #Type 1 diabetes with insulin non complicance, complicated by DKA. Hemoglobin A1C 16.4 upon admission  # Resolved DKA but labile insulin needs  - increase Lantus 20 BID.  Continuing with sliding scale    Hematology:  # DVT ppx  #anemia of chronic disease  #acute blood loss anemia. Needing transfusion on 12/23, 12/29. No clinical signs of bleeding  - subcutaneous heparin BID     ICU prophylaxis:      DVT: heparin BID due to weight     VAP: As above     Stress ulcer: ppi daily     Restraints needed: y     Lines   Central Line/PICC - placed 12/21 needed: y   Arterial line - placed n needed: n   Sheffield catheter.  Needed: y     Prognosis:  guarded      Family update: Father Jeronimo, no updates to any other \"friends\"    "

## 2022-12-29 NOTE — PLAN OF CARE
Pt. Remains intubated and sedated. Neuro checks unchanged. Able to communicate by writing. Following commands. Purposefully moves all extremities. Tube feeding held as ordered and D10 initiated per orders. Lung sounds coarse. Frequent congested cough, thick secretions. Very minimal UOP; bladder scanned twice for more than 800ml, straight cathed for 550ml and 800 ml respectively.  Hgb 6.4, 1 unit blood infusing. Plan is for Trach and PEG placement today.

## 2022-12-29 NOTE — CONSULTS
Psychiatric consult acknowledged, interview attempted. Patient initially opened eyes upon entering her room, but quickly closed them and did not respond to any questions. Will attempt back either later today or tomorrow.       Daniel De La Cruz, CHITRA-BC, APRN, CNP  Consult/Liaison Psychiatry  [facility]  Provider can be paged via [facility] Paging/Directory  If I am unavailable, please contact [facility] at [phone] to reach the on-call provider.

## 2022-12-29 NOTE — PROGRESS NOTES
12/29/22 8809   Appointment Info   Signing Clinician's Name / Credentials (PT) Brittany Dressler, DPT   Self-Care   Usual Activity Tolerance good   Current Activity Tolerance poor   Equipment Currently Used at Home none   Fall history within last six months no   Activity/Exercise/Self-Care Comment Independent no AD.   General Information   Onset of Illness/Injury or Date of Surgery 12/16/22   Referring Physician Tigre Tran MD   Patient/Family Therapy Goals Statement (PT) To feel better.   Pertinent History of Current Problem (include personal factors and/or comorbidities that impact the POC) Admit with same day AMA DC with DM1 DKA & covid-19. Re-admit with necrotizing pneumonia, resp failure requiring intubation - plan for trach, NANNETTE with tubular necrosis, planned trach, opioid use with withdrawal sx reported by pt, heroine & meth use. On dex, off fentanyl & midazolam. Closed eyes during attempted psych consult.   Existing Precautions/Restrictions fall;oxygen therapy device and L/min   Cognition   Affect/Mental Status (Cognition) WFL   Follows Commands (Cognition) WFL   Pain Assessment   Patient Currently in Pain No   Posture    Posture Comments Impaired stability, controlled mobility.   Range of Motion (ROM)   ROM Comment WFL   Strength (Manual Muscle Testing)   Strength Comments Grossly deconditioned plus on vent AC mode FiO2 55% with RR into 30s.   Bed Mobility   Comment, (Bed Mobility) Pt unable to roll without Crispin physical assist and technique cues.   Transfers   Comment, (Transfers) Unable to stand given symptomatic bradycardia into 50s in sitting (at rest HR 70s).   Gait/Stairs (Locomotion)   Distance in Feet Non-amb   Balance   Balance Comments Crispin sit balance with symptomatic bradycardia   Sensory Examination   Sensory Perception Comments WFL   Coordination   Coordination Comments WFL   Muscle Tone   Muscle Tone Comments WFl   Clinical Impression   Criteria for Skilled Therapeutic Intervention Yes,  treatment indicated   PT Diagnosis (PT) Impaired mobility   Influenced by the following impairments Impaired strength, balance, activity tolerance, self-management, safe decision-making.   Functional limitations due to impairments Impaired independent mobility & living   Clinical Presentation (PT Evaluation Complexity) Evolving/Changing   Clinical Presentation Rationale Progressive DKA, resp failure, deconditioning.   Clinical Decision Making (Complexity) moderate complexity   Planned Therapy Interventions (PT) balance training;bed mobility training;gait training;home exercise program;neuromuscular re-education;patient/family education;postural re-education;stair training;strengthening;transfer training;progressive activity/exercise;risk factor education;home program guidelines   Anticipated Equipment Needs at Discharge (PT) gait belt;hospital bed;lift device;commode chair;walker, rolling;wheelchair;wheelchair cushion;shower chair   Risk & Benefits of therapy have been explained evaluation/treatment results reviewed;care plan/treatment goals reviewed;risks/benefits reviewed;current/potential barriers reviewed;participants voiced agreement with care plan;participants included;patient   PT Total Evaluation Time   PT Eval, Moderate Complexity Minutes (07356) 5   Physical Therapy Goals   PT Frequency 6x/week   PT Predicted Duration/Target Date for Goal Attainment 01/13/23   PT Goals Bed Mobility;Transfers;Gait;Stairs;PT Goal 1;PT Goal 2   PT: Bed Mobility Modified independent;Supine to/from sit;Rolling   PT: Transfers Modified independent;Sit to/from stand;Bed to/from chair  (least restrictive device)   PT: Gait Modified independent;Greater than 200 feet  (least restrictive device)   PT: Stairs Supervision/stand-by assist;Greater than 10 stairs  (with rail PRN, optional goal)   PT: Goal 1 Pt will score >= 23 on the FGA to be a safe ambulator, OR have AD/safety recs/PT plan in place.   PT: Goal 2 Unsupported sitting  balance Adrian   Interventions   Interventions Quick Adds Therapeutic Activity;Therapeutic Procedure   Therapeutic Procedure/Exercise   Ther. Procedure: strength, endurance, ROM, flexibillity Minutes (66538) 10   Symptoms Noted During/After Treatment fatigue   Treatment Detail/Skilled Intervention SLRs, hip abd/add, heel slides with PT visual & VC, vitals stable.   Therapeutic Activity   Therapeutic Activities: dynamic activities to improve functional performance Minutes (04820) 10   Symptoms Noted During/After Treatment Fatigue;Significant change in vital signs   Treatment Detail/Skilled Intervention Crispin roll, supine<>sit, MaxA scoot technique cues and power & balance assist. Limited by sx low HR. Pt edu on need for graded upright activity tolerance progressions, pt agreeable to HOB up to 60deg with stable vitals, with RN aware of discussion and PT rec for recliner / combilizer use, HOB up, etc.   PT Discharge Planning   PT Plan PT: upright tolerance as vasovagal response allows (incremental), mobility / strength / balance progressions.   PT Discharge Recommendation (DC Rec) Acute Rehab Center-Motivated patient will benefit from intensive, interdisciplinary therapy.  Anticipate will be able to tolerate 3 hours of therapy per day;Transitional Care Facility   PT Rationale for DC Rec Pt is below her independent baseline, has an excellent mobility prognosis with intensive rehab, may be a good rehab candidate - recommend rehab before discharge back to the community.   PT Brief overview of current status Bed mob Crispin Crispin sit balnace with sx bradycardia 50s, breathing stable on vent AC mode FiO2 55%. LE strength >= 3/5 B.   Total Session Time   Timed Code Treatment Minutes 20   Total Session Time (sum of timed and untimed services) 25

## 2022-12-29 NOTE — PROGRESS NOTES
Thoracic Surgery    We obtained consent from Bridget's father yesterday for tracheostomy tube placement and she was medically ready for the procedure this morning. However, Bridget was vehemently opposed to the procedure even after her father, Jeronimo, talked to her over the phone. For this reason, we have cancelled the procedure for today.

## 2022-12-29 NOTE — PROGRESS NOTES
ICU note  12/29    Patient now agreeable to tracheostomy placement. Will plan for Dr Wynne's team (thoracic surgery) to do on 12/30 ~ 1400.     - type and cross: will be up to date   - heparin dose: to be held AM  - tube feeds: to be held 0400    Tigre Tran MD  Surgical Critical Care Fellow

## 2022-12-29 NOTE — PLAN OF CARE
Pt remains vented and sedated. Sedation decreased. Attempted PS trial, did not tolerate r/t anxiety. 2x bradycardiac episodes w/ coughing ?vagul response?, resolved w/ suctioning secretions, HR as low as 33. Rectal tube dc'd, banana supplement added for diarrhea. Sheffield dc'd, straight cath x1, purewick in place and DTV. BG improving. Plan- trach and PEG tomorrow. Pt's dad updated on POC via phone. Pt's friends/SO's also updated on visitation policy- verbalized understanding.     Kristal Douglas RN

## 2022-12-29 NOTE — ANESTHESIA PREPROCEDURE EVALUATION
Prior to Admission medications    Medication Sig Start Date End Date Taking? Authorizing Provider   insulin aspart (NOVOLOG PEN) 100 UNIT/ML pen Inject Subcutaneous 3 times daily (with meals) Sliding scale    Reported, Patient   insulin glargine (BASAGLAR KWIKPEN) 100 UNIT/ML pen Inject 20 Units Subcutaneous daily 9/17/19   Edgar Castellano MD     Current Facility-Administered Medications Ordered in Epic   Medication Dose Route Frequency Last Rate Last Admin     acetaminophen (TYLENOL) solution 650 mg  650 mg Oral Q4H PRN   650 mg at 12/27/22 2033    Or     acetaminophen (TYLENOL) tablet 650 mg  650 mg Oral Q4H PRN         banatrol plus packet 1 packet  1 packet Per Feeding Tube TID   1 packet at 12/28/22 2212     chlorhexidine (PERIDEX) 0.12 % solution 15 mL  15 mL Mouth/Throat Q12H   15 mL at 12/28/22 2010     dexmedetomidine (PRECEDEX) 400 mcg in 0.9% sodium chloride 100 mL  1.2 mcg/kg/hr Intravenous Continuous 12 mL/hr at 12/29/22 0548 1.2 mcg/kg/hr at 12/29/22 0548     dextrose 10% infusion   Intravenous Continuous PRN 40 mL/hr at 12/28/22 2320 1,000 mL at 12/28/22 2320     glucose gel 15-30 g  15-30 g Oral Q15 Min PRN        Or     dextrose 50 % injection 25-50 mL  25-50 mL Intravenous Q15 Min PRN   25 mL at 12/29/22 0413    Or     glucagon injection 1 mg  1 mg Subcutaneous Q15 Min PRN         fentaNYL (PF) (SUBLIMAZE) injection  mcg   mcg Intravenous Q1H PRN   100 mcg at 12/16/22 1451     fentaNYL (SUBLIMAZE) infusion   mcg/hr Intravenous Continuous 2 mL/hr at 12/28/22 1930 100 mcg/hr at 12/28/22 1930     For all blood glucose less than 100 mg/dL   Does not apply Continuous PRN         heparin ANTICOAGULANT injection 5,000 Units  5,000 Units Subcutaneous Q12H   5,000 Units at 12/28/22 2044     insulin aspart (NovoLOG) injection (RAPID ACTING)  1-12 Units Subcutaneous Q4H   2 Units at 12/28/22 1232     insulin glargine (LANTUS PEN) injection 10 Units  10 Units Subcutaneous At Bedtime    10 Units at 12/28/22 2234     insulin glargine (LANTUS PEN) injection 25 Units  25 Units Subcutaneous Daily   25 Units at 12/28/22 0900     levalbuterol (XOPENEX CONC) neb solution 0.63 mg  0.63 mg Nebulization Q8H   0.63 mg at 12/28/22 2323     lidocaine 1 % 0.1-1 mL  0.1-1 mL Other Q1H PRN         micafungin (MYCAMINE) 100 mg in sodium chloride 0.9 % 100 mL intermittent infusion  100 mg Intravenous Q24H 100 mL/hr at 12/28/22 1358 100 mg at 12/28/22 1358     midazolam (VERSED) bolus from infusion pump 1-2 mg  1-2 mg Intravenous Q4H PRN   2 mg at 12/28/22 1026     midazolam (VERSED) drip - ADULT 100 mg/100 mL in NS (pre-mix)  1-8 mg/hr Intravenous Continuous 4 mL/hr at 12/28/22 1930 4 mg/hr at 12/28/22 1930     multivitamins w/minerals liquid 15 mL  15 mL Per Feeding Tube Daily   15 mL at 12/28/22 0902     naloxone (NARCAN) injection 0.2 mg  0.2 mg Intravenous Q2 Min PRN        Or     naloxone (NARCAN) injection 0.4 mg  0.4 mg Intravenous Q2 Min PRN        Or     naloxone (NARCAN) injection 0.2 mg  0.2 mg Intramuscular Q2 Min PRN        Or     naloxone (NARCAN) injection 0.4 mg  0.4 mg Intramuscular Q2 Min PRN         ondansetron (ZOFRAN) injection 4 mg  4 mg Intravenous Q6H PRN   4 mg at 12/16/22 0924    Or     ondansetron (ZOFRAN ODT) ODT tab 4 mg  4 mg Oral Q6H PRN         pantoprazole (PROTONIX) IV push injection 40 mg  40 mg Intravenous Q24H   40 mg at 12/28/22 0902     prochlorperazine (COMPAZINE) injection 5 mg  5 mg Intravenous Q6H PRN        Or     prochlorperazine (COMPAZINE) tablet 5 mg  5 mg Oral Q6H PRN        Or     prochlorperazine (COMPAZINE) suppository 25 mg  25 mg Rectal Q12H PRN         QUEtiapine (SEROquel) tablet 25 mg  25 mg Oral or NG Tube TID   25 mg at 12/28/22 2212     sodium chloride (PF) 0.9% PF flush 10 mL  10 mL Intracatheter Q8H   10 mL at 12/29/22 0549     sodium chloride (PF) 0.9% PF flush 10-20 mL  10-20 mL Intracatheter q1 min prn         sodium chloride (PF) 0.9% PF flush 3 mL  3  mL Intravenous Q1H PRN         sodium chloride 0.9% infusion   Intravenous Continuous 10 mL/hr at 12/29/22 0422 New Bag at 12/29/22 0422     vancomycin (VANCOCIN) 750 mg in sodium chloride 0.9 % 250 mL intermittent infusion  750 mg Intravenous Q12H 167 mL/hr at 12/28/22 2212 750 mg at 12/28/22 2212     No current Epic-ordered outpatient medications on file.       dexmedetomidine 1.2 mcg/kg/hr (12/29/22 0548)     dextrose 1,000 mL (12/28/22 2320)     fentaNYL 100 mcg/hr (12/28/22 1930)     - MEDICATION INSTRUCTIONS -       midazolam 4 mg/hr (12/28/22 1930)     sodium chloride 10 mL/hr at 12/29/22 0422     Recent Labs   Lab Test 11/03/15  1929   ABO O   RH  Pos     Recent Labs   Lab Test 12/26/22  1707   HCG Negative     Recent Results (from the past 744 hour(s))   XR Chest Port 1 View    Narrative    EXAM: XR CHEST PORT 1 VIEW  LOCATION: [facility]  DATE/TIME: 12/13/2022 1:28 AM    INDICATION: SOB  COMPARISON: None.      Impression    IMPRESSION: Heart size and pulmonary vascularity normal. Airspace opacities within the right base laterally concerning for pneumonia. Follow-up exam recommended to ensure resolution. The left lung is clear. No effusions.   CTA Abdomen Pelvis with Contrast    Narrative    EXAM: CT ABDOMEN AND PELVIS WITHOUT AND WITH CONTRAST  LOCATION: [facility]  DATE/TIME: 12/13/2022 4:11 AM    INDICATION: Coffee ground emesis.  COMPARISON: None.    TECHNIQUE: CT abdomen and pelvis prior to the administration of intravenous contrast. CT angiogram abdomen and pelvis following the injection of IV contrast during arterial and renal excretory phases. 2D and 3D MIP reconstructions were performed by the   CT technologist. Dose reduction techniques were used.  CONTRAST: 60 mL Isovue 370.    FINDINGS:    GASTROINTESTINAL TRACT: Partial visualization of apparent moderate wall thickening of the distal esophagus. No dilatation of the stomach, small or large bowel. A  small amount of stool is scattered within the colon. The appendix is likely visualized and   unremarkable. No pneumatosis or free intraperitoneal gas. No CT evidence of active gastrointestinal bleeding.    LOWER CHEST: Partial visualization of a moderate-sized region of patchy consolidation in the right lower lobe.    HEPATOBILIARY: Unremarkable.    SPLEEN: Unremarkable.    PANCREAS: Unremarkable.    ADRENAL GLANDS: Unremarkable.    KIDNEYS/BLADDER: Unremarkable.    LYMPH NODES: Unremarkable.    PELVIC ORGANS: No acute findings.    MUSCULOSKELETAL: No acute findings.    OTHER: None.      Impression    IMPRESSION:   1.  Partial visualization of apparent moderate wall thickening of the distal esophagus. This is nonspecific, but could relate to esophagitis. If clinically relevant, an upper endoscopy could be considered for further evaluation.  2.  Partial visualization of a moderate-sized region of patchy consolidation in the right lower lobe. This most likely represents pneumonia.   3.  No other acute abnormality identified in the abdomen or pelvis. No CT evidence of active gastrointestinal bleeding.   CT Chest Pulmonary Embolism w Contrast    Narrative    EXAM: CT CHEST PULMONARY EMBOLISM W CONTRAST  LOCATION: [facility]  DATE/TIME: 12/13/2022 7:48 PM    INDICATION: COVID positive and elevated d dimer. has been having persistent tachycardia  COMPARISON: None.  TECHNIQUE: CT chest pulmonary angiogram during arterial phase injection of IV contrast. Multiplanar reformats and MIP reconstructions were performed. Dose reduction techniques were used.   CONTRAST: isovue 370 100ml    FINDINGS:  ANGIOGRAM CHEST: Pulmonary arteries are normal caliber and negative for pulmonary emboli. Thoracic aorta is negative for dissection.    LUNGS AND PLEURA: Multisegment consolidation in the basal right lower lobe including medial, lateral, and posterior basal segments associated with endoluminal airway debris. In  the right lower lobe superior segment and to a lesser extent posterior right   middle lobe there are airway centric inflammatory nodules. The left lung is well-expanded. Minimal subpleural opacities in the basal left lower lobe near the diaphragmatic pleura and similar opacities in the right upper lobe along the anterior pleura   consistent with COVID related pneumonitis. No pleural effusion.    MEDIASTINUM: Mildly enlarged lymph nodes in the right hilum. No mediastinal or left hilar adenopathy. Decompressed esophagus. Cardiac chambers are normal in size. Normal caliber thoracic aorta. Age-appropriate thymic tissue in the anterior mediastinum.    CORONARY ARTERY CALCIFICATION: None.    UPPER ABDOMEN: Normal.    MUSCULOSKELETAL: Normal.      Impression    IMPRESSION:    1.  No acute pulmonary embolism.  2.  Sparse foci of subpleural groundglass attenuation anterior right upper lobe and left lower lobe near the diaphragmatic pleura, typical for COVID related pneumonitis.  3.  Multisegment right lower lobe consolidation with extensive airway debris and airway centric inflammatory nodules right lower lobe superior segment and lesser extent right middle lobe consistent with pneumonia, likely bacterial.  4.  Mildly enlarged, reactive lymph nodes in the right hilum secondary to #3.   Head CT w/o contrast    Narrative    EXAM: CT HEAD W/O CONTRAST  LOCATION: [facility]  DATE/TIME: 12/16/2022 2:47 AM    INDICATION: AMS  COMPARISON: None.  TECHNIQUE: Routine CT Head without IV contrast. Multiplanar reformats. Dose reduction techniques were used.    FINDINGS:  INTRACRANIAL CONTENTS: No intracranial hemorrhage, extraaxial collection, or mass effect.  No CT evidence of acute infarct. Normal parenchymal attenuation. Normal ventricles and sulci.     VISUALIZED ORBITS/SINUSES/MASTOIDS: No intraorbital abnormality. Incomplete evaluation of the paranasal sinuses demonstrates evidence of pneumatized secretions  in the right maxillary and sphenoid sinuses. No middle ear or mastoid effusion.    BONES/SOFT TISSUES: No acute abnormality.      Impression    IMPRESSION:  1.  No acute intracranial pathology.  2.  Evidence of acute sinusitis in the right maxillary and sphenoid sinuses.   XR Chest Port 1 View    Narrative    EXAM: CHEST SINGLE VIEW PORTABLE  LOCATION: [facility]  DATE/TIME: 2022 3:19 AM    INDICATION: Altered mental state. Known COVID infection.  COMPARISON: 2022.      Impression    IMPRESSION:   1. A moderate to large-sized region of patchy opacities within the mid and lower right lung. This is nonspecific, but most likely represents pneumonia. On the recent comparison study dated 2022, there was a much smaller patchy nodular opacity at   this location.  2. Within the central aspect of the aforementioned patchy opacity, there is a 5 cm lobulated lucent region that may represent cavitation, possibly an abscess.  3. Interval placement of a right internal jugular central venous catheter distal catheter tip in the right atrium, approximately 1 cm distal to the junction of the superior vena cava and right atrium.   XR Chest Port 1 View    Narrative    CHEST PORTABLE ONE VIEW  2022 11:34 AM       INDICATION: Acute worsening hypoxia.  COMPARISON: 2022.       Impression    IMPRESSION: Cavitary pneumonia in the right lower lung is similar to  previous. No pneumothorax. Right IJ central venous catheter tip is  near the superior atriocaval junction. There is a new endotracheal  tube in good position above the demar. New NG tube in the stomach.       CARLY SULLIVAN MD         SYSTEM ID:  [id number]   Echocardiogram Complete   Result Value    LVEF  60-65%    Narrative    [id number]  [facility]  [id number]  [id number]^ALIA^[facility]     [facility]  Echocardiography Laboratory  [address] [place] [address]     Name: AASEN, DEIDRE N  MRN: [id number]  :  1995  Study Date: 12/16/2022 03:07 PM  Age: 27 yrs  Gender: Female  Patient Location: [facility]  Reason For Study: Shock  Ordering Physician: DEIRDRE DAWKINS  Referring Physician: Deirdre Dawkins  Performed By: Yosvany Goncalves     BSA: 1.4 m2  Height: 62 in  Weight: 96 lb  HR: 153  BP: 94/49 mmHg  ______________________________________________________________________________  Procedure  Complete Echo Adult. Optison (NDC #5009-4828) given intravenously.  ______________________________________________________________________________  Interpretation Summary     1. Left ventricular systolic function is normal. The visual ejection fraction  is 60-65%.  2. No regional wall motion abnormalities noted.  3. The right ventricle is normal in structure, function and size.  4. No evidence for significant valvular pathology.  ______________________________________________________________________________  Left Ventricle  The left ventricle is normal in size. There is normal left ventricular wall  thickness. Left ventricular systolic function is normal. The visual ejection  fraction is 60-65%. Left ventricular diastolic function is indeterminate. No  regional wall motion abnormalities noted.     Right Ventricle  The right ventricle is normal in structure, function and size.     Atria  Normal left atrial size. Right atrial size is normal. There is no color  Doppler evidence of an atrial shunt.     Mitral Valve  The mitral valve is normal in structure and function.     Tricuspid Valve  The tricuspid valve is normal in structure and function. There is mild (1+)  tricuspid regurgitation.     Aortic Valve  The aortic valve is normal in structure and function.     Pulmonic Valve  The pulmonic valve is not well seen, but is grossly normal.     Vessels  Normal size aorta.     Pericardium  There is no pericardial effusion.     Rhythm  Sinus rhythm was  noted.  ______________________________________________________________________________  MMode/2D Measurements & Calculations  IVSd: 0.84 cm     LVIDd: 2.7 cm  LVIDs: 2.1 cm  LVPWd: 0.79 cm  FS: 21.0 %  LV mass(C)d: 50.1 grams  LV mass(C)dI: 35.8 grams/m2  Ao root diam: 2.9 cm  LVOT diam: 1.8 cm  LVOT area: 2.6 cm2  RWT: 0.59     Doppler Measurements & Calculations  MV E max ramu: 92.2 cm/sec  MV A max ramu: 109.5 cm/sec  MV E/A: 0.84  MV dec slope: 818.6 cm/sec2  MV dec time: 0.11 sec  PA acc time: 0.08 sec  TR max ramu: 239.8 cm/sec  TR max P.1 mmHg  E/E' av.4  Lateral E/e': 5.8  Medial E/e': 11.0     ______________________________________________________________________________  Report approved by: Uma Price 2022 04:27 PM         XR Chest Port 1 View    Narrative    EXAM: XR CHEST PORT 1 VIEW  LOCATION: [facility]  DATE/TIME: 2022 5:40 AM    INDICATION: evaluate pneumonia  COMPARISON: 2022, 2022.      Impression    IMPRESSION: Interval intubation. Endotracheal tube tip is about 5 cm above the demar. Enteric tube terminates below the diaphragm. Increasing bibasilar airspace opacities and layering pleural effusions. No visible pneumothorax.   CT Chest w Contrast    Narrative    CT CHEST WITH CONTRAST 2022 8:55 AM     HISTORY: Cavitary right lung lesion.    COMPARISON: 2022    TECHNIQUE: Volumetric helical acquisition of CT images of the chest  from the clavicles to the kidneys were acquired after the  administration of 50mL Isovue-370 IV contrast. Radiation dose for this  scan was reduced using automated exposure control, adjustment of the  mA and/or kV according to patient size, or iterative reconstruction  technique.    FINDINGS:     LUNGS AND PLEURA: The examination was not tailored for pulmonary  emboli and contrast opacification in the pulmonary arteries is  limited, no pulmonary embolism demonstrated. Extensive  consolidation  throughout the right lower lobe with a cavitary lesion laterally  measuring 5 cm, posteriorly measuring 3.2 cm inferiorly. Patchy  infiltrates in the right upper lobe. Mild patchy infiltrates in the  left upper lobe. Lobar compressive atelectasis and/or infiltrate in  the left lower lobe. Small left effusion.    MEDIASTINUM/AXILLAE: Mildly enlarged lymph nodes in the chest which  are nonspecific, but likely reactive in this setting. No aneurysm.    CORONARY ARTERY CALCIFICATIONS: None.    UPPER ABDOMEN: No acute findings.    MUSCULOSKELETAL: Normal.      Impression    IMPRESSION:    1. Little to no demonstrated enhancement in the parenchyma of the  right lower lobe, vascularity appears intact with opacification of  pulmonary veins, this lack of apparent enhancement is likely related  to severe edema/pneumonitis.  2. At least two cavitary lesions in the right lower lobe measuring 5  cm laterally, and 3.2 cm posteriorly concerning for pulmonary  abscesses.  3. Suboptimal contrast bolus timing for pulmonary emboli, no definite  pulmonary emboli demonstrated.  4. Lobar atelectasis and/or consolidation in the left lower lobe and  small left effusion.  5. Patchy bilateral upper lobe infiltrates, right worse than left.    LALO STARR MD         SYSTEM ID:  [id number]   Transesophageal Echocardiogram    [facility]    [id number]  [facility]  [id number]  [id number]^HILDA^STACIA     [facility]  Echocardiography Laboratory  [address] [place] [address]     Name: AASEN, DEIDRE N  MRN: [id number]  : 1995  Study Date: 2022 11:23 AM  Age: 27 yrs  Gender: Female  Patient Location: [facility]  Reason For Study: Endocarditis  Ordering Physician: STACIA STEVENS  Referring Physician: STACIA STEVENS  Performed By: Bronwyn Rolle     BSA: 1.4 m2  Height: 62 in  Weight: 96 lb  HR: 89  BP: 105/69 mmHg  ______________________________________________________________________________  Procedure  Complete  CATHY Adult. CATHY Probe serial #[id number] was used during the procedure.  The heart rate, respiratory rate and response to care were monitored  throughout the procedure with the assistance of the nurse.  ______________________________________________________________________________  Interpretation Summary     1. No intracardiac mass or thrombus.  2. Normal left ventricular size and systolic function. LVEF 60-65%.  3. Normal right ventricular size and systolic function.  4. Normal cardiac valves. No endocarditis.  5. Bubble study was negative for inter-atrial shunt.     ______________________________________________________________________________  CATHY  I determined this patient to be an appropriate candidate for the planned  sedation and procedure and have reassessed the patient immediately prior to  sedation and procedure. Total sedation time: 13 minutes of continuous bedside  1:1 monitoring. CATHY done in the ICU under sedation. The transesophageal probe  was passed without difficulty. There were no complications associated with  this procedure. Prior to the exam, the oral cavity was checked and no  overcrowding was noted. Consent to the procedure was obtained prior to  sedation.     Left Ventricle  The left ventricle is normal in size. Left ventricular systolic function is  normal. No regional wall motion abnormalities noted. There is no thrombus seen  in the left ventricle.     Right Ventricle  The right ventricle is normal size. The right ventricular systolic function is  normal. There is no mass or thrombus in the right ventricle.     Atria  Normal left atrial size. No left atrial mass or thrombus visualized. Right  atrial size is normal. No evidence of right atrial mass/thrombus. Intact  atrial septum. There is no color Doppler evidence of an atrial shunt. A  contrast injection (Bubble Study) was performed that was negative for flow  across the interatrial septum. The left atrial appendage was well visualized  and  free of thrombus.     Mitral Valve  The mitral valve leaflets appear normal. There is no evidence of stenosis,  fluttering, or prolapse. There is trace mitral regurgitation. There is no  mitral valve stenosis.     Tricuspid Valve  Normal tricuspid valve. There is trace tricuspid regurgitation. This degree of  valvular regurgitation is within normal limits. There is no tricuspid  stenosis.     Aortic Valve  The aortic valve is trileaflet. No aortic regurgitation is present. No aortic  stenosis is present.     Pulmonic Valve  Normal pulmonic valve. This degree of valvular regurgitation is within normal  limits. There is no pulmonic valvular stenosis.     Vessels  The aortic root is normal size. 2.8 cm. Normal size ascending aorta. 2.4 cm.  Normal ascending, transverse (arch), and descending aorta. Normal pulmonary  venous drainage.     Pericardial/Pleural  There is no pericardial effusion.  ______________________________________________________________________________  MMode/2D Measurements & Calculations  Ao root diam: 2.8 cm     asc Aorta Diam: 2.4 cm     ______________________________________________________________________________  Report approved by: Dr Lazarus Chowdhury 12/20/2022 12:48 PM         XR Chest Port 1 View    Narrative    CHEST ONE VIEW  12/20/2022 2:18 PM     HISTORY: Inability to maintain O2 sats.    COMPARISON: December 19, 2022      Impression    IMPRESSION: Endotracheal tube tip 3.4 cm from the demar. Cavitary  lesion again noted at the right base. Extensive right-sided  consolidation similar to previous. Left lower lobe consolidation  probably not significantly changed given differences in rotation.    LALO STARR MD         SYSTEM ID:  [id number]   XR Abdomen Port 1 View    Narrative    XR ABDOMEN PORT 1 VIEW 12/20/2022 3:51 PM    HISTORY: feeding tube placement    COMPARISON: None.      Impression    IMPRESSION: Feeding tube in good position with tip at the duodenal  jejunal junction.    CARLY  JONELLE SULLIVAN MD         SYSTEM ID:  [id number]   CT Chest w Contrast    Narrative    CT CHEST WITH CONTRAST  12/23/2022 12:02 PM    CLINICAL HISTORY: Interval evaluation of right chest  cavitations/infection.    TECHNIQUE: CT chest with IV contrast. Multiplanar reformats were  obtained. Dose reduction techniques were used.    CONTRAST:  64 mL isovue 370    COMPARISON: 12/19/2022    FINDINGS:   LUNGS AND PLEURA: Peribronchial infiltrates again seen throughout both  upper lobes and right middle lobe. In the right upper lobe, previously  seen nodular infiltrate has become cavitary. The right middle lobe  cavity has decreased fluid when compared to previous. There remains  dense consolidation both lower lobes, with air bronchograms. There is  a 4 cm cavity in the lateral right lower lobe, stable to slightly  smaller. Subcentimeter cavity in the medial right lower lobe, slightly  decreased. There are new small cavitary spaces in the anterior right  lower lobe. No significant pleural effusion on the right. Small left  pleural effusion.    MEDIASTINUM/AXILLAE: Endotracheal tube in good position above the  demar. Mildly enlarged lymph nodes should be reactive. No coronary  artery calcification.    UPPER ABDOMEN: No significant finding.    MUSCULOSKELETAL: Unremarkable.      Impression    IMPRESSION: Cavitary pneumonia bilaterally. The largest cavities in  the right lower lobe appear decreased in size; however, there are  multiple new small cavities in the right lung.    CARLY SULLIVAN MD         SYSTEM ID:  [id number]   XR Chest Port 1 View    Narrative    EXAM: XR CHEST PORT 1 VIEW  LOCATION: [facility]  DATE/TIME: 12/24/2022 6:15 PM    INDICATION: Check ET tube placement.  COMPARISON: 12/20/2022      Impression    IMPRESSION: Endotracheal tube is 2.5 cm above the dmear. There is enteric feeding tube tip below diaphragm and right-sided PICC tip in high right atrium. Heart size and vascularity are  normal. Mid and lower lung predominant consolidative airspace   opacities have improved. Small bilateral pleural effusions unchanged. No pneumothorax.   XR Chest Port 1 View    Narrative    EXAM: XR CHEST PORT 1 VIEW  LOCATION: [facility]  DATE/TIME: 12/25/2022 9:20 PM    INDICATION: desatting  COMPARISON: 2/24/2022      Impression    IMPRESSION: ET tube remains in good position with tip 3.5 cm above demar. Feeding tube and right PICC line also appear in good position. Left PICC line has its tip at the level of left subclavian vein, unchanged.    Heart size normal. No change in patchy dense bilateral lung consolidation consistent with pneumonia   MR Thoracic Spine w/o Contrast    Narrative    EXAM: MR CERVICAL SPINE W/O CONTRAST, MR LUMBAR SPINE W/O CONTRAST, MR THORACIC SPINE W/O CONTRAST  LOCATION: [facility]  DATE/TIME: 12/26/2022 9:55 PM    INDICATION: mrsa bacteremia, concern for spinal abscess diskitis  COMPARISON: None.  TECHNIQUE:   1) MRI Cervical Spine without IV contrast.  2) MRI Thoracic Spine without IV contrast.  3) MRI Lumbar Spine without IV contrast. Sagittal T2, T1, and STIR sequences performed.    FINDINGS:    CERVICAL SPINE:   Normal vertebral body heights. Straightening of cervical lordosis. No abnormal cord signal. Mild edema posterior paraspinal soft tissues cervicothoracic region.    Craniovertebral junction and C1-C2: Normal.    C2-C3: Normal disc height. No herniation. Normal facets. No spinal canal or neural foraminal stenosis.     C3-C4: Normal disc height. No herniation. Normal facets. No spinal canal or neural foraminal stenosis.     C4-C5: Normal disc height. No herniation. Normal facets. No spinal canal or neural foraminal stenosis.     C5-C6: Normal disc height. No herniation. Normal facets. No spinal canal or neural foraminal stenosis.     C6-C7: Normal disc height. No herniation. Normal facets. No spinal canal or neural foraminal  stenosis.     C7-T1: Normal disc height. No herniation. Normal facets. No spinal canal or neural foraminal stenosis.    THORACIC SPINE:  Normal vertebral body heights, alignment and marrow signal. Normal disc heights. No herniation. Normal facets. No spinal canal or neural foraminal stenosis. No abnormal cord signal.     Infiltrates, atelectasis, and effusion in lungs bilaterally, right more than left.  Prominent infiltrates, atelectasis, and pleural effusion in both lungs, right more than left. Mild edema posterior paraspinal soft tissues.    LUMBAR SPINE:   Normal vertebral body heights and alignment. Normal distal cord with cord was extending to L1-L2. No significant canal or foraminal narrowing at any level. Mild edema posterior paraspinal soft tissues. Axial images not performed.       Impression    IMPRESSION:    CERVICAL SPINE MRI:  1.  Normal cervical cord.  2.  No significant canal or foraminal narrowing at any level.  3.  With limitations of a noncontrast examination, no definite discitis osteomyelitis complex or an infectious process within the spinal canal.    THORACIC SPINE MRI:  1.  Normal thoracic cord.  2.  No significant canal or foraminal narrowing at any level.  3.  With limitations of a noncontrast examination, no definite discitis osteomyelitis complex or an infectious process within the spinal canal.    LUMBAR SPINE MRI:  1.  Normal distal cord.  2.  No significant canal or foraminal narrowing at any level.  3.  With limitations of a noncontrast examination, no definite discitis osteomyelitis complex or an infectious process within the spinal canal.   MR Cervical Spine w/o Contrast    Narrative    EXAM: MR CERVICAL SPINE W/O CONTRAST, MR LUMBAR SPINE W/O CONTRAST, MR THORACIC SPINE W/O CONTRAST  LOCATION: [facility]  DATE/TIME: 12/26/2022 9:55 PM    INDICATION: mrsa bacteremia, concern for spinal abscess diskitis  COMPARISON: None.  TECHNIQUE:   1) MRI Cervical Spine  without IV contrast.  2) MRI Thoracic Spine without IV contrast.  3) MRI Lumbar Spine without IV contrast. Sagittal T2, T1, and STIR sequences performed.    FINDINGS:    CERVICAL SPINE:   Normal vertebral body heights. Straightening of cervical lordosis. No abnormal cord signal. Mild edema posterior paraspinal soft tissues cervicothoracic region.    Craniovertebral junction and C1-C2: Normal.    C2-C3: Normal disc height. No herniation. Normal facets. No spinal canal or neural foraminal stenosis.     C3-C4: Normal disc height. No herniation. Normal facets. No spinal canal or neural foraminal stenosis.     C4-C5: Normal disc height. No herniation. Normal facets. No spinal canal or neural foraminal stenosis.     C5-C6: Normal disc height. No herniation. Normal facets. No spinal canal or neural foraminal stenosis.     C6-C7: Normal disc height. No herniation. Normal facets. No spinal canal or neural foraminal stenosis.     C7-T1: Normal disc height. No herniation. Normal facets. No spinal canal or neural foraminal stenosis.    THORACIC SPINE:  Normal vertebral body heights, alignment and marrow signal. Normal disc heights. No herniation. Normal facets. No spinal canal or neural foraminal stenosis. No abnormal cord signal.     Infiltrates, atelectasis, and effusion in lungs bilaterally, right more than left.  Prominent infiltrates, atelectasis, and pleural effusion in both lungs, right more than left. Mild edema posterior paraspinal soft tissues.    LUMBAR SPINE:   Normal vertebral body heights and alignment. Normal distal cord with cord was extending to L1-L2. No significant canal or foraminal narrowing at any level. Mild edema posterior paraspinal soft tissues. Axial images not performed.       Impression    IMPRESSION:    CERVICAL SPINE MRI:  1.  Normal cervical cord.  2.  No significant canal or foraminal narrowing at any level.  3.  With limitations of a noncontrast examination, no definite discitis osteomyelitis  complex or an infectious process within the spinal canal.    THORACIC SPINE MRI:  1.  Normal thoracic cord.  2.  No significant canal or foraminal narrowing at any level.  3.  With limitations of a noncontrast examination, no definite discitis osteomyelitis complex or an infectious process within the spinal canal.    LUMBAR SPINE MRI:  1.  Normal distal cord.  2.  No significant canal or foraminal narrowing at any level.  3.  With limitations of a noncontrast examination, no definite discitis osteomyelitis complex or an infectious process within the spinal canal.   MR Lumbar Spine w/o Contrast    Narrative    EXAM: MR CERVICAL SPINE W/O CONTRAST, MR LUMBAR SPINE W/O CONTRAST, MR THORACIC SPINE W/O CONTRAST  LOCATION: [facility]  DATE/TIME: 12/26/2022 9:55 PM    INDICATION: mrsa bacteremia, concern for spinal abscess diskitis  COMPARISON: None.  TECHNIQUE:   1) MRI Cervical Spine without IV contrast.  2) MRI Thoracic Spine without IV contrast.  3) MRI Lumbar Spine without IV contrast. Sagittal T2, T1, and STIR sequences performed.    FINDINGS:    CERVICAL SPINE:   Normal vertebral body heights. Straightening of cervical lordosis. No abnormal cord signal. Mild edema posterior paraspinal soft tissues cervicothoracic region.    Craniovertebral junction and C1-C2: Normal.    C2-C3: Normal disc height. No herniation. Normal facets. No spinal canal or neural foraminal stenosis.     C3-C4: Normal disc height. No herniation. Normal facets. No spinal canal or neural foraminal stenosis.     C4-C5: Normal disc height. No herniation. Normal facets. No spinal canal or neural foraminal stenosis.     C5-C6: Normal disc height. No herniation. Normal facets. No spinal canal or neural foraminal stenosis.     C6-C7: Normal disc height. No herniation. Normal facets. No spinal canal or neural foraminal stenosis.     C7-T1: Normal disc height. No herniation. Normal facets. No spinal canal or neural foraminal  stenosis.    THORACIC SPINE:  Normal vertebral body heights, alignment and marrow signal. Normal disc heights. No herniation. Normal facets. No spinal canal or neural foraminal stenosis. No abnormal cord signal.     Infiltrates, atelectasis, and effusion in lungs bilaterally, right more than left.  Prominent infiltrates, atelectasis, and pleural effusion in both lungs, right more than left. Mild edema posterior paraspinal soft tissues.    LUMBAR SPINE:   Normal vertebral body heights and alignment. Normal distal cord with cord was extending to L1-L2. No significant canal or foraminal narrowing at any level. Mild edema posterior paraspinal soft tissues. Axial images not performed.       Impression    IMPRESSION:    CERVICAL SPINE MRI:  1.  Normal cervical cord.  2.  No significant canal or foraminal narrowing at any level.  3.  With limitations of a noncontrast examination, no definite discitis osteomyelitis complex or an infectious process within the spinal canal.    THORACIC SPINE MRI:  1.  Normal thoracic cord.  2.  No significant canal or foraminal narrowing at any level.  3.  With limitations of a noncontrast examination, no definite discitis osteomyelitis complex or an infectious process within the spinal canal.    LUMBAR SPINE MRI:  1.  Normal distal cord.  2.  No significant canal or foraminal narrowing at any level.  3.  With limitations of a noncontrast examination, no definite discitis osteomyelitis complex or an infectious process within the spinal canal.   Anesthesia Pre-Procedure Evaluation    Patient: Deidre N Aasen   MRN: [id number] : 1995        Procedure : Procedure(s):  TRACHEOSTOMY ,          Past Medical History:   Diagnosis Date     Anxiety      Chronic malnutrition      Depressive disorder      Diabetes mellitus      Heroin abuse      Methamphetamine abuse      Noncompliance with medication regimen       History reviewed. No pertinent surgical history.   Allergies   Allergen Reactions      Ciprofloxacin Other (See Comments)     Vomiting; diarrhea      Social History     Tobacco Use     Smoking status: Never     Smokeless tobacco: Never   Substance Use Topics     Alcohol use: No      Wt Readings from Last 1 Encounters:   12/28/22 37.5 kg (82 lb 11.2 oz)        Anesthesia Evaluation   Pt has had prior anesthetic.         ROS/MED HX  ENT/Pulmonary: Comment: resp failure    (+) recent URI, unresolved,     Neurologic:       Cardiovascular:       METS/Exercise Tolerance:     Hematologic:       Musculoskeletal:       GI/Hepatic:       Renal/Genitourinary:     (+) renal disease, type: ARF,     Endo:     (+) type I DM,     Psychiatric/Substance Use: Comment: Previous heroine useage    (+) H/O chronic opiod use . Recreational drug usage: Meth and Other (Comment).    Infectious Disease:       Malignancy:       Other:            Physical Exam    Airway             Respiratory Devices and Support    ETT:      Dental           Cardiovascular             Pulmonary    Unable to assess               OUTSIDE LABS:  CBC:   Lab Results   Component Value Date    WBC 11.0 12/29/2022    WBC 12.1 (H) 12/28/2022    HGB 6.4 (LL) 12/29/2022    HGB 7.4 (L) 12/28/2022    HCT 20.4 (L) 12/29/2022    HCT 23.2 (L) 12/28/2022     (H) 12/29/2022     (H) 12/28/2022     BMP:   Lab Results   Component Value Date     12/26/2022     12/24/2022    POTASSIUM 4.1 12/29/2022    POTASSIUM 4.5 12/28/2022    CHLORIDE 100 12/26/2022    CHLORIDE 94 12/24/2022    CO2 34 (H) 12/26/2022    CO2 35 (H) 12/24/2022    BUN 13 12/26/2022    BUN 14 12/24/2022    CR 0.60 12/27/2022    CR 0.52 12/26/2022     (H) 12/29/2022    GLC 69 (L) 12/29/2022     COAGS:   Lab Results   Component Value Date    PTT 38 12/22/2022    INR 1.11 12/28/2022    FIBR  12/22/2022      Comment:      Underfilled, redraw   This is a corrected result. Previous result was 517 mg/dL on 12/22/2022 at  1:25 PM CST     POC:   Lab Results   Component Value  Date    BGM >600 (HH) 09/17/2019    HCG Negative 12/26/2022    HCGS Negative 12/16/2022     HEPATIC:   Lab Results   Component Value Date    ALBUMIN 1.2 (L) 12/22/2022    PROTTOTAL 4.8 (L) 12/22/2022    ALT 32 12/22/2022    AST 46 (H) 12/22/2022    ALKPHOS 78 12/22/2022    BILITOTAL 0.3 12/22/2022     OTHER:   Lab Results   Component Value Date    PH 7.53 (H) 12/26/2022    LACT 1.4 12/17/2022    A1C 16.4 (H) 12/13/2022    AGNES 7.2 (L) 12/26/2022    PHOS 4.1 12/29/2022    MAG 2.0 12/29/2022    LIPASE 47 (L) 12/16/2022    TSH 0.46 12/13/2022    .0 (H) 12/24/2022       Anesthesia Plan    ASA Status:  3   NPO Status:  NPO Appropriate    Anesthesia Type: General.     - Airway: Tracheostomy   Induction: Intravenous.   Maintenance: Balanced.        Consents    Anesthesia Plan(s) and associated risks, benefits, and realistic alternatives discussed. Questions answered and patient/representative(s) expressed understanding.    - Discussed:     - Discussed with:  Patient         Postoperative Care    Pain management: IV analgesics.   PONV prophylaxis: Ondansetron (or other 5HT-3)     Comments:                Anton Kellogg MD

## 2022-12-29 NOTE — PROVIDER NOTIFICATION
MD Notification    Notified Person: MD    Notified Person Name: Dr. Feldman    Notification Date/Time: 12/29/22 1:32am    Notification Interaction: Verbal    Purpose of Notification: Low blood glucose 59; D50 given per protocol and she is on D10 at 40ml per hypoglycemic protocol after stoppage of Tube feeding. Recheck 139: Do we  want to increase D10 rate? Check BG sooner?    Orders Received:    Comments: No new orders; keep D10 at same rate, recheck Blood sugar as ordered (4am)

## 2022-12-29 NOTE — CONSULTS
"SPIRITUAL HEALTH SERVICES Progress Note    [facility] ICU    Saw pt Deidre N Aasen per [facility] consult for support.    Patient/Family Understanding of Illness and Goals of Care - Bridget was alert and engaged easily in conversation today through nodding (yes/no) and through writing more complex sentences on a notepad. She reports that she is feeling \"shitty\" today and understands that she will now be having surgery in the near future (possibly tomorrow) for a trach placement. She is having difficulty coping with this admission and I provided emotional support at the bedside.      Distress and Loss - In addition to the pt's ICU admission and intubation, Bridget named that she is experiencing emotional distress surrounding her upcoming surgical procedure. She affirmed that this was not her first surgery, but that she was very concerned about this one. She also affirmed that she was experiencing distress about the continued length of this admission.      Strengths, Coping, and Resources - When prompted to name coping tools, pt wrote down \"drugs\" and \"heroin.\" I acknowledged the difficulty of cessation for the patient and the lack of other healthy coping tools. This should be explored further on future visits.      Meaning, Beliefs, and Spirituality - In conversation, Bridget acknowledged that she was [employer] and that yris/prayer was indeed meaningful for her. I offered a prayer at the bedside that included themes from our conversation.      Plan of Care - Pt noted that they would like me to continue following-up with them while admitted to the ICU. If any immediate needs arise, please page or consult SH.     ------  Kristofer Forman M.Div.  Resident   Pager: [phone]  "

## 2022-12-29 NOTE — CONSULTS
Addiction Medicine Inpatient Consultation      Deidre N Aasen,  1995, MRN [id number]    [facility]  DKA, type 1 (H) [E10.10]    PCP: No Ref-Primary, Physician, None   Code status:  Full Code       Extended Emergency Contact Information  Primary Emergency Contact: Aasen,Eric  Address: [address] [place] [id number] [facility]  Home Phone: [phone]  Work Phone: [phone]  Relation: Father  Secondary Emergency Contact: Laurel Rodriguez  Mobile Phone: [phone]  Relation: Aunt       Date of Admission: 22  Date of Consult: 2022    Tele-Visit Details    Type of service:  Video Visit  Time Service Began (time 1st connected with pt):   Time Service Ended (time completely finished with pt):   Originating Location (pt. Location): Patient hospital room  Distant Location (provider location): [facility]  Reason for Televisit: COVID 19  Mode of Communication:  Video Conference via [employer]  Physician has received verbal consent for a video visit from the patient? Yes      Note:  Patient is alert, but intubated with endotrachael tube.   She can answer Yes and No questions.      Reason for Consultation: Hx of IVDU       ASSESSMENT and RECOMMENDATIONS:   1.   Opioid Use Disorder - patient is now off of fentanyl drip and, aside from opiate in OR tomorrow for tracheostomy, no other need for opiates is anticipated at this time.  She admits to using fentanyl IV prior to admission.  I informed patient that buprenorphine can be helpful in keeping patient out of withdrawal, as well as used for maintenance therapy to keep patient from having cravings and relapsing back to illicit fentanyl.   Patient has heard of suboxone and has used it illicitly to stave off withdrawal, but has not been prescribed it.   She is agreeable to take it.       2.  Methamphetamine use disorder - no specific therapy needed for withdrawal.   Will discuss  maintenance therapy when patient extubated and more stable.      Recommendations:    Given long term illicit fentanyl and recent prescribed fentanyl drip, will do micro-induction of buprenorphine as follows:          1.  Belbuca 450 mcg buccal film q 12 hrs until 12 hrs after last fentanyl in OR tomorrow.          2.  On 12/31, increase to 1 mg buprenorphine bid for 2 doses       3.  On 1/1, increase to 2 mg buprenorphine bid for 2 doses       4.  If not requiring any other opioids for any reason, can then increase to 4 mg bid x 1 day and then 8 mg bid.  Keep on that dose.      If patient is oversedated, hold dose.        If patient develops worsened opioid withdrawal, please page me at [phone]           Recommendations were discussed with Dr. Tigre Tran, ICU resident.        Peyton Larose MD  Addiction Medicine Service  [facility]  Page me (click here for Blank Larose)                        HPI:     Deidre N Aasen is a 27 year old old female with known Type I Diabetes who presented to ED on 12/16 and found to be in severe DKA ( ph < 7.0).  Found to have RM and RL necrotizing pneumonia, MRSA bacteremia and fungemia and hypoxic respiratory failure requiring intubation.  She also has developed acute kidney falure with tubular necrorsis. Has now agreed to tracheostomy.  Is writing messages and seems to be appropriate and comprehending.  On precedex, but fentanyl and midazolam were stopped this morning.   The patient was at [facility] 12/13 with DKA and left AMA.the same day.   She was COVID + on that admission and was started on remdesivcir.       CATHY echocardiogram - negative for valve lesions or thrombus.   MR of C, Thoracic and lumbar spines are negative for abscess, osteomyelitis or discitis.      The patient has a past hx of heroin and methamphetamine use.   Her UDS was positive for amphetamines on admission.  It was negative for opiates, but no fentanyl was tested for.   Patient admits to  "daily and recent use of heroin and methamphetamines, and used as recently as the day of admission.   .She feels she is having opiate withdrawal .  She answers\"yes\" to the following symptoms - sweats, chills, muscle aches, abdomenal cramping.  Clearly these are not specific but highly suggestive.   She thinks her withdrawal from methamphetamines is resolved.       She denies use of alcohol or benzodiazepines.       Treatment Hx:  unknown      Past Medical History:    Type I Diabetes mellitis.        Family History:   Substance Use History:  Not asked    Social History:      PTA Meds:  Medications Prior to Admission   Medication Sig Dispense Refill Last Dose     insulin aspart (NOVOLOG PEN) 100 UNIT/ML pen Inject Subcutaneous 3 times daily (with meals) Sliding scale        insulin glargine (BASAGLAR KWIKPEN) 100 UNIT/ML pen Inject 20 Units Subcutaneous daily 6 mL 0          Allergies:  Allergies   Allergen Reactions     Ciprofloxacin Other (See Comments)     Vomiting; diarrhea       [place] Prescription Monitoring Program:  No concerning prescriptions for controlled substances in [place] within the last year.      Review of Systems:    Limited.  See HPI        Physical Exam:  /61   Pulse 60   Temp 98.6  F (37  C) (Axillary)   Resp 16   Wt 36.2 kg (79 lb 14.4 oz)   SpO2 97%   Breastfeeding No   BMI 14.16 kg/m      Physical Exam by hospitalist reviewed.   Significant findings: coarse breath sounds.  Answers questions appropriatedly      Pertinent Labs, Radiology, and EKG:    Lab Results   Component Value Date    WBC 11.0 12/29/2022    HGB 6.4 (LL) 12/29/2022    HCT 20.4 (L) 12/29/2022    MCV 94 12/29/2022     (H) 12/29/2022     Lab Results   Component Value Date     12/26/2022    POTASSIUM 4.1 12/29/2022    CHLORIDE 100 12/26/2022    CO2 34 (H) 12/26/2022     (H) 12/29/2022     Lab Results   Component Value Date    AST 46 (H) 12/22/2022    ALT 32 12/22/2022    ALKPHOS 78 12/22/2022 "    BILITOTAL 0.3 12/22/2022     Lab Results   Component Value Date    BUN 13 12/26/2022    CR 0.53 12/29/2022          Amphetamines Urine   Date Value Ref Range Status   12/16/2022 Screen Positive (A) Screen Negative Final     Comment:     Cutoff for a positive amphetamine is 500 ng/mL or greater.   This is an unconfirmed screening result to be used for medical purposes only.     Barbituates Urine   Date Value Ref Range Status   12/16/2022 Screen Negative Screen Negative Final     Comment:     Cutoff for a negative barbiturate is less than 200 ng/mL.     Cannabinoids Urine   Date Value Ref Range Status   12/16/2022 Screen Negative Screen Negative Final     Comment:     Cutoff for a negative cannabinoid is less than 50 ng/mL.     Cocaine Urine   Date Value Ref Range Status   12/16/2022 Screen Negative Screen Negative Final     Comment:     Cutoff for a negative cocaine is less than 300 ng/mL.     Opiates Urine   Date Value Ref Range Status   12/16/2022 Screen Negative Screen Negative Final     Comment:     Cutoff for a negative opiate is less than 300 ng/mL.

## 2022-12-29 NOTE — PROGRESS NOTES
[facility] ICU RESPIRATORY NOTE        Date of Admission: 12/16/2022     Date of Intubation (most recent): 12/16/2022    Reason for Mechanical Ventilation: Respiratory failure     Number of Days on Mechanical Ventilation: 13    Met Criteria for Spontaneous Breathing Trial: No    Reason for No Spontaneous Breathing Trial: None     Significant Events Today: None     ABG Results:   Recent Labs   Lab 12/26/22  1009 12/26/22  0042   PH  --  7.53*   PCO2  --  45   PO2  --  70*   HCO3  --  38*   O2PER 65 70       Current Vent Settings: Vent Mode: CMV/AC  (Continuous Mandatory Ventilation/ Assist Control)  FiO2 (%): 55 %  Resp Rate (Set): 16 breaths/min  Tidal Volume (Set, mL): 400 mL  PEEP (cm H2O): 8 cmH2O  Resp: 19      Skin Assessment: Skin intact     Plan: Continue full vent support and wean as tolerated    RT German on 12/29/2022 at 5:26 PM

## 2022-12-30 ENCOUNTER — APPOINTMENT (OUTPATIENT)
Dept: GENERAL RADIOLOGY | Facility: CLINIC | Age: 27
End: 2022-12-30
Attending: INTERNAL MEDICINE
Payer: COMMERCIAL

## 2022-12-30 ENCOUNTER — ANESTHESIA (OUTPATIENT)
Dept: SURGERY | Facility: CLINIC | Age: 27
End: 2022-12-30
Payer: COMMERCIAL

## 2022-12-30 ENCOUNTER — ANESTHESIA EVENT (OUTPATIENT)
Dept: SURGERY | Facility: CLINIC | Age: 27
End: 2022-12-30
Payer: COMMERCIAL

## 2022-12-30 ENCOUNTER — APPOINTMENT (OUTPATIENT)
Dept: PHYSICAL THERAPY | Facility: CLINIC | Age: 27
End: 2022-12-30
Attending: INTERNAL MEDICINE
Payer: COMMERCIAL

## 2022-12-30 ENCOUNTER — APPOINTMENT (OUTPATIENT)
Dept: OCCUPATIONAL THERAPY | Facility: CLINIC | Age: 27
End: 2022-12-30
Attending: INTERNAL MEDICINE
Payer: COMMERCIAL

## 2022-12-30 LAB
BACTERIA BLD CULT: NO GROWTH
ERYTHROCYTE [DISTWIDTH] IN BLOOD BY AUTOMATED COUNT: 13.6 % (ref 10–15)
GLUCOSE BLDC GLUCOMTR-MCNC: 100 MG/DL (ref 70–99)
GLUCOSE BLDC GLUCOMTR-MCNC: 139 MG/DL (ref 70–99)
GLUCOSE BLDC GLUCOMTR-MCNC: 152 MG/DL (ref 70–99)
GLUCOSE BLDC GLUCOMTR-MCNC: 161 MG/DL (ref 70–99)
GLUCOSE BLDC GLUCOMTR-MCNC: 173 MG/DL (ref 70–99)
GLUCOSE BLDC GLUCOMTR-MCNC: 188 MG/DL (ref 70–99)
HCT VFR BLD AUTO: 21.5 % (ref 35–47)
HGB BLD-MCNC: 7.2 G/DL (ref 11.7–15.7)
MAGNESIUM SERPL-MCNC: 2.2 MG/DL (ref 1.6–2.3)
MCH RBC QN AUTO: 30.1 PG (ref 26.5–33)
MCHC RBC AUTO-ENTMCNC: 33.5 G/DL (ref 31.5–36.5)
MCV RBC AUTO: 90 FL (ref 78–100)
PHOSPHATE SERPL-MCNC: 4.7 MG/DL (ref 2.5–4.5)
PLATELET # BLD AUTO: 492 10E3/UL (ref 150–450)
POTASSIUM BLD-SCNC: 3.7 MMOL/L (ref 3.4–5.3)
RBC # BLD AUTO: 2.39 10E6/UL (ref 3.8–5.2)
VANCOMYCIN SERPL-MCNC: 18.2 MG/L
WBC # BLD AUTO: 11.1 10E3/UL (ref 4–11)

## 2022-12-30 PROCEDURE — 250N000011 HC RX IP 250 OP 636: Performed by: STUDENT IN AN ORGANIZED HEALTH CARE EDUCATION/TRAINING PROGRAM

## 2022-12-30 PROCEDURE — 370N000017 HC ANESTHESIA TECHNICAL FEE, PER MIN: Performed by: STUDENT IN AN ORGANIZED HEALTH CARE EDUCATION/TRAINING PROGRAM

## 2022-12-30 PROCEDURE — 80202 ASSAY OF VANCOMYCIN: CPT | Performed by: INTERNAL MEDICINE

## 2022-12-30 PROCEDURE — 31730 INTRO WINDPIPE WIRE/TUBE: CPT | Performed by: STUDENT IN AN ORGANIZED HEALTH CARE EDUCATION/TRAINING PROGRAM

## 2022-12-30 PROCEDURE — 0B113Z4 BYPASS TRACHEA TO CUTANEOUS, PERCUTANEOUS APPROACH: ICD-10-PCS | Performed by: STUDENT IN AN ORGANIZED HEALTH CARE EDUCATION/TRAINING PROGRAM

## 2022-12-30 PROCEDURE — 83735 ASSAY OF MAGNESIUM: CPT | Performed by: SURGERY

## 2022-12-30 PROCEDURE — 360N000075 HC SURGERY LEVEL 2, PER MIN: Performed by: STUDENT IN AN ORGANIZED HEALTH CARE EDUCATION/TRAINING PROGRAM

## 2022-12-30 PROCEDURE — 94640 AIRWAY INHALATION TREATMENT: CPT | Mod: 76

## 2022-12-30 PROCEDURE — C9113 INJ PANTOPRAZOLE SODIUM, VIA: HCPCS | Performed by: STUDENT IN AN ORGANIZED HEALTH CARE EDUCATION/TRAINING PROGRAM

## 2022-12-30 PROCEDURE — 99233 SBSQ HOSP IP/OBS HIGH 50: CPT | Performed by: INTERNAL MEDICINE

## 2022-12-30 PROCEDURE — 999N000009 HC STATISTIC AIRWAY CARE

## 2022-12-30 PROCEDURE — 94003 VENT MGMT INPAT SUBQ DAY: CPT

## 2022-12-30 PROCEDURE — 272N000001 HC OR GENERAL SUPPLY STERILE: Performed by: STUDENT IN AN ORGANIZED HEALTH CARE EDUCATION/TRAINING PROGRAM

## 2022-12-30 PROCEDURE — 999N000157 HC STATISTIC RCP TIME EA 10 MIN

## 2022-12-30 PROCEDURE — 200N000001 HC R&B ICU

## 2022-12-30 PROCEDURE — 94640 AIRWAY INHALATION TREATMENT: CPT

## 2022-12-30 PROCEDURE — 250N000025 HC SEVOFLURANE, PER MIN: Performed by: STUDENT IN AN ORGANIZED HEALTH CARE EDUCATION/TRAINING PROGRAM

## 2022-12-30 PROCEDURE — 250N000013 HC RX MED GY IP 250 OP 250 PS 637: Performed by: STUDENT IN AN ORGANIZED HEALTH CARE EDUCATION/TRAINING PROGRAM

## 2022-12-30 PROCEDURE — 250N000013 HC RX MED GY IP 250 OP 250 PS 637: Performed by: SURGERY

## 2022-12-30 PROCEDURE — 250N000011 HC RX IP 250 OP 636: Performed by: INTERNAL MEDICINE

## 2022-12-30 PROCEDURE — 99232 SBSQ HOSP IP/OBS MODERATE 35: CPT | Performed by: NURSE PRACTITIONER

## 2022-12-30 PROCEDURE — 250N000009 HC RX 250: Performed by: INTERNAL MEDICINE

## 2022-12-30 PROCEDURE — 85027 COMPLETE CBC AUTOMATED: CPT | Performed by: SURGERY

## 2022-12-30 PROCEDURE — 250N000011 HC RX IP 250 OP 636: Performed by: ANESTHESIOLOGY

## 2022-12-30 PROCEDURE — 258N000003 HC RX IP 258 OP 636: Performed by: INTERNAL MEDICINE

## 2022-12-30 PROCEDURE — 250N000013 HC RX MED GY IP 250 OP 250 PS 637: Performed by: NURSE PRACTITIONER

## 2022-12-30 PROCEDURE — 97166 OT EVAL MOD COMPLEX 45 MIN: CPT | Mod: GO | Performed by: OCCUPATIONAL THERAPIST

## 2022-12-30 PROCEDURE — 250N000009 HC RX 250: Performed by: ANESTHESIOLOGY

## 2022-12-30 PROCEDURE — 250N000013 HC RX MED GY IP 250 OP 250 PS 637: Performed by: INTERNAL MEDICINE

## 2022-12-30 PROCEDURE — 99291 CRITICAL CARE FIRST HOUR: CPT | Mod: GC | Performed by: INTERNAL MEDICINE

## 2022-12-30 PROCEDURE — 250N000011 HC RX IP 250 OP 636

## 2022-12-30 PROCEDURE — 250N000009 HC RX 250: Performed by: STUDENT IN AN ORGANIZED HEALTH CARE EDUCATION/TRAINING PROGRAM

## 2022-12-30 PROCEDURE — 97530 THERAPEUTIC ACTIVITIES: CPT | Mod: GP

## 2022-12-30 PROCEDURE — 258N000003 HC RX IP 258 OP 636

## 2022-12-30 PROCEDURE — 84100 ASSAY OF PHOSPHORUS: CPT | Performed by: SURGERY

## 2022-12-30 PROCEDURE — 84132 ASSAY OF SERUM POTASSIUM: CPT | Performed by: SURGERY

## 2022-12-30 PROCEDURE — 250N000011 HC RX IP 250 OP 636: Performed by: SURGERY

## 2022-12-30 PROCEDURE — 71045 X-RAY EXAM CHEST 1 VIEW: CPT

## 2022-12-30 PROCEDURE — 258N000001 HC RX 258: Performed by: INTERNAL MEDICINE

## 2022-12-30 RX ORDER — DEXMEDETOMIDINE HYDROCHLORIDE 4 UG/ML
.1-1.5 INJECTION, SOLUTION INTRAVENOUS CONTINUOUS
Status: DISCONTINUED | OUTPATIENT
Start: 2022-12-30 | End: 2023-01-03

## 2022-12-30 RX ORDER — FENTANYL CITRATE 50 UG/ML
INJECTION, SOLUTION INTRAMUSCULAR; INTRAVENOUS PRN
Status: DISCONTINUED | OUTPATIENT
Start: 2022-12-30 | End: 2022-12-30

## 2022-12-30 RX ORDER — HYDROXYZINE HYDROCHLORIDE 25 MG/1
50 TABLET, FILM COATED ORAL EVERY 6 HOURS PRN
Status: DISCONTINUED | OUTPATIENT
Start: 2022-12-30 | End: 2023-01-04 | Stop reason: HOSPADM

## 2022-12-30 RX ORDER — ACETAMINOPHEN 500 MG
1000 TABLET ORAL 4 TIMES DAILY
Status: DISCONTINUED | OUTPATIENT
Start: 2022-12-30 | End: 2022-12-30

## 2022-12-30 RX ORDER — LORAZEPAM 2 MG/ML
1-2 INJECTION INTRAMUSCULAR ONCE
Status: COMPLETED | OUTPATIENT
Start: 2022-12-30 | End: 2022-12-30

## 2022-12-30 RX ORDER — ZOLPIDEM TARTRATE 5 MG/1
10 TABLET ORAL
Status: DISCONTINUED | OUTPATIENT
Start: 2022-12-30 | End: 2023-01-01

## 2022-12-30 RX ORDER — PROPOFOL 10 MG/ML
INJECTION, EMULSION INTRAVENOUS PRN
Status: DISCONTINUED | OUTPATIENT
Start: 2022-12-30 | End: 2022-12-30

## 2022-12-30 RX ORDER — TRAZODONE HYDROCHLORIDE 50 MG/1
100 TABLET, FILM COATED ORAL AT BEDTIME
Status: DISCONTINUED | OUTPATIENT
Start: 2022-12-30 | End: 2023-01-04 | Stop reason: HOSPADM

## 2022-12-30 RX ORDER — NEOSTIGMINE METHYLSULFATE 1 MG/ML
VIAL (ML) INJECTION PRN
Status: DISCONTINUED | OUTPATIENT
Start: 2022-12-30 | End: 2022-12-30

## 2022-12-30 RX ORDER — PROPOFOL 10 MG/ML
INJECTION, EMULSION INTRAVENOUS CONTINUOUS PRN
Status: DISCONTINUED | OUTPATIENT
Start: 2022-12-30 | End: 2022-12-30

## 2022-12-30 RX ORDER — ACETAMINOPHEN 500 MG
500 TABLET ORAL 4 TIMES DAILY
Status: DISCONTINUED | OUTPATIENT
Start: 2022-12-30 | End: 2023-01-01

## 2022-12-30 RX ORDER — HYDROXYZINE HYDROCHLORIDE 25 MG/1
25 TABLET, FILM COATED ORAL EVERY 6 HOURS PRN
Status: DISCONTINUED | OUTPATIENT
Start: 2022-12-30 | End: 2023-01-04 | Stop reason: HOSPADM

## 2022-12-30 RX ORDER — GLYCOPYRROLATE 0.2 MG/ML
INJECTION, SOLUTION INTRAMUSCULAR; INTRAVENOUS PRN
Status: DISCONTINUED | OUTPATIENT
Start: 2022-12-30 | End: 2022-12-30

## 2022-12-30 RX ORDER — ACETAMINOPHEN 325 MG/10.15ML
500 LIQUID ORAL 4 TIMES DAILY
Status: DISCONTINUED | OUTPATIENT
Start: 2022-12-30 | End: 2023-01-01

## 2022-12-30 RX ORDER — ACETAMINOPHEN 325 MG/10.15ML
1000 LIQUID ORAL 4 TIMES DAILY
Status: DISCONTINUED | OUTPATIENT
Start: 2022-12-30 | End: 2022-12-30

## 2022-12-30 RX ADMIN — HYDROXYZINE HYDROCHLORIDE 50 MG: 25 TABLET, FILM COATED ORAL at 20:36

## 2022-12-30 RX ADMIN — BUPRENORPHINE HYDROCHLORIDE 450 MCG: 450 FILM, SOLUBLE BUCCAL at 20:35

## 2022-12-30 RX ADMIN — PROPOFOL 150 MCG/KG/MIN: 10 INJECTION, EMULSION INTRAVENOUS at 15:45

## 2022-12-30 RX ADMIN — QUETIAPINE FUMARATE 25 MG: 25 TABLET ORAL at 09:00

## 2022-12-30 RX ADMIN — ZOLPIDEM TARTRATE 10 MG: 5 TABLET ORAL at 22:08

## 2022-12-30 RX ADMIN — ACETAMINOPHEN 500 MG: 325 SUSPENSION ORAL at 08:47

## 2022-12-30 RX ADMIN — DEXMEDETOMIDINE HYDROCHLORIDE 1.2 MCG/KG/HR: 400 INJECTION INTRAVENOUS at 20:35

## 2022-12-30 RX ADMIN — DEXMEDETOMIDINE HYDROCHLORIDE 1.2 MCG/KG/HR: 400 INJECTION INTRAVENOUS at 06:24

## 2022-12-30 RX ADMIN — TRAZODONE HYDROCHLORIDE 100 MG: 50 TABLET ORAL at 20:35

## 2022-12-30 RX ADMIN — HEPARIN SODIUM 5000 UNITS: 5000 INJECTION, SOLUTION INTRAVENOUS; SUBCUTANEOUS at 20:36

## 2022-12-30 RX ADMIN — ACETAMINOPHEN 500 MG: 500 TABLET ORAL at 17:38

## 2022-12-30 RX ADMIN — ROCURONIUM BROMIDE 50 MG: 50 INJECTION, SOLUTION INTRAVENOUS at 15:45

## 2022-12-30 RX ADMIN — FENTANYL CITRATE 50 MCG: 50 INJECTION, SOLUTION INTRAMUSCULAR; INTRAVENOUS at 15:52

## 2022-12-30 RX ADMIN — Medication 1 MG: at 20:35

## 2022-12-30 RX ADMIN — DEXTROSE MONOHYDRATE 1000 ML: 100 INJECTION, SOLUTION INTRAVENOUS at 06:32

## 2022-12-30 RX ADMIN — HYDROXYZINE HYDROCHLORIDE 25 MG: 25 TABLET, FILM COATED ORAL at 12:10

## 2022-12-30 RX ADMIN — QUETIAPINE FUMARATE 25 MG: 25 TABLET ORAL at 22:04

## 2022-12-30 RX ADMIN — CHLORHEXIDINE GLUCONATE 0.12% ORAL RINSE 15 ML: 1.2 LIQUID ORAL at 20:35

## 2022-12-30 RX ADMIN — NEOSTIGMINE METHYLSULFATE 2 MG: 1 INJECTION, SOLUTION INTRAVENOUS at 16:23

## 2022-12-30 RX ADMIN — DEXMEDETOMIDINE HYDROCHLORIDE 1.1 MCG/KG/HR: 400 INJECTION INTRAVENOUS at 16:45

## 2022-12-30 RX ADMIN — PROPOFOL 50 MG: 10 INJECTION, EMULSION INTRAVENOUS at 15:40

## 2022-12-30 RX ADMIN — MICAFUNGIN SODIUM 100 MG: 50 INJECTION, POWDER, LYOPHILIZED, FOR SOLUTION INTRAVENOUS at 13:06

## 2022-12-30 RX ADMIN — SODIUM CHLORIDE: 9 INJECTION, SOLUTION INTRAVENOUS at 02:40

## 2022-12-30 RX ADMIN — MIDAZOLAM 2 MG: 1 INJECTION INTRAMUSCULAR; INTRAVENOUS at 15:40

## 2022-12-30 RX ADMIN — LORAZEPAM 2 MG: 2 INJECTION INTRAMUSCULAR; INTRAVENOUS at 17:45

## 2022-12-30 RX ADMIN — FENTANYL CITRATE 50 MCG: 50 INJECTION, SOLUTION INTRAMUSCULAR; INTRAVENOUS at 15:40

## 2022-12-30 RX ADMIN — ACETAMINOPHEN 500 MG: 325 SUSPENSION ORAL at 22:04

## 2022-12-30 RX ADMIN — Medication 1 PACKET: at 22:05

## 2022-12-30 RX ADMIN — VANCOMYCIN HYDROCHLORIDE 750 MG: 1 INJECTION, POWDER, LYOPHILIZED, FOR SOLUTION INTRAVENOUS at 10:32

## 2022-12-30 RX ADMIN — BUPRENORPHINE HYDROCHLORIDE 450 MCG: 450 FILM, SOLUBLE BUCCAL at 08:21

## 2022-12-30 RX ADMIN — GLYCOPYRROLATE 0.2 MG: 0.2 INJECTION, SOLUTION INTRAMUSCULAR; INTRAVENOUS at 16:23

## 2022-12-30 RX ADMIN — PANTOPRAZOLE SODIUM 40 MG: 40 INJECTION, POWDER, FOR SOLUTION INTRAVENOUS at 08:09

## 2022-12-30 RX ADMIN — CHLORHEXIDINE GLUCONATE 0.12% ORAL RINSE 15 ML: 1.2 LIQUID ORAL at 08:18

## 2022-12-30 RX ADMIN — DEXMEDETOMIDINE HYDROCHLORIDE 1.2 MCG/KG/HR: 400 INJECTION INTRAVENOUS at 00:09

## 2022-12-30 RX ADMIN — VANCOMYCIN HYDROCHLORIDE 750 MG: 1 INJECTION, POWDER, LYOPHILIZED, FOR SOLUTION INTRAVENOUS at 22:05

## 2022-12-30 RX ADMIN — LEVALBUTEROL HYDROCHLORIDE 0.63 MG: 1.25 SOLUTION, CONCENTRATE RESPIRATORY (INHALATION) at 07:37

## 2022-12-30 RX ADMIN — PROPOFOL 50 MG: 10 INJECTION, EMULSION INTRAVENOUS at 15:42

## 2022-12-30 RX ADMIN — LEVALBUTEROL HYDROCHLORIDE 0.63 MG: 1.25 SOLUTION, CONCENTRATE RESPIRATORY (INHALATION) at 23:09

## 2022-12-30 RX ADMIN — MULTIVITAMIN 15 ML: LIQUID ORAL at 09:00

## 2022-12-30 RX ADMIN — HYDROXYZINE HYDROCHLORIDE 25 MG: 25 TABLET, FILM COATED ORAL at 17:38

## 2022-12-30 ASSESSMENT — ACTIVITIES OF DAILY LIVING (ADL)
ADLS_ACUITY_SCORE: 36
ADLS_ACUITY_SCORE: 40
ADLS_ACUITY_SCORE: 44
ADLS_ACUITY_SCORE: 40
ADLS_ACUITY_SCORE: 40
ADLS_ACUITY_SCORE: 36
ADLS_ACUITY_SCORE: 36
ADLS_ACUITY_SCORE: 40
ADLS_ACUITY_SCORE: 44
ADLS_ACUITY_SCORE: 36

## 2022-12-30 NOTE — PROGRESS NOTES
Comprehensive Daily ICU Note  12/30/22        Deidre N Aasen MRN# [id number]   Age: 27 year old YOB: 1995     Date of Admission: 12/16/2022    Primary care provider: No Ref-Primary, Physician     CODE STATUS: FULL    Problem List:       Principal Problem:    Diabetic ketoacidosis without coma associated with type 1 diabetes mellitus (H)  Active Problems:    Acute kidney failure with tubular necrosis (H)    Infection due to 2019 novel coronavirus    Necrotic pneumonia (H)    Fungemia    ARDS (adult respiratory distress syndrome) (H)    MRSA bacteremia    Encephalopathy           Treatment goals for next 24 hours:   - Tracheostomy today        Subjective/ Last 24 hours:   Events: no major events, agreeable to tracheostomy. Denies pain         Mechanical Ventilation/Vitalsigns/IsandOs:     Temp:  [97.9  F (36.6  C)-98.6  F (37  C)] 98  F (36.7  C)  Pulse:  [54-89] 65  Resp:  [13-26] 13  BP: (103-131)/(52-71) 113/56  FiO2 (%):  [50 %-55 %] 50 %  SpO2:  [95 %-100 %] 98 %      Vent Mode: CMV/AC  (Continuous Mandatory Ventilation/ Assist Control)  FiO2 (%): 50 %  Resp Rate (Set): 16 breaths/min  Tidal Volume (Set, mL): 400 mL  PEEP (cm H2O): 8 cmH2O  Resp: 13         Physical Examination:     General: writing on paper and appropriately interactive   HEENT: ET tube, OG tube  Lungs: course bilaterally, anteriorly, unchanged  CVS: RRR  Abdomen: mildly distended  Extremities/musculoskeletal: warm, non-edematous, slight swelling of left hand  Neurology: minimally responsive on sedation  Psychiatry: answers questions appropriately but clearly frustrated         Feeding/Glucose:     None    Recent Labs   Lab 12/30/22  0407 12/29/22  2354 12/29/22  2137 12/29/22  1954 12/29/22  1631 12/29/22  1241   * 100* 117* 100* 88 186*            Medications:       banatrol plus  1 packet Per Feeding Tube TID     Buprenorphine HCl  450 mcg Buccal Q12H GINA (08/20)     chlorhexidine  15 mL Mouth/Throat Q12H     heparin  ANTICOAGULANT  5,000 Units Subcutaneous Q12H     insulin aspart  1-12 Units Subcutaneous Q4H     insulin glargine  10 Units Subcutaneous BID     levalbuterol  0.63 mg Nebulization Q8H     micafungin  100 mg Intravenous Q24H     multivitamins w/minerals  15 mL Per Feeding Tube Daily     pantoprazole  40 mg Intravenous Q24H     QUEtiapine  25 mg Oral or NG Tube TID     sodium chloride (PF)  10 mL Intracatheter Q8H     vancomycin  750 mg Intravenous Q12H          dexmedetomidine 1.2 mcg/kg/hr (12/30/22 0624)     dextrose 1,000 mL (12/30/22 0632)     fentaNYL Stopped (12/29/22 1022)     - MEDICATION INSTRUCTIONS -       midazolam Stopped (12/29/22 1023)     sodium chloride 20 mL/hr at 12/30/22 0240            Labs:       ROUTINE ICU LABS (Last four results)  CMP  Recent Labs   Lab 12/30/22  0512 12/30/22  0407 12/29/22  2354 12/29/22  2137 12/29/22  1954 12/29/22  0738 12/29/22  0441 12/28/22  0419 12/28/22  0418 12/27/22  0921 12/27/22  0445 12/26/22  0440 12/26/22  0437 12/25/22  0758 12/25/22  0627 12/24/22  1233 12/24/22  1052   NA  --   --   --   --   --   --   --   --   --   --   --   --  137  --   --   --  135   POTASSIUM 3.7  --   --   --   --   --  4.1  --  4.5  --  4.5  --  4.2   < > 3.4   < > 3.3*   CHLORIDE  --   --   --   --   --   --   --   --   --   --   --   --  100  --   --   --  94   CO2  --   --   --   --   --   --   --   --   --   --   --   --  34*  --   --   --  35*   ANIONGAP  --   --   --   --   --   --   --   --   --   --   --   --  3  --   --   --  6   GLC  --  139* 100* 117* 100*   < > 351*   < >  --    < > 294*   < > 272*   < >  --    < > 288*   BUN  --   --   --   --   --   --   --   --   --   --   --   --  13  --   --   --  14   CR  --   --   --   --   --   --  0.53  --   --   --  0.60  --  0.52  --  0.65  --  0.69   GFRESTIMATED  --   --   --   --   --   --  >90  --   --   --  >90  --  >90  --  >90  --  >90   AGNES  --   --   --   --   --   --   --   --   --   --   --   --  7.2*  --   --   --   7.3*   MAG 2.2  --   --   --   --   --  2.0  --  2.2  --  2.2  --  2.1  --  2.2   < >  --    PHOS 4.7*  --   --   --   --   --  4.1  --  3.9  --  3.2  --  3.4  --  3.2   < >  --     < > = values in this interval not displayed.     CBC  Recent Labs   Lab 12/30/22  0512 12/29/22  0441 12/28/22  0418 12/27/22  0445   WBC 11.1* 11.0 12.1* 14.6*   RBC 2.39* 2.17* 2.48* 2.63*   HGB 7.2* 6.4* 7.4* 7.9*   HCT 21.5* 20.4* 23.2* 24.2*   MCV 90 94 94 92   MCH 30.1 29.5 29.8 30.0   MCHC 33.5 31.4* 31.9 32.6   RDW 13.6 13.6 13.7 14.0   * 456* 504* 496*     INR  Recent Labs   Lab 12/28/22  1651   INR 1.11     Arterial Blood Gas  Recent Labs   Lab 12/26/22  1009 12/26/22  0042   PH  --  7.53*   PCO2  --  45   PO2  --  70*   HCO3  --  38*   O2PER 65 70         Cultures:      Recent Labs   Lab 12/26/22  0604 12/26/22  0559 12/25/22  2349 12/25/22  0519 12/23/22  1229   CULTURE No growth after 3 days No growth after 3 days 1+ Normal aaron  2+ Staphylococcus aureus MRSA*  Yeast* No growth after 4 days No Growth             Imaging/Other results:     No results found for this or any previous visit (from the past 24 hour(s)).            Assessment and Plan:     Summary:  Deidre N Aasen is a 27 year old female admitted on 12/16/2022 for DKA, respiratory failure from concurrent covid and bacterial pneumonia.  MRSA bacteremia and necrotizing pneumonia and candida bacteremia.  Has been making some improvements but has reached a plateau mostly limited by sedation/agitation and inability to wean from ventilator    Patient adamantly refused trach 12/29 and wanting to leave [facility]. Had extensive discussion and patient knows/understands she would not survive. Now agreeable to tracheostomy    Neuro/ pain/ sedation:  # Toxic metabolic encephalopathy  # Agitation in setting of polysubstance abuse - IV meth and heroin use.  Still on fairly robust doses of sedatives but is mostly awake.  # Substance use disorder  P: Continue current sedative  regimen.  Consult addiction medicine regarding Suboxone prior to transfer.  Tracheostomy with hope for reduced sedation needs  - Psychiatry consult  - off versed and fentanyl gtts  - Continue precedex, may increase to 1.5 if needed  - started buprenorphine 12/29. Schedule as follows per addiction medicine:   1.  Belbuca 450 mcg buccal film q 12 hrs until 12 hrs after last fentanyl in OR       2.  On 12/31, increase to 1 mg buprenorphine bid for 2 doses      3.  On 1/1, increase to 2 mg buprenorphine bid for 2 doses      4.  If not requiring any other opioids for any reason, can then increase to 4 mg bid x 1 day and then 8 mg bid.  Keep on that dose.        If patient is oversedated, hold dose.          If patient develops worsened opioid withdrawal, please page me at [phone]    Pulmonary:   # Complex cavitary pneumonia 2/2 to MRSA pneumonia  Also COVID     # severe ARDS.  Episode of desaturation night of 12/25.  Now seems to be  to her most recent baseline.  #Very limited tolerance for ventilator weaning.  Plan   - Continue current settings. Wean ventilator as able w/ PSTs  - Plan for tracheostomy today w/ thoracic surgery    Cardiovascular:  # septic shock, resolved  # Sinus tachycardia  #CATHY 12/20 negative for vegetations and negative bubble   - no acute issues    Renal/Electrolytes:  Labs are stable.  Was significantly volume overloaded.  She responded well to diuresis.   - Aim for net even    ID:  # Septic shock and MRSA bacteremia. Blood cultures have now cleared.  CATHY did not show endocarditis - Abx course: vanco 12/16- present; unasyn 12/16-12/19; cefepime 12/19-12/24.  Last positive blood cx: MRSA 12/16;# Complicated pneumonia with cavitary lesions.  For the moment no need for any kind of an intervention. Last positive BAL: MRSA 12/16  # Covid positive. Unclear clinical significance  #fungemia--no new positive cultures-  budding yeast 12/19. Anti-fungal course: micafungin 12/21-present, plan  "for 14 day course  # HSV in right groin wounds, had several day course of acyclovir  # Intermittent fevers, resolved    P: Continuing very complicated antimicrobial regimen.  Appreciate advice of infectious disease experts.  Reevaluate for infection if develops a much higher fever. Thus far, fevers resolved, WBC normalized    GI//Nutrition  She is at goal tube feeds. On GI prophylaxis    Musculoskeletal/Rheumatology:  Up in chair and PT today    Endocrine:  #Type 1 diabetes with insulin non complicance, complicated by DKA. Hemoglobin A1C 16.4 upon admission  # Resolved DKA but labile insulin needs  - increase Lantus 20 BID. Continuing with sliding scale    Hematology:  # DVT ppx  #anemia of chronic disease  #acute blood loss anemia. Needing transfusion on 12/23, 12/29. No clinical signs of bleeding  - subcutaneous heparin BID     ICU prophylaxis:      DVT: heparin BID due to weight     VAP: As above     Stress ulcer: ppi daily     Restraints needed: y     Lines   Central Line/PICC - placed 12/21 needed: y   Arterial line - placed n needed: n   [place] catheter.  n/a     Prognosis:  improving    Family update: Father Jeronimo, no updates to any other \"friends\"    "

## 2022-12-30 NOTE — ANESTHESIA PREPROCEDURE EVALUATION
Prior to Admission medications    Medication Sig Start Date End Date Taking? Authorizing Provider   insulin aspart (NOVOLOG PEN) 100 UNIT/ML pen Inject Subcutaneous 3 times daily (with meals) Sliding scale    Reported, Patient   insulin glargine (BASAGLAR KWIKPEN) 100 UNIT/ML pen Inject 20 Units Subcutaneous daily 9/17/19   Edgar Castellano MD     Current Facility-Administered Medications Ordered in Epic   Medication Dose Route Frequency Last Rate Last Admin     acetaminophen (TYLENOL) solution 500 mg  500 mg Oral 4x Daily   500 mg at 12/30/22 0847    Or     acetaminophen (TYLENOL) tablet 500 mg  500 mg Oral 4x Daily         banatrol plus packet 1 packet  1 packet Per Feeding Tube TID   1 packet at 12/28/22 2212     Buprenorphine HCl (BELBUCA) buccal film 450 mcg  450 mcg Buccal Q12H GINA (08/20)   450 mcg at 12/30/22 0821     chlorhexidine (PERIDEX) 0.12 % solution 15 mL  15 mL Mouth/Throat Q12H   15 mL at 12/30/22 0818     dexmedetomidine (PRECEDEX) 400 mcg in 0.9% sodium chloride 100 mL  0.1-1.5 mcg/kg/hr Intravenous Continuous 8.9 mL/hr at 12/30/22 1107 1.1 mcg/kg/hr at 12/30/22 1107     dextrose 10% infusion   Intravenous Continuous PRN 40 mL/hr at 12/30/22 0632 1,000 mL at 12/30/22 0632     glucose gel 15-30 g  15-30 g Oral Q15 Min PRN        Or     dextrose 50 % injection 25-50 mL  25-50 mL Intravenous Q15 Min PRN   25 mL at 12/29/22 0757    Or     glucagon injection 1 mg  1 mg Subcutaneous Q15 Min PRN         For all blood glucose less than 100 mg/dL   Does not apply Continuous PRN         heparin ANTICOAGULANT injection 5,000 Units  5,000 Units Subcutaneous Q12H   5,000 Units at 12/29/22 2121     hydrOXYzine (ATARAX) tablet 25 mg  25 mg Oral or Feeding Tube Q6H PRN   25 mg at 12/30/22 1210    Or     hydrOXYzine (ATARAX) tablet 50 mg  50 mg Oral or Feeding Tube Q6H PRN         insulin aspart (NovoLOG) injection (RAPID ACTING)  1-12 Units Subcutaneous Q4H   2 Units at 12/30/22 1200     insulin glargine  (LANTUS PEN) injection 10 Units  10 Units Subcutaneous BID   10 Units at 12/30/22 0835     levalbuterol (XOPENEX CONC) neb solution 0.63 mg  0.63 mg Nebulization Q8H   0.63 mg at 12/30/22 0737     lidocaine 1 % 0.1-1 mL  0.1-1 mL Other Q1H PRN         micafungin (MYCAMINE) 100 mg in sodium chloride 0.9 % 100 mL intermittent infusion  100 mg Intravenous Q24H 100 mL/hr at 12/30/22 1306 100 mg at 12/30/22 1306     multivitamins w/minerals liquid 15 mL  15 mL Per Feeding Tube Daily   15 mL at 12/30/22 0900     naloxone (NARCAN) injection 0.2 mg  0.2 mg Intravenous Q2 Min PRN        Or     naloxone (NARCAN) injection 0.4 mg  0.4 mg Intravenous Q2 Min PRN        Or     naloxone (NARCAN) injection 0.2 mg  0.2 mg Intramuscular Q2 Min PRN        Or     naloxone (NARCAN) injection 0.4 mg  0.4 mg Intramuscular Q2 Min PRN         ondansetron (ZOFRAN) injection 4 mg  4 mg Intravenous Q6H PRN   4 mg at 12/16/22 0924    Or     ondansetron (ZOFRAN ODT) ODT tab 4 mg  4 mg Oral Q6H PRN         [START ON 12/31/2022] pantoprazole (PROTONIX) 2 mg/mL suspension 40 mg  40 mg Per Feeding Tube QAM AC         prochlorperazine (COMPAZINE) injection 5 mg  5 mg Intravenous Q6H PRN        Or     prochlorperazine (COMPAZINE) tablet 5 mg  5 mg Oral Q6H PRN        Or     prochlorperazine (COMPAZINE) suppository 25 mg  25 mg Rectal Q12H PRN         QUEtiapine (SEROquel) tablet 25 mg  25 mg Oral or NG Tube TID   25 mg at 12/30/22 0900     sodium chloride (PF) 0.9% PF flush 10 mL  10 mL Intracatheter Q8H   10 mL at 12/30/22 1307     sodium chloride (PF) 0.9% PF flush 10-20 mL  10-20 mL Intracatheter q1 min prn         sodium chloride (PF) 0.9% PF flush 3 mL  3 mL Intravenous Q1H PRN         sodium chloride 0.9% infusion   Intravenous Continuous 10 mL/hr at 12/30/22 0800 Rate Change at 12/30/22 0800     vancomycin (VANCOCIN) 750 mg in sodium chloride 0.9 % 250 mL intermittent infusion  750 mg Intravenous Q12H 167 mL/hr at 12/29/22 2148 750 mg at  12/30/22 1032     No current [facility]-ordered outpatient medications on file.       Recent Labs   Lab Test 11/03/15  1929   ABO O   RH  Pos     Recent Labs   Lab Test 12/26/22  1707   HCG Negative     Recent Results (from the past 744 hour(s))   XR Chest Port 1 View    Narrative    EXAM: XR CHEST PORT 1 VIEW  LOCATION: [facility]  DATE/TIME: 12/13/2022 1:28 AM    INDICATION: SOB  COMPARISON: None.      Impression    IMPRESSION: Heart size and pulmonary vascularity normal. Airspace opacities within the right base laterally concerning for pneumonia. Follow-up exam recommended to ensure resolution. The left lung is clear. No effusions.   CTA Abdomen Pelvis with Contrast    Narrative    EXAM: CT ABDOMEN AND PELVIS WITHOUT AND WITH CONTRAST  LOCATION: [facility]  DATE/TIME: 12/13/2022 4:11 AM    INDICATION: Coffee ground emesis.  COMPARISON: None.    TECHNIQUE: CT abdomen and pelvis prior to the administration of intravenous contrast. CT angiogram abdomen and pelvis following the injection of IV contrast during arterial and renal excretory phases. 2D and 3D MIP reconstructions were performed by the   CT technologist. Dose reduction techniques were used.  CONTRAST: 60 mL Isovue 370.    FINDINGS:    GASTROINTESTINAL TRACT: Partial visualization of apparent moderate wall thickening of the distal esophagus. No dilatation of the stomach, small or large bowel. A small amount of stool is scattered within the colon. The appendix is likely visualized and   unremarkable. No pneumatosis or free intraperitoneal gas. No CT evidence of active gastrointestinal bleeding.    LOWER CHEST: Partial visualization of a moderate-sized region of patchy consolidation in the right lower lobe.    HEPATOBILIARY: Unremarkable.    SPLEEN: Unremarkable.    PANCREAS: Unremarkable.    ADRENAL GLANDS: Unremarkable.    KIDNEYS/BLADDER: Unremarkable.    LYMPH NODES: Unremarkable.    PELVIC ORGANS: No acute  findings.    MUSCULOSKELETAL: No acute findings.    OTHER: None.      Impression    IMPRESSION:   1.  Partial visualization of apparent moderate wall thickening of the distal esophagus. This is nonspecific, but could relate to esophagitis. If clinically relevant, an upper endoscopy could be considered for further evaluation.  2.  Partial visualization of a moderate-sized region of patchy consolidation in the right lower lobe. This most likely represents pneumonia.   3.  No other acute abnormality identified in the abdomen or pelvis. No CT evidence of active gastrointestinal bleeding.   CT Chest Pulmonary Embolism w Contrast    Narrative    EXAM: CT CHEST PULMONARY EMBOLISM W CONTRAST  LOCATION: [facility]  DATE/TIME: 12/13/2022 7:48 PM    INDICATION: COVID positive and elevated d dimer. has been having persistent tachycardia  COMPARISON: None.  TECHNIQUE: CT chest pulmonary angiogram during arterial phase injection of IV contrast. Multiplanar reformats and MIP reconstructions were performed. Dose reduction techniques were used.   CONTRAST: isovue 370 100ml    FINDINGS:  ANGIOGRAM CHEST: Pulmonary arteries are normal caliber and negative for pulmonary emboli. Thoracic aorta is negative for dissection.    LUNGS AND PLEURA: Multisegment consolidation in the basal right lower lobe including medial, lateral, and posterior basal segments associated with endoluminal airway debris. In the right lower lobe superior segment and to a lesser extent posterior right   middle lobe there are airway centric inflammatory nodules. The left lung is well-expanded. Minimal subpleural opacities in the basal left lower lobe near the diaphragmatic pleura and similar opacities in the right upper lobe along the anterior pleura   consistent with COVID related pneumonitis. No pleural effusion.    MEDIASTINUM: Mildly enlarged lymph nodes in the right hilum. No mediastinal or left hilar adenopathy. Decompressed esophagus.  Cardiac chambers are normal in size. Normal caliber thoracic aorta. Age-appropriate thymic tissue in the anterior mediastinum.    CORONARY ARTERY CALCIFICATION: None.    UPPER ABDOMEN: Normal.    MUSCULOSKELETAL: Normal.      Impression    IMPRESSION:    1.  No acute pulmonary embolism.  2.  Sparse foci of subpleural groundglass attenuation anterior right upper lobe and left lower lobe near the diaphragmatic pleura, typical for COVID related pneumonitis.  3.  Multisegment right lower lobe consolidation with extensive airway debris and airway centric inflammatory nodules right lower lobe superior segment and lesser extent right middle lobe consistent with pneumonia, likely bacterial.  4.  Mildly enlarged, reactive lymph nodes in the right hilum secondary to #3.   Head CT w/o contrast    Narrative    EXAM: CT HEAD W/O CONTRAST  LOCATION: [facility]  DATE/TIME: 12/16/2022 2:47 AM    INDICATION: AMS  COMPARISON: None.  TECHNIQUE: Routine CT Head without IV contrast. Multiplanar reformats. Dose reduction techniques were used.    FINDINGS:  INTRACRANIAL CONTENTS: No intracranial hemorrhage, extraaxial collection, or mass effect.  No CT evidence of acute infarct. Normal parenchymal attenuation. Normal ventricles and sulci.     VISUALIZED ORBITS/SINUSES/MASTOIDS: No intraorbital abnormality. Incomplete evaluation of the paranasal sinuses demonstrates evidence of pneumatized secretions in the right maxillary and sphenoid sinuses. No middle ear or mastoid effusion.    BONES/SOFT TISSUES: No acute abnormality.      Impression    IMPRESSION:  1.  No acute intracranial pathology.  2.  Evidence of acute sinusitis in the right maxillary and sphenoid sinuses.   XR Chest Port 1 View    Narrative    EXAM: CHEST SINGLE VIEW PORTABLE  LOCATION: [facility]  DATE/TIME: 12/16/2022 3:19 AM    INDICATION: Altered mental state. Known COVID infection.  COMPARISON: 12/13/2022.      Impression     IMPRESSION:   1. A moderate to large-sized region of patchy opacities within the mid and lower right lung. This is nonspecific, but most likely represents pneumonia. On the recent comparison study dated 2022, there was a much smaller patchy nodular opacity at   this location.  2. Within the central aspect of the aforementioned patchy opacity, there is a 5 cm lobulated lucent region that may represent cavitation, possibly an abscess.  3. Interval placement of a right internal jugular central venous catheter distal catheter tip in the right atrium, approximately 1 cm distal to the junction of the superior vena cava and right atrium.   XR Chest Port 1 View    Narrative    CHEST PORTABLE ONE VIEW  2022 11:34 AM       INDICATION: Acute worsening hypoxia.  COMPARISON: 2022.       Impression    IMPRESSION: Cavitary pneumonia in the right lower lung is similar to  previous. No pneumothorax. Right IJ central venous catheter tip is  near the superior atriocaval junction. There is a new endotracheal  tube in good position above the demar. New NG tube in the stomach.       CARLY SULLIVAN MD         SYSTEM ID:  [id number]   Echocardiogram Complete   Result Value    LVEF  60-65%    Narrative    [id number]  [id number]  [id number]  [id number]^ALIA^DEIRDRE     [facility]  Echocardiography Laboratory  [address] [place] [address]     Name: AASEN, DEIDRE N  MRN: [id number]  : 1995  Study Date: 2022 03:07 PM  Age: 27 yrs  Gender: Female  Patient Location: [facility]  Reason For Study: Shock  Ordering Physician: DEIRDRE DAWKINS  Referring Physician: Deirdre Dawkins  Performed By: Yosvany Goncalves     BSA: 1.4 m2  Height: 62 in  Weight: 96 lb  HR: 153  BP: 94/49 mmHg  ______________________________________________________________________________  Procedure  Complete Echo Adult. Optison (NDC #9220-7515) given  intravenously.  ______________________________________________________________________________  Interpretation Summary     1. Left ventricular systolic function is normal. The visual ejection fraction  is 60-65%.  2. No regional wall motion abnormalities noted.  3. The right ventricle is normal in structure, function and size.  4. No evidence for significant valvular pathology.  ______________________________________________________________________________  Left Ventricle  The left ventricle is normal in size. There is normal left ventricular wall  thickness. Left ventricular systolic function is normal. The visual ejection  fraction is 60-65%. Left ventricular diastolic function is indeterminate. No  regional wall motion abnormalities noted.     Right Ventricle  The right ventricle is normal in structure, function and size.     Atria  Normal left atrial size. Right atrial size is normal. There is no color  Doppler evidence of an atrial shunt.     Mitral Valve  The mitral valve is normal in structure and function.     Tricuspid Valve  The tricuspid valve is normal in structure and function. There is mild (1+)  tricuspid regurgitation.     Aortic Valve  The aortic valve is normal in structure and function.     Pulmonic Valve  The pulmonic valve is not well seen, but is grossly normal.     Vessels  Normal size aorta.     Pericardium  There is no pericardial effusion.     Rhythm  Sinus rhythm was noted.  ______________________________________________________________________________  MMode/2D Measurements & Calculations  IVSd: 0.84 cm     LVIDd: 2.7 cm  LVIDs: 2.1 cm  LVPWd: 0.79 cm  FS: 21.0 %  LV mass(C)d: 50.1 grams  LV mass(C)dI: 35.8 grams/m2  Ao root diam: 2.9 cm  LVOT diam: 1.8 cm  LVOT area: 2.6 cm2  RWT: 0.59     Doppler Measurements & Calculations  MV E max ramu: 92.2 cm/sec  MV A max ramu: 109.5 cm/sec  MV E/A: 0.84  MV dec slope: 818.6 cm/sec2  MV dec time: 0.11 sec  PA acc time: 0.08 sec  TR max ramu: 239.8  cm/sec  TR max P.1 mmHg  E/E' av.4  Lateral E/e': 5.8  Medial E/e': 11.0     ______________________________________________________________________________  Report approved by: Uma Price 2022 04:27 PM         XR Chest Port 1 View    Narrative    EXAM: XR CHEST PORT 1 VIEW  LOCATION: [facility]  DATE/TIME: 2022 5:40 AM    INDICATION: evaluate pneumonia  COMPARISON: 2022, 2022.      Impression    IMPRESSION: Interval intubation. Endotracheal tube tip is about 5 cm above the demar. Enteric tube terminates below the diaphragm. Increasing bibasilar airspace opacities and layering pleural effusions. No visible pneumothorax.   CT Chest w Contrast    Narrative    CT CHEST WITH CONTRAST 2022 8:55 AM     HISTORY: Cavitary right lung lesion.    COMPARISON: 2022    TECHNIQUE: Volumetric helical acquisition of CT images of the chest  from the clavicles to the kidneys were acquired after the  administration of 50mL Isovue-370 IV contrast. Radiation dose for this  scan was reduced using automated exposure control, adjustment of the  mA and/or kV according to patient size, or iterative reconstruction  technique.    FINDINGS:     LUNGS AND PLEURA: The examination was not tailored for pulmonary  emboli and contrast opacification in the pulmonary arteries is  limited, no pulmonary embolism demonstrated. Extensive consolidation  throughout the right lower lobe with a cavitary lesion laterally  measuring 5 cm, posteriorly measuring 3.2 cm inferiorly. Patchy  infiltrates in the right upper lobe. Mild patchy infiltrates in the  left upper lobe. Lobar compressive atelectasis and/or infiltrate in  the left lower lobe. Small left effusion.    MEDIASTINUM/AXILLAE: Mildly enlarged lymph nodes in the chest which  are nonspecific, but likely reactive in this setting. No aneurysm.    CORONARY ARTERY CALCIFICATIONS: None.    UPPER ABDOMEN: No acute  findings.    MUSCULOSKELETAL: Normal.      Impression    IMPRESSION:    1. Little to no demonstrated enhancement in the parenchyma of the  right lower lobe, vascularity appears intact with opacification of  pulmonary veins, this lack of apparent enhancement is likely related  to severe edema/pneumonitis.  2. At least two cavitary lesions in the right lower lobe measuring 5  cm laterally, and 3.2 cm posteriorly concerning for pulmonary  abscesses.  3. Suboptimal contrast bolus timing for pulmonary emboli, no definite  pulmonary emboli demonstrated.  4. Lobar atelectasis and/or consolidation in the left lower lobe and  small left effusion.  5. Patchy bilateral upper lobe infiltrates, right worse than left.    LALO STARR MD         SYSTEM ID:  [id number]   Transesophageal Echocardiogram    Narrative    [id number]  [facility]  [id number]  [id number]^HILDA^STACIA     [facility]  Echocardiography Laboratory  [address] [place] [address]     Name: AASEN, DEIDRE N  MRN: [id number]  : 1995  Study Date: 2022 11:23 AM  Age: 27 yrs  Gender: Female  Patient Location: [facility]  Reason For Study: Endocarditis  Ordering Physician: STACIA STEVENS  Referring Physician: STACIA STEVENS  Performed By: Bronwyn Rolle     BSA: 1.4 m2  Height: 62 in  Weight: 96 lb  HR: 89  BP: 105/69 mmHg  ______________________________________________________________________________  Procedure  Complete CATHY Adult. CATHY Probe serial #[id number] was used during the procedure.  The heart rate, respiratory rate and response to care were monitored  throughout the procedure with the assistance of the nurse.  ______________________________________________________________________________  Interpretation Summary     1. No intracardiac mass or thrombus.  2. Normal left ventricular size and systolic function. LVEF 60-65%.  3. Normal right ventricular size and systolic function.  4. Normal cardiac valves. No endocarditis.  5. Bubble study was  negative for inter-atrial shunt.     ______________________________________________________________________________  CATHY  I determined this patient to be an appropriate candidate for the planned  sedation and procedure and have reassessed the patient immediately prior to  sedation and procedure. Total sedation time: 13 minutes of continuous bedside  1:1 monitoring. CATHY done in the ICU under sedation. The transesophageal probe  was passed without difficulty. There were no complications associated with  this procedure. Prior to the exam, the oral cavity was checked and no  overcrowding was noted. Consent to the procedure was obtained prior to  sedation.     Left Ventricle  The left ventricle is normal in size. Left ventricular systolic function is  normal. No regional wall motion abnormalities noted. There is no thrombus seen  in the left ventricle.     Right Ventricle  The right ventricle is normal size. The right ventricular systolic function is  normal. There is no mass or thrombus in the right ventricle.     Atria  Normal left atrial size. No left atrial mass or thrombus visualized. Right  atrial size is normal. No evidence of right atrial mass/thrombus. Intact  atrial septum. There is no color Doppler evidence of an atrial shunt. A  contrast injection (Bubble Study) was performed that was negative for flow  across the interatrial septum. The left atrial appendage was well visualized  and free of thrombus.     Mitral Valve  The mitral valve leaflets appear normal. There is no evidence of stenosis,  fluttering, or prolapse. There is trace mitral regurgitation. There is no  mitral valve stenosis.     Tricuspid Valve  Normal tricuspid valve. There is trace tricuspid regurgitation. This degree of  valvular regurgitation is within normal limits. There is no tricuspid  stenosis.     Aortic Valve  The aortic valve is trileaflet. No aortic regurgitation is present. No aortic  stenosis is present.     Pulmonic  Valve  Normal pulmonic valve. This degree of valvular regurgitation is within normal  limits. There is no pulmonic valvular stenosis.     Vessels  The aortic root is normal size. 2.8 cm. Normal size ascending aorta. 2.4 cm.  Normal ascending, transverse (arch), and descending aorta. Normal pulmonary  venous drainage.     Pericardial/Pleural  There is no pericardial effusion.  ______________________________________________________________________________  MMode/2D Measurements & Calculations  Ao root diam: 2.8 cm     asc Aorta Diam: 2.4 cm     ______________________________________________________________________________  Report approved by: Dr Lazarus Chowdhury 12/20/2022 12:48 PM         XR Chest Port 1 View    Narrative    CHEST ONE VIEW  12/20/2022 2:18 PM     HISTORY: Inability to maintain O2 sats.    COMPARISON: December 19, 2022      Impression    IMPRESSION: Endotracheal tube tip 3.4 cm from the demar. Cavitary  lesion again noted at the right base. Extensive right-sided  consolidation similar to previous. Left lower lobe consolidation  probably not significantly changed given differences in rotation.    LALO STARR MD         SYSTEM ID:  [id number]   XR Abdomen Port 1 View    Narrative    XR ABDOMEN PORT 1 VIEW 12/20/2022 3:51 PM    HISTORY: feeding tube placement    COMPARISON: None.      Impression    IMPRESSION: Feeding tube in good position with tip at the duodenal  jejunal junction.    CARLY SULLIVAN MD         SYSTEM ID:  [id number]   CT Chest w Contrast    Narrative    CT CHEST WITH CONTRAST  12/23/2022 12:02 PM    CLINICAL HISTORY: Interval evaluation of right chest  cavitations/infection.    TECHNIQUE: CT chest with IV contrast. Multiplanar reformats were  obtained. Dose reduction techniques were used.    CONTRAST:  64 mL isovue 370    COMPARISON: 12/19/2022    FINDINGS:   LUNGS AND PLEURA: Peribronchial infiltrates again seen throughout both  upper lobes and right middle lobe. In the right upper  lobe, previously  seen nodular infiltrate has become cavitary. The right middle lobe  cavity has decreased fluid when compared to previous. There remains  dense consolidation both lower lobes, with air bronchograms. There is  a 4 cm cavity in the lateral right lower lobe, stable to slightly  smaller. Subcentimeter cavity in the medial right lower lobe, slightly  decreased. There are new small cavitary spaces in the anterior right  lower lobe. No significant pleural effusion on the right. Small left  pleural effusion.    MEDIASTINUM/AXILLAE: Endotracheal tube in good position above the  demar. Mildly enlarged lymph nodes should be reactive. No coronary  artery calcification.    UPPER ABDOMEN: No significant finding.    MUSCULOSKELETAL: Unremarkable.      Impression    IMPRESSION: Cavitary pneumonia bilaterally. The largest cavities in  the right lower lobe appear decreased in size; however, there are  multiple new small cavities in the right lung.    CARLY SULLIVAN MD         SYSTEM ID:  [id number]   XR Chest Port 1 View    Narrative    EXAM: XR CHEST PORT 1 VIEW  LOCATION: [facility]  DATE/TIME: 12/24/2022 6:15 PM    INDICATION: Check ET tube placement.  COMPARISON: 12/20/2022      Impression    IMPRESSION: Endotracheal tube is 2.5 cm above the demar. There is enteric feeding tube tip below diaphragm and right-sided PICC tip in high right atrium. Heart size and vascularity are normal. Mid and lower lung predominant consolidative airspace   opacities have improved. Small bilateral pleural effusions unchanged. No pneumothorax.   XR Chest Port 1 View    Narrative    EXAM: XR CHEST PORT 1 VIEW  LOCATION: [facility]  DATE/TIME: 12/25/2022 9:20 PM    INDICATION: desatting  COMPARISON: 2/24/2022      Impression    IMPRESSION: ET tube remains in good position with tip 3.5 cm above demar. Feeding tube and right PICC line also appear in good position. Left PICC line has  its tip at the level of left subclavian vein, unchanged.    Heart size normal. No change in patchy dense bilateral lung consolidation consistent with pneumonia   MR Thoracic Spine w/o Contrast    Narrative    EXAM: MR CERVICAL SPINE W/O CONTRAST, MR LUMBAR SPINE W/O CONTRAST, MR THORACIC SPINE W/O CONTRAST  LOCATION: [facility]  DATE/TIME: 12/26/2022 9:55 PM    INDICATION: mrsa bacteremia, concern for spinal abscess diskitis  COMPARISON: None.  TECHNIQUE:   1) MRI Cervical Spine without IV contrast.  2) MRI Thoracic Spine without IV contrast.  3) MRI Lumbar Spine without IV contrast. Sagittal T2, T1, and STIR sequences performed.    FINDINGS:    CERVICAL SPINE:   Normal vertebral body heights. Straightening of cervical lordosis. No abnormal cord signal. Mild edema posterior paraspinal soft tissues cervicothoracic region.    Craniovertebral junction and C1-C2: Normal.    C2-C3: Normal disc height. No herniation. Normal facets. No spinal canal or neural foraminal stenosis.     C3-C4: Normal disc height. No herniation. Normal facets. No spinal canal or neural foraminal stenosis.     C4-C5: Normal disc height. No herniation. Normal facets. No spinal canal or neural foraminal stenosis.     C5-C6: Normal disc height. No herniation. Normal facets. No spinal canal or neural foraminal stenosis.     C6-C7: Normal disc height. No herniation. Normal facets. No spinal canal or neural foraminal stenosis.     C7-T1: Normal disc height. No herniation. Normal facets. No spinal canal or neural foraminal stenosis.    THORACIC SPINE:  Normal vertebral body heights, alignment and marrow signal. Normal disc heights. No herniation. Normal facets. No spinal canal or neural foraminal stenosis. No abnormal cord signal.     Infiltrates, atelectasis, and effusion in lungs bilaterally, right more than left.  Prominent infiltrates, atelectasis, and pleural effusion in both lungs, right more than left. Mild edema  posterior paraspinal soft tissues.    LUMBAR SPINE:   Normal vertebral body heights and alignment. Normal distal cord with cord was extending to L1-L2. No significant canal or foraminal narrowing at any level. Mild edema posterior paraspinal soft tissues. Axial images not performed.       Impression    IMPRESSION:    CERVICAL SPINE MRI:  1.  Normal cervical cord.  2.  No significant canal or foraminal narrowing at any level.  3.  With limitations of a noncontrast examination, no definite discitis osteomyelitis complex or an infectious process within the spinal canal.    THORACIC SPINE MRI:  1.  Normal thoracic cord.  2.  No significant canal or foraminal narrowing at any level.  3.  With limitations of a noncontrast examination, no definite discitis osteomyelitis complex or an infectious process within the spinal canal.    LUMBAR SPINE MRI:  1.  Normal distal cord.  2.  No significant canal or foraminal narrowing at any level.  3.  With limitations of a noncontrast examination, no definite discitis osteomyelitis complex or an infectious process within the spinal canal.   MR Cervical Spine w/o Contrast    Narrative    EXAM: MR CERVICAL SPINE W/O CONTRAST, MR LUMBAR SPINE W/O CONTRAST, MR THORACIC SPINE W/O CONTRAST  LOCATION: [facility]  DATE/TIME: 12/26/2022 9:55 PM    INDICATION: mrsa bacteremia, concern for spinal abscess diskitis  COMPARISON: None.  TECHNIQUE:   1) MRI Cervical Spine without IV contrast.  2) MRI Thoracic Spine without IV contrast.  3) MRI Lumbar Spine without IV contrast. Sagittal T2, T1, and STIR sequences performed.    FINDINGS:    CERVICAL SPINE:   Normal vertebral body heights. Straightening of cervical lordosis. No abnormal cord signal. Mild edema posterior paraspinal soft tissues cervicothoracic region.    Craniovertebral junction and C1-C2: Normal.    C2-C3: Normal disc height. No herniation. Normal facets. No spinal canal or neural foraminal stenosis.     C3-C4:  Normal disc height. No herniation. Normal facets. No spinal canal or neural foraminal stenosis.     C4-C5: Normal disc height. No herniation. Normal facets. No spinal canal or neural foraminal stenosis.     C5-C6: Normal disc height. No herniation. Normal facets. No spinal canal or neural foraminal stenosis.     C6-C7: Normal disc height. No herniation. Normal facets. No spinal canal or neural foraminal stenosis.     C7-T1: Normal disc height. No herniation. Normal facets. No spinal canal or neural foraminal stenosis.    THORACIC SPINE:  Normal vertebral body heights, alignment and marrow signal. Normal disc heights. No herniation. Normal facets. No spinal canal or neural foraminal stenosis. No abnormal cord signal.     Infiltrates, atelectasis, and effusion in lungs bilaterally, right more than left.  Prominent infiltrates, atelectasis, and pleural effusion in both lungs, right more than left. Mild edema posterior paraspinal soft tissues.    LUMBAR SPINE:   Normal vertebral body heights and alignment. Normal distal cord with cord was extending to L1-L2. No significant canal or foraminal narrowing at any level. Mild edema posterior paraspinal soft tissues. Axial images not performed.       Impression    IMPRESSION:    CERVICAL SPINE MRI:  1.  Normal cervical cord.  2.  No significant canal or foraminal narrowing at any level.  3.  With limitations of a noncontrast examination, no definite discitis osteomyelitis complex or an infectious process within the spinal canal.    THORACIC SPINE MRI:  1.  Normal thoracic cord.  2.  No significant canal or foraminal narrowing at any level.  3.  With limitations of a noncontrast examination, no definite discitis osteomyelitis complex or an infectious process within the spinal canal.    LUMBAR SPINE MRI:  1.  Normal distal cord.  2.  No significant canal or foraminal narrowing at any level.  3.  With limitations of a noncontrast examination, no definite discitis osteomyelitis  complex or an infectious process within the spinal canal.   MR Lumbar Spine w/o Contrast    Narrative    EXAM: MR CERVICAL SPINE W/O CONTRAST, MR LUMBAR SPINE W/O CONTRAST, MR THORACIC SPINE W/O CONTRAST  LOCATION: [facility]  DATE/TIME: 12/26/2022 9:55 PM    INDICATION: mrsa bacteremia, concern for spinal abscess diskitis  COMPARISON: None.  TECHNIQUE:   1) MRI Cervical Spine without IV contrast.  2) MRI Thoracic Spine without IV contrast.  3) MRI Lumbar Spine without IV contrast. Sagittal T2, T1, and STIR sequences performed.    FINDINGS:    CERVICAL SPINE:   Normal vertebral body heights. Straightening of cervical lordosis. No abnormal cord signal. Mild edema posterior paraspinal soft tissues cervicothoracic region.    Craniovertebral junction and C1-C2: Normal.    C2-C3: Normal disc height. No herniation. Normal facets. No spinal canal or neural foraminal stenosis.     C3-C4: Normal disc height. No herniation. Normal facets. No spinal canal or neural foraminal stenosis.     C4-C5: Normal disc height. No herniation. Normal facets. No spinal canal or neural foraminal stenosis.     C5-C6: Normal disc height. No herniation. Normal facets. No spinal canal or neural foraminal stenosis.     C6-C7: Normal disc height. No herniation. Normal facets. No spinal canal or neural foraminal stenosis.     C7-T1: Normal disc height. No herniation. Normal facets. No spinal canal or neural foraminal stenosis.    THORACIC SPINE:  Normal vertebral body heights, alignment and marrow signal. Normal disc heights. No herniation. Normal facets. No spinal canal or neural foraminal stenosis. No abnormal cord signal.     Infiltrates, atelectasis, and effusion in lungs bilaterally, right more than left.  Prominent infiltrates, atelectasis, and pleural effusion in both lungs, right more than left. Mild edema posterior paraspinal soft tissues.    LUMBAR SPINE:   Normal vertebral body heights and alignment. Normal  distal cord with cord was extending to L1-L2. No significant canal or foraminal narrowing at any level. Mild edema posterior paraspinal soft tissues. Axial images not performed.       Impression    IMPRESSION:    CERVICAL SPINE MRI:  1.  Normal cervical cord.  2.  No significant canal or foraminal narrowing at any level.  3.  With limitations of a noncontrast examination, no definite discitis osteomyelitis complex or an infectious process within the spinal canal.    THORACIC SPINE MRI:  1.  Normal thoracic cord.  2.  No significant canal or foraminal narrowing at any level.  3.  With limitations of a noncontrast examination, no definite discitis osteomyelitis complex or an infectious process within the spinal canal.    LUMBAR SPINE MRI:  1.  Normal distal cord.  2.  No significant canal or foraminal narrowing at any level.  3.  With limitations of a noncontrast examination, no definite discitis osteomyelitis complex or an infectious process within the spinal canal.   XR Chest Port 1 View    Narrative    CHEST ONE VIEW  2022 10:25 AM     HISTORY: Pneumonia    COMPARISON: 2022      Impression    IMPRESSION: Endotracheal tube tip 4 cm from the demar. Enteric tube  tip below the margin of the study. PICC line stable from the right.  Moderately extensive infiltrates on the right similar to previous.  Question minimal pleural fluid bilaterally. Minimal atelectasis and/or  infiltrate on the left similar to previous.   Anesthesia Pre-Procedure Evaluation    Patient: Deidre N Aasen   MRN: [id number] : 1995        Procedure : Procedure(s):  TRACHEOSTOMY ,          Past Medical History:   Diagnosis Date     Anxiety      Chronic malnutrition      Depressive disorder      Diabetes mellitus      Heroin abuse      Methamphetamine abuse      Noncompliance with medication regimen       No past surgical history on file.   Allergies   Allergen Reactions     Ciprofloxacin Other (See Comments)     Vomiting;  diarrhea      Social History     Tobacco Use     Smoking status: Never     Smokeless tobacco: Never   Substance Use Topics     Alcohol use: No      Wt Readings from Last 1 Encounters:   12/30/22 32.2 kg (70 lb 15.8 oz)        Anesthesia Evaluation   Pt has had prior anesthetic.     No history of anesthetic complications       ROS/MED HX  ENT/Pulmonary: Comment: resp failure    (+) recent URI, unresolved,  (-) sleep apnea   Neurologic:    (-) no CVA   Cardiovascular:    (-) CAD   METS/Exercise Tolerance:     Hematologic:       Musculoskeletal:       GI/Hepatic:    (-) GERD   Renal/Genitourinary:     (+) renal disease, type: ARF,     Endo:     (+) type I DM,  (-) Type II DM   Psychiatric/Substance Use: Comment: Previous heroine useage    (+) H/O chronic opiod use . Recreational drug usage: Meth and Other (Comment).    Infectious Disease:       Malignancy:       Other:            Physical Exam    Airway   unable to assess          Respiratory Devices and Support    ETT:      Dental    unable to assess        Cardiovascular   cardiovascular exam normal          Pulmonary    Unable to assess               OUTSIDE LABS:  CBC:   Lab Results   Component Value Date    WBC 11.1 (H) 12/30/2022    WBC 11.0 12/29/2022    HGB 7.2 (L) 12/30/2022    HGB 6.4 (LL) 12/29/2022    HCT 21.5 (L) 12/30/2022    HCT 20.4 (L) 12/29/2022     (H) 12/30/2022     (H) 12/29/2022     BMP:   Lab Results   Component Value Date     12/26/2022     12/24/2022    POTASSIUM 3.7 12/30/2022    POTASSIUM 4.1 12/29/2022    CHLORIDE 100 12/26/2022    CHLORIDE 94 12/24/2022    CO2 34 (H) 12/26/2022    CO2 35 (H) 12/24/2022    BUN 13 12/26/2022    BUN 14 12/24/2022    CR 0.53 12/29/2022    CR 0.60 12/27/2022     (H) 12/30/2022     (H) 12/30/2022     COAGS:   Lab Results   Component Value Date    PTT 38 12/22/2022    INR 1.11 12/28/2022    FIBR  12/22/2022      Comment:      Underfilled, redraw   This is a corrected result.  Previous result was 517 mg/dL on 12/22/2022 at  1:25 PM CST     POC:   Lab Results   Component Value Date    BGM >600 (HH) 09/17/2019    HCG Negative 12/26/2022    HCGS Negative 12/16/2022     HEPATIC:   Lab Results   Component Value Date    ALBUMIN 1.2 (L) 12/22/2022    PROTTOTAL 4.8 (L) 12/22/2022    ALT 32 12/22/2022    AST 46 (H) 12/22/2022    ALKPHOS 78 12/22/2022    BILITOTAL 0.3 12/22/2022     OTHER:   Lab Results   Component Value Date    PH 7.53 (H) 12/26/2022    LACT 1.4 12/17/2022    A1C 16.4 (H) 12/13/2022    AGNES 7.2 (L) 12/26/2022    PHOS 4.7 (H) 12/30/2022    MAG 2.2 12/30/2022    LIPASE 47 (L) 12/16/2022    TSH 0.46 12/13/2022    .0 (H) 12/24/2022       Anesthesia Plan    ASA Status:  4   NPO Status:  NPO Appropriate    Anesthesia Type: General.     - Airway: ETT   Induction: Intravenous.   Maintenance: Balanced.        Consents    Anesthesia Plan(s) and associated risks, benefits, and realistic alternatives discussed. Questions answered and patient/representative(s) expressed understanding.    - Discussed:     - Discussed with:  Patient         Postoperative Care    Pain management: IV analgesics.   PONV prophylaxis: Ondansetron (or other 5HT-3)     Comments:                    Chari Rick MD, MD

## 2022-12-30 NOTE — PROGRESS NOTES
[facility] ICU RESPIRATORY NOTE           Date of Admission: 12/16/2022     Date of Intubation (most recent): 12/16/2022    Reason for Mechanical Ventilation: Resp. failure      Number of Days on Mechanical Ventilation: 14     Met Criteria for Spontaneous Breathing Trial: NO     Significant event today :None     ABG Results:   Recent Labs   Lab 12/26/22  1009 12/26/22  0042   PH  --  7.53*   PCO2  --  45   PO2  --  70*   HCO3  --  38*   O2PER 65 70     Current Vent Settings:  Vent Mode: CMV/AC  (Continuous Mandatory Ventilation/ Assist Control)  FiO2 (%): 50 %  Resp Rate (Set): 16 breaths/min  Tidal Volume (Set, mL): 400 mL  PEEP (cm H2O): 8 cmH2O  Resp: 17     Skin Assessment :Intact      Plan: Continue full ventilatory support and wean as tolerated.     Mir Sorensen, RT

## 2022-12-30 NOTE — PROGRESS NOTES
[facility] ICU RESPIRATORY NOTE        Date of Admission: 12/16/2022    Date of Intubation (most recent): 12/26/2022    Reason for Mechanical Ventilation: Respiratory failure     Number of Days on Mechanical Ventilation: 14    Met Criteria for Spontaneous Breathing Trial: No    Reason for No Spontaneous Breathing Trial: Per MD     Significant Events Today: None     ABG Results:   Recent Labs   Lab 12/26/22  1009 12/26/22  0042   PH  --  7.53*   PCO2  --  45   PO2  --  70*   HCO3  --  38*   O2PER 65 70       Current Vent Settings: Vent Mode: CMV/AC  (Continuous Mandatory Ventilation/ Assist Control)  FiO2 (%): 50 %  Resp Rate (Set): 16 breaths/min  Tidal Volume (Set, mL): 400 mL  PEEP (cm H2O): 8 cmH2O  Resp: 16      Skin Assessment: Skin intact     Plan: Continue full vent support and wean as tolerated     RT German on 12/30/2022 at 1:55 PM

## 2022-12-30 NOTE — PROGRESS NOTES
CLINICAL NUTRITION SERVICES - REASSESSMENT NOTE      RECOMMENDATIONS FOR MD/PROVIDER TO ORDER:   Recommend discontinue D10 containing IVF when TF begins.   Recommendations Ordered by Registered Dietitian (RD):   When ready to resume TF, change product to [employer] Peptide 1.5 at 20 mL/hr; after 8 hrs increase to goal 40 mL/hr = 1440 kcals (40 kcal/kg), 65 gm pro (1.8 gm/kg), 733 mL H20, 133 gm CHO, 9 gm fiber    Cancel Banatrol.   Malnutrition: (12/21)  % Weight Loss:  Large wt fluctuations - unable to assess true wt loss  % Intake:  </= 50% for >/= 5 days (severe malnutrition)  Subcutaneous Fat Loss:  Orbital region severe depletion (visual only d/t COVID isolation)  Muscle Loss:  Temporal region severe depletion (visual only d/t COVID isolation)  Fluid Retention:  Severe (4+ generalized)     Malnutrition Diagnosis: Severe malnutrition  In Context of:  Acute illness or injury  Chronic illness or disease  Environmental or social circumstances        EVALUATION OF PROGRESS TOWARD GOALS   Diet:  NPO on vent    Nutrition Support:  Previous TF was as below:    Nutrition Support Enteral:  Type of Feeding Tube: ND  Enteral Frequency:  Continuous  Enteral Regimen:  Osmolite 1.5 to 40 mL/hr   Total Enteral Provisions: 1440 kcals (40 kcal/kg), 60 gm pro (1.7 gm/kg), 732 mL H20, 196 gm CHO, no fiber   Free Water Flush: 100 mL every 4 hrs  MVI-M daily    Banatrol TID = 120 kcal and 6 g fiber   Total (TF + Banatrol): 1560 kcal (43 kcal/kg and 108% needs), 6 g fiber      - TF had been running at goal 40 mL/hr up until FT became clogged last night.  - TF continues to be on hold for trach planned this pm.  - Was asked to discontinue the Banatrol as RN suspects it caused the clogged FT.    - IVF D10 at 40 mL/hr = 96 gm CHO, 326 kcals    Intake/Tolerance:    Stool Pattern:  12/27:  375 mL  12/28:  None  12/29:  x3  12/30:  None thus far  K 3.7 (NL), Mg 2.2 (NL), Phos 4.7 (H).  BGM: 186, 88, 100, 117, 100, 139, 188 - High Resistant  sliding scale insulin + Lantus 10 Units BID  I/O: 3395/3350. Wt: 32.2 kg (down 5.6 kg from a few days ago and down 4 kg from yesterday). Question accuracy of today's weight, however.  BMI: 12.58 (62% IBW). Pt with mild generalized edema.     ASSESSED NUTRITION NEEDS: (Modified needs using lower DW from yesterday)    Dosing Weight:  36.2 kg (12/29)  Estimated Energy Needs: 5788-5657 kcals (35-40 Kcal/Kg)  Justification: Underweight, vented   Estimated Protein Needs: 54-72 grams protein (1.5-2 g pro/Kg)   Justification: Stress, Repletion    NEW FINDINGS:   Patient has agreed to having a trach, which is planned later today.  No PEG planned at this time.    Previous Goals (12/27):   TF will continue to meet % estimated needs.  Evaluation: Not met    Previous Nutrition Diagnosis (12/27):   Predicted suboptimal nutrient intake (energy and protein) related to Propofol off, BMI only 14.7, and meeting lower end of estimated needs.  Evaluation: Declining    CURRENT NUTRITION DIAGNOSIS  Inadequate protein-energy intake related to TF on hold as evidenced by D10 IVF meeting 26% energy and 0% protein needs and TF resumption planned later today.    INTERVENTIONS  Recommendations / Nutrition Prescription  When ready to resume TF, change product to [employer] Peptide 1.5 at 20 mL/hr; after 8 hrs increase to goal 40 mL/hr = 1440 kcals (40 kcal/kg), 65 gm pro (1.8 gm/kg), 733 mL H20, 133 gm CHO, 9 gm fiber    Cancel Banatrol.    Recommend discontinue D10 containing IVF when TF begins.    Implementation  Collaboration and Referral of Nutrition care - Pt was discussed during ICU interdisciplinary rounds this morning.  EN Composition, EN Schedule, and Medical Food Supplement - TF orders modified in Epic as below.    Goals  TF goal [employer] Peptide 1.5 at 40 mL/hr will meet % estimated needs.  Stool pattern will improve (less frequency, more formed)    MONITORING AND EVALUATION:  Progress towards goals will be monitored and  evaluated per protocol and Practice Guidelines    Bhavya Nicholson, JASMIN, LD, CNSC

## 2022-12-30 NOTE — PROGRESS NOTES
Critical Care Services Attending Note:     Please see resident/fellow Dr. Barnes, note from today, 12/30, for details of physical exam, history, medications and labs.  I agree with assessment and plan as documented in said note, with main points listed below.     Critical Care Services Attending Note:     Please see resident/fellow Dr. Barnes, note from today, 12/29, for details of physical exam, history, medications and labs.  I agree with assessment and plan as documented in said note, with main points listed below.      Deidre N Aasen  is a 27 year old female admitted on 12/16/2022 for necrotizing pneumonia, COVID, MRSA bacteremia and fungemia.     Deidre N Aasen remains critically ill with severe respiratory distress and hypoxic respiratory failure     I personally examined and evaluated the patient today. I have evaluated all laboratory values and imaging studies from the past 24 hours.  I personally managed the ventilator and sedation.      Key findings today include: Stable overnight. Tracheostomy planned for this morning. Glucoses at acceptable level with NPO and half dose insulin.  Suboxone ordered. Continued purulent secretions     Key decisions made by me today include:  - CXR   - Closely monitoring blood sugars perisurgery  - Other per fellow note     Consults ongoing and ordered are Psychiatry, Surgery, and ID  Procedures that will happen today are: tracheostomy  The patient s prognosis today is tentative        All treatments were placed at my direction.  I formulated today s plan with the house staff team or resident(s) and agree with the findings and plan in the associated note.            I spent a total of 40 minutes (excluding procedure time) personally providing and directing critical care services at the bedside and on the critical care unit for Deidre N Aasen.               aL Lira MD  [phone]

## 2022-12-30 NOTE — ANESTHESIA POSTPROCEDURE EVALUATION
Patient: Deidre N Aasen    Procedure: Procedure(s):  PLACEMENT OF TRACHEOSTOMY TUBE       Anesthesia Type:  General    Note:     Postop Pain Control: Uneventful            Sign Out: Well controlled pain   PONV: No   Neuro/Psych: Uneventful            Sign Out: Acceptable/Baseline neuro status   Airway/Respiratory: Uneventful            Sign Out: AIRWAY IN SITU/Resp. Support   CV/Hemodynamics: Uneventful            Sign Out: Acceptable CV status; No obvious hypovolemia; No obvious fluid overload   Other NRE:    DID A NON-ROUTINE EVENT OCCUR? No           Last vitals:  Vitals:    12/30/22 1100 12/30/22 1137 12/30/22 1200   BP: 100/66  102/70   Pulse: 57  63   Resp:   16   Temp:      SpO2: 100% 97% 97%       Electronically Signed By: Chari Rick MD, MD  December 30, 2022  4:50 PM

## 2022-12-30 NOTE — ANESTHESIA CARE TRANSFER NOTE
Patient: Deidre N Aasen    Procedure: Procedure(s):  PLACEMENT OF TRACHEOSTOMY TUBE       Diagnosis: Acute respiratory failure with hypoxia (H) [J96.01]  Diagnosis Additional Information: No value filed.    Anesthesia Type:   General     Note:    Oropharynx: Trach in place.  Level of Consciousness: iatrogenic sedation    Level of Supplemental Oxygen (L/min / FiO2): 20  Independent Airway: airway patency not satisfactory and stable  Dentition: dentition unchanged  Vital Signs Stable: post-procedure vital signs reviewed and stable  Report to RN Given: handoff report given  Patient transferred to: ICU  Comments: Patient connected to SpO2, EKG, and arterial blood pressure transport monitors and accompanied by CRNA to ICU room. Patient ventilated by CRNA with ambu via ETT with O2 at 8 liters per minute during transport.     On arrival to ICU, equal, bilateral breath sounds auscultated in ICU room, patient placed on ICU ventilator by respiratory therapist, teeth and oral mucosa intact and unchanged at handoff of care. At anesthesia handoff of care, clinical monitors and alarms on and functioning, report on patient's clinical status given to ICU RN, ICU staff questions answered.  ICU Handoff: Call for PAUSE to initiate/utilize ICU HANDOFF, Identified Patient, Identified Responsible Provider, Reviewed the Pertinent Medical History, Discussed Surgical Course, Reviewed Intra-OP Anesthesia Management and Issues during Anesthesia, Set Expectations for Post Procedure Period and Allowed Opportunity for Questions and Acknowledgement of Understanding      Vitals:  Vitals Value Taken Time   BP     Temp     Pulse     Resp     SpO2         Electronically Signed By: INO Gramajo CRNA  December 30, 2022  4:39 PM

## 2022-12-30 NOTE — CONSULTS
[facility]  Initial Psychiatric Consult   Consult date: December 30, 2022         Reason for Consult, requesting source:    Hx of substance abuse admitted for MRSA PNA, refusing cares and wanting to leave AMA.  Requesting source: Chad Guzman Gregg        HPI:   Deidre N Aasen is a 27 year old female who was admitted to [facility] on 12/16/22 with shortness of breath, confusion, and behavior change. Found to have severe diabetic ketoacidosis and necrotizing pneumonia. Has also developed acute kidney injury with tubular necrosis. History notable for DM type I.  Psychiatry is consulted for evaluation and medication recommendations. Of note, patient was evaluated by addiction medicine yesterday, who initiated buprenorphine micro-induction.    Attempted to see patient yesterday, but was too somnolent to participate in interview. Upon return today, patient continues to appear somnolent but is a bit more participatory. She endorses sleeping okay at night.  She does report feeling increasingly depressed here in the hospital. She denies that she felt depressed prior to hospitalization. Denies any history of treatment for mood or anxiety. She does endorse some passive thoughts about wanting to die but denies any plans or intent to harm herself. She agrees to reach out to nursing staff should these thoughts worsen. She endorses feeling anxious, is worried about the procedure later this afternoon. She does not feel as though the quetiapine is helping to manage her anxiety. She requests something else to help with her anxiety symptoms.         Past Psychiatric History:   No known hx of psychiatric diagnoses. Denies any past psychotropic medication trials. Does not have a psychiatrist or therapist. No hx of psychiatric hospitalizations, suicide attempts, or ECT.         Substance Use and History:   Patient with history of methamphetamine and heroin/fentanyl use. UDS positive for  amphetamines on admission. Use pattern has been daily, used as recently as the day of admission, per addiction medicine. Patient does have a history of CD treatment at [facility].         Past Medical History:   PAST MEDICAL HISTORY:   Past Medical History:   Diagnosis Date     Anxiety      Chronic malnutrition      Depressive disorder      Diabetes mellitus      Heroin abuse      Methamphetamine abuse      Noncompliance with medication regimen        PAST SURGICAL HISTORY: History reviewed. No pertinent surgical history.          Family History:   FAMILY HISTORY: History reviewed. No pertinent family history.        Social History:   Unable to obtain social history. Per chart review, patient lives in [place]. Her father lives out of state in [place] and does not have much contact with her. Unknown if she was working prior to hospitalization.          Physical ROS:   The patient endorsed anxiety.  The remainder of 10-point review of systems was negative except as noted in HPI.         PTA Medications:     Medications Prior to Admission   Medication Sig Dispense Refill Last Dose     insulin aspart (NOVOLOG PEN) 100 UNIT/ML pen Inject Subcutaneous 3 times daily (with meals) Sliding scale        insulin glargine (BASAGLAR KWIKPEN) 100 UNIT/ML pen Inject 20 Units Subcutaneous daily 6 mL 0           Allergies:     Allergies   Allergen Reactions     Ciprofloxacin Other (See Comments)     Vomiting; diarrhea          Labs:     Recent Results (from the past 48 hour(s))   Glucose by meter    Collection Time: 12/28/22 12:29 PM   Result Value Ref Range    GLUCOSE BY METER POCT 178 (H) 70 - 99 mg/dL   Glucose by meter    Collection Time: 12/28/22  4:50 PM   Result Value Ref Range    GLUCOSE BY METER POCT 139 (H) 70 - 99 mg/dL   INR    Collection Time: 12/28/22  4:51 PM   Result Value Ref Range    INR 1.11 0.85 - 1.15   Adult Type and Screen    Collection Time: 12/28/22  4:51 PM   Result Value Ref Range    ABO/RH(D) O POS      Antibody Screen Negative Negative    SPECIMEN EXPIRATION DATE [id number]    EKG 12-lead, tracing only    Collection Time: 12/28/22  5:36 PM   Result Value Ref Range    Systolic Blood Pressure  mmHg    Diastolic Blood Pressure  mmHg    Ventricular Rate 85 BPM    Atrial Rate 85 BPM    MT Interval 134 ms    QRS Duration 72 ms     ms    QTc 442 ms    P Axis 65 degrees    R AXIS 64 degrees    T Axis 52 degrees    Interpretation ECG       Sinus rhythm  Normal ECG  When compared with ECG of 25-DEC-2022 21:28,  No significant change was found     Glucose by meter    Collection Time: 12/28/22  8:10 PM   Result Value Ref Range    GLUCOSE BY METER POCT 118 (H) 70 - 99 mg/dL   Glucose by meter    Collection Time: 12/28/22 10:34 PM   Result Value Ref Range    GLUCOSE BY METER POCT 109 (H) 70 - 99 mg/dL   Glucose by meter    Collection Time: 12/29/22 12:23 AM   Result Value Ref Range    GLUCOSE BY METER POCT 72 70 - 99 mg/dL   Glucose by meter    Collection Time: 12/29/22  1:06 AM   Result Value Ref Range    GLUCOSE BY METER POCT 59 (L) 70 - 99 mg/dL   Glucose by meter    Collection Time: 12/29/22  1:25 AM   Result Value Ref Range    GLUCOSE BY METER POCT 139 (H) 70 - 99 mg/dL   Glucose by meter    Collection Time: 12/29/22  4:07 AM   Result Value Ref Range    GLUCOSE BY METER POCT 69 (L) 70 - 99 mg/dL   CBC with platelets    Collection Time: 12/29/22  4:41 AM   Result Value Ref Range    WBC Count 11.0 4.0 - 11.0 10e3/uL    RBC Count 2.17 (L) 3.80 - 5.20 10e6/uL    Hemoglobin 6.4 (LL) 11.7 - 15.7 g/dL    Hematocrit 20.4 (L) 35.0 - 47.0 %    MCV 94 78 - 100 fL    MCH 29.5 26.5 - 33.0 pg    MCHC 31.4 (L) 31.5 - 36.5 g/dL    RDW 13.6 10.0 - 15.0 %    Platelet Count 456 (H) 150 - 450 10e3/uL   Potassium    Collection Time: 12/29/22  4:41 AM   Result Value Ref Range    Potassium 4.1 3.4 - 5.3 mmol/L   Magnesium    Collection Time: 12/29/22  4:41 AM   Result Value Ref Range    Magnesium 2.0 1.6 - 2.3 mg/dL   Phosphorus     Collection Time: 12/29/22  4:41 AM   Result Value Ref Range    Phosphorus 4.1 2.5 - 4.5 mg/dL   Glucose by meter    Collection Time: 12/29/22  4:41 AM   Result Value Ref Range    GLUCOSE BY METER POCT 351 (H) 70 - 99 mg/dL   Creatinine    Collection Time: 12/29/22  4:41 AM   Result Value Ref Range    Creatinine 0.53 0.52 - 1.04 mg/dL    GFR Estimate >90 >60 mL/min/1.73m2   Prepare red blood cells (unit)    Collection Time: 12/29/22  5:34 AM   Result Value Ref Range    Blood Component Type Red Blood Cells     Product Code [id number]     Unit Status Transfused     Unit Number [id number]     CROSSMATCH Compatible     CODING SYSTEM [id number]     ISSUE DATE AND TIME [id number]     UNIT ABO/RH O+     UNIT TYPE ISBT 5100    Glucose by meter    Collection Time: 12/29/22  7:38 AM   Result Value Ref Range    GLUCOSE BY METER POCT 69 (L) 70 - 99 mg/dL   Glucose by meter    Collection Time: 12/29/22  8:20 AM   Result Value Ref Range    GLUCOSE BY METER POCT 125 (H) 70 - 99 mg/dL   Glucose by meter    Collection Time: 12/29/22 11:00 AM   Result Value Ref Range    GLUCOSE BY METER POCT 176 (H) 70 - 99 mg/dL   Glucose by meter    Collection Time: 12/29/22 12:41 PM   Result Value Ref Range    GLUCOSE BY METER POCT 186 (H) 70 - 99 mg/dL   Glucose by meter    Collection Time: 12/29/22  4:31 PM   Result Value Ref Range    GLUCOSE BY METER POCT 88 70 - 99 mg/dL   Glucose by meter    Collection Time: 12/29/22  7:54 PM   Result Value Ref Range    GLUCOSE BY METER POCT 100 (H) 70 - 99 mg/dL   Glucose by meter    Collection Time: 12/29/22  9:37 PM   Result Value Ref Range    GLUCOSE BY METER POCT 117 (H) 70 - 99 mg/dL   Glucose by meter    Collection Time: 12/29/22 11:54 PM   Result Value Ref Range    GLUCOSE BY METER POCT 100 (H) 70 - 99 mg/dL   Glucose by meter    Collection Time: 12/30/22  4:07 AM   Result Value Ref Range    GLUCOSE BY METER POCT 139 (H) 70 - 99 mg/dL   CBC with platelets    Collection Time: 12/30/22  5:12 AM    Result Value Ref Range    WBC Count 11.1 (H) 4.0 - 11.0 10e3/uL    RBC Count 2.39 (L) 3.80 - 5.20 10e6/uL    Hemoglobin 7.2 (L) 11.7 - 15.7 g/dL    Hematocrit 21.5 (L) 35.0 - 47.0 %    MCV 90 78 - 100 fL    MCH 30.1 26.5 - 33.0 pg    MCHC 33.5 31.5 - 36.5 g/dL    RDW 13.6 10.0 - 15.0 %    Platelet Count 492 (H) 150 - 450 10e3/uL   Potassium    Collection Time: 12/30/22  5:12 AM   Result Value Ref Range    Potassium 3.7 3.4 - 5.3 mmol/L   Magnesium    Collection Time: 12/30/22  5:12 AM   Result Value Ref Range    Magnesium 2.2 1.6 - 2.3 mg/dL   Phosphorus    Collection Time: 12/30/22  5:12 AM   Result Value Ref Range    Phosphorus 4.7 (H) 2.5 - 4.5 mg/dL   Glucose by meter    Collection Time: 12/30/22  8:16 AM   Result Value Ref Range    GLUCOSE BY METER POCT 188 (H) 70 - 99 mg/dL   Vancomycin level    Collection Time: 12/30/22  8:31 AM   Result Value Ref Range    Vancomycin 18.2   mg/L          Physical and Psychiatric Examination:     /61   Pulse 79   Temp 97.7  F (36.5  C) (Axillary)   Resp 16   Wt 32.2 kg (70 lb 15.8 oz)   SpO2 96%   Breastfeeding No   BMI 12.58 kg/m    Weight is 70 lbs 15.81 oz  Body mass index is 12.58 kg/m .    Physical Exam:  I have reviewed the physical exam as documented by by the medical team and agree with findings and assessment and have no additional findings to add at this time.    Mental Status Exam:  Appearance: age-appearing, lying in hospital bed, fair hygiene, in no acute distress  Behavior: calm  Eye contact: fair  Orientation: alert and oriented to self, place, situation. Did not assess orientation to date as patient was only answering yes or no questions.   Movements: no tics, tremors, or dystonia  Mood: Anxious  Affect: Blunted/Flat  Speech: not able to speak due to intubation  Language: not able to speak due to intubation  Memory: difficult to assess at this time  Fund of knowledge: difficult to assess at this time  Concentration: attentive to  interview  Thought process and content: appears logical, coherent given responses to questions. Did not elicit any paranoia or delusions. Does not appear to respond to internal stimuli.   Associations: no loosening noted  Insight: fair  Judgement: fair  Safety: endorses passive suicidal ideation, denies plan or intent. No evidence of homicidal ideation.            DSM-5 Diagnosis:   #1 Adjustment disorder with depressed mood and anxiety  #2 Opioid use disorder  #3 Stimulant use disorder (amphetamines)          Assessment:   Deidre N Aasen is a 27 year old female who was admitted to [facility] on 12/16/22 with shortness of breath, confusion, and behavior change. Found to have severe diabetic ketoacidosis and necrotizing pneumonia. Has also developed acute kidney injury with tubular necrosis. History notable for DM type I.  Psychiatry is consulted for evaluation and medication recommendations.    Patient seen today. She is answering yes or no questions. Continues to appear somnolent and she remains on a Precedex drip, along with quetiapine 25 mg bid. She endorses feeling increasingly depressed here in the hospital, along with symptoms of anxiety. She is worried about her trach placement later today. Denies that these symptoms were present prior to hospitalization. She also endorses passive suicidal ideation with no plan or intent. She has been experiencing opioid withdrawal symptoms and was started on buprenorphine to manage her opioid use disorder. No known hx of psychotropic medication trials, denies that she has ever been treated for anxiety or depression. She indicates that she is amenable to chemical dependency treatment when she is medically stable.           Summary of Recommendations:   1) Trial hydroxyzine 25 to 50 mg q6h PRN for anxiety symptoms  2) Can continue quetiapine 25 mg TID for anxiety and mood stabilization. Consider increasing the HS dose if not sleeping well at night.   3) When  patient is able to communicate and is within 30 days of discharge to the community, recommend to place a chemical dependency consult. Patient states she would be agreeable to CD treatment.   4) Will hold off on starting an antidepressant but this can be considered  5) Reconsult psychiatry as needed        Daniel De La Cruz, FERP-BC, APRN, CNP  Consult/Liaison Psychiatry  [facility]  Provider can be paged via [facility] Paging/Directory  If I am unavailable, please contact [facility] at [phone] to reach the on-call provider.

## 2022-12-30 NOTE — PHARMACY-VANCOMYCIN DOSING SERVICE
Pharmacy Vancomycin Note  Date of Service 2022  Patient's  1995   27 year old, female    Indication: Bacteremia and Sepsis  Day of Therapy: 15  Current vancomycin regimen:  750 mg IV q12h  Current vancomycin monitoring method: AUC  Current vancomycin therapeutic monitoring goal: 400-600 mg*h/L    InsightRX Prediction of Current Vancomycin Regimen    Regimen: 750 mg IV every 12 hours.  Start time: 10:31 on 2022  Exposure target: AUC24 (range)400-600 mg/L.hr   AUC24,ss: 530 mg/L.hr  Probability of AUC24 > 400: 98 %  Ctrough,ss: 15.7 mg/L  Probability of Ctrough,ss > 20: 3 %  Probability of nephrotoxicity (Lodise LEONORA ): 11 %      Current estimated CrCl = Estimated Creatinine Clearance: 82.5 mL/min (based on SCr of 0.53 mg/dL).    Creatinine for last 3 days  2022:  4:41 AM Creatinine 0.53 mg/dL    Recent Vancomycin Levels (past 3 days)  2022:  8:31 AM Vancomycin 18.2 mg/L    Vancomycin IV Administrations (past 72 hours)                   vancomycin (VANCOCIN) 750 mg in sodium chloride 0.9 % 250 mL intermittent infusion (mg) 750 mg New Bag 22 2148     750 mg New Bag  1023     750 mg New Bag 22 2212     750 mg New Bag  1023     750 mg New Bag 22 2206     750 mg New Bag  1030                Nephrotoxins and other renal medications (From now, onward)    Start     Dose/Rate Route Frequency Ordered Stop    22 1000  vancomycin (VANCOCIN) 750 mg in sodium chloride 0.9 % 250 mL intermittent infusion         750 mg  over 90 Minutes Intravenous EVERY 12 HOURS 22 2240               Contrast Orders - past 72 hours (72h ago, onward)    None          Interpretation of levels and current regimen:  Vancomycin level is reflective of -600    Has serum creatinine changed greater than 50% in last 72 hours: No    Urine output:  good urine output    Renal Function: Stable      Plan:  1. Continue Current Dose  2. Vancomycin monitoring method: AUC  3. Vancomycin  therapeutic monitoring goal: 400-600 mg*h/L  4. Pharmacy will check vancomycin levels as appropriate in 1-3 Days.  5. Serum creatinine levels will be ordered a minimum of twice weekly.    Aleena Crump RPH

## 2022-12-30 NOTE — PROGRESS NOTES
Care Management Follow Up    Length of Stay (days): 14    Expected Discharge Date: 01/05/2023     Concerns to be Addressed:       Patient plan of care discussed at interdisciplinary rounds: Yes    Anticipated Discharge Disposition: LTACH     Anticipated Discharge Services:    Anticipated Discharge DME:      Patient/family educated on Medicare website which has current facility and service quality ratings:    Education Provided on the Discharge Plan:    Patient/Family in Agreement with the Plan: yes    Referrals Placed by CM/SW: Post Acute Facilities  Private pay costs discussed: Not applicable    Additional Information:  Updated Rosa at [facility] that patient is getting Trach today,  MD anticipates may be ready for transfer next week.   [facility] will continue to follow for admission.       Philly Alonso RN

## 2022-12-30 NOTE — PROGRESS NOTES
12/30/22 1115   Appointment Info   Signing Clinician's Name / Credentials (OT) Tiffanie Host, OTR/L   Living Environment   Living Environment Comments Unclear living arrangements prior to admission.   Self-Care   Usual Activity Tolerance good   Current Activity Tolerance poor   Equipment Currently Used at Home none   Fall history within last six months no   Activity/Exercise/Self-Care Comment Independent at baseline, no AD.   General Information   Onset of Illness/Injury or Date of Surgery 12/16/22   Referring Physician Tigre Tran MD   Patient/Family Therapy Goal Statement (OT) None stated   Additional Occupational Profile Info/Pertinent History of Current Problem Admit with same day AMA DC with DM1 DKA & covid-19. Re-admit with necrotizing pneumonia, resp failure requiring intubation - plan for trach, NANNETTE with tubular necrosis, planned trach, opioid use with withdrawal sx reported by pt, heroine & meth use. On dex, off fentanyl & midazolam   Existing Precautions/Restrictions fall;oxygen therapy device and L/min   Cognitive Status Examination   Orientation Status orientation to person, place and time   Affect/Mental Status (Cognitive) flat/blunted affect   Follows Commands follows one-step commands   Cognitive Status Comments Difficult to communicate d/t vent. Pt able to nod yes/no correctly and write needs on notepad.   Visual Perception   Visual Impairment/Limitations corrective lenses full-time   Sensory   Sensory Quick Adds sensation intact   Pain Assessment   Patient Currently in Pain Yes, see Vital Sign flowsheet   Range of Motion Comprehensive   General Range of Motion upper extremity range of motion deficits identified   General Upper Extremity Assessment (Range of Motion)   Upper Extremity: Range of Motion shoulder, left: UE ROM;shoulder, right: UE ROM;scapula, left: UE ROM;scapula, right: UE ROM   Strength Comprehensive (MMT)   General Manual Muscle Testing (MMT) Assessment upper extremity strength  deficits identified   Upper Extremity (Manual Muscle Testing)   Upper Extremity: Manual Muscle Testing (MMT) right shoulder strength deficit;left scapular strength deficit;left shoulder strength deficit;right scapular strength deficit   Comment, MMT: Upper Extremity 3/5 bilateral UEs   Coordination   Upper Extremity Coordination Left UE impaired   Functional Limitations Decreased speed   Activities of Daily Living   BADL Assessment/Intervention grooming;toileting;lower body dressing   Lower Body Dressing Assessment/Training   [place] Level (Lower Body Dressing) dependent (less than 25% patient effort)   Grooming Assessment/Training   [place] Level (Grooming) set up   Toileting   [place] Level (Toileting) dependent (less than 25% patient effort)   Clinical Impression   Criteria for Skilled Therapeutic Interventions Met (OT) Yes, treatment indicated   OT Diagnosis Decreased ADL independence   OT Problem List-Impairments impacting ADL problems related to;activity tolerance impaired;coordination;mobility;range of motion (ROM);strength;pain   Assessment of Occupational Performance 5 or more Performance Deficits   Identified Performance Deficits dressing, toileting, bathing, IADLs, functional mobility   Planned Therapy Interventions (OT) ADL retraining;fine motor coordination training;neuromuscular re-education;ROM;strengthening;stretching;transfer training;home program guidelines;progressive activity/exercise   Clinical Decision Making Complexity (OT) moderate complexity   Risk & Benefits of therapy have been explained patient   OT Total Evaluation Time   OT Eval, Moderate Complexity Minutes (35665) 15   OT Goals   Therapy Frequency (OT) 5 times/wk   OT Predicted Duration/Target Date for Goal Attainment 01/11/23   OT Goals Hygiene/Grooming;Upper Body Dressing;Lower Body Dressing;Toilet Transfer/Toileting;OT Goal 1   OT: Hygiene/Grooming supervision/stand-by assist   OT: Upper Body Dressing  Supervision/stand-by assist   OT: Lower Body Dressing Supervision/stand-by assist   OT: Toilet Transfer/Toileting Supervision/stand-by assist;toilet transfer;cleaning and garment management   OT: Goal 1 Pt will complete 10-12 mins of UE ex to increase ROM, strength, coordination in prep for ADLs.   OT Discharge Planning   OT Plan UE ex, ADLs on EOB or chair. assess transfers   OT Discharge Recommendation (DC Rec) LTACH-pending meets medical criteria   OT Rationale for DC Rec Pt significantly below baseline for ADL performance. Independent at baseline. Limited by O2 needs, decreased ROM, strength and activity tim. Will need LTACH for O2 needs and further therapy to increase strength, independence prior to returning to community.   OT Brief overview of current status set up for G/H, dep for dressing, toileting   Total Session Time   Total Session Time (sum of timed and untimed services) 15

## 2022-12-30 NOTE — PLAN OF CARE
Pt. Remains intubated. Alert and following commands. Lung sounds coarse. Congested cough with cloudy, creamy thick secretions. Denies any pain. Voiding via pure wick. Tube feeding tube clogged, attempts to unclog unsuccessful. Tube feed held and D10 initiated for planned Trach and PEG placement today 1400.

## 2022-12-30 NOTE — PLAN OF CARE
Goal Outcome Evaluation:     Plan of Care Reviewed With: other (see comments) Patient was discussed during ICU interdisciplinary rounds this morning.     Overall Patient Progress: Declining      Outcome Evaluation: TF on hold for trach and RN has requested that Banatrol be discontinued as may be the cause of FT clogging yesterday. Entered new TF orders for different product which contains Inulin fiber (9 gm per Liter) and canceled the Banatrol.      Bhavya Nicholson, JASMIN, LD, CNSC

## 2022-12-30 NOTE — PROVIDER NOTIFICATION
Paged and talked on phone with Dr. Keating from addiction medicine to clarify orders for buprenorphine and if it was OK to be administered tonight with patient going to surgery tomorrow afternoon. Advised that it OK to give scheduled doses to patient tonight and tomorrow as it is a low enough dose that it should not interfere with opioids received in the OR tomorrow.

## 2022-12-30 NOTE — PROGRESS NOTES
[facility]    Infectious Disease Progress Note    Date of Service : 12/30/2022     Assessment:  27 year old female with polysubstance abuse who has been admitted since 12/16/2022 for DKA, respiratory failure from concurrent covid and found to have superimposed MRSA cavitary pneumonia with septic shock. She developed acute respiratory failure requiring intubation and pressor support as well as stress dose steroids for shock in addition to broad spectrum antimicrobial therapy.  She remains severely critically ill . Has now developed fungemia related to candida glabrata. Lines have been changed     -MRSA with septic shock. No endocarditis noted on CATHY. Last blood cx positive from 12/19  -Severe lobar consolidations and cavitary pneumonia related to MRSA with hypoxic respiratory failure  -Candida glabrata fungemia  -Genital herpes  -Covid19 infection  -Uncontrolled type 1 diabetes with HbA1c > 16%  -DKA with NANNETTE and electrolyte abnormalities have improved  -Malnutrition  -substance abuse disorder     Recommendations:  1. Continue treatment with Vancomycin and Micafungin. Pharmacy managing vancomycin dosing, levels therapeutic, renal functions stable  2. Blood cxs negative since 12/21   3. No discitis/ osteo on the  MRI spine to assess for secondary infection   4. S/p bronchoscopy  5. HIV negative  6. On acyclovir for genital herpes     Discussed with the ICU team      Rosy Davis MD    Interval History   Remains intubated, ongoing low grade fever though slightly better   Fever is improved   WBC improved at 11k     Physical Exam   Temp: 97.7  F (36.5  C) Temp src: Axillary BP: 111/61 Pulse: 79   Resp: 16 SpO2: 96 % O2 Device: Mechanical Ventilator    Vitals:    12/28/22 0200 12/29/22 0600 12/30/22 0400   Weight: 37.5 kg (82 lb 11.2 oz) 36.2 kg (79 lb 14.4 oz) 32.2 kg (70 lb 15.8 oz)     Vital Signs with Ranges  Temp:  [97.7  F (36.5  C)-98.6  F (37  C)] 97.7  F (36.5  C)  Pulse:  [54-89]  79  Resp:  [13-26] 16  BP: (105-131)/(52-71) 111/61  FiO2 (%):  [50 %-55 %] 50 %  SpO2:  [95 %-100 %] 96 %    Constitutional: on ventilator  Lungs: clear  Cardiovascular: S1S2  Skin: No rash    Other:    Medications     dexmedetomidine 1.2 mcg/kg/hr (12/30/22 0816)     dextrose 1,000 mL (12/30/22 0632)     - MEDICATION INSTRUCTIONS -       sodium chloride 10 mL/hr at 12/30/22 0800       acetaminophen  500 mg Oral 4x Daily    Or     acetaminophen  500 mg Oral 4x Daily     banatrol plus  1 packet Per Feeding Tube TID     Buprenorphine HCl  450 mcg Buccal Q12H GINA (08/20)     chlorhexidine  15 mL Mouth/Throat Q12H     heparin ANTICOAGULANT  5,000 Units Subcutaneous Q12H     insulin aspart  1-12 Units Subcutaneous Q4H     insulin glargine  10 Units Subcutaneous BID     levalbuterol  0.63 mg Nebulization Q8H     micafungin  100 mg Intravenous Q24H     multivitamins w/minerals  15 mL Per Feeding Tube Daily     [START ON 12/31/2022] pantoprazole  40 mg Per Feeding Tube QAM AC     QUEtiapine  25 mg Oral or NG Tube TID     sodium chloride (PF)  10 mL Intracatheter Q8H     vancomycin  750 mg Intravenous Q12H       Data   All microbiology laboratory data reviewed.  Recent Labs   Lab Test 12/30/22  0512 12/29/22  0441 12/28/22  0418   WBC 11.1* 11.0 12.1*   HGB 7.2* 6.4* 7.4*   HCT 21.5* 20.4* 23.2*   MCV 90 94 94   * 456* 504*     Recent Labs   Lab Test 12/29/22  0441 12/27/22  0445 12/26/22  0437   CR 0.53 0.60 0.52     Microbiology  Blood cx 12/21, 12/22, 12/23, 12/25 no growth so far  12/19 blood cx  Line, venous; Blood    0 Result Notes  Culture Positive on the 1st day of incubation Abnormal         Candida glabrata complex Panic     2 of 2 bottles   Staphylococcus aureus MRSA Panic     1 of 2 bottles   Positive on the 5th day of incubation  Susceptibilities done on previous cultures         Resulting Agency: [facility]      Susceptibility              Candida glabrata complex       SAMANTHA       Amphotericin B 1 ug/mL No  interpretation available       Fluconazole 16 ug/mL Susceptible Dose Dependent       Micafungin <=0.03 ug/mL Susceptible       Voriconazole 0.5 ug/mL No interpretation available

## 2022-12-31 LAB
BACTERIA BLD CULT: NO GROWTH
BACTERIA BLD CULT: NO GROWTH
CREAT SERPL-MCNC: 0.54 MG/DL (ref 0.52–1.04)
ERYTHROCYTE [DISTWIDTH] IN BLOOD BY AUTOMATED COUNT: 13.6 % (ref 10–15)
GFR SERPL CREATININE-BSD FRML MDRD: >90 ML/MIN/1.73M2
GLUCOSE BLDC GLUCOMTR-MCNC: 106 MG/DL (ref 70–99)
GLUCOSE BLDC GLUCOMTR-MCNC: 110 MG/DL (ref 70–99)
GLUCOSE BLDC GLUCOMTR-MCNC: 137 MG/DL (ref 70–99)
GLUCOSE BLDC GLUCOMTR-MCNC: 150 MG/DL (ref 70–99)
GLUCOSE BLDC GLUCOMTR-MCNC: 157 MG/DL (ref 70–99)
GLUCOSE BLDC GLUCOMTR-MCNC: 94 MG/DL (ref 70–99)
HCT VFR BLD AUTO: 24.5 % (ref 35–47)
HGB BLD-MCNC: 7.8 G/DL (ref 11.7–15.7)
MAGNESIUM SERPL-MCNC: 2.3 MG/DL (ref 1.6–2.3)
MCH RBC QN AUTO: 30 PG (ref 26.5–33)
MCHC RBC AUTO-ENTMCNC: 31.8 G/DL (ref 31.5–36.5)
MCV RBC AUTO: 94 FL (ref 78–100)
PHOSPHATE SERPL-MCNC: 4.5 MG/DL (ref 2.5–4.5)
PLATELET # BLD AUTO: 606 10E3/UL (ref 150–450)
POTASSIUM BLD-SCNC: 4.2 MMOL/L (ref 3.4–5.3)
RBC # BLD AUTO: 2.6 10E6/UL (ref 3.8–5.2)
VANCOMYCIN SERPL-MCNC: 18.7 MG/L
WBC # BLD AUTO: 7.5 10E3/UL (ref 4–11)

## 2022-12-31 PROCEDURE — 250N000011 HC RX IP 250 OP 636

## 2022-12-31 PROCEDURE — 94640 AIRWAY INHALATION TREATMENT: CPT

## 2022-12-31 PROCEDURE — 250N000011 HC RX IP 250 OP 636: Performed by: SURGERY

## 2022-12-31 PROCEDURE — 258N000003 HC RX IP 258 OP 636

## 2022-12-31 PROCEDURE — 82565 ASSAY OF CREATININE: CPT | Performed by: INTERNAL MEDICINE

## 2022-12-31 PROCEDURE — 94640 AIRWAY INHALATION TREATMENT: CPT | Mod: 76

## 2022-12-31 PROCEDURE — 83735 ASSAY OF MAGNESIUM: CPT | Performed by: SURGERY

## 2022-12-31 PROCEDURE — 250N000013 HC RX MED GY IP 250 OP 250 PS 637: Performed by: INTERNAL MEDICINE

## 2022-12-31 PROCEDURE — 84100 ASSAY OF PHOSPHORUS: CPT | Performed by: SURGERY

## 2022-12-31 PROCEDURE — 999N000009 HC STATISTIC AIRWAY CARE

## 2022-12-31 PROCEDURE — 84132 ASSAY OF SERUM POTASSIUM: CPT | Performed by: SURGERY

## 2022-12-31 PROCEDURE — 80202 ASSAY OF VANCOMYCIN: CPT | Performed by: INTERNAL MEDICINE

## 2022-12-31 PROCEDURE — 999N000157 HC STATISTIC RCP TIME EA 10 MIN

## 2022-12-31 PROCEDURE — 250N000013 HC RX MED GY IP 250 OP 250 PS 637: Performed by: NURSE PRACTITIONER

## 2022-12-31 PROCEDURE — 99291 CRITICAL CARE FIRST HOUR: CPT | Mod: GC | Performed by: INTERNAL MEDICINE

## 2022-12-31 PROCEDURE — 94003 VENT MGMT INPAT SUBQ DAY: CPT

## 2022-12-31 PROCEDURE — 250N000013 HC RX MED GY IP 250 OP 250 PS 637: Performed by: STUDENT IN AN ORGANIZED HEALTH CARE EDUCATION/TRAINING PROGRAM

## 2022-12-31 PROCEDURE — 85027 COMPLETE CBC AUTOMATED: CPT | Performed by: SURGERY

## 2022-12-31 PROCEDURE — 250N000011 HC RX IP 250 OP 636: Performed by: INTERNAL MEDICINE

## 2022-12-31 PROCEDURE — 250N000009 HC RX 250: Performed by: INTERNAL MEDICINE

## 2022-12-31 PROCEDURE — 200N000001 HC R&B ICU

## 2022-12-31 PROCEDURE — 250N000013 HC RX MED GY IP 250 OP 250 PS 637: Performed by: SURGERY

## 2022-12-31 PROCEDURE — 250N000009 HC RX 250: Performed by: STUDENT IN AN ORGANIZED HEALTH CARE EDUCATION/TRAINING PROGRAM

## 2022-12-31 RX ORDER — OLANZAPINE 10 MG/1
5 INJECTION, POWDER, LYOPHILIZED, FOR SOLUTION INTRAMUSCULAR ONCE
Status: COMPLETED | OUTPATIENT
Start: 2022-12-31 | End: 2022-12-31

## 2022-12-31 RX ORDER — BUPRENORPHINE 2 MG/1
4 TABLET SUBLINGUAL 2 TIMES DAILY
Status: DISCONTINUED | OUTPATIENT
Start: 2023-01-02 | End: 2023-01-04 | Stop reason: HOSPADM

## 2022-12-31 RX ORDER — BUPRENORPHINE 2 MG/1
2 TABLET SUBLINGUAL 2 TIMES DAILY
Status: COMPLETED | OUTPATIENT
Start: 2023-01-01 | End: 2023-01-02

## 2022-12-31 RX ADMIN — HYDROXYZINE HYDROCHLORIDE 50 MG: 25 TABLET, FILM COATED ORAL at 08:53

## 2022-12-31 RX ADMIN — QUETIAPINE FUMARATE 25 MG: 25 TABLET ORAL at 21:51

## 2022-12-31 RX ADMIN — DEXMEDETOMIDINE HYDROCHLORIDE 0.9 MCG/KG/HR: 400 INJECTION INTRAVENOUS at 21:50

## 2022-12-31 RX ADMIN — BUPRENORPHINE HYDROCHLORIDE 450 MCG: 450 FILM, SOLUBLE BUCCAL at 09:16

## 2022-12-31 RX ADMIN — Medication 1 MG: at 20:49

## 2022-12-31 RX ADMIN — HEPARIN SODIUM 5000 UNITS: 5000 INJECTION, SOLUTION INTRAVENOUS; SUBCUTANEOUS at 09:02

## 2022-12-31 RX ADMIN — CHLORHEXIDINE GLUCONATE 0.12% ORAL RINSE 15 ML: 1.2 LIQUID ORAL at 20:48

## 2022-12-31 RX ADMIN — ACETAMINOPHEN 500 MG: 325 SUSPENSION ORAL at 21:50

## 2022-12-31 RX ADMIN — LEVALBUTEROL HYDROCHLORIDE 0.63 MG: 1.25 SOLUTION, CONCENTRATE RESPIRATORY (INHALATION) at 06:59

## 2022-12-31 RX ADMIN — MULTIVITAMIN 15 ML: LIQUID ORAL at 08:53

## 2022-12-31 RX ADMIN — ZOLPIDEM TARTRATE 10 MG: 5 TABLET ORAL at 21:51

## 2022-12-31 RX ADMIN — BUPRENORPHINE HYDROCHLORIDE 1 MG: 2 TABLET SUBLINGUAL at 20:49

## 2022-12-31 RX ADMIN — QUETIAPINE FUMARATE 25 MG: 25 TABLET ORAL at 08:53

## 2022-12-31 RX ADMIN — MICAFUNGIN SODIUM 100 MG: 50 INJECTION, POWDER, LYOPHILIZED, FOR SOLUTION INTRAVENOUS at 13:37

## 2022-12-31 RX ADMIN — LEVALBUTEROL HYDROCHLORIDE 0.63 MG: 1.25 SOLUTION, CONCENTRATE RESPIRATORY (INHALATION) at 16:29

## 2022-12-31 RX ADMIN — VANCOMYCIN HYDROCHLORIDE 750 MG: 1 INJECTION, POWDER, LYOPHILIZED, FOR SOLUTION INTRAVENOUS at 10:40

## 2022-12-31 RX ADMIN — LEVALBUTEROL HYDROCHLORIDE 0.63 MG: 1.25 SOLUTION, CONCENTRATE RESPIRATORY (INHALATION) at 23:59

## 2022-12-31 RX ADMIN — QUETIAPINE FUMARATE 25 MG: 25 TABLET ORAL at 16:25

## 2022-12-31 RX ADMIN — TRAZODONE HYDROCHLORIDE 100 MG: 50 TABLET ORAL at 21:51

## 2022-12-31 RX ADMIN — ACETAMINOPHEN 500 MG: 500 TABLET ORAL at 08:53

## 2022-12-31 RX ADMIN — HEPARIN SODIUM 5000 UNITS: 5000 INJECTION, SOLUTION INTRAVENOUS; SUBCUTANEOUS at 20:49

## 2022-12-31 RX ADMIN — ACETAMINOPHEN 500 MG: 500 TABLET ORAL at 16:25

## 2022-12-31 RX ADMIN — HYDROXYZINE HYDROCHLORIDE 50 MG: 25 TABLET, FILM COATED ORAL at 20:48

## 2022-12-31 RX ADMIN — DEXMEDETOMIDINE HYDROCHLORIDE 1.2 MCG/KG/HR: 400 INJECTION INTRAVENOUS at 10:41

## 2022-12-31 RX ADMIN — VANCOMYCIN HYDROCHLORIDE 750 MG: 1 INJECTION, POWDER, LYOPHILIZED, FOR SOLUTION INTRAVENOUS at 21:50

## 2022-12-31 RX ADMIN — Medication 40 MG: at 08:52

## 2022-12-31 RX ADMIN — CHLORHEXIDINE GLUCONATE 0.12% ORAL RINSE 15 ML: 1.2 LIQUID ORAL at 08:52

## 2022-12-31 RX ADMIN — OLANZAPINE 5 MG: 10 INJECTION, POWDER, FOR SOLUTION INTRAMUSCULAR at 00:55

## 2022-12-31 ASSESSMENT — ACTIVITIES OF DAILY LIVING (ADL)
ADLS_ACUITY_SCORE: 36

## 2022-12-31 NOTE — PROGRESS NOTES
[facility] ICU RESPIRATORY NOTE        Date of Admission: 12/16/2022    Date of Intubation (most recent):12/26/22 trach 12/30/22    Reason for Mechanical Ventilation:Respiratory failure    Number of Days on Mechanical Ventilation:15    Met Criteria for Spontaneous Breathing Trial:No    Reason for No Spontaneous Breathing Trial:Per MD    Significant Events Today:Vent settings changed per MD order.    ABG Results:   Recent Labs   Lab 12/26/22  1009 12/26/22  0042   PH  --  7.53*   PCO2  --  45   PO2  --  70*   HCO3  --  38*   O2PER 65 70       Current Vent Settings: Vent Mode: CMV/AC  (Continuous Mandatory Ventilation/ Assist Control)  FiO2 (%): 40 %  Resp Rate (Set): 14 breaths/min  Tidal Volume (Set, mL): 400 mL  PEEP (cm H2O): 7 cmH2O  Resp: 21      Skin Assessment:Trach site intact with some dried blood around the site. Initial trach cares done with bedside RN.    Plan:Will cont full vent support for now and will assess for weaning readiness daily.    Safia Vo RT on 12/31/2022 at 5:30 PM

## 2022-12-31 NOTE — PROGRESS NOTES
Patient reports anxiety r/t to trach(writes),  Not able to sleep.Melatonin, Tylenol, Tramadol, Seroquel, Atarax given without any results. Discussed with MD, Ambien ordered, Patient still not able to sleep. Zyprexa ordered and given, will continue to monitor.

## 2022-12-31 NOTE — PROGRESS NOTES
Addiction Medicine    Patient with opioid and methamphetamine use disorders.   Presented with bacteremia, fungemia and cavitary pneumonia.  Intubated since 12/16/22.  Tracheostomy yesterday.  Last fentanyl was 12/29 in am.  Has been getting buprenorphine (Belbuca 450 mcg bid) since 12/29.   On Precedex,     Plan to advance buprenorphine to treat withdrawal and opioid cravings.     1 mg sl x 2 doses, then 2 mg bid x 2 doses, then 4 mg bid.       May need less Precedex as buprenorphine is increased.     Watch for increased ventilator requirements.    If patient need further procedures resulting in increased pain, will adjust buprenorphine.      Peyton Larose MD  Addiction Medicine Service  [facility]  Page me (click here for Blank Larose)

## 2022-12-31 NOTE — PROGRESS NOTES
Neuro:Neuros grossly intact. WALKER. Follows. Cam Negative. Anxious at times. Tearful. Flat affect    Last NIHSS score: NA    Cardiovascular: Tele: SB/SR MAP > 65. Pulses palpable.      Pulmonary: Lungs coarse throughout fields. Minimal secretions through ETT suction. Copious oral secretions with eTT.     : Incontinent x1    GI: No BM. Tube feeds restarted this evening.      Skin: Scabs/lesion in perianal/buttock area. Blanchable redness to lower left calf ? From BP cuff.     Mobility: Up to chair with lift--patients states harder to breathe in that position and SpO2 did drop. Danged at edge of bed with therapy.     IV: Precedex drip.     Dad flyin in from [place] tomorrow. Updated via phone and spoke with patient.

## 2022-12-31 NOTE — OP NOTE
Procedure Date: 12/14//2022     PREOPERATIVE DIAGNOSIS:   failure to thrive, respiratory failure.       POSTOPERATIVE DIAGNOSIS:   failure to thrive, respiratory failure.       PROCEDURE PERFORMED:  Flexible bronchoscopy, percutaneous tracheostomy tube insertion,      OPERATING SURGEON:  Ruma Wynne MD.          DESCRIPTION OF PROCEDURE:  After obtaining informed consent, the patient was brought to the operating room and laid supine on the operating table.  After induction of general anesthesia, a flexible bronchoscopy was performed through the endotracheal tube. With bronchoscopic visualization, a needle puncture was made in the trachea between the second  And third tracheal rings.  Using the Seldinger technique, the tract was dilated and a 6 Shiley DCT cuffed tracheostomy tube was inserted over the wire into the trachea.  The balloon was inflated.  A bronchoscopy was then performed through the tracheostomy to clear out secretions and confirm position.  After this, the tracheostomy was secured in place.

## 2022-12-31 NOTE — PROGRESS NOTES
[facility] ICU RESPIRATORY NOTE           Date of Admission: 12/16/2022     Date of Intubation (most recent): 12/26/2022    Reason for Mechanical Ventilation: Resp. failure    Number of Days on Mechanical Ventilation: 15     Met Criteria for Spontaneous Breathing Trial: 15     Significant event today :none      ABG Results:   Recent Labs   Lab 12/26/22  1009 12/26/22  0042   PH  --  7.53*   PCO2  --  45   PO2  --  70*   HCO3  --  38*   O2PER 65 70     Current Vent Settings:    Vent Mode: CMV/AC  (Continuous Mandatory Ventilation/ Assist Control)  FiO2 (%): 40 %  Resp Rate (Set): 16 breaths/min  Tidal Volume (Set, mL): 400 mL  PEEP (cm H2O): 8 cmH2O  Resp: 16     Skin Assessment : intact , drainage      Plan: Continue full ventilatory support and wean as tolerated.     Mir Sorensen, RT

## 2023-01-01 LAB
ALBUMIN SERPL-MCNC: 1.3 G/DL (ref 3.4–5)
ALP SERPL-CCNC: 75 U/L (ref 40–150)
ALT SERPL W P-5'-P-CCNC: 8 U/L (ref 0–50)
ANION GAP SERPL CALCULATED.3IONS-SCNC: 5 MMOL/L (ref 3–14)
AST SERPL W P-5'-P-CCNC: 12 U/L (ref 0–45)
BASE EXCESS BLDV CALC-SCNC: 3.6 MMOL/L (ref -7.7–1.9)
BASOPHILS # BLD AUTO: 0.1 10E3/UL (ref 0–0.2)
BASOPHILS NFR BLD AUTO: 1 %
BILIRUB SERPL-MCNC: 0.3 MG/DL (ref 0.2–1.3)
BUN SERPL-MCNC: 18 MG/DL (ref 7–30)
CALCIUM SERPL-MCNC: 7.9 MG/DL (ref 8.5–10.1)
CHLORIDE BLD-SCNC: 107 MMOL/L (ref 94–109)
CO2 SERPL-SCNC: 26 MMOL/L (ref 20–32)
CREAT SERPL-MCNC: 0.57 MG/DL (ref 0.52–1.04)
EOSINOPHIL # BLD AUTO: 0.2 10E3/UL (ref 0–0.7)
EOSINOPHIL NFR BLD AUTO: 2 %
ERYTHROCYTE [DISTWIDTH] IN BLOOD BY AUTOMATED COUNT: 13.6 % (ref 10–15)
GFR SERPL CREATININE-BSD FRML MDRD: >90 ML/MIN/1.73M2
GLUCOSE BLD-MCNC: 93 MG/DL (ref 70–99)
GLUCOSE BLDC GLUCOMTR-MCNC: 109 MG/DL (ref 70–99)
GLUCOSE BLDC GLUCOMTR-MCNC: 111 MG/DL (ref 70–99)
GLUCOSE BLDC GLUCOMTR-MCNC: 112 MG/DL (ref 70–99)
GLUCOSE BLDC GLUCOMTR-MCNC: 120 MG/DL (ref 70–99)
GLUCOSE BLDC GLUCOMTR-MCNC: 124 MG/DL (ref 70–99)
GLUCOSE BLDC GLUCOMTR-MCNC: 65 MG/DL (ref 70–99)
GLUCOSE BLDC GLUCOMTR-MCNC: 71 MG/DL (ref 70–99)
GLUCOSE BLDC GLUCOMTR-MCNC: 74 MG/DL (ref 70–99)
GLUCOSE BLDC GLUCOMTR-MCNC: 83 MG/DL (ref 70–99)
GLUCOSE BLDC GLUCOMTR-MCNC: 88 MG/DL (ref 70–99)
GLUCOSE BLDC GLUCOMTR-MCNC: 91 MG/DL (ref 70–99)
HCO3 BLDV-SCNC: 28 MMOL/L (ref 21–28)
HCT VFR BLD AUTO: 24.3 % (ref 35–47)
HGB BLD-MCNC: 7.8 G/DL (ref 11.7–15.7)
IMM GRANULOCYTES # BLD: 0.1 10E3/UL
IMM GRANULOCYTES NFR BLD: 1 %
LYMPHOCYTES # BLD AUTO: 1.9 10E3/UL (ref 0.8–5.3)
LYMPHOCYTES NFR BLD AUTO: 24 %
MAGNESIUM SERPL-MCNC: 2.2 MG/DL (ref 1.6–2.3)
MCH RBC QN AUTO: 30.5 PG (ref 26.5–33)
MCHC RBC AUTO-ENTMCNC: 32.1 G/DL (ref 31.5–36.5)
MCV RBC AUTO: 95 FL (ref 78–100)
MONOCYTES # BLD AUTO: 0.8 10E3/UL (ref 0–1.3)
MONOCYTES NFR BLD AUTO: 10 %
NEUTROPHILS # BLD AUTO: 4.8 10E3/UL (ref 1.6–8.3)
NEUTROPHILS NFR BLD AUTO: 62 %
NRBC # BLD AUTO: 0 10E3/UL
NRBC BLD AUTO-RTO: 0 /100
O2/TOTAL GAS SETTING VFR VENT: 40 %
OXYHGB MFR BLDV: 56 % (ref 70–75)
PCO2 BLDV: 42 MM HG (ref 40–50)
PH BLDV: 7.44 [PH] (ref 7.32–7.43)
PHOSPHATE SERPL-MCNC: 4.7 MG/DL (ref 2.5–4.5)
PLATELET # BLD AUTO: 526 10E3/UL (ref 150–450)
PO2 BLDV: 32 MM HG (ref 25–47)
POTASSIUM BLD-SCNC: 4.5 MMOL/L (ref 3.4–5.3)
PROT SERPL-MCNC: 6.9 G/DL (ref 6.8–8.8)
RBC # BLD AUTO: 2.56 10E6/UL (ref 3.8–5.2)
SODIUM SERPL-SCNC: 138 MMOL/L (ref 133–144)
WBC # BLD AUTO: 7.7 10E3/UL (ref 4–11)

## 2023-01-01 PROCEDURE — 999N000157 HC STATISTIC RCP TIME EA 10 MIN

## 2023-01-01 PROCEDURE — 258N000001 HC RX 258: Performed by: INTERNAL MEDICINE

## 2023-01-01 PROCEDURE — 94640 AIRWAY INHALATION TREATMENT: CPT

## 2023-01-01 PROCEDURE — 250N000013 HC RX MED GY IP 250 OP 250 PS 637: Performed by: INTERNAL MEDICINE

## 2023-01-01 PROCEDURE — 250N000013 HC RX MED GY IP 250 OP 250 PS 637: Performed by: STUDENT IN AN ORGANIZED HEALTH CARE EDUCATION/TRAINING PROGRAM

## 2023-01-01 PROCEDURE — 250N000011 HC RX IP 250 OP 636

## 2023-01-01 PROCEDURE — 250N000013 HC RX MED GY IP 250 OP 250 PS 637: Performed by: NURSE PRACTITIONER

## 2023-01-01 PROCEDURE — 258N000003 HC RX IP 258 OP 636

## 2023-01-01 PROCEDURE — 94640 AIRWAY INHALATION TREATMENT: CPT | Mod: 76

## 2023-01-01 PROCEDURE — 250N000009 HC RX 250: Performed by: STUDENT IN AN ORGANIZED HEALTH CARE EDUCATION/TRAINING PROGRAM

## 2023-01-01 PROCEDURE — 99232 SBSQ HOSP IP/OBS MODERATE 35: CPT | Performed by: INTERNAL MEDICINE

## 2023-01-01 PROCEDURE — 250N000009 HC RX 250: Performed by: INTERNAL MEDICINE

## 2023-01-01 PROCEDURE — 80053 COMPREHEN METABOLIC PANEL: CPT | Performed by: INTERNAL MEDICINE

## 2023-01-01 PROCEDURE — 94003 VENT MGMT INPAT SUBQ DAY: CPT

## 2023-01-01 PROCEDURE — 250N000013 HC RX MED GY IP 250 OP 250 PS 637: Performed by: SURGERY

## 2023-01-01 PROCEDURE — 85025 COMPLETE CBC W/AUTO DIFF WBC: CPT | Performed by: INTERNAL MEDICINE

## 2023-01-01 PROCEDURE — 99291 CRITICAL CARE FIRST HOUR: CPT | Mod: GC | Performed by: INTERNAL MEDICINE

## 2023-01-01 PROCEDURE — 999N000040 HC STATISTIC CONSULT NO CHARGE VASC ACCESS

## 2023-01-01 PROCEDURE — 83735 ASSAY OF MAGNESIUM: CPT | Performed by: INTERNAL MEDICINE

## 2023-01-01 PROCEDURE — 82805 BLOOD GASES W/O2 SATURATION: CPT | Performed by: INTERNAL MEDICINE

## 2023-01-01 PROCEDURE — 200N000001 HC R&B ICU

## 2023-01-01 PROCEDURE — 84100 ASSAY OF PHOSPHORUS: CPT | Performed by: INTERNAL MEDICINE

## 2023-01-01 PROCEDURE — 999N000253 HC STATISTIC WEANING TRIALS

## 2023-01-01 PROCEDURE — 250N000011 HC RX IP 250 OP 636: Performed by: SURGERY

## 2023-01-01 PROCEDURE — 258N000003 HC RX IP 258 OP 636: Performed by: INTERNAL MEDICINE

## 2023-01-01 RX ORDER — ZOLPIDEM TARTRATE 5 MG/1
10 TABLET ORAL
Status: DISCONTINUED | OUTPATIENT
Start: 2023-01-01 | End: 2023-01-04 | Stop reason: HOSPADM

## 2023-01-01 RX ORDER — ACETAMINOPHEN 500 MG
500 TABLET ORAL 4 TIMES DAILY
Status: DISCONTINUED | OUTPATIENT
Start: 2023-01-01 | End: 2023-01-04 | Stop reason: HOSPADM

## 2023-01-01 RX ORDER — ACETAMINOPHEN 325 MG/10.15ML
500 LIQUID ORAL 4 TIMES DAILY
Status: DISCONTINUED | OUTPATIENT
Start: 2023-01-01 | End: 2023-01-04 | Stop reason: HOSPADM

## 2023-01-01 RX ADMIN — TRAZODONE HYDROCHLORIDE 100 MG: 50 TABLET ORAL at 22:16

## 2023-01-01 RX ADMIN — LEVALBUTEROL HYDROCHLORIDE 0.63 MG: 1.25 SOLUTION, CONCENTRATE RESPIRATORY (INHALATION) at 08:10

## 2023-01-01 RX ADMIN — HEPARIN SODIUM 5000 UNITS: 5000 INJECTION, SOLUTION INTRAVENOUS; SUBCUTANEOUS at 20:01

## 2023-01-01 RX ADMIN — QUETIAPINE FUMARATE 25 MG: 25 TABLET ORAL at 22:16

## 2023-01-01 RX ADMIN — QUETIAPINE FUMARATE 25 MG: 25 TABLET ORAL at 16:54

## 2023-01-01 RX ADMIN — DEXTROSE MONOHYDRATE 25 ML: 25 INJECTION, SOLUTION INTRAVENOUS at 14:02

## 2023-01-01 RX ADMIN — Medication 40 MG: at 07:55

## 2023-01-01 RX ADMIN — ACETAMINOPHEN 500 MG: 500 TABLET ORAL at 13:20

## 2023-01-01 RX ADMIN — ACETAMINOPHEN 500 MG: 325 SUSPENSION ORAL at 22:16

## 2023-01-01 RX ADMIN — ACETAMINOPHEN 500 MG: 500 TABLET ORAL at 18:15

## 2023-01-01 RX ADMIN — ZOLPIDEM TARTRATE 10 MG: 5 TABLET ORAL at 22:16

## 2023-01-01 RX ADMIN — VANCOMYCIN HYDROCHLORIDE 750 MG: 1 INJECTION, POWDER, LYOPHILIZED, FOR SOLUTION INTRAVENOUS at 11:09

## 2023-01-01 RX ADMIN — HEPARIN SODIUM 5000 UNITS: 5000 INJECTION, SOLUTION INTRAVENOUS; SUBCUTANEOUS at 10:08

## 2023-01-01 RX ADMIN — LEVALBUTEROL HYDROCHLORIDE 0.63 MG: 1.25 SOLUTION, CONCENTRATE RESPIRATORY (INHALATION) at 23:22

## 2023-01-01 RX ADMIN — VANCOMYCIN HYDROCHLORIDE 750 MG: 1 INJECTION, POWDER, LYOPHILIZED, FOR SOLUTION INTRAVENOUS at 22:21

## 2023-01-01 RX ADMIN — SODIUM CHLORIDE: 9 INJECTION, SOLUTION INTRAVENOUS at 22:22

## 2023-01-01 RX ADMIN — DEXMEDETOMIDINE HYDROCHLORIDE 0.7 MCG/KG/HR: 400 INJECTION INTRAVENOUS at 13:20

## 2023-01-01 RX ADMIN — Medication 1 MG: at 22:16

## 2023-01-01 RX ADMIN — CHLORHEXIDINE GLUCONATE 0.12% ORAL RINSE 15 ML: 1.2 LIQUID ORAL at 19:56

## 2023-01-01 RX ADMIN — HYDROXYZINE HYDROCHLORIDE 50 MG: 25 TABLET, FILM COATED ORAL at 03:56

## 2023-01-01 RX ADMIN — MICAFUNGIN SODIUM 100 MG: 50 INJECTION, POWDER, LYOPHILIZED, FOR SOLUTION INTRAVENOUS at 13:20

## 2023-01-01 RX ADMIN — BUPRENORPHINE HYDROCHLORIDE 1 MG: 2 TABLET SUBLINGUAL at 08:24

## 2023-01-01 RX ADMIN — LEVALBUTEROL HYDROCHLORIDE 0.63 MG: 1.25 SOLUTION, CONCENTRATE RESPIRATORY (INHALATION) at 15:46

## 2023-01-01 RX ADMIN — ACETAMINOPHEN 500 MG: 500 TABLET ORAL at 07:55

## 2023-01-01 RX ADMIN — MULTIVITAMIN 15 ML: LIQUID ORAL at 07:55

## 2023-01-01 RX ADMIN — BUPRENORPHINE HYDROCHLORIDE 2 MG: 2 TABLET SUBLINGUAL at 20:01

## 2023-01-01 RX ADMIN — DEXTROSE MONOHYDRATE 25 ML: 25 INJECTION, SOLUTION INTRAVENOUS at 18:15

## 2023-01-01 RX ADMIN — QUETIAPINE FUMARATE 25 MG: 25 TABLET ORAL at 07:55

## 2023-01-01 ASSESSMENT — ACTIVITIES OF DAILY LIVING (ADL)
ADLS_ACUITY_SCORE: 36

## 2023-01-01 NOTE — PROGRESS NOTES
Neuro:Neuros grossly intact. WALKER. Follows. Flat affect and sleepy through day. Anxious this morning, PRN atarax given.     Last NIHSS score: NA    Cardiovascular: Tele: SR MAP > 65. Pulses palpable.      Pulmonary: Lungs coarse throughout fields. Small thick yellow secretions through inline suction. PEEP decreased today--patient tolerating.     : Continent. Utilizing purewick, output adequate.     GI: Small watery BM. Tube feeds unchanged.     Skin: Scabs/lesion in perianal/buttock area. Blanchable redness to lower left calf ? From BP cuff--marked and MD aware. Continue to monitor for now.      Mobility: Up to chair with lift--patient tolerated for ~30 minutes and wanted to get back to bed.    IV: Precedex drip--titrating down and patient tolerating.     Dad flyin in from [place] today.. Updated via phone by RN and MD.

## 2023-01-01 NOTE — PHARMACY-VANCOMYCIN DOSING SERVICE
Pharmacy Vancomycin Note  Date of Service 2022  Patient's  1995   27 year old, female    Indication: MRSA  Day of Therapy: 16  Current vancomycin regimen:  750 mg IV q12h  Current vancomycin monitoring method: AUC  Current vancomycin therapeutic monitoring goal: 400-600 mg*h/L    Insight[employer] Prediction of Current Vancomycin Regimen  Regimen: 750 mg IV every 12 hours.  Start time: 22:39 on 2022  Exposure target: AUC24 (range)400-600 mg/L.hr   AUC24,ss: 573 mg/L.hr  Probability of AUC24 > 400: 100 %  Ctrough,ss: 17 mg/L  Probability of Ctrough,ss > 20: 9 %  Probability of nephrotoxicity (Lodise LEONORA ): 13 %      Current estimated CrCl = Estimated Creatinine Clearance: 73.9 mL/min (based on SCr of 0.54 mg/dL).    Creatinine for last 3 days  2022:  4:41 AM Creatinine 0.53 mg/dL  2022:  8:47 PM Creatinine 0.54 mg/dL    Recent Vancomycin Levels (past 3 days)  2022:  8:31 AM Vancomycin 18.2 mg/L  2022:  8:47 PM Vancomycin 18.7 mg/L    Vancomycin IV Administrations (past 72 hours)                   vancomycin (VANCOCIN) 750 mg in sodium chloride 0.9 % 250 mL intermittent infusion (mg) 750 mg New Bag 22 1040     750 mg New Bag 22 2205     750 mg New Bag  1032     750 mg New Bag 22 2148     750 mg New Bag  1023     750 mg New Bag 22 2212                Nephrotoxins and other renal medications (From now, onward)    Start     Dose/Rate Route Frequency Ordered Stop    22 1000  vancomycin (VANCOCIN) 750 mg in sodium chloride 0.9 % 250 mL intermittent infusion         750 mg  over 90 Minutes Intravenous EVERY 12 HOURS 22 2240               Interpretation of levels and current regimen:  Vancomycin level is reflective of -600  Has serum creatinine changed greater than 50% in last 72 hours: No  Urine output:  good urine output  Renal Function: Stable      Plan:  1. Continue Current Dose  2. Vancomycin monitoring method: AUC  3. Vancomycin  therapeutic monitoring goal: 400-600 mg*h/L  4. Pharmacy will check vancomycin levels as appropriate in 5-7 Days.  5. Serum creatinine levels will be ordered every 48 hours.    Savannah Ca RPH

## 2023-01-01 NOTE — PROGRESS NOTES
Contacted physical therapy to see patient. Per PT, patient is on waitlist for today and will likely not be seen. Pt on schedule for 1000 tomorrow.

## 2023-01-01 NOTE — PROGRESS NOTES
Comprehensive Daily ICU Note  12/30/22        Deidre N Aasen MRN# [id number]   Age: 27 year old YOB: 1995     Date of Admission: 12/16/2022    Primary care provider: No Ref-Primary, Physician     CODE STATUS: FULL    Problem List:       Principal Problem:    Diabetic ketoacidosis without coma associated with type 1 diabetes mellitus (H)  Active Problems:    Acute kidney failure with tubular necrosis (H)    Infection due to 2019 novel coronavirus    Necrotic pneumonia (H)    Fungemia    ARDS (adult respiratory distress syndrome) (H)    MRSA bacteremia    Encephalopathy              Subjective/ Last 24 hours:   Events: no major events overnight. Cardiopulmonary status stable.         Treatment goals for next 24 hours:   1. PT/OT/up in lift chair  2. Initiate limited (to 1 hour) high level PS BID  3.  Remove PCDs  4. Follow skin irritation Left calf  5. SW -evaluate for LTACH      Beth Iniguez MD  #[id number]  Critical Care Total Time 35 minutes-exclusive of procedures.         Mechanical Ventilation/Vitalsigns/IsandOs:     Temp:  [98.4  F (36.9  C)-99.2  F (37.3  C)] 98.4  F (36.9  C)  Pulse:  [62-92] 80  Resp:  [7-22] 22  BP: ()/(57-75) 103/64  FiO2 (%):  [35 %-40 %] 35 %  SpO2:  [97 %-100 %] 98 %      Vent Mode: CMV/AC  (Continuous Mandatory Ventilation/ Assist Control)  FiO2 (%): 35 %  Resp Rate (Set): 14 breaths/min  Tidal Volume (Set, mL): 400 mL  PEEP (cm H2O): 7 cmH2O  Resp: 22         Physical Examination:     General: writing on paper and appropriately interactive   HEENT: ET tube, OG tube  Lungs: course bilaterally, anteriorly, unchanged  CVS: RRR  Abdomen: mildly distended  Extremities/musculoskeletal: warm, non-edematous, slight swelling of left hand, circumferential erythematous area on left LE appears to correspond to BP cuff-improved-not extending  Neurology: minimally responsive on sedation  Psychiatry: Awake, smiling, follows appropriately.         Feeding/Glucose:     None    Recent  Labs   Lab 01/01/23  0751 01/01/23  0406 01/01/23  0404 01/01/23  0008 12/31/22 2047 12/31/22  1615   GLC 74 91 93 120* 150* 94            Medications:       acetaminophen  500 mg Oral 4x Daily    Or     acetaminophen  500 mg Oral 4x Daily     banatrol plus  1 packet Per Feeding Tube TID     buprenorphine  1 mg Sublingual BID    Followed by     buprenorphine  2 mg Sublingual BID    Followed by     [START ON 1/2/2023] buprenorphine  4 mg Sublingual BID     chlorhexidine  15 mL Mouth/Throat Q12H     heparin ANTICOAGULANT  5,000 Units Subcutaneous Q12H     insulin aspart  1-12 Units Subcutaneous Q4H     insulin glargine  10 Units Subcutaneous BID     levalbuterol  0.63 mg Nebulization Q8H     micafungin  100 mg Intravenous Q24H     multivitamins w/minerals  15 mL Per Feeding Tube Daily     pantoprazole  40 mg Per Feeding Tube QAM AC     QUEtiapine  25 mg Oral or NG Tube TID     sodium chloride (PF)  10 mL Intracatheter Q8H     traZODone  100 mg Oral or Feeding Tube At Bedtime     vancomycin  750 mg Intravenous Q12H                 Labs:       ROUTINE ICU LABS (Last four results)  CMP  Recent Labs   Lab 01/01/23  0404 12/31/22  0419 12/30/22  0512 12/29/22  0441 12/26/22  0437     --   --   --  137   POTASSIUM 4.5 4.2 3.7 4.1 4.2   CHLORIDE 107  --   --   --  100   CO2 26  --   --   --  34*   ANIONGAP 5  --   --   --  3   GLC 93  --   --  351* 272*   BUN 18  --   --   --  13   CR 0.57  --   --  0.53 0.52   GFRESTIMATED >90  --   --  >90 >90   AGNES 7.9*  --   --   --  7.2*   MAG 2.2 2.3 2.2 2.0 2.1   PHOS 4.7* 4.5 4.7* 4.1 3.4   PROTTOTAL 6.9  --   --   --   --    ALBUMIN 1.3*  --   --   --   --    BILITOTAL 0.3  --   --   --   --    ALKPHOS 75  --   --   --   --    AST 12  --   --   --   --    ALT 8  --   --   --   --     < > = values in this interval not displayed.     CBC  Recent Labs   Lab 01/01/23  0404 12/31/22  0419 12/30/22  0512 12/29/22  0441   WBC 7.7 7.5 11.1* 11.0   RBC 2.56* 2.60* 2.39* 2.17*   HGB  7.8* 7.8* 7.2* 6.4*   HCT 24.3* 24.5* 21.5* 20.4*   MCV 95 94 90 94   MCH 30.5 30.0 30.1 29.5   MCHC 32.1 31.8 33.5 31.4*   RDW 13.6 13.6 13.6 13.6   * 606* 492* 456*     INR  Recent Labs   Lab 12/28/22  1651   INR 1.11     Arterial Blood Gas  Recent Labs   Lab 01/01/23  0404 12/26/22  1009 12/26/22  0042   PH  --   --  7.53*   PCO2  --   --  45   PO2  --   --  70*   HCO3  --   --  38*   O2PER 40 65 70         Cultures:      Recent Labs   Lab 12/26/22  0604 12/26/22  0559 12/25/22  2349   CULTURE No Growth No Growth Candida albicans*  1+ Normal aaron  2+ Staphylococcus aureus MRSA*             Imaging/Other results:     No results found for this or any previous visit (from the past 24 hour(s)).            Assessment and Plan:     Summary:  Deidre N Aasen is a 27 year old female admitted on 12/16/2022 for DKA, respiratory failure from concurrent covid and bacterial pneumonia.  MRSA bacteremia and necrotizing pneumonia and candida bacteremia.  Has been making some improvements but has reached a plateau mostly limited by sedation/agitation and inability to wean from ventilator    Patient adamantly refused trach 12/29 and wanting to leave A. Had extensive discussion and patient knows/understands she would not survive. Now agreeable to tracheostomy    Neuro/ pain/ sedation:  # Toxic metabolic encephalopathy  # Agitation in setting of polysubstance abuse - IV meth and heroin use.  Still on fairly robust doses of sedatives but is mostly awake.  # Substance use disorder  P: Continue current sedative regimen. Remains on dexmedetomidine gtt-decreasing.  - Awake and appropriate, smiling.   Consult addiction medicine regarding Suboxone prior to transfer.  Tracheostomy with hope for reduced sedation needs  - Psychiatry evaluated  - off versed and fentanyl gtts  - Continue precedex, may increase to 1.5 if needed  - started buprenorphine 12/29. Schedule as follows per addiction medicine:   1.  Belbuca 450 mcg buccal  film q 12 hrs until 12 hrs after last fentanyl in OR       2.  On 12/31, increase to 1 mg buprenorphine bid for 2 doses      3.  On 1/1, increase to 2 mg buprenorphine bid for 2 doses      4.  If not requiring any other opioids for any reason, can then increase to 4 mg bid x 1 day and then 8 mg bid.  Keep on that dose.        If patient is oversedated, hold dose.          If patient develops worsened opioid withdrawal, please page me at [phone]    Pulmonary:   S/p tracheostomy 12/30/22  # Complex cavitary pneumonia 2/2 to MRSA pneumonia  Also COVID     Plan   1. Tolerating bedside PS/work trial 12/7 35% with 's and RR high teens, Sp02 mid/high 90's-anticipate 1 hr max BID today at above and then increase time.    Cardiovascular:  # septic shock, resolved  # Sinus tachycardia  #CATHY 12/20 negative for vegetations and negative bubble   - no acute issues    Renal/Electrolytes:  Labs are stable.  Was significantly volume overloaded.  She responded well to diuresis.   - Aim for net even    ID:  # Septic shock and MRSA bacteremia. Blood cultures have now cleared.  CATHY did not show endocarditis - Abx course: vanco 12/16- present; unasyn 12/16-12/19; cefepime 12/19-12/24.  Last positive blood cx: MRSA 12/16;# Complicated pneumonia with cavitary lesions.  For the moment no need for any kind of an intervention. Last positive BAL: MRSA 12/16  # Covid positive. Unclear clinical significance  #fungemia--no new positive cultures-  budding yeast 12/19. Anti-fungal course: micafungin 12/21-present, plan for 14 day course  # HSV in right groin wounds, had several day course of acyclovir  # Intermittent fevers, resolved    P: Continuing very complicated antimicrobial regimen.  Appreciate advice of infectious disease experts.  Reevaluate for infection if develops a much higher fever. Thus far, fevers resolved, WBC normalized    GI//Nutrition  TF at goal 40 ml/hr. On GI prophylaxis  Had  Amena    Musculoskeletal/Rheumatology:  Up in chair and PT today    Endocrine:  #Type 1 diabetes with insulin non complicance, complicated by DKA. Hemoglobin A1C 16.4 upon admission  # Resolved DKA but labile insulin needs  - increase Lantus 20 BID. Continuing with sliding scale    Hematology:  # DVT ppx  #anemia of chronic disease  #acute blood loss anemia. Needing transfusion on 12/23, 12/29. No clinical signs of bleeding  - subcutaneous heparin BID     ICU prophylaxis:      DVT: heparin BID due to weight     VAP: As above     Stress ulcer: ppi daily     Restraints needed: y     Lines   Central Line/PICC - placed 12/21 needed: y   Arterial line - placed n needed: n   Sheffield catheter.  n/a     Prognosis:  improving    Family update: Discussed with father at bedside today-answered his questions re: LTACH/placement and general substantial improvement.

## 2023-01-01 NOTE — PROGRESS NOTES
[facility] ICU RESPIRATORY NOTE        Date of Admission: 12/16/2022    Date of Intubation (most recent): 12/30/22    Reason for Mechanical Ventilation: Resp failure    Number of Days on Mechanical Ventilation: 16    Met Criteria for Spontaneous Breathing Trial: No    Reason for No Spontaneous Breathing Trial: Per MD    Significant Events Today: None    ABG Results:   Recent Labs   Lab 01/01/23  0404 12/26/22  1009 12/26/22  0042   PH  --   --  7.53*   PCO2  --   --  45   PO2  --   --  70*   HCO3  --   --  38*   O2PER 40 65 70       Current Vent Settings: Vent Mode: CMV/AC  (Continuous Mandatory Ventilation/ Assist Control)  FiO2 (%): 35 %  Resp Rate (Set): 14 breaths/min  Tidal Volume (Set, mL): 400 mL  PEEP (cm H2O): 7 cmH2O  Resp: (!) 7      Skin Assessment: Peristomal site with drainage, some dried blood. Trach area gently cleaned and gauze replaced at ~0545 today 01/01/23    Plan: Continue full vent support and wean as tolerated.    Manny Khan, RT on 1/1/2023 at 5:52 AM

## 2023-01-01 NOTE — PROGRESS NOTES
Comprehensive Daily ICU Note  12/30/22        Deidre N Aasen MRN# [id number]   Age: 27 year old YOB: 1995     Date of Admission: 12/16/2022    Primary care provider: No Ref-Primary, Physician     CODE STATUS: FULL    Problem List:       Principal Problem:    Diabetic ketoacidosis without coma associated with type 1 diabetes mellitus (H)  Active Problems:    Acute kidney failure with tubular necrosis (H)    Infection due to 2019 novel coronavirus    Necrotic pneumonia (H)    Fungemia    ARDS (adult respiratory distress syndrome) (H)    MRSA bacteremia    Encephalopathy           Treatment goals for next 24 hours:   1. Likely start limited PS trials        Subjective/ Last 24 hours:   Events: no major events, agreeable to tracheostomy. S/p Tracheostomy 12/30/22      Beth Iniguez MD  #[id number]  Critical Care Total Time 35 minutes-exclusive of procedures.         Mechanical Ventilation/Vitalsigns/IsandOs:     Temp:  [98  F (36.7  C)-99.2  F (37.3  C)] 98.7  F (37.1  C)  Pulse:  [] 68  Resp:  [14-21] 18  BP: ()/(53-75) 102/75  FiO2 (%):  [40 %] 40 %  SpO2:  [95 %-100 %] 99 %      Vent Mode: CMV/AC  (Continuous Mandatory Ventilation/ Assist Control)  FiO2 (%): 40 %  Resp Rate (Set): 14 breaths/min  Tidal Volume (Set, mL): 400 mL  PEEP (cm H2O): 7 cmH2O  Resp: 18         Physical Examination:     General: writing on paper and appropriately interactive   HEENT: ET tube, OG tube  Lungs: course bilaterally, anteriorly, unchanged  CVS: RRR  Abdomen: mildly distended  Extremities/musculoskeletal: warm, non-edematous, slight swelling of left hand, circumferential erythematous area on left LE appears to correspond to BP cuff  Neurology: minimally responsive on sedation  Psychiatry: answers questions appropriately but clearly frustrated         Feeding/Glucose:     None    Recent Labs   Lab 12/31/22  2047 12/31/22  1615 12/31/22  1336 12/31/22  0840 12/31/22  0417 12/31/22  0014   * 94 106* 137* 110*  157*            Medications:       acetaminophen  500 mg Oral 4x Daily    Or     acetaminophen  500 mg Oral 4x Daily     banatrol plus  1 packet Per Feeding Tube TID     buprenorphine  1 mg Sublingual BID    Followed by     [START ON 1/1/2023] buprenorphine  2 mg Sublingual BID    Followed by     [START ON 1/2/2023] buprenorphine  4 mg Sublingual BID     chlorhexidine  15 mL Mouth/Throat Q12H     heparin ANTICOAGULANT  5,000 Units Subcutaneous Q12H     insulin aspart  1-12 Units Subcutaneous Q4H     insulin glargine  10 Units Subcutaneous BID     levalbuterol  0.63 mg Nebulization Q8H     micafungin  100 mg Intravenous Q24H     multivitamins w/minerals  15 mL Per Feeding Tube Daily     pantoprazole  40 mg Per Feeding Tube QAM AC     QUEtiapine  25 mg Oral or NG Tube TID     sodium chloride (PF)  10 mL Intracatheter Q8H     traZODone  100 mg Oral or Feeding Tube At Bedtime     vancomycin  750 mg Intravenous Q12H          dexmedetomidine 0.9 mcg/kg/hr (12/31/22 2150)     dextrose Stopped (12/30/22 1820)     - MEDICATION INSTRUCTIONS -       sodium chloride 10 mL/hr at 12/31/22 2200            Labs:       ROUTINE ICU LABS (Last four results)  CMP  Recent Labs   Lab 12/31/22  2047 12/31/22  1615 12/31/22  1336 12/31/22  0840 12/31/22  0419 12/30/22  0816 12/30/22  0512 12/29/22  0738 12/29/22  0441 12/28/22  0419 12/28/22  0418 12/27/22  0921 12/27/22  0445 12/26/22  0440 12/26/22  0437   NA  --   --   --   --   --   --   --   --   --   --   --   --   --   --  137   POTASSIUM  --   --   --   --  4.2  --  3.7  --  4.1  --  4.5  --  4.5  --  4.2   CHLORIDE  --   --   --   --   --   --   --   --   --   --   --   --   --   --  100   CO2  --   --   --   --   --   --   --   --   --   --   --   --   --   --  34*   ANIONGAP  --   --   --   --   --   --   --   --   --   --   --   --   --   --  3   * 94 106* 137*  --    < >  --    < > 351*   < >  --    < > 294*   < > 272*   BUN  --   --   --   --   --   --   --   --   --    --   --   --   --   --  13   CR 0.54  --   --   --   --   --   --   --  0.53  --   --   --  0.60  --  0.52   GFRESTIMATED >90  --   --   --   --   --   --   --  >90  --   --   --  >90  --  >90   AGNES  --   --   --   --   --   --   --   --   --   --   --   --   --   --  7.2*   MAG  --   --   --   --  2.3  --  2.2  --  2.0  --  2.2  --  2.2  --  2.1   PHOS  --   --   --   --  4.5  --  4.7*  --  4.1  --  3.9  --  3.2  --  3.4    < > = values in this interval not displayed.     CBC  Recent Labs   Lab 12/31/22  0419 12/30/22  0512 12/29/22  0441 12/28/22  0418   WBC 7.5 11.1* 11.0 12.1*   RBC 2.60* 2.39* 2.17* 2.48*   HGB 7.8* 7.2* 6.4* 7.4*   HCT 24.5* 21.5* 20.4* 23.2*   MCV 94 90 94 94   MCH 30.0 30.1 29.5 29.8   MCHC 31.8 33.5 31.4* 31.9   RDW 13.6 13.6 13.6 13.7   * 492* 456* 504*     INR  Recent Labs   Lab 12/28/22  1651   INR 1.11     Arterial Blood Gas  Recent Labs   Lab 12/26/22  1009 12/26/22  0042   PH  --  7.53*   PCO2  --  45   PO2  --  70*   HCO3  --  38*   O2PER 65 70         Cultures:      Recent Labs   Lab 12/26/22  0604 12/26/22  0559 12/25/22  2349 12/25/22  0519   CULTURE No Growth No Growth Candida albicans*  1+ Normal aaron  2+ Staphylococcus aureus MRSA* No Growth             Imaging/Other results:     No results found for this or any previous visit (from the past 24 hour(s)).            Assessment and Plan:     Summary:  Deidre N Aasen is a 27 year old female admitted on 12/16/2022 for DKA, respiratory failure from concurrent covid and bacterial pneumonia.  MRSA bacteremia and necrotizing pneumonia and candida bacteremia.  Has been making some improvements but has reached a plateau mostly limited by sedation/agitation and inability to wean from ventilator    Patient adamantly refused trach 12/29 and wanting to leave AMA. Had extensive discussion and patient knows/understands she would not survive. Now agreeable to tracheostomy    Neuro/ pain/ sedation:  # Toxic metabolic encephalopathy  #  Agitation in setting of polysubstance abuse - IV meth and heroin use.  Still on fairly robust doses of sedatives but is mostly awake.  # Substance use disorder  P: Continue current sedative regimen.  Consult addiction medicine regarding Suboxone prior to transfer.  Tracheostomy with hope for reduced sedation needs  - Psychiatry consult  - off versed and fentanyl gtts  - Continue precedex, may increase to 1.5 if needed  - started buprenorphine 12/29. Schedule as follows per addiction medicine:   1.  Belbuca 450 mcg buccal film q 12 hrs until 12 hrs after last fentanyl in OR       2.  On 12/31, increase to 1 mg buprenorphine bid for 2 doses      3.  On 1/1, increase to 2 mg buprenorphine bid for 2 doses      4.  If not requiring any other opioids for any reason, can then increase to 4 mg bid x 1 day and then 8 mg bid.  Keep on that dose.        If patient is oversedated, hold dose.          If patient develops worsened opioid withdrawal, please page me at [phone]    Pulmonary:   S/p tracheostomy 12/30/22  # Complex cavitary pneumonia 2/2 to MRSA pneumonia  Also COVID     Plan   Initiate PS 1/1/2023    Cardiovascular:  # septic shock, resolved  # Sinus tachycardia  #CATHY 12/20 negative for vegetations and negative bubble   - no acute issues    Renal/Electrolytes:  Labs are stable.  Was significantly volume overloaded.  She responded well to diuresis.   - Aim for net even    ID:  # Septic shock and MRSA bacteremia. Blood cultures have now cleared.  CATHY did not show endocarditis - Abx course: vanco 12/16- present; unasyn 12/16-12/19; cefepime 12/19-12/24.  Last positive blood cx: MRSA 12/16;# Complicated pneumonia with cavitary lesions.  For the moment no need for any kind of an intervention. Last positive BAL: MRSA 12/16  # Covid positive. Unclear clinical significance  #fungemia--no new positive cultures-  budding yeast 12/19. Anti-fungal course: micafungin 12/21-present, plan for 14 day course  # HSV in right  groin wounds, had several day course of acyclovir  # Intermittent fevers, resolved    P: Continuing very complicated antimicrobial regimen.  Appreciate advice of infectious disease experts.  Reevaluate for infection if develops a much higher fever. Thus far, fevers resolved, WBC normalized    GI//Nutrition  She is at goal tube feeds. On GI prophylaxis    Musculoskeletal/Rheumatology:  Up in chair and PT today    Endocrine:  #Type 1 diabetes with insulin non complicance, complicated by DKA. Hemoglobin A1C 16.4 upon admission  # Resolved DKA but labile insulin needs  - increase Lantus 20 BID. Continuing with sliding scale    Hematology:  # DVT ppx  #anemia of chronic disease  #acute blood loss anemia. Needing transfusion on 12/23, 12/29. No clinical signs of bleeding  - subcutaneous heparin BID     ICU prophylaxis:      DVT: heparin BID due to weight     VAP: As above     Stress ulcer: ppi daily     Restraints needed: y     Lines   Central Line/PICC - placed 12/21 needed: y   Arterial line - placed n needed: n   Sheffield catheter.  n/a     Prognosis:  improving    Family update: Father is en route from [place].

## 2023-01-01 NOTE — PROGRESS NOTES
Critical access hospital ICU RESPIRATORY NOTE        Date of Admission: 12/16/2022    Date of Intubation (most recent): 12/26/22 trach 12/30/22    Reason for Mechanical Ventilation:Respiratory failure    Number of Days on Mechanical Ventilation:16    Met Criteria for Spontaneous Breathing Trial:Yes    Significant Events Today:Pt was on PS 12/7 for 1 hr twice today per MD order. Pt tolerated PS trials well.     ABG Results:   Recent Labs   Lab 01/01/23  0404 12/26/22  1009 12/26/22  0042   PH  --   --  7.53*   PCO2  --   --  45   PO2  --   --  70*   HCO3  --   --  38*   O2PER 40 65 70       Current Vent Settings: Vent Mode: CMV/AC  (Continuous Mandatory Ventilation/ Assist Control)  FiO2 (%): 30 %  Resp Rate (Set): 14 breaths/min  Tidal Volume (Set, mL): 400 mL  PEEP (cm H2O): 7 cmH2O  Pressure Support (cm H2O): 12 cmH2O  Resp: 16      Skin Assessment:Trach site intact    Plan:Will cont full vent support overnight and will assess for another PS trial in the morning.    Safia Vo RT on 1/1/2023 at 5:45 PM

## 2023-01-01 NOTE — PROGRESS NOTES
Neuro:Neuros grossly intact. WALKER. Follows. Appears less anxious today. Needs a lot of encouragement for getting up to chair and completing pressure support trials.    Last NIHSS score: NA    Cardiovascular: Tele: SR MAP > 65. Pulses palpable.  Trace edema in bilateral feet.    Pulmonary: Lungs coarse throughout fields. Minimal secretions through inline suction. PS trial today x2 @ 12/7    : Continent. Utilizing purewick, output adequate.     GI: 2 small watery BMs this a.m. Tube feeds unchanged.     Skin: Scabs/lesion in perianal/buttock area. Blanchable redness to lower left calf ? From BP cuff--marked and MD aware. Improved from yesterday      Mobility: Up to chair x2 today with chair lift.     IV: Precedex drip--titrating down and patient tolerating.     Dad present at bedside and supportive of patient. Treated for hypoglycemia x2--patient able to feel low BG and calls appropriately.

## 2023-01-01 NOTE — PROGRESS NOTES
Patient's father came and visit. Patient received Atarax for mild anxiety, with good effect.   Patient incontinent of stool, incontinent garment applied, will monitor bowel pattern and change garment as needed .Barrier cream availed to prevent skin breakdowns. Will continue to turn patient every 2 hours to ensure optimal skin integrity.Patient observed sleeping throughout the shift after father left. Appropriate interventions for patient's safety remain in place.IWill continue to monitor.

## 2023-01-01 NOTE — PROGRESS NOTES
[facility]    Infectious Disease Progress Note    Date of Service : 01/01/2023     Assessment:  27 year old female with polysubstance abuse who has been admitted since 12/16/2022 for DKA, respiratory failure from concurrent covid and found to have superimposed MRSA cavitary pneumonia with septic shock. She developed acute respiratory failure requiring intubation and pressor support as well as stress dose steroids for shock in addition to broad spectrum antimicrobial therapy.  She remains severely critically ill . Has now developed fungemia related to candida glabrata. Lines have been changed     -MRSA with septic shock. No endocarditis noted on CATHY. Last blood cx positive from 12/19  -Severe lobar consolidations and cavitary pneumonia related to MRSA with hypoxic respiratory failure  -Candida glabrata fungemia  -Genital herpes  -Covid19 infection  -Uncontrolled type 1 diabetes with HbA1c > 16%  -DKA with NANNETTE and electrolyte abnormalities have improved  -Malnutrition  -substance abuse disorder     Recommendations:  1. Continue  Vancomycin and Micafungin. Pharmacy managing vancomycin dosing, levels therapeutic, renal functions stable  2. Blood cxs negative since 12/21 candidemia rapidly cleared prob 2 weeks total asiya  3. No discitis/ osteo on the  MRI spine to assess for secondary infection   4. S/p bronchoscopy no new micro  5. HIV negative  6. On acyclovir for genital herpes           Cole Torres MD    Interval History   Remains intubated, ongoing low grade fever though slightly better   Fever is improved   WBC improved at 7k     Physical Exam   Temp: 98.5  F (36.9  C) Temp src: Oral BP: 109/72 Pulse: 80   Resp: 16 SpO2: 97 % O2 Device: Mechanical Ventilator    Vitals:    12/31/22 0400 12/31/22 1800 01/01/23 0400   Weight: (!) 24.6 kg (54 lb 3.7 oz) (!) 29.4 kg (64 lb 13 oz) (!) 26.6 kg (58 lb 10.3 oz)     Vital Signs with Ranges  Temp:  [98.4  F (36.9  C)-99.2  F (37.3  C)] 98.5  F  (36.9  C)  Pulse:  [66-89] 80  Resp:  [7-22] 16  BP: ()/(57-75) 109/72  FiO2 (%):  [35 %-40 %] 35 %  SpO2:  [96 %-100 %] 97 %    Constitutional: on ventilator  Lungs: clear  Cardiovascular: S1S2  Skin: No rash    Other:    Medications     dexmedetomidine 0.8 mcg/kg/hr (01/01/23 0758)     dextrose Stopped (12/30/22 1820)     - MEDICATION INSTRUCTIONS -       sodium chloride 10 mL/hr at 01/01/23 0700       acetaminophen  500 mg Oral or Feeding Tube 4x Daily    Or     acetaminophen  500 mg Oral or Feeding Tube 4x Daily     banatrol plus  1 packet Per Feeding Tube TID     buprenorphine  2 mg Sublingual BID    Followed by     [START ON 1/2/2023] buprenorphine  4 mg Sublingual BID     chlorhexidine  15 mL Mouth/Throat Q12H     heparin ANTICOAGULANT  5,000 Units Subcutaneous Q12H     insulin aspart  1-12 Units Subcutaneous Q4H     insulin glargine  10 Units Subcutaneous BID     levalbuterol  0.63 mg Nebulization Q8H     micafungin  100 mg Intravenous Q24H     multivitamins w/minerals  15 mL Per Feeding Tube Daily     pantoprazole  40 mg Per Feeding Tube QAM AC     QUEtiapine  25 mg Oral or NG Tube TID     sodium chloride (PF)  10 mL Intracatheter Q8H     traZODone  100 mg Oral or Feeding Tube At Bedtime     vancomycin  750 mg Intravenous Q12H       Data   All microbiology laboratory data reviewed.  Recent Labs   Lab Test 01/01/23  0404 12/31/22  0419 12/30/22  0512   WBC 7.7 7.5 11.1*   HGB 7.8* 7.8* 7.2*   HCT 24.3* 24.5* 21.5*   MCV 95 94 90   * 606* 492*     Recent Labs   Lab Test 01/01/23  0404 12/31/22  2047 12/29/22  0441   CR 0.57 0.54 0.53     Microbiology  Blood cx 12/21, 12/22, 12/23, 12/25 no growth so far  12/19 blood cx  Line, venous; Blood    0 Result Notes  Culture Positive on the 1st day of incubation Abnormal         Candida glabrata complex Panic     2 of 2 bottles   Staphylococcus aureus MRSA Panic     1 of 2 bottles   Positive on the 5th day of incubation  Susceptibilities done on previous  cultures         Resulting Agency: [facility]      Susceptibility              Candida glabrata complex       SAMANTHA       Amphotericin B 1 ug/mL No interpretation available       Fluconazole 16 ug/mL Susceptible Dose Dependent       Micafungin <=0.03 ug/mL Susceptible       Voriconazole 0.5 ug/mL No interpretation available

## 2023-01-02 ENCOUNTER — APPOINTMENT (OUTPATIENT)
Dept: PHYSICAL THERAPY | Facility: CLINIC | Age: 28
End: 2023-01-02
Attending: INTERNAL MEDICINE
Payer: COMMERCIAL

## 2023-01-02 LAB
ANION GAP SERPL CALCULATED.3IONS-SCNC: 6 MMOL/L (ref 3–14)
BASOPHILS # BLD AUTO: 0.1 10E3/UL (ref 0–0.2)
BASOPHILS NFR BLD AUTO: 1 %
BUN SERPL-MCNC: 17 MG/DL (ref 7–30)
CALCIUM SERPL-MCNC: 8.1 MG/DL (ref 8.5–10.1)
CHLORIDE BLD-SCNC: 104 MMOL/L (ref 94–109)
CO2 SERPL-SCNC: 28 MMOL/L (ref 20–32)
CREAT SERPL-MCNC: 0.5 MG/DL (ref 0.52–1.04)
EOSINOPHIL # BLD AUTO: 0.2 10E3/UL (ref 0–0.7)
EOSINOPHIL NFR BLD AUTO: 2 %
ERYTHROCYTE [DISTWIDTH] IN BLOOD BY AUTOMATED COUNT: 13.4 % (ref 10–15)
GFR SERPL CREATININE-BSD FRML MDRD: >90 ML/MIN/1.73M2
GLUCOSE BLD-MCNC: 125 MG/DL (ref 70–99)
GLUCOSE BLDC GLUCOMTR-MCNC: 118 MG/DL (ref 70–99)
GLUCOSE BLDC GLUCOMTR-MCNC: 122 MG/DL (ref 70–99)
GLUCOSE BLDC GLUCOMTR-MCNC: 124 MG/DL (ref 70–99)
GLUCOSE BLDC GLUCOMTR-MCNC: 130 MG/DL (ref 70–99)
GLUCOSE BLDC GLUCOMTR-MCNC: 131 MG/DL (ref 70–99)
GLUCOSE BLDC GLUCOMTR-MCNC: 156 MG/DL (ref 70–99)
HCT VFR BLD AUTO: 24.7 % (ref 35–47)
HGB BLD-MCNC: 8 G/DL (ref 11.7–15.7)
IMM GRANULOCYTES # BLD: 0.1 10E3/UL
IMM GRANULOCYTES NFR BLD: 1 %
LYMPHOCYTES # BLD AUTO: 1.5 10E3/UL (ref 0.8–5.3)
LYMPHOCYTES NFR BLD AUTO: 17 %
MAGNESIUM SERPL-MCNC: 2.3 MG/DL (ref 1.6–2.3)
MCH RBC QN AUTO: 30.3 PG (ref 26.5–33)
MCHC RBC AUTO-ENTMCNC: 32.4 G/DL (ref 31.5–36.5)
MCV RBC AUTO: 94 FL (ref 78–100)
MONOCYTES # BLD AUTO: 0.6 10E3/UL (ref 0–1.3)
MONOCYTES NFR BLD AUTO: 7 %
NEUTROPHILS # BLD AUTO: 6.2 10E3/UL (ref 1.6–8.3)
NEUTROPHILS NFR BLD AUTO: 72 %
NRBC # BLD AUTO: 0 10E3/UL
NRBC BLD AUTO-RTO: 0 /100
PHOSPHATE SERPL-MCNC: 4.8 MG/DL (ref 2.5–4.5)
PLATELET # BLD AUTO: 643 10E3/UL (ref 150–450)
POTASSIUM BLD-SCNC: 4.7 MMOL/L (ref 3.4–5.3)
RBC # BLD AUTO: 2.64 10E6/UL (ref 3.8–5.2)
SODIUM SERPL-SCNC: 138 MMOL/L (ref 133–144)
VANCOMYCIN SERPL-MCNC: 18.3 MG/L
VANCOMYCIN SERPL-MCNC: 25.3 MG/L
WBC # BLD AUTO: 8.6 10E3/UL (ref 4–11)

## 2023-01-02 PROCEDURE — 85025 COMPLETE CBC W/AUTO DIFF WBC: CPT | Performed by: INTERNAL MEDICINE

## 2023-01-02 PROCEDURE — 250N000009 HC RX 250: Performed by: INTERNAL MEDICINE

## 2023-01-02 PROCEDURE — 999N000009 HC STATISTIC AIRWAY CARE

## 2023-01-02 PROCEDURE — 250N000013 HC RX MED GY IP 250 OP 250 PS 637: Performed by: INTERNAL MEDICINE

## 2023-01-02 PROCEDURE — 250N000013 HC RX MED GY IP 250 OP 250 PS 637: Performed by: NURSE PRACTITIONER

## 2023-01-02 PROCEDURE — 999N000157 HC STATISTIC RCP TIME EA 10 MIN

## 2023-01-02 PROCEDURE — 94640 AIRWAY INHALATION TREATMENT: CPT | Mod: 76

## 2023-01-02 PROCEDURE — 94640 AIRWAY INHALATION TREATMENT: CPT

## 2023-01-02 PROCEDURE — 250N000011 HC RX IP 250 OP 636: Performed by: SURGERY

## 2023-01-02 PROCEDURE — 258N000003 HC RX IP 258 OP 636

## 2023-01-02 PROCEDURE — 80202 ASSAY OF VANCOMYCIN: CPT | Performed by: INTERNAL MEDICINE

## 2023-01-02 PROCEDURE — 250N000013 HC RX MED GY IP 250 OP 250 PS 637: Performed by: SURGERY

## 2023-01-02 PROCEDURE — 250N000011 HC RX IP 250 OP 636

## 2023-01-02 PROCEDURE — 99291 CRITICAL CARE FIRST HOUR: CPT | Mod: GC | Performed by: INTERNAL MEDICINE

## 2023-01-02 PROCEDURE — 83735 ASSAY OF MAGNESIUM: CPT | Performed by: INTERNAL MEDICINE

## 2023-01-02 PROCEDURE — 97530 THERAPEUTIC ACTIVITIES: CPT | Mod: GP | Performed by: PHYSICAL THERAPIST

## 2023-01-02 PROCEDURE — 94003 VENT MGMT INPAT SUBQ DAY: CPT

## 2023-01-02 PROCEDURE — 84100 ASSAY OF PHOSPHORUS: CPT | Performed by: INTERNAL MEDICINE

## 2023-01-02 PROCEDURE — 200N000001 HC R&B ICU

## 2023-01-02 PROCEDURE — 250N000013 HC RX MED GY IP 250 OP 250 PS 637: Performed by: STUDENT IN AN ORGANIZED HEALTH CARE EDUCATION/TRAINING PROGRAM

## 2023-01-02 PROCEDURE — 80048 BASIC METABOLIC PNL TOTAL CA: CPT | Performed by: INTERNAL MEDICINE

## 2023-01-02 RX ORDER — LEVALBUTEROL 1.25 MG/.5ML
1.25 SOLUTION, CONCENTRATE RESPIRATORY (INHALATION) EVERY 4 HOURS PRN
Status: DISCONTINUED | OUTPATIENT
Start: 2023-01-02 | End: 2023-01-04 | Stop reason: HOSPADM

## 2023-01-02 RX ADMIN — LEVALBUTEROL HYDROCHLORIDE 0.63 MG: 1.25 SOLUTION, CONCENTRATE RESPIRATORY (INHALATION) at 07:18

## 2023-01-02 RX ADMIN — LEVALBUTEROL HYDROCHLORIDE 0.63 MG: 1.25 SOLUTION, CONCENTRATE RESPIRATORY (INHALATION) at 23:48

## 2023-01-02 RX ADMIN — MICAFUNGIN SODIUM 100 MG: 50 INJECTION, POWDER, LYOPHILIZED, FOR SOLUTION INTRAVENOUS at 12:18

## 2023-01-02 RX ADMIN — ACETAMINOPHEN 500 MG: 325 SUSPENSION ORAL at 09:46

## 2023-01-02 RX ADMIN — QUETIAPINE FUMARATE 25 MG: 25 TABLET ORAL at 09:46

## 2023-01-02 RX ADMIN — LEVALBUTEROL HYDROCHLORIDE 1.25 MG: 1.25 SOLUTION, CONCENTRATE RESPIRATORY (INHALATION) at 02:17

## 2023-01-02 RX ADMIN — MULTIVITAMIN 15 ML: LIQUID ORAL at 09:47

## 2023-01-02 RX ADMIN — HYDROXYZINE HYDROCHLORIDE 50 MG: 25 TABLET, FILM COATED ORAL at 02:13

## 2023-01-02 RX ADMIN — ZOLPIDEM TARTRATE 10 MG: 5 TABLET ORAL at 22:03

## 2023-01-02 RX ADMIN — LEVALBUTEROL HYDROCHLORIDE 0.63 MG: 1.25 SOLUTION, CONCENTRATE RESPIRATORY (INHALATION) at 15:12

## 2023-01-02 RX ADMIN — CHLORHEXIDINE GLUCONATE 0.12% ORAL RINSE 15 ML: 1.2 LIQUID ORAL at 20:59

## 2023-01-02 RX ADMIN — QUETIAPINE FUMARATE 25 MG: 25 TABLET ORAL at 22:03

## 2023-01-02 RX ADMIN — BUPRENORPHINE HYDROCHLORIDE 2 MG: 2 TABLET SUBLINGUAL at 09:46

## 2023-01-02 RX ADMIN — Medication 40 MG: at 07:39

## 2023-01-02 RX ADMIN — ACETAMINOPHEN 500 MG: 325 SUSPENSION ORAL at 12:11

## 2023-01-02 RX ADMIN — VANCOMYCIN HYDROCHLORIDE 750 MG: 1 INJECTION, POWDER, LYOPHILIZED, FOR SOLUTION INTRAVENOUS at 10:06

## 2023-01-02 RX ADMIN — CHLORHEXIDINE GLUCONATE 0.12% ORAL RINSE 15 ML: 1.2 LIQUID ORAL at 07:41

## 2023-01-02 RX ADMIN — HEPARIN SODIUM 5000 UNITS: 5000 INJECTION, SOLUTION INTRAVENOUS; SUBCUTANEOUS at 20:59

## 2023-01-02 RX ADMIN — BUPRENORPHINE HYDROCHLORIDE 4 MG: 2 TABLET SUBLINGUAL at 20:59

## 2023-01-02 RX ADMIN — TRAZODONE HYDROCHLORIDE 100 MG: 50 TABLET ORAL at 22:04

## 2023-01-02 RX ADMIN — HEPARIN SODIUM 5000 UNITS: 5000 INJECTION, SOLUTION INTRAVENOUS; SUBCUTANEOUS at 09:46

## 2023-01-02 RX ADMIN — ACETAMINOPHEN 500 MG: 325 SUSPENSION ORAL at 22:03

## 2023-01-02 RX ADMIN — ACETAMINOPHEN 500 MG: 325 SUSPENSION ORAL at 18:14

## 2023-01-02 RX ADMIN — QUETIAPINE FUMARATE 25 MG: 25 TABLET ORAL at 15:45

## 2023-01-02 ASSESSMENT — ACTIVITIES OF DAILY LIVING (ADL)
TOILETING_ISSUES: YES
ADLS_ACUITY_SCORE: 36
ADLS_ACUITY_SCORE: 36
ADLS_ACUITY_SCORE: 48
DIFFICULTY_EATING/SWALLOWING: YES
DRESSING/BATHING_DIFFICULTY: YES
EATING/SWALLOWING: SWALLOWING LIQUIDS;SWALLOWING SOLID FOOD
WALKING_OR_CLIMBING_STAIRS_DIFFICULTY: YES
WEAR_GLASSES_OR_BLIND: NO
ADLS_ACUITY_SCORE: 36
HEARING_DIFFICULTY_OR_DEAF: NO
ADLS_ACUITY_SCORE: 36
DIFFICULTY_COMMUNICATING: YES
ADLS_ACUITY_SCORE: 36
ADLS_ACUITY_SCORE: 48
ADLS_ACUITY_SCORE: 36
CONCENTRATING,_REMEMBERING_OR_MAKING_DECISIONS_DIFFICULTY: NO
CHANGE_IN_FUNCTIONAL_STATUS_SINCE_ONSET_OF_CURRENT_ILLNESS/INJURY: YES
ADLS_ACUITY_SCORE: 36
COMMUNICATION: UNABLE TO SPEAK
DRESSING/BATHING: BATHING DIFFICULTY, ASSISTANCE 1 PERSON;DRESSING DIFFICULTY, ASSISTANCE 1 PERSON
DOING_ERRANDS_INDEPENDENTLY_DIFFICULTY: YES
ADLS_ACUITY_SCORE: 36
FALL_HISTORY_WITHIN_LAST_SIX_MONTHS: NO
ADLS_ACUITY_SCORE: 36
TOILETING_ASSISTANCE: TOILETING DIFFICULTY, ASSISTANCE 1 PERSON
WALKING_OR_CLIMBING_STAIRS: AMBULATION DIFFICULTY, ASSISTANCE 1 PERSON;STAIR CLIMBING DIFFICULTY, DEPENDENT;TRANSFERRING DIFFICULTY, ASSISTANCE 1 PERSON
ADLS_ACUITY_SCORE: 36

## 2023-01-02 NOTE — PROGRESS NOTES
OVERNIGHT NOTE:    Notified about intermittent borderline low blood sugars throughout the day.  Reduced Lantus to from 10 8 units.        Cole Miller M.D.  Pulmonary & Critical Care  Pager: Click Here to page

## 2023-01-02 NOTE — PROGRESS NOTES
[facility] ICU RESPIRATORY NOTE        Date of Admission: 12/16/2022     Date of Intubation (most recent): 12/30/22    Reason for Mechanical Ventilation: Respiratory failure     Number of Days on Mechanical Ventilation: 17    Met Criteria for Spontaneous Breathing Trial: Yes        Significant Events Today: PS 7/5 since 0830    ABG Results:   Recent Labs   Lab 01/01/23  0404   O2PER 40       Current Vent Settings: Vent Mode: CPAP/PS  (Continuous positive airway pressure with Pressure Support)  FiO2 (%): 30 %  Resp Rate (Set): 14 breaths/min  Tidal Volume (Set, mL): 400 mL  PEEP (cm H2O): 7 cmH2O  Pressure Support (cm H2O): 5 cmH2O  Resp: (!) 6      Skin Assessment: Skin intact     Plan: Continue full vent support and wean as tolerated     RT German on 1/2/2023 at 5:14 PM

## 2023-01-02 NOTE — PHARMACY-VANCOMYCIN DOSING SERVICE
Pharmacy Vancomycin Note  Date of Service 2023  Patient's  1995   27 year old, female    Indication: Bacteremia  Day of Therapy: 18  Current vancomycin regimen:  750 mg IV q12h  Current vancomycin monitoring method: AUC  Current vancomycin therapeutic monitoring goal: 400-600 mg*h/L    [employer] Prediction of Current Vancomycin Regimen  Loading dose: N/A  Regimen: 750 mg IV every 12 hours.  Start time: 22:06 on 2023  Exposure target: AUC24 (range)400-600 mg/L.hr   AUC24,ss: 546 mg/L.hr  Probability of AUC24 > 400: 100 %  Ctrough,ss: 16 mg/L  Probability of Ctrough,ss > 20: 3 %  Probability of nephrotoxicity (Lodise LEONORA ): 11 %      Current estimated CrCl = Estimated Creatinine Clearance: 71.1 mL/min (A) (based on SCr of 0.5 mg/dL (L)).    Creatinine for last 3 days  2022:  8:47 PM Creatinine 0.54 mg/dL  2023:  4:04 AM Creatinine 0.57 mg/dL  2023:  4:54 AM Creatinine 0.50 mg/dL    Recent Vancomycin Levels (past 3 days)  2022:  8:47 PM Vancomycin 18.7 mg/L  2023:  4:54 AM Vancomycin 25.3 mg/L    Vancomycin IV Administrations (past 72 hours)                   vancomycin (VANCOCIN) 750 mg in sodium chloride 0.9 % 250 mL intermittent infusion (mg) 750 mg New Bag 23 1006     750 mg New Bag 23 2221     750 mg New Bag  1109     750 mg New Bag 22 2150     750 mg New Bag  1040     750 mg New Bag 22 2205                Nephrotoxins and other renal medications (From now, onward)    Start     Dose/Rate Route Frequency Ordered Stop    22 1000  vancomycin (VANCOCIN) 750 mg in sodium chloride 0.9 % 250 mL intermittent infusion         750 mg  over 90 Minutes Intravenous EVERY 12 HOURS 22 2240               Contrast Orders - past 72 hours (72h ago, onward)    None          Interpretation of levels and current regimen:  Vancomycin level is reflective of -600    Has serum creatinine changed greater than 50% in last 72 hours: No    Urine  output:  good urine output    Renal Function: Stable    Plan:  1. Continue Current Dose  2. Vancomycin monitoring method: AUC  3. Vancomycin therapeutic monitoring goal: 400-600 mg*h/L  4. Pharmacy will check vancomycin levels as appropriate in 1-3 Days.  5. Serum creatinine levels will be ordered daily for the first week of therapy and at least twice weekly for subsequent weeks.    Crys Groves [facility]

## 2023-01-02 NOTE — PROGRESS NOTES
Comprehensive Daily ICU Note  1/2/22        Deidre N Aasen MRN# [id number]   Age: 27 year old YOB: 1995     Date of Admission: 12/16/2022    Primary care provider: No Ref-Primary, Physician     CODE STATUS: FULL    Problem List:       Principal Problem:    Diabetic ketoacidosis without coma associated with type 1 diabetes mellitus (H)  Active Problems:    Acute kidney failure with tubular necrosis (H)    Infection due to 2019 novel coronavirus    Necrotic pneumonia (H)    Fungemia    ARDS (adult respiratory distress syndrome) (H)    MRSA bacteremia    Encephalopathy              Subjective/ Last 24 hours:   Events: no major events overnight         Treatment goals for next 24 hours:   1. PT/OT  2. SW -evaluate for LTACH, likely discharge Tues/Wed  ICU staff: Dr Hira Tran MD  Surgical Critical Care Fellow         Mechanical Ventilation/Vitalsigns/IsandOs:     Temp:  [97.7  F (36.5  C)-98.9  F (37.2  C)] 98.3  F (36.8  C)  Pulse:  [57-96] 68  Resp:  [10-19] 15  BP: (103-119)/(68-95) 106/72  FiO2 (%):  [30 %-35 %] 30 %  SpO2:  [92 %-100 %] 99 %      Vent Mode: CMV/AC  (Continuous Mandatory Ventilation/ Assist Control)  FiO2 (%): 30 %  Resp Rate (Set): 14 breaths/min  Tidal Volume (Set, mL): 400 mL  PEEP (cm H2O): 7 cmH2O  Pressure Support (cm H2O): 12 cmH2O  Resp: 15         Physical Examination:     General: appropriately interactive and pleasant  HEENT: tracheostomy, NJT  Lungs: right side course, left side clear anteriorly  CVS: RRR  Abdomen: non-distended  Extremities/musculoskeletal: warm  Neurology: alert and oriented  Psychiatry: Awake, smiling, appropriate mood         Feeding/Glucose:     None    Recent Labs   Lab 01/02/23  0746 01/02/23  0454 01/02/23  0453 01/02/23  0052 01/01/23  1958 01/01/23  1855   * 125* 118* 124* 112* 111*            Medications:       acetaminophen  500 mg Oral or Feeding Tube 4x Daily    Or     acetaminophen  500 mg Oral or Feeding Tube 4x Daily      buprenorphine  2 mg Sublingual BID    Followed by     buprenorphine  4 mg Sublingual BID     chlorhexidine  15 mL Mouth/Throat Q12H     fiber modular  1 packet Per Feeding Tube Daily     heparin ANTICOAGULANT  5,000 Units Subcutaneous Q12H     insulin aspart  1-12 Units Subcutaneous Q4H     insulin glargine  8 Units Subcutaneous BID     levalbuterol  0.63 mg Nebulization Q8H     micafungin  100 mg Intravenous Q24H     multivitamins w/minerals  15 mL Per Feeding Tube Daily     pantoprazole  40 mg Per Feeding Tube QAM AC     QUEtiapine  25 mg Oral or NG Tube TID     sodium chloride (PF)  10 mL Intracatheter Q8H     traZODone  100 mg Oral or Feeding Tube At Bedtime     vancomycin  750 mg Intravenous Q12H                 Labs:       ROUTINE ICU LABS (Last four results)  CMP  Recent Labs   Lab 01/01/23 0404 12/31/22 0419 12/30/22 0512 12/29/22 0441 12/26/22  0437     --   --   --  137   POTASSIUM 4.5 4.2 3.7 4.1 4.2   CHLORIDE 107  --   --   --  100   CO2 26  --   --   --  34*   ANIONGAP 5  --   --   --  3   GLC 93  --   --  351* 272*   BUN 18  --   --   --  13   CR 0.57  --   --  0.53 0.52   GFRESTIMATED >90  --   --  >90 >90   AGNES 7.9*  --   --   --  7.2*   MAG 2.2 2.3 2.2 2.0 2.1   PHOS 4.7* 4.5 4.7* 4.1 3.4   PROTTOTAL 6.9  --   --   --   --    ALBUMIN 1.3*  --   --   --   --    BILITOTAL 0.3  --   --   --   --    ALKPHOS 75  --   --   --   --    AST 12  --   --   --   --    ALT 8  --   --   --   --     < > = values in this interval not displayed.     CBC  Recent Labs   Lab 01/02/23 0454 01/01/23 0404 12/31/22 0419 12/30/22 0512   WBC 8.6 7.7 7.5 11.1*   RBC 2.64* 2.56* 2.60* 2.39*   HGB 8.0* 7.8* 7.8* 7.2*   HCT 24.7* 24.3* 24.5* 21.5*   MCV 94 95 94 90   MCH 30.3 30.5 30.0 30.1   MCHC 32.4 32.1 31.8 33.5   RDW 13.4 13.6 13.6 13.6   * 526* 606* 492*     INR  Recent Labs   Lab 12/28/22  1651   INR 1.11     Arterial Blood Gas  Recent Labs   Lab 01/01/23  0404 12/26/22  1009   O2PER 40 65      Cultures:  No results for input(s): CULTURE in the last 168 hours.          Imaging/Other results:     No results found for this or any previous visit (from the past 24 hour(s)).          Assessment and Plan:     Summary:  Deidre N Aasen is a 27 year old female admitted on 12/16/2022 for DKA, respiratory failure from concurrent covid and bacterial pneumonia.  MRSA bacteremia and necrotizing pneumonia and candida bacteremia.  Has been making some improvements but has reached a plateau mostly limited by sedation/agitation and inability to wean from ventilator. Now s/p tracheostomy 12/30    Neuro/ pain/ sedation:  # Toxic metabolic encephalopathy  # Agitation in setting of polysubstance abuse - IV meth and heroin use.  Still on fairly robust doses of sedatives but is mostly awake.  # Substance use disorder  P: Continue current sedative regimen. Remains on dexmedetomidine gtt-decreasing.  - Awake and appropriate, smiling.  - Psychiatry evaluated  - wean dex today  - started buprenorphine 12/29. Schedule as follows per addiction medicine:   1.  Belbuca 450 mcg buccal film q 12 hrs until 12 hrs after last fentanyl in OR       2.  On 12/31, increase to 1 mg buprenorphine bid for 2 doses      3.  On 1/1, increase to 2 mg buprenorphine bid for 2 doses      4.  If not requiring any other opioids for any reason, can then increase to 4 mg bid x 1 day and then 8 mg bid.  Keep on that dose.        If patient is oversedated, hold dose.          If patient develops worsened opioid withdrawal, please page at [phone]    Pulmonary:   S/p tracheostomy 12/30/22  # Complex cavitary pneumonia 2/2 to MRSA pneumonia  Also COVID     Plan   1. Tolerating bedside PS/work trial 12/7 35% with 's and RR high teens, Sp02 mid/high 90's-anticipate 1 hr max BID today at above and then increase time    Cardiovascular:  # septic shock, resolved  # Sinus tachycardia  #CATHY 12/20 negative for vegetations and negative bubble   - no acute  issues    Renal/Electrolytes:  Labs are stable.  Was significantly volume overloaded.  She responded well to diuresis.   - Aim for net even    ID:  # Septic shock and MRSA bacteremia. Blood cultures have now cleared.  CATHY did not show endocarditis - Abx course: vanco 12/16- present; unasyn 12/16-12/19; cefepime 12/19-12/24.  Last positive blood cx: MRSA 12/16;# Complicated pneumonia with cavitary lesions.  For the moment no need for any kind of an intervention. Last positive BAL: MRSA 12/16  # Covid positive. Unclear clinical significance  #fungemia--no new positive cultures-  budding yeast 12/19. Anti-fungal course: micafungin 12/21-present, plan for 14 day course  # HSV in right groin wounds, had several day course of acyclovir  # Intermittent fevers, resolved    P: Continuing antimicrobial regimen vanco 12/16-present. Faiza 12/21-present    GI//Nutrition  TF at goal 40 ml/hr. On GI prophylaxis  Had BMs    Musculoskeletal/Rheumatology:  Up in chair and PT today    Endocrine:  #Type 1 diabetes with insulin non complicance, complicated by DKA. Hemoglobin A1C 16.4 upon admission  # Resolved DKA but labile insulin needs  - Lantus 8 BID. Continuing with sliding scale    Hematology:  # DVT ppx  #anemia of chronic disease  #acute blood loss anemia. Needing transfusion on 12/23, 12/29. No clinical signs of bleeding  - subcutaneous heparin BID     ICU prophylaxis:      DVT: heparin BID due to weight     VAP: As above     Stress ulcer: ppi daily     Restraints needed: y     Lines   Central Line/PICC - placed 12/21 needed: y   Arterial line - placed n needed: n   [place] catheter.  n/a     Prognosis:  recovering

## 2023-01-02 NOTE — PLAN OF CARE
Care provided from [id number]    Plan of care reviewed with patient and father    Neuro: patient alert and oriented. Moves all extremities purposefully. Weakness when standing  CV: SR with stable blood pressures  Pulm: patient pressure supporting 7/7 on 30%  GI/: multiple loose BM's. Active bowel sounds

## 2023-01-02 NOTE — PROGRESS NOTES
Care Management Follow Up    Length of Stay (days): 17    Expected Discharge Date: 01/05/2023     Concerns to be Addressed:       Patient plan of care discussed at interdisciplinary rounds: Yes    Anticipated Discharge Disposition: LTACH     Anticipated Discharge Services:  Per LTACH  Anticipated Discharge DME: TBD per LTACH     Patient/family educated on Medicare website which has current facility and service quality ratings:    Education Provided on the Discharge Plan:    Patient/Family in Agreement with the Plan: yes    Referrals Placed by CM/SW: Post Acute Facilities  Private pay costs discussed: Not applicable Insurance Auth to be obtained by Marce    Additional Information:  Writer called [facility] and spoke to Berenice. She is aware of patient and will be getting insurance auth prior to patient coming to LTACH. They are following and anticipate patient being admitted this week.      Lisa Marti RN

## 2023-01-02 NOTE — PLAN OF CARE
Patient observed sleeping throughout the shift, no s/s of distress or discomfort observed or reported. Precedex drip titrated down. Lantus insulin decreased r/t to recent BS data.Incontinent of BM x2. Appropriate interventions for patient's safety remain in place.Will continue to monitor.

## 2023-01-03 ENCOUNTER — APPOINTMENT (OUTPATIENT)
Dept: PHYSICAL THERAPY | Facility: CLINIC | Age: 28
End: 2023-01-03
Attending: INTERNAL MEDICINE
Payer: COMMERCIAL

## 2023-01-03 LAB
ATRIAL RATE - MUSE: 85 BPM
DIASTOLIC BLOOD PRESSURE - MUSE: NORMAL MMHG
ERYTHROCYTE [DISTWIDTH] IN BLOOD BY AUTOMATED COUNT: 13.4 % (ref 10–15)
GLUCOSE BLDC GLUCOMTR-MCNC: 129 MG/DL (ref 70–99)
GLUCOSE BLDC GLUCOMTR-MCNC: 138 MG/DL (ref 70–99)
GLUCOSE BLDC GLUCOMTR-MCNC: 149 MG/DL (ref 70–99)
GLUCOSE BLDC GLUCOMTR-MCNC: 151 MG/DL (ref 70–99)
GLUCOSE BLDC GLUCOMTR-MCNC: 198 MG/DL (ref 70–99)
GLUCOSE BLDC GLUCOMTR-MCNC: 226 MG/DL (ref 70–99)
GLUCOSE BLDC GLUCOMTR-MCNC: 70 MG/DL (ref 70–99)
GLUCOSE BLDC GLUCOMTR-MCNC: 79 MG/DL (ref 70–99)
GLUCOSE BLDC GLUCOMTR-MCNC: 96 MG/DL (ref 70–99)
HCT VFR BLD AUTO: 23.3 % (ref 35–47)
HGB BLD-MCNC: 7.3 G/DL (ref 11.7–15.7)
INTERPRETATION ECG - MUSE: NORMAL
MAGNESIUM SERPL-MCNC: 2.2 MG/DL (ref 1.6–2.3)
MCH RBC QN AUTO: 29.6 PG (ref 26.5–33)
MCHC RBC AUTO-ENTMCNC: 31.3 G/DL (ref 31.5–36.5)
MCV RBC AUTO: 94 FL (ref 78–100)
P AXIS - MUSE: 65 DEGREES
PHOSPHATE SERPL-MCNC: 4.4 MG/DL (ref 2.5–4.5)
PLATELET # BLD AUTO: 742 10E3/UL (ref 150–450)
POTASSIUM BLD-SCNC: 4.6 MMOL/L (ref 3.4–5.3)
PR INTERVAL - MUSE: 134 MS
QRS DURATION - MUSE: 72 MS
QT - MUSE: 372 MS
QTC - MUSE: 442 MS
R AXIS - MUSE: 64 DEGREES
RBC # BLD AUTO: 2.47 10E6/UL (ref 3.8–5.2)
SODIUM SERPL-SCNC: 139 MMOL/L (ref 133–144)
SYSTOLIC BLOOD PRESSURE - MUSE: NORMAL MMHG
T AXIS - MUSE: 52 DEGREES
VENTRICULAR RATE- MUSE: 85 BPM
WBC # BLD AUTO: 12.7 10E3/UL (ref 4–11)

## 2023-01-03 PROCEDURE — 258N000003 HC RX IP 258 OP 636

## 2023-01-03 PROCEDURE — 250N000011 HC RX IP 250 OP 636

## 2023-01-03 PROCEDURE — 94640 AIRWAY INHALATION TREATMENT: CPT | Mod: 76

## 2023-01-03 PROCEDURE — 94003 VENT MGMT INPAT SUBQ DAY: CPT

## 2023-01-03 PROCEDURE — 250N000013 HC RX MED GY IP 250 OP 250 PS 637: Performed by: STUDENT IN AN ORGANIZED HEALTH CARE EDUCATION/TRAINING PROGRAM

## 2023-01-03 PROCEDURE — G0463 HOSPITAL OUTPT CLINIC VISIT: HCPCS

## 2023-01-03 PROCEDURE — 97530 THERAPEUTIC ACTIVITIES: CPT | Mod: GP | Performed by: PHYSICAL THERAPIST

## 2023-01-03 PROCEDURE — 83735 ASSAY OF MAGNESIUM: CPT | Performed by: INTERNAL MEDICINE

## 2023-01-03 PROCEDURE — 999N000157 HC STATISTIC RCP TIME EA 10 MIN

## 2023-01-03 PROCEDURE — 999N000009 HC STATISTIC AIRWAY CARE

## 2023-01-03 PROCEDURE — 84295 ASSAY OF SERUM SODIUM: CPT | Performed by: STUDENT IN AN ORGANIZED HEALTH CARE EDUCATION/TRAINING PROGRAM

## 2023-01-03 PROCEDURE — 250N000013 HC RX MED GY IP 250 OP 250 PS 637: Performed by: SURGERY

## 2023-01-03 PROCEDURE — 250N000013 HC RX MED GY IP 250 OP 250 PS 637: Performed by: INTERNAL MEDICINE

## 2023-01-03 PROCEDURE — 250N000011 HC RX IP 250 OP 636: Performed by: SURGERY

## 2023-01-03 PROCEDURE — 99233 SBSQ HOSP IP/OBS HIGH 50: CPT | Performed by: INTERNAL MEDICINE

## 2023-01-03 PROCEDURE — 94640 AIRWAY INHALATION TREATMENT: CPT

## 2023-01-03 PROCEDURE — 84100 ASSAY OF PHOSPHORUS: CPT | Performed by: INTERNAL MEDICINE

## 2023-01-03 PROCEDURE — 84132 ASSAY OF SERUM POTASSIUM: CPT | Performed by: INTERNAL MEDICINE

## 2023-01-03 PROCEDURE — 85027 COMPLETE CBC AUTOMATED: CPT | Performed by: SURGERY

## 2023-01-03 PROCEDURE — 99291 CRITICAL CARE FIRST HOUR: CPT | Mod: GC | Performed by: INTERNAL MEDICINE

## 2023-01-03 PROCEDURE — 250N000013 HC RX MED GY IP 250 OP 250 PS 637: Performed by: NURSE PRACTITIONER

## 2023-01-03 PROCEDURE — 97116 GAIT TRAINING THERAPY: CPT | Mod: GP | Performed by: PHYSICAL THERAPIST

## 2023-01-03 PROCEDURE — 250N000009 HC RX 250: Performed by: INTERNAL MEDICINE

## 2023-01-03 PROCEDURE — 200N000001 HC R&B ICU

## 2023-01-03 RX ADMIN — VANCOMYCIN HYDROCHLORIDE 750 MG: 1 INJECTION, POWDER, LYOPHILIZED, FOR SOLUTION INTRAVENOUS at 00:00

## 2023-01-03 RX ADMIN — CHLORHEXIDINE GLUCONATE 0.12% ORAL RINSE 15 ML: 1.2 LIQUID ORAL at 20:37

## 2023-01-03 RX ADMIN — BUPRENORPHINE HYDROCHLORIDE 4 MG: 2 TABLET SUBLINGUAL at 20:37

## 2023-01-03 RX ADMIN — HEPARIN SODIUM 5000 UNITS: 5000 INJECTION, SOLUTION INTRAVENOUS; SUBCUTANEOUS at 21:20

## 2023-01-03 RX ADMIN — LEVALBUTEROL HYDROCHLORIDE 0.63 MG: 1.25 SOLUTION, CONCENTRATE RESPIRATORY (INHALATION) at 23:06

## 2023-01-03 RX ADMIN — VANCOMYCIN HYDROCHLORIDE 750 MG: 1 INJECTION, POWDER, LYOPHILIZED, FOR SOLUTION INTRAVENOUS at 10:47

## 2023-01-03 RX ADMIN — ACETAMINOPHEN 500 MG: 325 SUSPENSION ORAL at 22:30

## 2023-01-03 RX ADMIN — QUETIAPINE FUMARATE 25 MG: 25 TABLET ORAL at 15:35

## 2023-01-03 RX ADMIN — Medication 40 MG: at 08:03

## 2023-01-03 RX ADMIN — LEVALBUTEROL HYDROCHLORIDE 0.63 MG: 1.25 SOLUTION, CONCENTRATE RESPIRATORY (INHALATION) at 15:25

## 2023-01-03 RX ADMIN — ACETAMINOPHEN 500 MG: 325 SUSPENSION ORAL at 12:29

## 2023-01-03 RX ADMIN — LEVALBUTEROL HYDROCHLORIDE 1.25 MG: 1.25 SOLUTION, CONCENTRATE RESPIRATORY (INHALATION) at 06:58

## 2023-01-03 RX ADMIN — TRAZODONE HYDROCHLORIDE 100 MG: 50 TABLET ORAL at 22:31

## 2023-01-03 RX ADMIN — QUETIAPINE FUMARATE 25 MG: 25 TABLET ORAL at 08:03

## 2023-01-03 RX ADMIN — BUPRENORPHINE HYDROCHLORIDE 4 MG: 2 TABLET SUBLINGUAL at 08:03

## 2023-01-03 RX ADMIN — ACETAMINOPHEN 500 MG: 325 SUSPENSION ORAL at 08:03

## 2023-01-03 RX ADMIN — QUETIAPINE FUMARATE 25 MG: 25 TABLET ORAL at 22:30

## 2023-01-03 RX ADMIN — ACETAMINOPHEN 500 MG: 325 SUSPENSION ORAL at 18:17

## 2023-01-03 RX ADMIN — VANCOMYCIN HYDROCHLORIDE 750 MG: 1 INJECTION, POWDER, LYOPHILIZED, FOR SOLUTION INTRAVENOUS at 22:30

## 2023-01-03 RX ADMIN — CHLORHEXIDINE GLUCONATE 0.12% ORAL RINSE 15 ML: 1.2 LIQUID ORAL at 08:03

## 2023-01-03 RX ADMIN — HEPARIN SODIUM 5000 UNITS: 5000 INJECTION, SOLUTION INTRAVENOUS; SUBCUTANEOUS at 08:04

## 2023-01-03 RX ADMIN — HYDROXYZINE HYDROCHLORIDE 50 MG: 25 TABLET, FILM COATED ORAL at 21:09

## 2023-01-03 RX ADMIN — MULTIVITAMIN 15 ML: LIQUID ORAL at 08:03

## 2023-01-03 RX ADMIN — MICAFUNGIN SODIUM 100 MG: 50 INJECTION, POWDER, LYOPHILIZED, FOR SOLUTION INTRAVENOUS at 12:30

## 2023-01-03 ASSESSMENT — ACTIVITIES OF DAILY LIVING (ADL)
ADLS_ACUITY_SCORE: 47
ADLS_ACUITY_SCORE: 46
ADLS_ACUITY_SCORE: 48
ADLS_ACUITY_SCORE: 48
ADLS_ACUITY_SCORE: 47
ADLS_ACUITY_SCORE: 46
ADLS_ACUITY_SCORE: 48
ADLS_ACUITY_SCORE: 46

## 2023-01-03 NOTE — PROGRESS NOTES
CLINICAL NUTRITION SERVICES - REASSESSMENT NOTE      Recommendations Ordered by Registered Dietitian (RD): Change to elemental TF regimen Vivonex RTF at 60 mL/hr to provide:  1440 kcal (43 kcal/kg), 72 g protein (2.2 g/kg), 253 g CHO, 0 g fiber, 1221 mL H2O  Discontinue the Banatrol    Malnutrition: % Weight Loss:  > 2% in 1 week (severe malnutrition)  % Intake:  No decreased intake noted (TF reliant)  Subcutaneous Fat Loss:  Orbital region severe depletion, Upper arm region severe depletion and Thoracic region severe depletion  Muscle Loss:  Temporal region severe depletion, Clavicle bone region severe depletion and Acromion bone region severe depletion  Fluid Retention:  None noted    Malnutrition Diagnosis: Severe malnutrition  In Context of:  Acute illness or injury  Chronic illness or disease  Environmental or social circumstances       EVALUATION OF PROGRESS TOWARD GOALS   Diet:  NPO with Trach    Nutrition Support:  Patient's TF was changed on 12/30 and continues as follows ~    Nutrition Support Enteral:  Type of Feeding Tube: Nasoduodenal   Enteral Frequency:  Continuous  Enteral Regimen: [employer] Peptide 1.5 at 40 mL/hr  Total Enteral Provisions: 1440 kcal (40 kcal/kg), 65 g protein (1.8 g/kg), 733 mL H2O, 133 g CHO, 9 g fiber   Free Water Flush: 100 mL every 4 hours   1 pkt Banatrol daily (Enfit) = 5 g fiber and 45 kcal   Total = 1485 kcal (41 kcal/kg), 14 g fiber     Intake/Tolerance:    K/Mg/Phos normal  BGM  with High sliding scale insulin, Lantus 6 units BID   I/O 2131/1200, wt 33.3 kg (after bed re-zeroed) - down 2.9 kg over the last 5 days   Patient with large volume of stool --> BM x 1 today, x 17 yesterday and x 5 on 1/1 -- question patient's absorption with ongoing loose stools and weight drops       ASSESSED NUTRITION NEEDS:  Dosing Weight:  33.3 kg (1/3)  Estimated Energy Needs: 6335-6163 kcals (40-45 Kcal/Kg)  Justification: Underweight, repletion  Estimated Protein Needs: 65-85 grams  protein (2-2.5 g pro/Kg)   Justification: Stress, Repletion      NEW FINDINGS:   12/30: Tracheostomy     Plan LTACH once bed/prior authorization complete     Previous Goals (12/30):   TF goal [employer] Peptide 1.5 at 40 mL/hr will meet % estimated needs. - Met, however question absorption   Stool pattern will improve (less frequency, more formed) - Not met    Previous Nutrition Diagnosis (12/30):   Inadequate protein-energy intake related to TF on hold as evidenced by D10 IVF meeting 26% energy and 0% protein needs and TF resumption planned later today  Evaluation: Resolved       MALNUTRITION  % Weight Loss:  > 2% in 1 week (severe malnutrition)  % Intake:  No decreased intake noted (TF reliant)  Subcutaneous Fat Loss:  Orbital region severe depletion, Upper arm region severe depletion and Thoracic region severe depletion  Muscle Loss:  Temporal region severe depletion, Clavicle bone region severe depletion and Acromion bone region severe depletion  Fluid Retention:  None noted    Malnutrition Diagnosis: Severe malnutrition  In Context of:  Acute illness or injury  Chronic illness or disease  Environmental or social circumstances    CURRENT NUTRITION DIAGNOSIS  Altered GI function related to questionable absorption as evidenced by ongoing liquids stools and weight loss     INTERVENTIONS  Recommendations / Nutrition Prescription  Change to elemental TF regimen Vivonex RTF at 60 mL/hr to provide:  1440 kcal (43 kcal/kg), 72 g protein (2.2 g/kg), 253 g CHO, 0 g fiber, 1221 mL H2O    Discontinue the Banatrol     Implementation  EN Composition, EN Schedule:  Orders written to change TF product to Vivonex RTF, increase rate to 50 mL/hr x 12 hours and then advance to goal of 60 mL/hr.  Discontinue the Banatrol.   Collaboration and Referral of Nutrition care:  Patient discussed today during interdisciplinary bedside rounds.  Rounding MD in agreement with above.     Goals  Goal TF will meet % needs   Patient  will not drop weight below 33 kg   Stooling frequency will improve with change in TF regimen     MONITORING AND EVALUATION:  Progress towards goals will be monitored and evaluated per protocol and Practice Guidelines    Chrissie Avery RD, LD, CNSC   Clinical Dietitian - [facility]

## 2023-01-03 NOTE — PROGRESS NOTES
[facility] ICU RESPIRATORY NOTE           Date of Admission: 12/16/2022     Date of Intubation (most recent): Trach 12/30/2022    Reason for Mechanical Ventilation: Respiratory failure      Number of Days on Mechanical Ventilation: 16     Met Criteria for Spontaneous Breathing Trial: pt on PS 7/5 from 4717-3618. Placed back to full support to rest overnight      Significant event today :None     ABG Results:   Recent Labs   Lab 01/01/23  0404   O2PER 40       Current Vent Settings:  Vent Mode: CMV/AC  (Continuous Mandatory Ventilation/ Assist Control)  FiO2 (%): 30 %  Resp Rate (Set): 14 breaths/min  Tidal Volume (Set, mL): 400 mL  PEEP (cm H2O): 7 cmH2O  Pressure Support (cm H2O): 5 cmH2O  Resp: 15       Skin Assessment : intact      Plan: Continue full ventilatory support and wean as tolerated.   Mir Sorensen, RT

## 2023-01-03 NOTE — DISCHARGE SUMMARY
"[facility] ICU  Discharge Summary    Deidre N Aasen MRN# [id number]   YOB: 1995 Age: 27 year old     Date of Admission:  12/16/2022  Date of Discharge::  1/4/2023  Admitting Physician:  Joao Krishna DO  Discharge Physician:  La Lira MD  Primary Care Physician:        No Ref-Primary, Physician          Admission Diagnoses:   DKA, type 1 (H) [E10.10]  Overdose  Candida fungemia  Bacteremia  MRSA cavitary pneumonia  Acute Kidney Injury  Encephalopathy  COVID PNA  Severe malnutrition          Discharge Diagnosis:   Fungemia  MRSA PNA  DM1  S/p tracheostomy  Severe deconditioning         Procedures:   Procedure(s):  PLACEMENT OF TRACHEOSTOMY TUBE  Surgeon: Dr Ruma Wynne  Date: 12/30/22          Brief History of Illness:   Taken from Admission H&P:  \"Ms. Aasen is a 27-year-old female with a past medical history significant for type 1 diabetes, noncompliant with insulin regimen, depression, anxiety, recent admission to [facility] a few days ago with DKA where she left [place], who returns to [facility] Emergency department when her friends were concerned about her being short of breath, confused and not herself.  History is obtained through chart review as the patient is not really able to provide much in the way of history currently.     The patient presented to [facility] on 12/13/2022 with evidence of diabetic ketoacidosis and concern that she had not been taking her typical insulin regimen.  Her pH at that point was 7.09 with a bicarbonate of 9. She was started on typical DKA insulin regimen and did have improvement in her ketones and slight improvement in her bicarbonate as well as anion gap.  She was diagnosed with COVID-19 at that point and started on remdesivir.  The patient improved enough fairly quickly that she actually decided to the hospital and after discussion with the physician on site left against medical advice.  She returns to the Emergency Department at " [facility] earlier today after her friends noted to be confused, breathing quickly, seemingly short of breath and were concerned about her health.  It is unclear if she has been taking her insulin since she left the hospital against medical advice a couple of days ago.  Upon presentation to the Emergency Department at [facility], she was noted to have bicarbonate and her BNP of 5 with a glucose of 561, anion gap of 28, pH of 6.79.  She was given 2 L of normal saline and started on insulin drip per the DKA regimen.  She was also given 25 mEq of bicarbonate.  Repeat labs about an hour later showed slight improvement in her bicarbonate of 7 and a pH of 6.8.     In addition, in the ED at [facility] she had a chest x-ray showing evidence of right middle lobe and right lower lobe pneumonia with a 5 cm lobulated lucent region concerning for possible abscess.  She previously had a chest CT with contrast to rule out PE on 12/13/2022.  There was no PE noted at that point, but she did have evidence of right lower lobe consolidation and extensive airway debris concerning for possible pneumonia.  Head CT at [facility] earlier this morning, was also negative for acute pathology.     In addition to the above, When she was at [facility] a few days ago.  There was some concern for a GI bleed.  It sounds like she was having hematemesis with coffee-ground emesis and was diagnosed with a suspected Aydee-Dunn tear.  I do not believe that she saw GI or had an EGD.  Unclear if she is having ongoing hematemesis at this point, but a Hemoccult stool was checked in the ED at [facility] and was positive.  Drug of abuse screen in the ED turned up positive for amphetamines.  When I saw the patient in our ICU, she was awake more so then she had been prior discussion with the ICU, RN.  Of note, she did get a dose of Narcan at the [facility] and had minimal response.  At this point, she is able to follow commands, albeit somewhat slowly and not comprehensively.  She  "is unable to provide any additional history at this point.\"           Hospital Course:   Briefly, patient was admitted, intubated, found to have cavitary pulmonary lesions, bacteremia, obtunded. Was intubated, responded well to conservative treatment of sepsis and cavitary lesions with vanco and micafungin. Difficulty with sedation and ventilator compliance. Failed multiple days of pressure support due to tachypnea, thus requiring tracheostomy on 12/30. Post-tracheostomy tolerated daytime pressure support with nightly vent support.     Neuro/ pain/ sedation:  # Toxic metabolic encephalopathy, resolved  # Agitation in setting of polysubstance abuse - IV meth and heroin use.  Resolved  - started buprenorphine 12/29 per Addiction medicine. 4mg BID  - seroquel 25mg TID  - trazodone 100mg q HS     Pulmonary:   S/p tracheostomy 12/30/22  # Complex cavitary pneumonia 2/2 to MRSA pneumonia  Also COVID, resolved  Plan   Continue pressure support trials and vent weaning. VC-/14/7. Tolerating PST all day, anticipate trach dome 1/4 or 1/5. Tracheostomy sutures removed 1/4     Cardiovascular:  # septic shock, resolved  # Sinus tachycardia, resolved  #CATHY 12/20 negative for vegetations and negative bubble test  - no acute issues     Renal/Electrolytes:  #acute kidney injury, resolved  Labs are stable. Was significantly volume overloaded. She responded well to diuresis and suspected near baseline weight  - Aim for net even     ID:  # Septic shock and MRSA bacteremia. Blood cultures have now cleared.  CATHY did not show endocarditis - Abx course: vanco 12/16- present; unasyn 12/16-12/19; cefepime 12/19-12/24.  Last positive blood cx: MRSA 12/16  # Complicated pneumonia with cavitary lesions. For the moment no need for any kind of an intervention. Last positive BAL: MRSA 12/16  # Covid positive. Unclear clinical significance, recovered.  #fungemia--no new positive cultures-  budding yeast 12/19. Anti-fungal course: micafungin " 12/21-present, plan for 14 day course through 1/9  # HSV in right groin wounds, resolved had several day course of acyclovir  # Intermittent fevers, resolved     GI//Nutrition  #hematochezia, resolved  TF at goal 40 ml/hr. On GI prophylaxis  Had BMs     Musculoskeletal/Rheumatology:  Participating in therapies     Endocrine:  #Type 1 diabetes with insulin non complicance, complicated by DKA. Hemoglobin A1C 16.4 upon admission  # Resolved DKA and decreasing insulin needs  # stress hyperglycemia  - Lantus 6 BID (decreased from peak 20 daily). Continuing with high sliding scale     Hematology:  # DVT ppx  #anemia of chronic disease  #acute blood loss anemia. Needing transfusion on 12/23, 12/29. No clinical signs of bleeding  - subcutaneous heparin BID      Prophylaxis:      DVT: heparin BID due to weight     Stress ulcer: ppi daily     Restraints needed: n     Lines/Tubes                 PICC - placed 12/21 needed: y   Tracheostomy 12/30    Family/Social:  Father, Eric Aasen ([phone]) is the primary contact. Aunt, Laurel Rodriguez also involved in cares. Per father and patient, there is to be no one else allowed information about her location or condition.            Day of Discharge Physical Exam:   Blood pressure 110/76, pulse 74, temperature 98.3  F (36.8  C), temperature source Oral, resp. rate 13, weight 33.5 kg (73 lb 13.7 oz), SpO2 98 %, not currently breastfeeding.    Gen: AAOx3, NAD. NJT in place  Neuro: no focal deficits and following commands  Pulm: Non-labored breathing on pressure support, clear to auscultation. Tracheostomy in place  Abd: Soft, non-tender, no guarding, non-distended  Ext:  Warm and well-perfused, band like avinash to left leg         Discharge Instructions and Follow-Up:   No discharge procedures on file.         Home Health Care:     N/A- discharged to rehab facility           Discharge Disposition:     Discharged to long-term care facility      Condition at discharge: Stable          Consultations:   THORACIC SURGERY IP CONSULT  INFECTIOUS DISEASES IP CONSULT  PHARMACY TO DOSE VANCO  CARE MANAGEMENT / SOCIAL WORK IP CONSULT  VASCULAR ACCESS ADULT IP CONSULT  VASCULAR ACCESS ADULT IP CONSULT  NUTRITION SERVICES ADULT IP CONSULT  VASCULAR ACCESS ADULT IP CONSULT  PHARMACY IP CONSULT  VASCULAR ACCESS ADULT IP CONSULT  VASCULAR ACCESS ADULT IP CONSULT  VASCULAR ACCESS ADULT IP CONSULT  WOUND OSTOMY CONTINENCE NURSE  IP CONSULT  VASCULAR ACCESS ADULT IP CONSULT  SPIRITUAL HEALTH SERVICES IP CONSULT  PSYCHIATRY IP CONSULT  ADDICTION MEDICINE INPATIENT CONSULT  PHYSICAL THERAPY ADULT IP CONSULT  OCCUPATIONAL THERAPY ADULT IP CONSULT  SOCIAL WORK IP CONSULT  PHYSICAL THERAPY ADULT IP CONSULT  OCCUPATIONAL THERAPY ADULT IP CONSULT         Imaging Studies:     Results for orders placed or performed during the hospital encounter of 12/16/22   XR Chest Port 1 View    Narrative    CHEST PORTABLE ONE VIEW  12/16/2022 11:34 AM       INDICATION: Acute worsening hypoxia.  COMPARISON: 12/16/2022.       Impression    IMPRESSION: Cavitary pneumonia in the right lower lung is similar to  previous. No pneumothorax. Right IJ central venous catheter tip is  near the superior atriocaval junction. There is a new endotracheal  tube in good position above the demar. New NG tube in the stomach.       CARLY SULLIVAN MD         SYSTEM ID:  [id number]   CT Chest w Contrast    Narrative    CT CHEST WITH CONTRAST 12/19/2022 8:55 AM     HISTORY: Cavitary right lung lesion.    COMPARISON: December 13, 2022    TECHNIQUE: Volumetric helical acquisition of CT images of the chest  from the clavicles to the kidneys were acquired after the  administration of 50mL Isovue-370 IV contrast. Radiation dose for this  scan was reduced using automated exposure control, adjustment of the  mA and/or kV according to patient size, or iterative reconstruction  technique.    FINDINGS:     LUNGS AND PLEURA: The examination was not tailored for  pulmonary  emboli and contrast opacification in the pulmonary arteries is  limited, no pulmonary embolism demonstrated. Extensive consolidation  throughout the right lower lobe with a cavitary lesion laterally  measuring 5 cm, posteriorly measuring 3.2 cm inferiorly. Patchy  infiltrates in the right upper lobe. Mild patchy infiltrates in the  left upper lobe. Lobar compressive atelectasis and/or infiltrate in  the left lower lobe. Small left effusion.    MEDIASTINUM/AXILLAE: Mildly enlarged lymph nodes in the chest which  are nonspecific, but likely reactive in this setting. No aneurysm.    CORONARY ARTERY CALCIFICATIONS: None.    UPPER ABDOMEN: No acute findings.    MUSCULOSKELETAL: Normal.      Impression    IMPRESSION:    1. Little to no demonstrated enhancement in the parenchyma of the  right lower lobe, vascularity appears intact with opacification of  pulmonary veins, this lack of apparent enhancement is likely related  to severe edema/pneumonitis.  2. At least two cavitary lesions in the right lower lobe measuring 5  cm laterally, and 3.2 cm posteriorly concerning for pulmonary  abscesses.  3. Suboptimal contrast bolus timing for pulmonary emboli, no definite  pulmonary emboli demonstrated.  4. Lobar atelectasis and/or consolidation in the left lower lobe and  small left effusion.  5. Patchy bilateral upper lobe infiltrates, right worse than left.    LALO STARR MD         SYSTEM ID:  [id number]   XR Chest Port 1 View    Narrative    EXAM: XR CHEST PORT 1 VIEW  LOCATION: [facility]  DATE/TIME: 12/19/2022 5:40 AM    INDICATION: evaluate pneumonia  COMPARISON: 12/16/2022, 12/13/2022.      Impression    IMPRESSION: Interval intubation. Endotracheal tube tip is about 5 cm above the demar. Enteric tube terminates below the diaphragm. Increasing bibasilar airspace opacities and layering pleural effusions. No visible pneumothorax.   XR Chest Port 1 View    Narrative    CHEST ONE VIEW   12/20/2022 2:18 PM     HISTORY: Inability to maintain O2 sats.    COMPARISON: December 19, 2022      Impression    IMPRESSION: Endotracheal tube tip 3.4 cm from the demar. Cavitary  lesion again noted at the right base. Extensive right-sided  consolidation similar to previous. Left lower lobe consolidation  probably not significantly changed given differences in rotation.    LALO STARR MD         SYSTEM ID:  [id number]   XR Abdomen Port 1 View    Narrative    XR ABDOMEN PORT 1 VIEW 12/20/2022 3:51 PM    HISTORY: feeding tube placement    COMPARISON: None.      Impression    IMPRESSION: Feeding tube in good position with tip at the duodenal  jejunal junction.    CARLY SULLIVAN MD         SYSTEM ID:  [id number]   CT Chest w Contrast    Narrative    CT CHEST WITH CONTRAST  12/23/2022 12:02 PM    CLINICAL HISTORY: Interval evaluation of right chest  cavitations/infection.    TECHNIQUE: CT chest with IV contrast. Multiplanar reformats were  obtained. Dose reduction techniques were used.    CONTRAST:  64 mL isovue 370    COMPARISON: 12/19/2022    FINDINGS:   LUNGS AND PLEURA: Peribronchial infiltrates again seen throughout both  upper lobes and right middle lobe. In the right upper lobe, previously  seen nodular infiltrate has become cavitary. The right middle lobe  cavity has decreased fluid when compared to previous. There remains  dense consolidation both lower lobes, with air bronchograms. There is  a 4 cm cavity in the lateral right lower lobe, stable to slightly  smaller. Subcentimeter cavity in the medial right lower lobe, slightly  decreased. There are new small cavitary spaces in the anterior right  lower lobe. No significant pleural effusion on the right. Small left  pleural effusion.    MEDIASTINUM/AXILLAE: Endotracheal tube in good position above the  demar. Mildly enlarged lymph nodes should be reactive. No coronary  artery calcification.    UPPER ABDOMEN: No significant finding.    MUSCULOSKELETAL:  Unremarkable.      Impression    IMPRESSION: Cavitary pneumonia bilaterally. The largest cavities in  the right lower lobe appear decreased in size; however, there are  multiple new small cavities in the right lung.    CARLY SULLIVAN MD         SYSTEM ID:  [id number]   XR Chest Port 1 View    Narrative    EXAM: XR CHEST PORT 1 VIEW  LOCATION: [facility]  DATE/TIME: 12/24/2022 6:15 PM    INDICATION: Check ET tube placement.  COMPARISON: 12/20/2022      Impression    IMPRESSION: Endotracheal tube is 2.5 cm above the demar. There is enteric feeding tube tip below diaphragm and right-sided PICC tip in high right atrium. Heart size and vascularity are normal. Mid and lower lung predominant consolidative airspace   opacities have improved. Small bilateral pleural effusions unchanged. No pneumothorax.   XR Chest Port 1 View    Narrative    EXAM: XR CHEST PORT 1 VIEW  LOCATION: [facility]  DATE/TIME: 12/25/2022 9:20 PM    INDICATION: desatting  COMPARISON: 2/24/2022      Impression    IMPRESSION: ET tube remains in good position with tip 3.5 cm above demar. Feeding tube and right PICC line also appear in good position. Left PICC line has its tip at the level of left subclavian vein, unchanged.    Heart size normal. No change in patchy dense bilateral lung consolidation consistent with pneumonia   MR Thoracic Spine w/o Contrast    Narrative    EXAM: MR CERVICAL SPINE W/O CONTRAST, MR LUMBAR SPINE W/O CONTRAST, MR THORACIC SPINE W/O CONTRAST  LOCATION: [facility]  DATE/TIME: 12/26/2022 9:55 PM    INDICATION: mrsa bacteremia, concern for spinal abscess diskitis  COMPARISON: None.  TECHNIQUE:   1) MRI Cervical Spine without IV contrast.  2) MRI Thoracic Spine without IV contrast.  3) MRI Lumbar Spine without IV contrast. Sagittal T2, T1, and STIR sequences performed.    FINDINGS:    CERVICAL SPINE:   Normal vertebral body heights. Straightening of  cervical lordosis. No abnormal cord signal. Mild edema posterior paraspinal soft tissues cervicothoracic region.    Craniovertebral junction and C1-C2: Normal.    C2-C3: Normal disc height. No herniation. Normal facets. No spinal canal or neural foraminal stenosis.     C3-C4: Normal disc height. No herniation. Normal facets. No spinal canal or neural foraminal stenosis.     C4-C5: Normal disc height. No herniation. Normal facets. No spinal canal or neural foraminal stenosis.     C5-C6: Normal disc height. No herniation. Normal facets. No spinal canal or neural foraminal stenosis.     C6-C7: Normal disc height. No herniation. Normal facets. No spinal canal or neural foraminal stenosis.     C7-T1: Normal disc height. No herniation. Normal facets. No spinal canal or neural foraminal stenosis.    THORACIC SPINE:  Normal vertebral body heights, alignment and marrow signal. Normal disc heights. No herniation. Normal facets. No spinal canal or neural foraminal stenosis. No abnormal cord signal.     Infiltrates, atelectasis, and effusion in lungs bilaterally, right more than left.  Prominent infiltrates, atelectasis, and pleural effusion in both lungs, right more than left. Mild edema posterior paraspinal soft tissues.    LUMBAR SPINE:   Normal vertebral body heights and alignment. Normal distal cord with cord was extending to L1-L2. No significant canal or foraminal narrowing at any level. Mild edema posterior paraspinal soft tissues. Axial images not performed.       Impression    IMPRESSION:    CERVICAL SPINE MRI:  1.  Normal cervical cord.  2.  No significant canal or foraminal narrowing at any level.  3.  With limitations of a noncontrast examination, no definite discitis osteomyelitis complex or an infectious process within the spinal canal.    THORACIC SPINE MRI:  1.  Normal thoracic cord.  2.  No significant canal or foraminal narrowing at any level.  3.  With limitations of a noncontrast examination, no definite  discitis osteomyelitis complex or an infectious process within the spinal canal.    LUMBAR SPINE MRI:  1.  Normal distal cord.  2.  No significant canal or foraminal narrowing at any level.  3.  With limitations of a noncontrast examination, no definite discitis osteomyelitis complex or an infectious process within the spinal canal.   MR Cervical Spine w/o Contrast    Narrative    EXAM: MR CERVICAL SPINE W/O CONTRAST, MR LUMBAR SPINE W/O CONTRAST, MR THORACIC SPINE W/O CONTRAST  LOCATION: [facility]  DATE/TIME: 12/26/2022 9:55 PM    INDICATION: mrsa bacteremia, concern for spinal abscess diskitis  COMPARISON: None.  TECHNIQUE:   1) MRI Cervical Spine without IV contrast.  2) MRI Thoracic Spine without IV contrast.  3) MRI Lumbar Spine without IV contrast. Sagittal T2, T1, and STIR sequences performed.    FINDINGS:    CERVICAL SPINE:   Normal vertebral body heights. Straightening of cervical lordosis. No abnormal cord signal. Mild edema posterior paraspinal soft tissues cervicothoracic region.    Craniovertebral junction and C1-C2: Normal.    C2-C3: Normal disc height. No herniation. Normal facets. No spinal canal or neural foraminal stenosis.     C3-C4: Normal disc height. No herniation. Normal facets. No spinal canal or neural foraminal stenosis.     C4-C5: Normal disc height. No herniation. Normal facets. No spinal canal or neural foraminal stenosis.     C5-C6: Normal disc height. No herniation. Normal facets. No spinal canal or neural foraminal stenosis.     C6-C7: Normal disc height. No herniation. Normal facets. No spinal canal or neural foraminal stenosis.     C7-T1: Normal disc height. No herniation. Normal facets. No spinal canal or neural foraminal stenosis.    THORACIC SPINE:  Normal vertebral body heights, alignment and marrow signal. Normal disc heights. No herniation. Normal facets. No spinal canal or neural foraminal stenosis. No abnormal cord signal.     Infiltrates,  atelectasis, and effusion in lungs bilaterally, right more than left.  Prominent infiltrates, atelectasis, and pleural effusion in both lungs, right more than left. Mild edema posterior paraspinal soft tissues.    LUMBAR SPINE:   Normal vertebral body heights and alignment. Normal distal cord with cord was extending to L1-L2. No significant canal or foraminal narrowing at any level. Mild edema posterior paraspinal soft tissues. Axial images not performed.       Impression    IMPRESSION:    CERVICAL SPINE MRI:  1.  Normal cervical cord.  2.  No significant canal or foraminal narrowing at any level.  3.  With limitations of a noncontrast examination, no definite discitis osteomyelitis complex or an infectious process within the spinal canal.    THORACIC SPINE MRI:  1.  Normal thoracic cord.  2.  No significant canal or foraminal narrowing at any level.  3.  With limitations of a noncontrast examination, no definite discitis osteomyelitis complex or an infectious process within the spinal canal.    LUMBAR SPINE MRI:  1.  Normal distal cord.  2.  No significant canal or foraminal narrowing at any level.  3.  With limitations of a noncontrast examination, no definite discitis osteomyelitis complex or an infectious process within the spinal canal.   MR Lumbar Spine w/o Contrast    Narrative    EXAM: MR CERVICAL SPINE W/O CONTRAST, MR LUMBAR SPINE W/O CONTRAST, MR THORACIC SPINE W/O CONTRAST  LOCATION: [facility]  DATE/TIME: 12/26/2022 9:55 PM    INDICATION: mrsa bacteremia, concern for spinal abscess diskitis  COMPARISON: None.  TECHNIQUE:   1) MRI Cervical Spine without IV contrast.  2) MRI Thoracic Spine without IV contrast.  3) MRI Lumbar Spine without IV contrast. Sagittal T2, T1, and STIR sequences performed.    FINDINGS:    CERVICAL SPINE:   Normal vertebral body heights. Straightening of cervical lordosis. No abnormal cord signal. Mild edema posterior paraspinal soft tissues  cervicothoracic region.    Craniovertebral junction and C1-C2: Normal.    C2-C3: Normal disc height. No herniation. Normal facets. No spinal canal or neural foraminal stenosis.     C3-C4: Normal disc height. No herniation. Normal facets. No spinal canal or neural foraminal stenosis.     C4-C5: Normal disc height. No herniation. Normal facets. No spinal canal or neural foraminal stenosis.     C5-C6: Normal disc height. No herniation. Normal facets. No spinal canal or neural foraminal stenosis.     C6-C7: Normal disc height. No herniation. Normal facets. No spinal canal or neural foraminal stenosis.     C7-T1: Normal disc height. No herniation. Normal facets. No spinal canal or neural foraminal stenosis.    THORACIC SPINE:  Normal vertebral body heights, alignment and marrow signal. Normal disc heights. No herniation. Normal facets. No spinal canal or neural foraminal stenosis. No abnormal cord signal.     Infiltrates, atelectasis, and effusion in lungs bilaterally, right more than left.  Prominent infiltrates, atelectasis, and pleural effusion in both lungs, right more than left. Mild edema posterior paraspinal soft tissues.    LUMBAR SPINE:   Normal vertebral body heights and alignment. Normal distal cord with cord was extending to L1-L2. No significant canal or foraminal narrowing at any level. Mild edema posterior paraspinal soft tissues. Axial images not performed.       Impression    IMPRESSION:    CERVICAL SPINE MRI:  1.  Normal cervical cord.  2.  No significant canal or foraminal narrowing at any level.  3.  With limitations of a noncontrast examination, no definite discitis osteomyelitis complex or an infectious process within the spinal canal.    THORACIC SPINE MRI:  1.  Normal thoracic cord.  2.  No significant canal or foraminal narrowing at any level.  3.  With limitations of a noncontrast examination, no definite discitis osteomyelitis complex or an infectious process within the spinal canal.    LUMBAR  SPINE MRI:  1.  Normal distal cord.  2.  No significant canal or foraminal narrowing at any level.  3.  With limitations of a noncontrast examination, no definite discitis osteomyelitis complex or an infectious process within the spinal canal.   XR Chest Port 1 View    Narrative    CHEST ONE VIEW  2022 10:25 AM     HISTORY: Pneumonia    COMPARISON: 2022      Impression    IMPRESSION: Endotracheal tube tip 4 cm from the demar. Enteric tube  tip below the margin of the study. PICC line stable from the right.  Moderately extensive infiltrates on the right similar to previous.  Question minimal pleural fluid bilaterally. Minimal atelectasis and/or  infiltrate on the left similar to previous.    LALO STARR MD         SYSTEM ID:  [id number]   Echocardiogram Complete     Value    LVEF  60-65%    Narrative    [id number]  [id number]  [id number]  [id number]^ALIA^DEIRDRE     [facility]  Echocardiography Laboratory  [address] [place] [address]     Name: AASEN, DEIDRE N  MRN: [id number]  : 1995  Study Date: 2022 03:07 PM  Age: 27 yrs  Gender: Female  Patient Location: [facility]  Reason For Study: Shock  Ordering Physician: DEIRDRE DAWKINS  Referring Physician: Deirdre Dawkins  Performed By: Yosvany Goncalves     BSA: 1.4 m2  Height: 62 in  Weight: 96 lb  HR: 153  BP: 94/49 mmHg  ______________________________________________________________________________  Procedure  Complete Echo Adult. Optison (NDC #[id number]) given intravenously.  ______________________________________________________________________________  Interpretation Summary     1. Left ventricular systolic function is normal. The visual ejection fraction  is 60-65%.  2. No regional wall motion abnormalities noted.  3. The right ventricle is normal in structure, function and size.  4. No evidence for significant valvular pathology.  ______________________________________________________________________________  Left  Ventricle  The left ventricle is normal in size. There is normal left ventricular wall  thickness. Left ventricular systolic function is normal. The visual ejection  fraction is 60-65%. Left ventricular diastolic function is indeterminate. No  regional wall motion abnormalities noted.     Right Ventricle  The right ventricle is normal in structure, function and size.     Atria  Normal left atrial size. Right atrial size is normal. There is no color  Doppler evidence of an atrial shunt.     Mitral Valve  The mitral valve is normal in structure and function.     Tricuspid Valve  The tricuspid valve is normal in structure and function. There is mild (1+)  tricuspid regurgitation.     Aortic Valve  The aortic valve is normal in structure and function.     Pulmonic Valve  The pulmonic valve is not well seen, but is grossly normal.     Vessels  Normal size aorta.     Pericardium  There is no pericardial effusion.     Rhythm  Sinus rhythm was noted.  ______________________________________________________________________________  MMode/2D Measurements & Calculations  IVSd: 0.84 cm     LVIDd: 2.7 cm  LVIDs: 2.1 cm  LVPWd: 0.79 cm  FS: 21.0 %  LV mass(C)d: 50.1 grams  LV mass(C)dI: 35.8 grams/m2  Ao root diam: 2.9 cm  LVOT diam: 1.8 cm  LVOT area: 2.6 cm2  RWT: 0.59     Doppler Measurements & Calculations  MV E max ramu: 92.2 cm/sec  MV A max ramu: 109.5 cm/sec  MV E/A: 0.84  MV dec slope: 818.6 cm/sec2  MV dec time: 0.11 sec  PA acc time: 0.08 sec  TR max ramu: 239.8 cm/sec  TR max P.1 mmHg  E/E' av.4  Lateral E/e': 5.8  Medial E/e': 11.0     ______________________________________________________________________________  Report approved by: Uma Price 2022 04:27 PM         Transesophageal Echocardiogram    Narrative    [id number]  [facility]  [id number]  [id number]^HILDA^[facility]  Echocardiography Laboratory  [address] [place] [id number]     Name: AASEN, DEIDRE N  MRN:  [id number]  : 1995  Study Date: 2022 11:23 AM  Age: 27 yrs  Gender: Female  Patient Location: [facility]  Reason For Study: Endocarditis  Ordering Physician: STACIA STEVENS  Referring Physician: STACIA STEVENS  Performed By: Bronwyn Rolle     BSA: 1.4 m2  Height: 62 in  Weight: 96 lb  HR: 89  BP: 105/69 mmHg  ______________________________________________________________________________  Procedure  Complete CATHY Adult. CATHY Probe serial #[id number] was used during the procedure.  The heart rate, respiratory rate and response to care were monitored  throughout the procedure with the assistance of the nurse.  ______________________________________________________________________________  Interpretation Summary     1. No intracardiac mass or thrombus.  2. Normal left ventricular size and systolic function. LVEF 60-65%.  3. Normal right ventricular size and systolic function.  4. Normal cardiac valves. No endocarditis.  5. Bubble study was negative for inter-atrial shunt.     ______________________________________________________________________________  CATHY  I determined this patient to be an appropriate candidate for the planned  sedation and procedure and have reassessed the patient immediately prior to  sedation and procedure. Total sedation time: 13 minutes of continuous bedside  1:1 monitoring. CATHY done in the ICU under sedation. The transesophageal probe  was passed without difficulty. There were no complications associated with  this procedure. Prior to the exam, the oral cavity was checked and no  overcrowding was noted. Consent to the procedure was obtained prior to  sedation.     Left Ventricle  The left ventricle is normal in size. Left ventricular systolic function is  normal. No regional wall motion abnormalities noted. There is no thrombus seen  in the left ventricle.     Right Ventricle  The right ventricle is normal size. The right ventricular systolic function is  normal. There is no mass or thrombus  in the right ventricle.     Atria  Normal left atrial size. No left atrial mass or thrombus visualized. Right  atrial size is normal. No evidence of right atrial mass/thrombus. Intact  atrial septum. There is no color Doppler evidence of an atrial shunt. A  contrast injection (Bubble Study) was performed that was negative for flow  across the interatrial septum. The left atrial appendage was well visualized  and free of thrombus.     Mitral Valve  The mitral valve leaflets appear normal. There is no evidence of stenosis,  fluttering, or prolapse. There is trace mitral regurgitation. There is no  mitral valve stenosis.     Tricuspid Valve  Normal tricuspid valve. There is trace tricuspid regurgitation. This degree of  valvular regurgitation is within normal limits. There is no tricuspid  stenosis.     Aortic Valve  The aortic valve is trileaflet. No aortic regurgitation is present. No aortic  stenosis is present.     Pulmonic Valve  Normal pulmonic valve. This degree of valvular regurgitation is within normal  limits. There is no pulmonic valvular stenosis.     Vessels  The aortic root is normal size. 2.8 cm. Normal size ascending aorta. 2.4 cm.  Normal ascending, transverse (arch), and descending aorta. Normal pulmonary  venous drainage.     Pericardial/Pleural  There is no pericardial effusion.  ______________________________________________________________________________  MMode/2D Measurements & Calculations  Ao root diam: 2.8 cm     asc Aorta Diam: 2.4 cm     ______________________________________________________________________________  Report approved by: Dr Lazarus Chowdhury 12/20/2022 12:48 PM                  Medications Prior to Admission:     Medications Prior to Admission   Medication Sig Dispense Refill Last Dose     insulin aspart (NOVOLOG PEN) 100 UNIT/ML pen Inject Subcutaneous 3 times daily (with meals) Sliding scale        insulin glargine (BASAGLAR KWIKPEN) 100 UNIT/ML pen Inject 20 Units  Subcutaneous daily 6 mL 0             Discharge Medications:     Current Facility-Administered Medications   Medication     acetaminophen (TYLENOL) solution 500 mg    Or     acetaminophen (TYLENOL) tablet 500 mg     buprenorphine (SUBUTEX) sublingual tablet 4 mg     chlorhexidine (PERIDEX) 0.12 % solution 15 mL     dextrose 10% infusion     glucose gel 15-30 g    Or     dextrose 50 % injection 25-50 mL    Or     glucagon injection 1 mg     For all blood glucose less than 100 mg/dL     heparin ANTICOAGULANT injection 5,000 Units     hydrOXYzine (ATARAX) tablet 25 mg    Or     hydrOXYzine (ATARAX) tablet 50 mg     insulin aspart (NovoLOG) injection (RAPID ACTING)     insulin glargine (LANTUS PEN) injection 6 Units     levalbuterol (XOPENEX CONC) neb solution 0.63 mg     levalbuterol (XOPENEX CONC) neb solution 1.25 mg     lidocaine 1 % 0.1-1 mL     melatonin tablet 1 mg     multivitamins w/minerals liquid 15 mL     naloxone (NARCAN) injection 0.2 mg    Or     naloxone (NARCAN) injection 0.4 mg    Or     naloxone (NARCAN) injection 0.2 mg    Or     naloxone (NARCAN) injection 0.4 mg     ondansetron (ZOFRAN) injection 4 mg    Or     ondansetron (ZOFRAN ODT) ODT tab 4 mg     pantoprazole (PROTONIX) 2 mg/mL suspension 40 mg     prochlorperazine (COMPAZINE) injection 5 mg    Or     prochlorperazine (COMPAZINE) tablet 5 mg    Or     prochlorperazine (COMPAZINE) suppository 25 mg     QUEtiapine (SEROquel) tablet 25 mg     sodium chloride (PF) 0.9% PF flush 10 mL     sodium chloride (PF) 0.9% PF flush 10-20 mL     sodium chloride 0.9% infusion     traZODone (DESYREL) tablet 100 mg     vancomycin (VANCOCIN) 750 mg in sodium chloride 0.9 % 250 mL intermittent infusion     zolpidem (AMBIEN) tablet 10 mg     Tigre Tran MD  Surgical Critical Care Fellow

## 2023-01-03 NOTE — PROGRESS NOTES
Comprehensive Daily ICU Note  1/3/22        Deidre N Aasen MRN# [id number]   Age: 27 year old YOB: 1995     Date of Admission: 12/16/2022    Primary care provider: No Ref-Primary, Physician     CODE STATUS: FULL    Problem List:       Principal Problem:    Diabetic ketoacidosis without coma associated with type 1 diabetes mellitus (H)  Active Problems:    Acute kidney failure with tubular necrosis (H)    Infection due to 2019 novel coronavirus    Necrotic pneumonia (H)    Fungemia    ARDS (adult respiratory distress syndrome) (H)    MRSA bacteremia    Encephalopathy          Subjective/ Last 24 hours:   Events: no major events overnight         Treatment goals for next 24 hours:   1. PT/OT  2. SW -evaluate for LTACH, likely discharge in next few days  ICU staff: Dr Soraya Tran MD  Surgical Critical Care Fellow         Mechanical Ventilation/Vitalsigns/IsandOs:     Temp:  [98.4  F (36.9  C)-98.6  F (37  C)] 98.4  F (36.9  C)  Pulse:  [50-98] 85  Resp:  [6-31] 22  BP: (111-134)/(70-84) 125/79  FiO2 (%):  [30 %] 30 %  SpO2:  [94 %-100 %] 98 %      Vent Mode: CMV/AC  (Continuous Mandatory Ventilation/ Assist Control)  FiO2 (%): 30 %  Resp Rate (Set): 14 breaths/min  Tidal Volume (Set, mL): 400 mL  PEEP (cm H2O): 7 cmH2O  Pressure Support (cm H2O): 5 cmH2O  Resp: 22         Physical Examination:     General: appropriately interactive and pleasant  HEENT: tracheostomy, NJT  Lungs: non-labored breathing on PS  CVS: RRR  Abdomen: non-distended  Extremities/musculoskeletal: warm  Neurology: alert and oriented  Psychiatry: Awake, smiling, appropriate mood         Feeding/Glucose:     None    Recent Labs   Lab 01/03/23  0759 01/03/23  0410 01/03/23  0357 01/02/23  2358 01/02/23  2111 01/02/23  1550   * 96 70 79 130* 122*            Medications:       acetaminophen  500 mg Oral or Feeding Tube 4x Daily    Or     acetaminophen  500 mg Oral or Feeding Tube 4x Daily     buprenorphine  4 mg  Sublingual BID     chlorhexidine  15 mL Mouth/Throat Q12H     fiber modular  1 packet Per Feeding Tube Daily     heparin ANTICOAGULANT  5,000 Units Subcutaneous Q12H     insulin aspart  1-12 Units Subcutaneous Q4H     insulin glargine  6 Units Subcutaneous BID     levalbuterol  0.63 mg Nebulization Q8H     micafungin  100 mg Intravenous Q24H     multivitamins w/minerals  15 mL Per Feeding Tube Daily     pantoprazole  40 mg Per Feeding Tube QAM AC     QUEtiapine  25 mg Oral or NG Tube TID     sodium chloride (PF)  10 mL Intracatheter Q8H     traZODone  100 mg Oral or Feeding Tube At Bedtime     vancomycin  750 mg Intravenous Q12H           Labs:     ROUTINE ICU LABS (Last four results)  CMP  Recent Labs   Lab 01/01/23 0404 12/31/22 0419 12/30/22 0512 12/29/22 0441 12/26/22 0437     --   --   --  137   POTASSIUM 4.5 4.2 3.7 4.1 4.2   CHLORIDE 107  --   --   --  100   CO2 26  --   --   --  34*   ANIONGAP 5  --   --   --  3   GLC 93  --   --  351* 272*   BUN 18  --   --   --  13   CR 0.57  --   --  0.53 0.52   GFRESTIMATED >90  --   --  >90 >90   AGNES 7.9*  --   --   --  7.2*   MAG 2.2 2.3 2.2 2.0 2.1   PHOS 4.7* 4.5 4.7* 4.1 3.4   PROTTOTAL 6.9  --   --   --   --    ALBUMIN 1.3*  --   --   --   --    BILITOTAL 0.3  --   --   --   --    ALKPHOS 75  --   --   --   --    AST 12  --   --   --   --    ALT 8  --   --   --   --     < > = values in this interval not displayed.     CBC  Recent Labs   Lab 01/03/23 0406 01/02/23 0454 01/01/23 0404 12/31/22 0419   WBC 12.7* 8.6 7.7 7.5   RBC 2.47* 2.64* 2.56* 2.60*   HGB 7.3* 8.0* 7.8* 7.8*   HCT 23.3* 24.7* 24.3* 24.5*   MCV 94 94 95 94   MCH 29.6 30.3 30.5 30.0   MCHC 31.3* 32.4 32.1 31.8   RDW 13.4 13.4 13.6 13.6   * 643* 526* 606*         Imaging/Other results:     None in past 24 hours.         Assessment and Plan:     Summary:  Bridgetre N Aasen is a 27 year old female admitted on 12/16/2022 for DKA, respiratory failure from concurrent covid and bacterial  pneumonia.  MRSA bacteremia and necrotizing pneumonia and candida bacteremia.  Has been making some improvements but has reached a plateau mostly limited by sedation/agitation and inability to wean from ventilator. Now s/p tracheostomy 12/30    Neuro/ pain/ sedation:  # Toxic metabolic encephalopathy  # Agitation in setting of polysubstance abuse - IV meth and heroin use.  Still on fairly robust doses of sedatives but is mostly awake.  # Substance use disorder  P: Continue current sedative regimen. Remains on dexmedetomidine gtt-decreasing.  - Awake and appropriate, smiling.  - seroquel 25 TID  - trazodone q hs  - Subxone 4mg BID     Pulmonary:   S/p tracheostomy 12/30/22  # Complex cavitary pneumonia 2/2 to MRSA pneumonia  Also COVID     Plan   1. Tolerates PS during day.    Cardiovascular:  # septic shock, resolved  # Sinus tachycardia  #CATHY 12/20 negative for vegetations and negative bubble   - no acute issues    Renal/Electrolytes:  Labs are stable.  Was significantly volume overloaded on admission, now resolved  - Aim for net even    ID:  # Septic shock and MRSA bacteremia. Blood cultures have now cleared.  CATHY did not show endocarditis - Abx course: vanco 12/16- present; unasyn 12/16-12/19; cefepime 12/19-12/24.  Last positive blood cx: MRSA 12/16;# Complicated pneumonia with cavitary lesions.  For the moment no need for any kind of an intervention. Last positive BAL: MRSA 12/16  # Covid positive. Unclear clinical significance  #fungemia--no new positive cultures-  budding yeast 12/19. Anti-fungal course: micafungin 12/21-present, plan for 14 day course  # HSV in right groin wounds, had several day course of acyclovir  # Intermittent fevers, resolved    P: Continuing antimicrobial regimen vanco 12/16-present. Faiza 12/21-present    GI//Nutrition  TF at goal 40 ml/hr. On GI prophylaxis  Had BMs    Musculoskeletal/Rheumatology:  Up in chair and PT today    Endocrine:  #Type 1 diabetes with insulin non  complicance, complicated by DKA. Hemoglobin A1C 16.4 upon admission  # Resolved DKA and decreasing insulin needs  - Lantus 6 BID. Continuing with sliding scale    Hematology:  # DVT ppx  #anemia of chronic disease  #acute blood loss anemia. Needing transfusion on 12/23, 12/29. No clinical signs of bleeding  - subcutaneous heparin BID     ICU prophylaxis:      DVT: heparin BID due to weight     VAP: As above     Stress ulcer: ppi daily     Restraints needed: y     Lines   PICC - placed 12/21 needed: y   Arterial line - placed n needed: n   Sheffield catheter.  n/a     Prognosis:  recovering

## 2023-01-03 NOTE — PROGRESS NOTES
Care Management Follow Up    Length of Stay (days): 18    Expected Discharge Date: 01/05/2023     Concerns to be Addressed:       Patient plan of care discussed at interdisciplinary rounds: Yes    Anticipated Discharge Disposition: LTACH     Anticipated Discharge Services:    Anticipated Discharge DME:      Patient/family educated on Medicare website which has current facility and service quality ratings:    Education Provided on the Discharge Plan:    Patient/Family in Agreement with the Plan: yes    Referrals Placed by CM/SW: Post Acute Facilities  Private pay costs discussed: Not applicable    Additional Information:  Writer placed call to [facility], spoke to Berenice. She will have to get insurance auth, bed is not available today but they are hopeful for discharges in the next couple of days.      Lisa Marti RN

## 2023-01-03 NOTE — PROGRESS NOTES
[facility]    Infectious Disease Progress Note    Date of Service : 01/03/2023     Assessment:  27 year old female with polysubstance abuse who has been admitted since 12/16/2022 for DKA, respiratory failure from concurrent covid and found to have superimposed MRSA cavitary pneumonia with septic shock. She developed acute respiratory failure requiring intubation and pressor support as well as stress dose steroids for shock in addition to broad spectrum antimicrobial therapy.  She remains severely critically ill . Has now developed fungemia related to candida glabrata. Lines have been changed     -MRSA with septic shock. No endocarditis noted on CATHY. Last blood cx positive from 12/19  -Severe lobar consolidations and cavitary pneumonia related to MRSA with hypoxic respiratory failure  -Candida glabrata fungemia  -Genital herpes  -Covid19 infection  -Uncontrolled type 1 diabetes with HbA1c > 16%  -DKA with NANNETTE and electrolyte abnormalities have improved  -Malnutrition  -substance abuse disorder     Recommendations:  1. Continue  Vancomycin ( LOT is 1/16/23)  and Micafungin. Pharmacy managing vancomycin dosing, levels therapeutic, renal functions stable  2. Blood cxs negative since 12/21 candidemia rapidly cleared prob 2 weeks total asiya day 13/ 14 today    3. No discitis/ osteo on the  MRI spine to assess for secondary infection   4. S/p bronchoscopy no new micro  5. HIV negative  6. Will need repeat blood cultures on 1/ 5 and then again on 1/ 20          Rosy Davis MD    Interval History   Continues to get slightly better   Fever is improved   WBC up again slightly today     Physical Exam   Temp: 98.4  F (36.9  C) Temp src: Oral BP: 125/79 (sitting) Pulse: 85   Resp: 22 SpO2: 98 % O2 Device: Mechanical Ventilator    Vitals:    01/01/23 1300 01/02/23 0500 01/03/23 0801   Weight: 44.7 kg (98 lb 8.7 oz) (!) 26.2 kg (57 lb 12.2 oz) 33.3 kg (73 lb 6.6 oz)     Vital Signs with Ranges  Temp:   [98.4  F (36.9  C)-98.6  F (37  C)] 98.4  F (36.9  C)  Pulse:  [50-98] 85  Resp:  [6-31] 22  BP: (111-134)/(70-84) 125/79  FiO2 (%):  [30 %] 30 %  SpO2:  [94 %-100 %] 98 %    Constitutional: on ventilator  Lungs: clear  Cardiovascular: S1S2  Skin: No rash    Other:    Medications     dextrose Stopped (12/30/22 1820)     - MEDICATION INSTRUCTIONS -       sodium chloride Stopped (01/03/23 0400)       acetaminophen  500 mg Oral or Feeding Tube 4x Daily    Or     acetaminophen  500 mg Oral or Feeding Tube 4x Daily     buprenorphine  4 mg Sublingual BID     chlorhexidine  15 mL Mouth/Throat Q12H     fiber modular  1 packet Per Feeding Tube Daily     heparin ANTICOAGULANT  5,000 Units Subcutaneous Q12H     insulin aspart  1-12 Units Subcutaneous Q4H     insulin glargine  6 Units Subcutaneous BID     levalbuterol  0.63 mg Nebulization Q8H     micafungin  100 mg Intravenous Q24H     multivitamins w/minerals  15 mL Per Feeding Tube Daily     pantoprazole  40 mg Per Feeding Tube QAM AC     QUEtiapine  25 mg Oral or NG Tube TID     sodium chloride (PF)  10 mL Intracatheter Q8H     traZODone  100 mg Oral or Feeding Tube At Bedtime     vancomycin  750 mg Intravenous Q12H       Data   All microbiology laboratory data reviewed.  Recent Labs   Lab Test 01/03/23  0406 01/02/23  0454 01/01/23  0404   WBC 12.7* 8.6 7.7   HGB 7.3* 8.0* 7.8*   HCT 23.3* 24.7* 24.3*   MCV 94 94 95   * 643* 526*     Recent Labs   Lab Test 01/02/23  0454 01/01/23  0404 12/31/22  2047   CR 0.50* 0.57 0.54     Microbiology  Blood cx 12/21, 12/22, 12/23, 12/25 no growth so far  12/19 blood cx  Line, venous; Blood    0 Result Notes  Culture Positive on the 1st day of incubation Abnormal         Candida glabrata complex Panic     2 of 2 bottles   Staphylococcus aureus MRSA Panic     1 of 2 bottles   Positive on the 5th day of incubation  Susceptibilities done on previous cultures         Resulting Agency: [facility]      Susceptibility              Candida  glabrata complex       SAMANTHA       Amphotericin B 1 ug/mL No interpretation available       Fluconazole 16 ug/mL Susceptible Dose Dependent       Micafungin <=0.03 ug/mL Susceptible       Voriconazole 0.5 ug/mL No interpretation available

## 2023-01-03 NOTE — PHARMACY-VANCOMYCIN DOSING SERVICE
Pharmacy Vancomycin Note  Date of Service 2023  Patient's  1995   27 year old, female    Indication: MRSA  Day of Therapy: 18  Current vancomycin regimen:  750 mg IV q12h  Current vancomycin monitoring method: AUC  Current vancomycin therapeutic monitoring goal: 400-600 mg*h/L    [employer] Prediction of Current Vancomycin Regimen  Regimen: 750 mg IV every 12 hours.  Start time: 23:01 on 2023  Exposure target: AUC24 (range)400-600 mg/L.hr   AUC24,ss: 548 mg/L.hr  Probability of AUC24 > 400: 100 %  Ctrough,ss: 16.1 mg/L  Probability of Ctrough,ss > 20: 2 %  Probability of nephrotoxicity (Lodise LEONORA ): 11 %      Current estimated CrCl = Estimated Creatinine Clearance: 71.1 mL/min (A) (based on SCr of 0.5 mg/dL (L)).    Creatinine for last 3 days  2022:  8:47 PM Creatinine 0.54 mg/dL  2023:  4:04 AM Creatinine 0.57 mg/dL  2023:  4:54 AM Creatinine 0.50 mg/dL    Recent Vancomycin Levels (past 3 days)  2022:  8:47 PM Vancomycin 18.7 mg/L  2023:  4:54 AM Vancomycin 25.3 mg/L;  9:14 PM Vancomycin 18.3 mg/L    Vancomycin IV Administrations (past 72 hours)                   vancomycin (VANCOCIN) 750 mg in sodium chloride 0.9 % 250 mL intermittent infusion (mg) 750 mg New Bag 23 1006     750 mg New Bag 23 2221     750 mg New Bag  1109     750 mg New Bag 22 2150     750 mg New Bag  1040     750 mg New Bag 22 2205                Nephrotoxins and other renal medications (From now, onward)    Start     Dose/Rate Route Frequency Ordered Stop    22 1000  vancomycin (VANCOCIN) 750 mg in sodium chloride 0.9 % 250 mL intermittent infusion         750 mg  over 90 Minutes Intravenous EVERY 12 HOURS 22 2240          Interpretation of levels and current regimen:  Vancomycin level is reflective of -600  Has serum creatinine changed greater than 50% in last 72 hours: No  Urine output:  diminished urine output  Renal Function:  Stable    Plan:  1. Continue Current Dose  2. Vancomycin monitoring method: AUC  3. Vancomycin therapeutic monitoring goal: 400-600 mg*h/L  4. Pharmacy will check vancomycin levels as appropriate in 3-5 Days.  5. Serum creatinine levels will be ordered a minimum of twice weekly.    Savannah Ca RPH

## 2023-01-03 NOTE — PROGRESS NOTES
[facility] Nurse Inpatient Assessment     Consulted for: Coccyx- blanchable 1/3    Patient History (according to provider note(s):      Deidre N Aasen is a 27 year old female admitted on 12/16/2022 for DKA, respiratory failure from concurrent covid, bacterial pneumonia.   After arrival to the ICU she developed acute respiratory failure requiring intubation and development of septic shock requiring initation of dual vasopressor support and stress dose steroids in addition to broad spectrum antimicrobial therapy. She has ongoing fluid resuscitation needs, management of severe electrolyte abnormalities and ongoing treatment for septic shock and DKA per protocol. 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 Sedated in the ICU on initial assessment. Patient had approximately 4 other small scabbed areas scattered on legs and buttock and [place] scars of varying ages some fully healed on initial assessment. On acyclovir suspension     Wound base: 100% blanchable epidermis     Palpation of the wound bed: normal      Drainage: none     Description of drainage: none     Measurements (length x width x depth, in cm): 0.4cm  x 0.4cm  x  0 cm      Tunneling: N/A     Undermining: N/A  Periwound skin: Intact, circular blanchable pink to purple epidermis scattered, improving      Color: normal      Temperature: normal   Odor: none  Pain: mild to palpation  Pain interventions prior to dressing change: patient tolerated well  Treatment goal: Protection  STATUS: healed on 1/3  Supplies ordered: supplies stored on unit      Treatment Plan:     Coccyx skin care: Every 3 days   1. Clean with soap and water. Pat dry  2. Press a  Mepilex Sacral to the area, making sure to conform nicely to skin curvatures.   3. Time and date dressing change  Pressure Injury prevention (please order supplies if not in room)  1. Turn/reposition every 1-2 hrs  2.   Float heels off bed with use of pillow and if needed Heel Lift boots (Prevalon #[id number])  3.   If incontinent Cleanse with incontinent cleanser (Juan Daniel spray #[id number]) followed by skin barrier protectant (Critic Aid paste)  BID and after each incontinent episode  4.   Prevent sliding and shear by limiting HOB to 30 degrees or less unless contraindicated, use knee gatch first if not contraindicated  5.   Chair cushion pressure redistribution as needed (#529602): please limit sitting to an hour at a time  6.   Optimize nutrition   7.   Consider PULSATE low air loss mattress    Orders: Reviewed and Updated    RECOMMEND PRIMARY TEAM ORDER: None, at this time  Education provided: importance of repositioning, plan of care, Moisture management, Hygiene and Off-loading pressure  Discussed plan of care with: Patient and Nurse  WOC nurse follow-up plan: sign off  Notify WOC if wound(s) deteriorate.  Nursing to notify the Provider(s) and re-consult the WOC Nurse if new skin concern.    DATA:     Current support surface: Standard  Low air loss (JERMAINE pump, Isolibrium, Pulsate, skin guard, etc)  Containment of urine/stool: Incontinent pad in bed. Eben  BMI: Body mass index is 13.01 kg/m .   Active diet order: None     Output: I/O last 3 completed shifts:  In: 2335 [I.V.:490; NG/GT:885]  Out: 1600 [Urine:1600]     Labs:   Recent Labs   Lab 01/03/23  0406 01/02/23  0454 01/01/23  0404 12/29/22  0441 12/28/22  1651   ALBUMIN  --   --  1.3*  --   --    HGB 7.3*   < > 7.8*   < >  --    INR  --   --   --   --  1.11   WBC 12.7*   < > 7.7   < >  --     < > = values in this interval not displayed.     Pressure injury risk assessment:   Sensory Perception: 4-->no impairment  Moisture: 3-->occasionally moist  Activity:  2-->chairfast  Mobility: 3-->slightly limited  Nutrition: 3-->adequate  Friction and Shear: 3-->no apparent problem  Sedrick Score: 18    Charley Willis [facility]   Dept. Pager: [phone]  Dept. Office Number: [phone]

## 2023-01-03 NOTE — PROGRESS NOTES
[facility] ICU RESPIRATORY NOTE        Date of Admission: 12/16/2022    Date of Intubation (most recent): Tracheostomy 12/30/2022    Reason for Mechanical Ventilation: Respiratory Failure    Number of Days on Mechanical Ventilation: 19    Met Criteria for Spontaneous Breathing Trial: Yes. PS 5/5 tolerating.     Significant Events Today: None    ABG Results:   Recent Labs   Lab 01/01/23  0404   O2PER 40       Current Vent Settings: Vent Mode: CPAP/PS  (Continuous positive airway pressure with Pressure Support)  FiO2 (%): 30 %  Resp Rate (Set): 14 breaths/min  Tidal Volume (Set, mL): 370 mL  PEEP (cm H2O): 5 cmH2O  Pressure Support (cm H2O): 5 cmH2O  Resp: 19      Plan: Place patient back on full ventilatory support overnight.     Herber Heath, RT on 1/3/2023 at 5:48 PM

## 2023-01-03 NOTE — PLAN OF CARE
Shift Summary  [id number]    No significant changes this shift, though patient was having hypoglycemia symptoms with BG in the 70s. Symptoms improved with an apple juice bolus via NG. Otherwise patient hemodynamically stable on vent support overnight 30%/7  1 loose stool overnight.       Plan- vent weaning/pressure supporting. Working with patient on increasing independence and activity. Needs encouragement.

## 2023-01-04 ENCOUNTER — HOSPITAL ENCOUNTER (INPATIENT)
Facility: CLINIC | Age: 28
LOS: 14 days | Discharge: HOME | End: 2023-01-18
Attending: HOSPITALIST | Admitting: HOSPITALIST
Payer: COMMERCIAL

## 2023-01-04 ENCOUNTER — ANCILLARY PROCEDURE (OUTPATIENT)
Dept: GENERAL RADIOLOGY | Facility: CLINIC | Age: 28
End: 2023-01-04
Attending: HOSPITALIST
Payer: COMMERCIAL

## 2023-01-04 VITALS
WEIGHT: 73.85 LBS | BODY MASS INDEX: 13.09 KG/M2 | HEART RATE: 70 BPM | OXYGEN SATURATION: 97 % | SYSTOLIC BLOOD PRESSURE: 109 MMHG | DIASTOLIC BLOOD PRESSURE: 79 MMHG | TEMPERATURE: 98.7 F | RESPIRATION RATE: 17 BRPM

## 2023-01-04 DIAGNOSIS — E10.65 TYPE 1 DIABETES MELLITUS WITH HYPERGLYCEMIA (H): Primary | ICD-10-CM

## 2023-01-04 DIAGNOSIS — F11.10 HEROIN ABUSE (H): ICD-10-CM

## 2023-01-04 DIAGNOSIS — K20.90 ESOPHAGITIS: ICD-10-CM

## 2023-01-04 DIAGNOSIS — F41.8 ANXIETY WITH DEPRESSION: ICD-10-CM

## 2023-01-04 PROBLEM — J80 ARDS (ADULT RESPIRATORY DISTRESS SYNDROME) (H): Status: RESOLVED | Noted: 2022-12-27 | Resolved: 2023-01-04

## 2023-01-04 PROBLEM — B49 FUNGEMIA: Status: RESOLVED | Noted: 2022-12-27 | Resolved: 2023-01-04

## 2023-01-04 PROBLEM — E10.10 DIABETIC KETOACIDOSIS WITHOUT COMA ASSOCIATED WITH TYPE 1 DIABETES MELLITUS (H): Status: RESOLVED | Noted: 2022-12-16 | Resolved: 2023-01-04

## 2023-01-04 PROBLEM — J18.9 CAVITARY PNEUMONIA: Status: ACTIVE | Noted: 2023-01-04

## 2023-01-04 PROBLEM — G93.40 ENCEPHALOPATHY: Status: RESOLVED | Noted: 2022-12-27 | Resolved: 2023-01-04

## 2023-01-04 PROBLEM — F15.10 METHAMPHETAMINE ABUSE (H): Status: ACTIVE | Noted: 2019-02-14

## 2023-01-04 PROBLEM — J96.10 CHRONIC RESPIRATORY FAILURE REQUIRING CONTINUOUS MECHANICAL VENTILATION THROUGH TRACHEOSTOMY (H): Status: ACTIVE | Noted: 2023-01-04

## 2023-01-04 PROBLEM — Z86.19 HISTORY OF SEVERE SEPSIS: Status: ACTIVE | Noted: 2023-01-04

## 2023-01-04 PROBLEM — J15.212 PNEUMONIA OF BOTH LUNGS DUE TO METHICILLIN RESISTANT STAPHYLOCOCCUS AUREUS (MRSA) (H): Status: ACTIVE | Noted: 2023-01-04

## 2023-01-04 PROBLEM — Z99.11 CHRONIC RESPIRATORY FAILURE REQUIRING CONTINUOUS MECHANICAL VENTILATION THROUGH TRACHEOSTOMY (H): Status: ACTIVE | Noted: 2023-01-04

## 2023-01-04 PROBLEM — Z93.0 CHRONIC RESPIRATORY FAILURE REQUIRING CONTINUOUS MECHANICAL VENTILATION THROUGH TRACHEOSTOMY (H): Status: ACTIVE | Noted: 2023-01-04

## 2023-01-04 PROBLEM — J98.4 CAVITARY PNEUMONIA: Status: ACTIVE | Noted: 2023-01-04

## 2023-01-04 PROBLEM — E10.9 DIABETES MELLITUS TYPE 1 (H): Status: ACTIVE | Noted: 2023-01-04

## 2023-01-04 LAB
ERYTHROCYTE [DISTWIDTH] IN BLOOD BY AUTOMATED COUNT: 13.2 % (ref 10–15)
GLUCOSE BLDC GLUCOMTR-MCNC: 160 MG/DL (ref 70–99)
GLUCOSE BLDC GLUCOMTR-MCNC: 164 MG/DL (ref 70–99)
GLUCOSE BLDC GLUCOMTR-MCNC: 169 MG/DL (ref 70–99)
GLUCOSE BLDC GLUCOMTR-MCNC: 171 MG/DL (ref 70–99)
GLUCOSE BLDC GLUCOMTR-MCNC: 215 MG/DL (ref 70–99)
GLUCOSE BLDC GLUCOMTR-MCNC: 230 MG/DL (ref 70–99)
GLUCOSE BLDC GLUCOMTR-MCNC: 73 MG/DL (ref 70–99)
HCT VFR BLD AUTO: 26.9 % (ref 35–47)
HGB BLD-MCNC: 8.6 G/DL (ref 11.7–15.7)
MAGNESIUM SERPL-MCNC: 2.1 MG/DL (ref 1.6–2.3)
MCH RBC QN AUTO: 29.9 PG (ref 26.5–33)
MCHC RBC AUTO-ENTMCNC: 32 G/DL (ref 31.5–36.5)
MCV RBC AUTO: 93 FL (ref 78–100)
PHOSPHATE SERPL-MCNC: 4.3 MG/DL (ref 2.5–4.5)
PLATELET # BLD AUTO: 631 10E3/UL (ref 150–450)
POTASSIUM BLD-SCNC: 4.2 MMOL/L (ref 3.4–5.3)
RBC # BLD AUTO: 2.88 10E6/UL (ref 3.8–5.2)
WBC # BLD AUTO: 8.8 10E3/UL (ref 4–11)

## 2023-01-04 PROCEDURE — 250N000013 HC RX MED GY IP 250 OP 250 PS 637: Performed by: INTERNAL MEDICINE

## 2023-01-04 PROCEDURE — 999N000065 XR ABDOMEN PORT 1 VIEW

## 2023-01-04 PROCEDURE — 85048 AUTOMATED LEUKOCYTE COUNT: CPT | Performed by: SURGERY

## 2023-01-04 PROCEDURE — 250N000011 HC RX IP 250 OP 636

## 2023-01-04 PROCEDURE — 999N000253 HC STATISTIC WEANING TRIALS

## 2023-01-04 PROCEDURE — 258N000001 HC RX 258

## 2023-01-04 PROCEDURE — 250N000013 HC RX MED GY IP 250 OP 250 PS 637: Performed by: HOSPITALIST

## 2023-01-04 PROCEDURE — 250N000013 HC RX MED GY IP 250 OP 250 PS 637: Performed by: NURSE PRACTITIONER

## 2023-01-04 PROCEDURE — 120N000017 HC R&B RESPIRATORY CARE

## 2023-01-04 PROCEDURE — 250N000013 HC RX MED GY IP 250 OP 250 PS 637: Performed by: STUDENT IN AN ORGANIZED HEALTH CARE EDUCATION/TRAINING PROGRAM

## 2023-01-04 PROCEDURE — 250N000009 HC RX 250: Performed by: INTERNAL MEDICINE

## 2023-01-04 PROCEDURE — 84100 ASSAY OF PHOSPHORUS: CPT | Performed by: INTERNAL MEDICINE

## 2023-01-04 PROCEDURE — 250N000012 HC RX MED GY IP 250 OP 636 PS 637: Performed by: HOSPITALIST

## 2023-01-04 PROCEDURE — 258N000003 HC RX IP 258 OP 636

## 2023-01-04 PROCEDURE — 999N000157 HC STATISTIC RCP TIME EA 10 MIN

## 2023-01-04 PROCEDURE — 250N000011 HC RX IP 250 OP 636: Performed by: HOSPITALIST

## 2023-01-04 PROCEDURE — 94003 VENT MGMT INPAT SUBQ DAY: CPT

## 2023-01-04 PROCEDURE — 83735 ASSAY OF MAGNESIUM: CPT | Performed by: INTERNAL MEDICINE

## 2023-01-04 PROCEDURE — 250N000011 HC RX IP 250 OP 636: Performed by: SURGERY

## 2023-01-04 PROCEDURE — 99232 SBSQ HOSP IP/OBS MODERATE 35: CPT | Mod: GC | Performed by: INTERNAL MEDICINE

## 2023-01-04 PROCEDURE — 250N000013 HC RX MED GY IP 250 OP 250 PS 637: Performed by: SURGERY

## 2023-01-04 PROCEDURE — 84132 ASSAY OF SERUM POTASSIUM: CPT | Performed by: INTERNAL MEDICINE

## 2023-01-04 PROCEDURE — 99223 1ST HOSP IP/OBS HIGH 75: CPT | Performed by: HOSPITALIST

## 2023-01-04 PROCEDURE — 5A1935Z RESPIRATORY VENTILATION, LESS THAN 24 CONSECUTIVE HOURS: ICD-10-PCS | Performed by: NURSE PRACTITIONER

## 2023-01-04 PROCEDURE — 999N000009 HC STATISTIC AIRWAY CARE

## 2023-01-04 PROCEDURE — 94640 AIRWAY INHALATION TREATMENT: CPT

## 2023-01-04 PROCEDURE — 94002 VENT MGMT INPAT INIT DAY: CPT

## 2023-01-04 PROCEDURE — 258N000003 HC RX IP 258 OP 636: Performed by: HOSPITALIST

## 2023-01-04 PROCEDURE — 258N000001 HC RX 258: Performed by: HOSPITALIST

## 2023-01-04 RX ORDER — HEPARIN SODIUM 5000 [USP'U]/.5ML
5000 INJECTION, SOLUTION INTRAVENOUS; SUBCUTANEOUS 2 TIMES DAILY
Status: DISCONTINUED | OUTPATIENT
Start: 2023-01-04 | End: 2023-01-10

## 2023-01-04 RX ORDER — GUAIFENESIN 600 MG/1
15 TABLET, EXTENDED RELEASE ORAL DAILY
Status: CANCELLED | OUTPATIENT
Start: 2023-01-05

## 2023-01-04 RX ORDER — NALOXONE HYDROCHLORIDE 0.4 MG/ML
0.4 INJECTION, SOLUTION INTRAMUSCULAR; INTRAVENOUS; SUBCUTANEOUS
Status: CANCELLED | OUTPATIENT
Start: 2023-01-04

## 2023-01-04 RX ORDER — ONDANSETRON 4 MG/1
4 TABLET, ORALLY DISINTEGRATING ORAL EVERY 6 HOURS PRN
Status: DISCONTINUED | OUTPATIENT
Start: 2023-01-04 | End: 2023-01-18 | Stop reason: HOSPADM

## 2023-01-04 RX ORDER — BUPRENORPHINE 2 MG/1
4 TABLET SUBLINGUAL 2 TIMES DAILY
Status: DISCONTINUED | OUTPATIENT
Start: 2023-01-04 | End: 2023-01-18 | Stop reason: HOSPADM

## 2023-01-04 RX ORDER — NALOXONE HYDROCHLORIDE 0.4 MG/ML
0.4 INJECTION, SOLUTION INTRAMUSCULAR; INTRAVENOUS; SUBCUTANEOUS
Status: DISCONTINUED | OUTPATIENT
Start: 2023-01-04 | End: 2023-01-18 | Stop reason: HOSPADM

## 2023-01-04 RX ORDER — SODIUM CHLORIDE 9 MG/ML
INJECTION, SOLUTION INTRAVENOUS CONTINUOUS
Status: CANCELLED | OUTPATIENT
Start: 2023-01-04

## 2023-01-04 RX ORDER — ONDANSETRON 2 MG/ML
4 INJECTION INTRAMUSCULAR; INTRAVENOUS EVERY 6 HOURS PRN
Status: DISCONTINUED | OUTPATIENT
Start: 2023-01-04 | End: 2023-01-18 | Stop reason: HOSPADM

## 2023-01-04 RX ORDER — NALOXONE HYDROCHLORIDE 0.4 MG/ML
0.2 INJECTION, SOLUTION INTRAMUSCULAR; INTRAVENOUS; SUBCUTANEOUS
Status: DISCONTINUED | OUTPATIENT
Start: 2023-01-04 | End: 2023-01-18 | Stop reason: HOSPADM

## 2023-01-04 RX ORDER — NICOTINE POLACRILEX 4 MG
15-30 LOZENGE BUCCAL
Status: DISCONTINUED | OUTPATIENT
Start: 2023-01-04 | End: 2023-01-05

## 2023-01-04 RX ORDER — ONDANSETRON 4 MG/1
4 TABLET, ORALLY DISINTEGRATING ORAL EVERY 6 HOURS PRN
Status: CANCELLED | OUTPATIENT
Start: 2023-01-04

## 2023-01-04 RX ORDER — DEXTROSE MONOHYDRATE 25 G/50ML
25 INJECTION, SOLUTION INTRAVENOUS ONCE
Status: COMPLETED | OUTPATIENT
Start: 2023-01-04 | End: 2023-01-04

## 2023-01-04 RX ORDER — HYDROXYZINE HYDROCHLORIDE 25 MG/1
50 TABLET, FILM COATED ORAL EVERY 6 HOURS PRN
Status: CANCELLED | OUTPATIENT
Start: 2023-01-04

## 2023-01-04 RX ORDER — QUETIAPINE FUMARATE 25 MG/1
25 TABLET, FILM COATED ORAL 3 TIMES DAILY
Status: DISCONTINUED | OUTPATIENT
Start: 2023-01-04 | End: 2023-01-08

## 2023-01-04 RX ORDER — HYDROXYZINE HYDROCHLORIDE 25 MG/1
25 TABLET, FILM COATED ORAL EVERY 6 HOURS PRN
Status: CANCELLED | OUTPATIENT
Start: 2023-01-04

## 2023-01-04 RX ORDER — NALOXONE HYDROCHLORIDE 0.4 MG/ML
0.2 INJECTION, SOLUTION INTRAMUSCULAR; INTRAVENOUS; SUBCUTANEOUS
Status: CANCELLED | OUTPATIENT
Start: 2023-01-04

## 2023-01-04 RX ORDER — DEXTROSE MONOHYDRATE 25 G/50ML
25-50 INJECTION, SOLUTION INTRAVENOUS
Status: CANCELLED | OUTPATIENT
Start: 2023-01-04

## 2023-01-04 RX ORDER — NICOTINE POLACRILEX 4 MG
15-30 LOZENGE BUCCAL
Status: CANCELLED | OUTPATIENT
Start: 2023-01-04

## 2023-01-04 RX ORDER — GUAIFENESIN 600 MG/1
15 TABLET, EXTENDED RELEASE ORAL DAILY
Status: DISCONTINUED | OUTPATIENT
Start: 2023-01-05 | End: 2023-01-10

## 2023-01-04 RX ORDER — BUPRENORPHINE 2 MG/1
4 TABLET SUBLINGUAL 2 TIMES DAILY
Status: CANCELLED | OUTPATIENT
Start: 2023-01-04

## 2023-01-04 RX ORDER — PROCHLORPERAZINE MALEATE 5 MG
5 TABLET ORAL EVERY 6 HOURS PRN
Status: DISCONTINUED | OUTPATIENT
Start: 2023-01-04 | End: 2023-01-18 | Stop reason: HOSPADM

## 2023-01-04 RX ORDER — LEVALBUTEROL 1.25 MG/.5ML
1.25 SOLUTION, CONCENTRATE RESPIRATORY (INHALATION) EVERY 4 HOURS PRN
Status: CANCELLED | OUTPATIENT
Start: 2023-01-04

## 2023-01-04 RX ORDER — PROCHLORPERAZINE MALEATE 5 MG
5 TABLET ORAL EVERY 6 HOURS PRN
Status: CANCELLED | OUTPATIENT
Start: 2023-01-04

## 2023-01-04 RX ORDER — HYDROXYZINE HYDROCHLORIDE 25 MG/1
25 TABLET, FILM COATED ORAL EVERY 6 HOURS PRN
Status: DISCONTINUED | OUTPATIENT
Start: 2023-01-04 | End: 2023-01-18 | Stop reason: HOSPADM

## 2023-01-04 RX ORDER — LEVALBUTEROL INHALATION SOLUTION 1.25 MG/3ML
1.25 SOLUTION RESPIRATORY (INHALATION) EVERY 4 HOURS PRN
Status: DISCONTINUED | OUTPATIENT
Start: 2023-01-04 | End: 2023-01-18 | Stop reason: HOSPADM

## 2023-01-04 RX ORDER — DEXTROSE MONOHYDRATE 100 MG/ML
INJECTION, SOLUTION INTRAVENOUS CONTINUOUS PRN
Status: CANCELLED | OUTPATIENT
Start: 2023-01-04

## 2023-01-04 RX ORDER — PROCHLORPERAZINE 25 MG
25 SUPPOSITORY, RECTAL RECTAL EVERY 12 HOURS PRN
Status: DISCONTINUED | OUTPATIENT
Start: 2023-01-04 | End: 2023-01-18 | Stop reason: HOSPADM

## 2023-01-04 RX ORDER — DEXTROSE MONOHYDRATE 25 G/50ML
25-50 INJECTION, SOLUTION INTRAVENOUS
Status: DISCONTINUED | OUTPATIENT
Start: 2023-01-04 | End: 2023-01-05

## 2023-01-04 RX ORDER — DEXTROSE MONOHYDRATE 25 G/50ML
INJECTION, SOLUTION INTRAVENOUS
Status: COMPLETED
Start: 2023-01-04 | End: 2023-01-04

## 2023-01-04 RX ORDER — ONDANSETRON 2 MG/ML
4 INJECTION INTRAMUSCULAR; INTRAVENOUS EVERY 6 HOURS PRN
Status: CANCELLED | OUTPATIENT
Start: 2023-01-04

## 2023-01-04 RX ORDER — PROCHLORPERAZINE 25 MG
25 SUPPOSITORY, RECTAL RECTAL EVERY 12 HOURS PRN
Status: CANCELLED | OUTPATIENT
Start: 2023-01-04

## 2023-01-04 RX ORDER — HEPARIN SODIUM 5000 [USP'U]/.5ML
5000 INJECTION, SOLUTION INTRAVENOUS; SUBCUTANEOUS EVERY 12 HOURS
Status: CANCELLED | OUTPATIENT
Start: 2023-01-04

## 2023-01-04 RX ORDER — DEXTROSE MONOHYDRATE 100 MG/ML
INJECTION, SOLUTION INTRAVENOUS CONTINUOUS PRN
Status: DISCONTINUED | OUTPATIENT
Start: 2023-01-04 | End: 2023-01-18 | Stop reason: HOSPADM

## 2023-01-04 RX ORDER — TRAZODONE HYDROCHLORIDE 50 MG/1
100 TABLET, FILM COATED ORAL AT BEDTIME
Status: DISCONTINUED | OUTPATIENT
Start: 2023-01-04 | End: 2023-01-08

## 2023-01-04 RX ORDER — ZOLPIDEM TARTRATE 5 MG/1
10 TABLET ORAL
Status: DISCONTINUED | OUTPATIENT
Start: 2023-01-04 | End: 2023-01-18 | Stop reason: HOSPADM

## 2023-01-04 RX ORDER — HYDROXYZINE HYDROCHLORIDE 25 MG/1
50 TABLET, FILM COATED ORAL EVERY 6 HOURS PRN
Status: DISCONTINUED | OUTPATIENT
Start: 2023-01-04 | End: 2023-01-18 | Stop reason: HOSPADM

## 2023-01-04 RX ORDER — ZOLPIDEM TARTRATE 5 MG/1
10 TABLET ORAL
Status: CANCELLED | OUTPATIENT
Start: 2023-01-04

## 2023-01-04 RX ORDER — SODIUM CHLORIDE 9 MG/ML
INJECTION, SOLUTION INTRAVENOUS CONTINUOUS
Status: DISCONTINUED | OUTPATIENT
Start: 2023-01-04 | End: 2023-01-04

## 2023-01-04 RX ORDER — TRAZODONE HYDROCHLORIDE 50 MG/1
100 TABLET, FILM COATED ORAL AT BEDTIME
Status: CANCELLED | OUTPATIENT
Start: 2023-01-04

## 2023-01-04 RX ORDER — QUETIAPINE FUMARATE 25 MG/1
25 TABLET, FILM COATED ORAL 3 TIMES DAILY
Status: CANCELLED | OUTPATIENT
Start: 2023-01-04

## 2023-01-04 RX ADMIN — ACETAMINOPHEN 500 MG: 325 SUSPENSION ORAL at 07:51

## 2023-01-04 RX ADMIN — INSULIN GLARGINE 6 UNITS: 100 INJECTION, SOLUTION SUBCUTANEOUS at 21:27

## 2023-01-04 RX ADMIN — CHLORHEXIDINE GLUCONATE 0.12% ORAL RINSE 15 ML: 1.2 LIQUID ORAL at 07:52

## 2023-01-04 RX ADMIN — LEVALBUTEROL HYDROCHLORIDE 0.63 MG: 1.25 SOLUTION, CONCENTRATE RESPIRATORY (INHALATION) at 07:46

## 2023-01-04 RX ADMIN — DEXTROSE MONOHYDRATE 25 ML: 25 INJECTION, SOLUTION INTRAVENOUS at 16:09

## 2023-01-04 RX ADMIN — QUETIAPINE FUMARATE 25 MG: 25 TABLET ORAL at 09:02

## 2023-01-04 RX ADMIN — VANCOMYCIN HYDROCHLORIDE 750 MG: 5 INJECTION, POWDER, LYOPHILIZED, FOR SOLUTION INTRAVENOUS at 21:40

## 2023-01-04 RX ADMIN — Medication 40 MG: at 07:52

## 2023-01-04 RX ADMIN — TRAZODONE HYDROCHLORIDE 100 MG: 50 TABLET ORAL at 21:05

## 2023-01-04 RX ADMIN — BUPRENORPHINE HYDROCHLORIDE 4 MG: 2 TABLET SUBLINGUAL at 21:25

## 2023-01-04 RX ADMIN — HEPARIN SODIUM 5000 UNITS: 5000 INJECTION, SOLUTION INTRAVENOUS; SUBCUTANEOUS at 21:05

## 2023-01-04 RX ADMIN — ACETAMINOPHEN 500 MG: 500 TABLET ORAL at 12:19

## 2023-01-04 RX ADMIN — QUETIAPINE FUMARATE 25 MG: 25 TABLET ORAL at 21:05

## 2023-01-04 RX ADMIN — DEXTROSE MONOHYDRATE 25 ML: 500 INJECTION PARENTERAL at 16:09

## 2023-01-04 RX ADMIN — VANCOMYCIN HYDROCHLORIDE 750 MG: 1 INJECTION, POWDER, LYOPHILIZED, FOR SOLUTION INTRAVENOUS at 10:06

## 2023-01-04 RX ADMIN — INSULIN ASPART 2 UNITS: 100 INJECTION, SOLUTION INTRAVENOUS; SUBCUTANEOUS at 21:23

## 2023-01-04 RX ADMIN — MULTIVITAMIN 15 ML: LIQUID ORAL at 09:02

## 2023-01-04 RX ADMIN — HEPARIN SODIUM 5000 UNITS: 5000 INJECTION, SOLUTION INTRAVENOUS; SUBCUTANEOUS at 08:08

## 2023-01-04 RX ADMIN — ZOLPIDEM TARTRATE 10 MG: 5 TABLET ORAL at 01:54

## 2023-01-04 RX ADMIN — HYDROXYZINE HYDROCHLORIDE 25 MG: 25 TABLET, FILM COATED ORAL at 08:01

## 2023-01-04 RX ADMIN — BUPRENORPHINE HYDROCHLORIDE 4 MG: 2 TABLET SUBLINGUAL at 09:02

## 2023-01-04 ASSESSMENT — ACTIVITIES OF DAILY LIVING (ADL)
ADLS_ACUITY_SCORE: 45
TOILETING_ASSISTANCE: TOILETING DIFFICULTY, ASSISTANCE 1 PERSON
ADLS_ACUITY_SCORE: 45
DRESSING/BATHING: BATHING DIFFICULTY, ASSISTANCE 1 PERSON
EATING/SWALLOWING: OTHER (SEE COMMENTS)
TOILETING_ISSUES: YES
WALKING_OR_CLIMBING_STAIRS_DIFFICULTY: YES
ADLS_ACUITY_SCORE: 45
WALKING_OR_CLIMBING_STAIRS: AMBULATION DIFFICULTY, REQUIRES EQUIPMENT
FALL_HISTORY_WITHIN_LAST_SIX_MONTHS: NO
ADLS_ACUITY_SCORE: 45
DIFFICULTY_EATING/SWALLOWING: YES
ADLS_ACUITY_SCORE: 45
CONCENTRATING,_REMEMBERING_OR_MAKING_DECISIONS_DIFFICULTY: NO
ADLS_ACUITY_SCORE: 45
DRESSING/BATHING_DIFFICULTY: YES
ADLS_ACUITY_SCORE: 41
ADLS_ACUITY_SCORE: 35
ADLS_ACUITY_SCORE: 42
ADLS_ACUITY_SCORE: 41
ADLS_ACUITY_SCORE: 45
DOING_ERRANDS_INDEPENDENTLY_DIFFICULTY: YES
WEAR_GLASSES_OR_BLIND: NO
CHANGE_IN_FUNCTIONAL_STATUS_SINCE_ONSET_OF_CURRENT_ILLNESS/INJURY: YES

## 2023-01-04 NOTE — PLAN OF CARE
Problem: Mechanical Ventilation Invasive  Goal: Effective Communication  Outcome: Progressing  Goal: Optimal Device Function  Outcome: Progressing  Intervention: Optimize Device Care and Function  Recent Flowsheet Documentation  Taken 1/4/2023 1658 by Mahnaz Hobbs, RT  Airway Safety Measures: all equipment/monitors on and audible  Taken 1/4/2023 1525 by Mahnaz Hobbs, RT  Airway Safety Measures: all equipment/monitors on and audible  Goal: Mechanical Ventilation Liberation  Outcome: Progressing  Goal: Absence of Device-Related Skin and Tissue Injury  Outcome: Progressing  Goal: Absence of Ventilator-Induced Lung Injury  Outcome: Progressing   Goal Outcome Evaluation:

## 2023-01-04 NOTE — PROGRESS NOTES
[facility]  ([address])    Ms. Aasen has been accepted for admission to [facility] today pending insurance authorization.     Ride: 1:00pm    RN to RN report: Please call [address] at [phone] for nurse to nurse report.before the pt discharges.     Accepting MD: Dr. Feliciano. Dr. Feliciano will call for a provider to provider report before the pt discharges.    Documentation needed prior to discharge: A visible discharge summary and discharge/readmission orders       Berenice Carson, PT  [facility] Referral Specialist    [facility]      [address] [address]  Admissions Office: [phone]  Fax: [phone]     CONFIDENTIAL Protected under [place] Statute  145.61 et seq

## 2023-01-04 NOTE — PLAN OF CARE
Occupational Therapy Discharge Summary    Reason for therapy discharge:    Discharged to LTACH    Progress towards therapy goal(s). See goals on Care Plan in [facility] electronic health record for goal details.  Goals not met.  Barriers to achieving goals:   discharge from facility.    Therapy recommendation(s):    Continued therapy is recommended.  Rationale/Recommendations:  Increase ADL independence, strength, and safety.

## 2023-01-04 NOTE — PROGRESS NOTES
SPIRITUAL HEALTH SERVICES Progress Note       ICU     Visited with Bridget per follow-up plan of care.    Bridget was awake an open to  visit. Per previous encounter, she primarily communicated with nodding yes/no, writing on a tablet, and mouthing words.     Today, she noted that she has been enjoying watching the snowfall through her room window. Bridget also reflected on a recent visit by her father which was noted to be really meaningful and helpful during this hospitalization. As I spoke of her father's visit, she continued to have a smile on her face. When assessing for  needs, Bridget requested a prayer. I offered this at the bedside.     This patient really appreciates  visits and we remain available to support. Please consult or page as needs arise.     ------  Kristofer Forman M.Div.  Resident   Pager: [phone]

## 2023-01-04 NOTE — PLAN OF CARE
Pleasant today, wanting to video chat with father, will attempt tomorrow morning. Tolerated pressure support for 2 hours. Continues with diarrhea, TF formula adjusted. Ambulated in room with PT & RN. Continue to encourage activity and independence. Goals to wean vent and optimize for LTAC/ARU.

## 2023-01-04 NOTE — DISCHARGE SUMMARY
Patient discharged at 2:34 PM [facility]. Pain at time of discharge was 3/10. Belongings returned to patient.  Discharge instructions and medications reviewed with EMS.  At time of discharge, patient condition was stable on vent and left the unit by cart escorted by EMS.

## 2023-01-04 NOTE — PROGRESS NOTES
Care Management Follow Up    Length of Stay (days): 19    Expected Discharge Date: 01/05/2023     Concerns to be Addressed:       Patient plan of care discussed at interdisciplinary rounds: Yes    Anticipated Discharge Disposition: LTACH     Anticipated Discharge Services:    Anticipated Discharge DME:      Patient/family educated on Medicare website which has current facility and service quality ratings:    Education Provided on the Discharge Plan:    Patient/Family in Agreement with the Plan: yes    Referrals Placed by CM/SW: Post Acute Facilities  Private pay costs discussed: Not applicable    Additional Information:  Received call from Berenice Carson [facility] Liaison.  Per Berenice, still awaiting insurance auth but they do have a bed for pt today.  Informed Berenice that currently [facility] isn't scheduling rides at this time due to the weather.    Addendum:  [employer] lifted hold on rides.  Informed Berenice Carson at [facility].  Berenice requested a ride be scheduled for 1300 today.  Called [facility] and spoke to Laverne.  Scheduled stretcher ride for 1300 today.  Bedside RN informed of ride time.    Addendum:  Ambulance PCS form completed, signed and faxed to [phone].  Communicated with Berenice Carson regarding discharge orders.  Per Berenice, she will coordinate with Dr. Feliciano and Dr. Tran.  Informed bedside RN that ride is still set for 1300.  Per bedside RN, she will be informing pt of discharge time.  Inpatient Care Coordinator will continue to follow for discharge planning.  Nimo Moody, RN  Nimo Moody RN, BSN, OCN   Inpatient Care Coordination [address]  Office: [phone]

## 2023-01-04 NOTE — PROGRESS NOTES
"Physical Therapy Discharge Summary    Reason for therapy discharge:    Discharged to LTACH    Progress towards therapy goal(s). See goals on Care Plan in [facility] electronic health record for goal details.  Goals partially met.  Barriers to achieving goals:   discharge from facility.    Therapy recommendation(s):    Continued therapy is recommended.  Rationale/Recommendations:  Further PT. Last visit: \"Sup<>sit CGA. Sit<>stand CGA. Bedside gait fwd/bk and 6' forward via portable vent, walker and Min A.\".      "

## 2023-01-04 NOTE — PROGRESS NOTES
RT PROGRESS NOTE     DATA:     CURRENT SETTINGS:             TRACH TYPE / SIZE:  #6 Suad (placed 12/30)             MODE:   AC 14, 370, +5             FIO2:   30%     ACTION:             THERAPIES:   PRN             SUCTION:                           FREQUENCY:   x1                        AMOUNT:   Moderate                        CONSISTENCY:   Thick/Thin                        COLOR:   Pale Yellow             SPONTANEOUS COUGH EFFORT/STRENGTH OF EFFORT (not elicited by suctioning): Strong Spontaneous Cough                           WEANING PHASE:   Need Phase 1 Order                        WEAN MODE:    NA                        WEAN TIME:   NA                        END WEAN REASON:   NA     RESPONSE:             BS:   Coarse             VITAL SIGNS:   Sating %, HR 66-70, RR 20-21             EMOTIONAL NEEDS / CONCERNS:  NA                RISK FOR SELF DECANNULATION:  No     NOTE / PLAN:   Pt is a new admit this afternoon.  RT will continue to monitor.

## 2023-01-04 NOTE — H&P
LTACH    History and Physical - Hospitalist Service       Date of Admission:  1/4/2023    Assessment & Plan      Principal Problem:    Bilateral MRSA cavitary pneumonia (1/4/2023)  Active Problems:    Pneumonia of both lungs due to MRSA (H) (1/4/2023)    Chronic respiratory failure requiring continuous mechanical ventilation through tracheostomy (H) (1/4/2023)    Diabetes mellitus type 1 with Hx of DKA (1/4/2023)    Methamphetamine abuse (H) (2/14/2019)    Heroin abuse (H) (2/14/2019)    History of severe sepsis with cavitary pneumonia due to MRSA (1/4/2023)    Anxiety with depression (2/13/2019)    Bilateral cavitary pneumonia due to MRSA: Continue with vancomycin, pharmacy will dose. Continue with mechanical ventilation.  Pulmonology is consulted for weaning off ventilation  Respiratory failure requiring continuous mechanical ventilation with tracheostomy: Stable and improving.  Due to MRSA pneumonia.  Pulmonology is consulted for weaning off ventilator.  Type 1 diabetes with history of DKA and noncompliance with medication: Blood glucose is controlled.  Continue with current dose of Lantus and SSI.  Monitor blood glucose levels.  Polysubstance abuse: Patient has been on different medications and is not in withdrawal.  Currently is stable on buprenorphine 4 mg sublingual 2 times daily, hydroxyzine as needed and trazodone for depression.  Anxiety and depression: Controlled with hydroxyzine as needed and trazodone 100 mg nightly     Diet: Adult Formula Drip Feeding: Continuous Vivonex RTF; Nasoduodenal tube; Goal Rate: 60; mL/hr; Change TF formula to Vivonex RTF, increase TF rate to 50 mL/hr x 12 hours and then advance to goal of 60 mL/hr    DVT Prophylaxis: Pneumatic Compression Devices  [place] Catheter: Not present  Lines: PRESENT             Cardiac Monitoring: None  Code Status: Full Code      Clinically Significant Risk Factors Present on Admission                               Disposition Plan      Expected  Discharge Date: 01/04/2023                  Rogelio Feliciano MD  Hospitalist Service  LTACH  Securely message with [facility] (more info)  Text page via [facility] Paging/Directory     ______________________________________________________________________    Chief Complaint   When can I eat.     History is obtained from the patient's electronic medical record and discharging physician.    History of Present Illness   Deidre N Aasen is a 27 year old female with DM I and polysubstance abuse admitted on 1/4/2023 to LTAC for continuation of antibiotic treatment and weaning off ventilator.   She is transferred from [facility] ICU after initial admission of 12/16/2022 with confusion and DKA, sepsis secondary to MRSA cavitary pneumonia, candidemia and incidental finding of COVID infection.  She needed intubation and mechanical ventilation and later tracheostomy.   She had severe sepsis and developed ARDS, NANNETTE and encephalopathy which eventually recovered from.  She had candidemia and had full course micafungin for 2 weeks.   Other sources of infection such as endocarditis, spine infection, discitis have already been ruled out.  She no longer needs COVID or MRSA isolation.   She needs to be on IV vancomycin through 1/16/2023 and needs to be weaned off ventilator. She has NJ tube and is tube fed.  She is currently stable and afebrile and off pressors and micafungin.   She needs another blood culture repeat on 1/5/2023 and 1/20/2023.     Past Medical History    Diagnosis Date     Anxiety      Chronic malnutrition      Depressive disorder      Diabetes mellitus      Heroin abuse      Methamphetamine abuse      Noncompliance with medication regimen      Past Surgical History   Past Surgical History:   Procedure Laterality Date     TRACHEOSTOMY N/A 12/30/2022    Procedure: PLACEMENT OF TRACHEOSTOMY TUBE;  Surgeon: Ruma Wynne MD;  Location:  OR       Prior to Admission Medications   Prior to Admission Medications    Prescriptions Last Dose Informant Patient Reported? Taking?   insulin aspart (NOVOLOG PEN) 100 UNIT/ML pen   Yes No   Sig: Inject Subcutaneous 3 times daily (with meals) Sliding scale   insulin glargine (BASAGLAR KWIKPEN) 100 UNIT/ML pen   No No   Sig: Inject 20 Units Subcutaneous daily      Facility-Administered Medications: None        Review of Systems    The 10 point Review of Systems is negative other than noted in the HPI.  She denies any pain or discomfort.  Has no headache or nausea or vomiting or chest pain or SOB or cough or fever or chills.  No nausea vomiting diarrhea.  No dysuria.  She only complains of wanting to eat.    Social History   I have reviewed this patient's social history and updated it with pertinent information if needed.  Social History     Tobacco Use     Smoking status: Never     Smokeless tobacco: Never   Substance Use Topics     Alcohol use: No     Drug use: Yes       Family History     No significant family history    Allergies   Allergies   Allergen Reactions     Ciprofloxacin Other (See Comments)     Vomiting; diarrhea        Physical Exam   Vital Signs: Temp: 98.1  F (36.7  C) Temp src: Oral BP: 116/76 Pulse: 63   Resp: 18 SpO2: 96 % O2 Device: Mechanical Ventilator    Weight: 68 lbs 9.6 oz  Patient is a thin and pale young lady laying on her back in her bed.  She has tracheostomy and is on ventilator.  She is alert and oriented and pleasant and cooperative.  HEENT: Moist oral mucosal moisture.  Neck: Supple, tracheostomy in place and functional and hooked up to ventilator  Chest: Normal breathing through ventilator.  No retraction.  Breathing sounds are normal  Heart with regular rate and rhythm no murmur  Abdomen is a scaphoid and nontender without mass or organomegaly.   deferred  Extremities without edema or cyanosis or clubbing or deformity.  Neurological exam is grossly nonfocal.  Patient does not have any facial droop and moves all extremities equally.  No ataxia or  dysmetria.    Medical Decision Making       35 MINUTES SPENT BY ME on the date of service doing chart review, history, exam, documentation & further activities per the note.  MANAGEMENT DISCUSSED with the following over the past 24 hours: Nursing staff       Data   Imaging results reviewed over the past 24 hrs:   Recent Results (from the past 24 hour(s))   XR Abdomen Port 1 View    Narrative    EXAM: XR ABDOMEN PORT 1 VIEW  LOCATION: [facility]  DATE/TIME: 1/4/2023 5:18 PM    INDICATION: Enteric tube placement  COMPARISON: 12/20/2022      Impression    IMPRESSION:   Feeding tube terminates at the level of the proximal jejunum. Mild gaseous distention of the visualized bowel. Patchy pulmonary opacities and small pleural effusion in the right lung base.     Recent Labs   Lab 01/04/23  1626 01/04/23  1539 01/04/23  1227 01/04/23  0750 01/04/23  0523 01/03/23  0410 01/03/23  0406 01/02/23  0746 01/02/23  0454 01/01/23  0406 01/01/23  0404 01/01/23  0008 12/31/22  2047   WBC  --   --   --   --  8.8  --  12.7*  --  8.6  --  7.7  --   --    HGB  --   --   --   --  8.6*  --  7.3*  --  8.0*  --  7.8*  --   --    MCV  --   --   --   --  93  --  94  --  94  --  95  --   --    PLT  --   --   --   --  631*  --  742*  --  643*  --  526*  --   --    NA  --   --   --   --   --   --  139  --  138  --  138  --   --    POTASSIUM  --   --   --   --  4.2  --  4.6  --  4.7  --  4.5  --   --    CHLORIDE  --   --   --   --   --   --   --   --  104  --  107  --   --    CO2  --   --   --   --   --   --   --   --  28  --  26  --   --    BUN  --   --   --   --   --   --   --   --  17  --  18  --   --    CR  --   --   --   --   --   --   --   --  0.50*  --  0.57  --  0.54   ANIONGAP  --   --   --   --   --   --   --   --  6  --  5  --   --    AGNES  --   --   --   --   --   --   --   --  8.1*  --  7.9*  --   --    * 73 230*   < >  --    < >  --    < > 125*   < > 93   < > 150*   ALBUMIN  --   --   --   --   --   --    --   --   --   --  1.3*  --   --    PROTTOTAL  --   --   --   --   --   --   --   --   --   --  6.9  --   --    BILITOTAL  --   --   --   --   --   --   --   --   --   --  0.3  --   --    ALKPHOS  --   --   --   --   --   --   --   --   --   --  75  --   --    ALT  --   --   --   --   --   --   --   --   --   --  8  --   --    AST  --   --   --   --   --   --   --   --   --   --  12  --   --     < > = values in this interval not displayed.

## 2023-01-04 NOTE — PROGRESS NOTES
[facility] ICU RESPIRATORY NOTE           Date of Admission: 12/16/2022     Date of Intubation (most recent): Tracheostomy 12/30/2022    Reason for Mechanical Ventilation: Respiratory failure      Number of Days on Mechanical Ventilation: 19     Met Criteria for Spontaneous Breathing Trial: No     Significant event today :None     ABG Results:   Recent Labs   Lab 01/01/23  0404   O2PER 40     Current Vent Settings:  Vent Mode: CMV/AC  (Continuous Mandatory Ventilation/ Assist Control)  FiO2 (%): 30 %  Resp Rate (Set): 14 breaths/min  Tidal Volume (Set, mL): 370 mL  PEEP (cm H2O): 5 cmH2O  Pressure Support (cm H2O): 5 cmH2O  Resp: 20     Skin Assessment : intact      Plan: Continue full ventilatory support and wean as tolerated.     Mir Sorensen, RT

## 2023-01-04 NOTE — PLAN OF CARE
Goal Outcome Evaluation: ongoing, improving      Changes this shift: secretions from trach have started becoming more yellow/green/creamy. Tube feed increased to goal of 60cc/hr with 60q4 flushes. In better spirits this shift than previous overnight.    Otherwise assessments mostly unchanged. 1 loose stool this shift. Patient A&O. VSS on room air. Hemodynamically stable on vent over night 30%/5.     Plan: vent weaning/pressure supporting. Working with patient on increasing independence and activity. Hoping to video chat with father, Jeronimo, today.

## 2023-01-04 NOTE — PHARMACY-ADMISSION MEDICATION HISTORY
Pharmacy Note: LTACH Admission Drug Regimen Review    Initial admission medication history was completed at [facility]. Please see Pharmacy - Admission Medication History note from 12/13/2022.    Medication orders were signed & held for discharge from transferring facility?  Yes    Admission drug regimen review has been completed. The following medication issues were identified.    1.  Per discharge summary - continue micafungin through 1/9 - per ID notes on 1/3/23 - last day fo micafungin 1/4/23  2. Vancomyin was not mentioned in discharge summary , order was signed and held to continue on transfer. Per ID note on 1/3/23, vancomycin through 1/16/23.  3. Will need pharmacy consult to dose/monitor vancomycin levels.  4. Please consult addiction medicine to follow buprenorphine dosing.            Thank you for the opportunity to participate in the care of this patient.    Zuly Hayes, [facility],BCCCP, BCCP    1/4/2023 3:59 PM

## 2023-01-05 ENCOUNTER — APPOINTMENT (OUTPATIENT)
Dept: PHYSICAL THERAPY | Facility: CLINIC | Age: 28
End: 2023-01-05
Attending: HOSPITALIST
Payer: COMMERCIAL

## 2023-01-05 ENCOUNTER — APPOINTMENT (OUTPATIENT)
Dept: SPEECH THERAPY | Facility: CLINIC | Age: 28
End: 2023-01-05
Attending: NURSE PRACTITIONER
Payer: COMMERCIAL

## 2023-01-05 ENCOUNTER — APPOINTMENT (OUTPATIENT)
Dept: OCCUPATIONAL THERAPY | Facility: CLINIC | Age: 28
End: 2023-01-05
Attending: HOSPITALIST
Payer: COMMERCIAL

## 2023-01-05 ENCOUNTER — ANCILLARY PROCEDURE (OUTPATIENT)
Dept: GENERAL RADIOLOGY | Facility: CLINIC | Age: 28
End: 2023-01-05
Attending: NURSE PRACTITIONER
Payer: COMMERCIAL

## 2023-01-05 PROBLEM — J96.00 ACUTE RESPIRATORY FAILURE (H): Status: ACTIVE | Noted: 2023-01-05

## 2023-01-05 PROBLEM — M94.0 COSTOCHONDRITIS: Status: ACTIVE | Noted: 2023-01-05

## 2023-01-05 LAB
GLUCOSE BLDC GLUCOMTR-MCNC: 168 MG/DL (ref 70–99)
GLUCOSE BLDC GLUCOMTR-MCNC: 175 MG/DL (ref 70–99)
GLUCOSE BLDC GLUCOMTR-MCNC: 191 MG/DL (ref 70–99)
GLUCOSE BLDC GLUCOMTR-MCNC: 197 MG/DL (ref 70–99)
GLUCOSE BLDC GLUCOMTR-MCNC: 214 MG/DL (ref 70–99)
GLUCOSE BLDC GLUCOMTR-MCNC: 214 MG/DL (ref 70–99)

## 2023-01-05 PROCEDURE — 97530 THERAPEUTIC ACTIVITIES: CPT | Mod: GP

## 2023-01-05 PROCEDURE — 97162 PT EVAL MOD COMPLEX 30 MIN: CPT | Mod: GP

## 2023-01-05 PROCEDURE — 99233 SBSQ HOSP IP/OBS HIGH 50: CPT | Performed by: HOSPITALIST

## 2023-01-05 PROCEDURE — 999N000157 HC STATISTIC RCP TIME EA 10 MIN

## 2023-01-05 PROCEDURE — 92597 ORAL SPEECH DEVICE EVAL: CPT | Mod: GN | Performed by: SPEECH-LANGUAGE PATHOLOGIST

## 2023-01-05 PROCEDURE — 250N000011 HC RX IP 250 OP 636: Performed by: HOSPITALIST

## 2023-01-05 PROCEDURE — 97165 OT EVAL LOW COMPLEX 30 MIN: CPT | Mod: GO | Performed by: OCCUPATIONAL THERAPIST

## 2023-01-05 PROCEDURE — 999N000009 HC STATISTIC AIRWAY CARE

## 2023-01-05 PROCEDURE — 250N000013 HC RX MED GY IP 250 OP 250 PS 637: Performed by: HOSPITALIST

## 2023-01-05 PROCEDURE — 999N000253 HC STATISTIC WEANING TRIALS

## 2023-01-05 PROCEDURE — 94003 VENT MGMT INPAT SUBQ DAY: CPT

## 2023-01-05 PROCEDURE — 92610 EVALUATE SWALLOWING FUNCTION: CPT | Mod: GN | Performed by: SPEECH-LANGUAGE PATHOLOGIST

## 2023-01-05 PROCEDURE — 272N000063 HC CIRCUIT HUMID FACE/TRACH MSK

## 2023-01-05 PROCEDURE — 120N000017 HC R&B RESPIRATORY CARE

## 2023-01-05 PROCEDURE — 97535 SELF CARE MNGMENT TRAINING: CPT | Mod: GO | Performed by: OCCUPATIONAL THERAPIST

## 2023-01-05 PROCEDURE — 71045 X-RAY EXAM CHEST 1 VIEW: CPT

## 2023-01-05 PROCEDURE — 258N000003 HC RX IP 258 OP 636: Performed by: HOSPITALIST

## 2023-01-05 PROCEDURE — 94002 VENT MGMT INPAT INIT DAY: CPT | Performed by: NURSE PRACTITIONER

## 2023-01-05 PROCEDURE — 97530 THERAPEUTIC ACTIVITIES: CPT | Mod: GO | Performed by: OCCUPATIONAL THERAPIST

## 2023-01-05 PROCEDURE — 99207 PR CDG-CUT & PASTE-POTENTIAL IMPACT ON LEVEL: CPT | Performed by: HOSPITALIST

## 2023-01-05 RX ORDER — NICOTINE POLACRILEX 4 MG
15-30 LOZENGE BUCCAL
Status: DISCONTINUED | OUTPATIENT
Start: 2023-01-05 | End: 2023-01-18 | Stop reason: HOSPADM

## 2023-01-05 RX ORDER — MULTIPLE VITAMINS W/ MINERALS TAB 9MG-400MCG
1 TAB ORAL DAILY
Status: DISCONTINUED | OUTPATIENT
Start: 2023-01-06 | End: 2023-01-05

## 2023-01-05 RX ORDER — IBUPROFEN 200 MG
600 TABLET ORAL 3 TIMES DAILY PRN
Status: DISCONTINUED | OUTPATIENT
Start: 2023-01-05 | End: 2023-01-18 | Stop reason: HOSPADM

## 2023-01-05 RX ORDER — LIDOCAINE 4 G/G
1 PATCH TOPICAL
Status: DISCONTINUED | OUTPATIENT
Start: 2023-01-05 | End: 2023-01-18 | Stop reason: HOSPADM

## 2023-01-05 RX ORDER — DEXTROSE MONOHYDRATE 25 G/50ML
25-50 INJECTION, SOLUTION INTRAVENOUS
Status: DISCONTINUED | OUTPATIENT
Start: 2023-01-05 | End: 2023-01-18 | Stop reason: HOSPADM

## 2023-01-05 RX ADMIN — QUETIAPINE FUMARATE 25 MG: 25 TABLET ORAL at 20:08

## 2023-01-05 RX ADMIN — LIDOCAINE 1 PATCH: 4 PATCH TOPICAL at 20:24

## 2023-01-05 RX ADMIN — TRAZODONE HYDROCHLORIDE 100 MG: 50 TABLET ORAL at 20:07

## 2023-01-05 RX ADMIN — INSULIN ASPART 3 UNITS: 100 INJECTION, SOLUTION INTRAVENOUS; SUBCUTANEOUS at 01:18

## 2023-01-05 RX ADMIN — INSULIN GLARGINE 6 UNITS: 100 INJECTION, SOLUTION SUBCUTANEOUS at 21:46

## 2023-01-05 RX ADMIN — INSULIN ASPART 3 UNITS: 100 INJECTION, SOLUTION INTRAVENOUS; SUBCUTANEOUS at 17:16

## 2023-01-05 RX ADMIN — HEPARIN SODIUM 5000 UNITS: 5000 INJECTION, SOLUTION INTRAVENOUS; SUBCUTANEOUS at 20:07

## 2023-01-05 RX ADMIN — VANCOMYCIN HYDROCHLORIDE 750 MG: 5 INJECTION, POWDER, LYOPHILIZED, FOR SOLUTION INTRAVENOUS at 10:00

## 2023-01-05 RX ADMIN — INSULIN ASPART 2 UNITS: 100 INJECTION, SOLUTION INTRAVENOUS; SUBCUTANEOUS at 20:28

## 2023-01-05 RX ADMIN — MULTIVIT AND MINERALS-FERROUS GLUCONATE 9 MG IRON/15 ML ORAL LIQUID 15 ML: at 09:12

## 2023-01-05 RX ADMIN — QUETIAPINE FUMARATE 25 MG: 25 TABLET ORAL at 09:12

## 2023-01-05 RX ADMIN — INSULIN ASPART 3 UNITS: 100 INJECTION, SOLUTION INTRAVENOUS; SUBCUTANEOUS at 12:44

## 2023-01-05 RX ADMIN — BUPRENORPHINE HYDROCHLORIDE 4 MG: 2 TABLET SUBLINGUAL at 09:09

## 2023-01-05 RX ADMIN — VANCOMYCIN HYDROCHLORIDE 750 MG: 5 INJECTION, POWDER, LYOPHILIZED, FOR SOLUTION INTRAVENOUS at 21:46

## 2023-01-05 RX ADMIN — Medication 40 MG: at 08:16

## 2023-01-05 RX ADMIN — INSULIN GLARGINE 6 UNITS: 100 INJECTION, SOLUTION SUBCUTANEOUS at 09:11

## 2023-01-05 RX ADMIN — IBUPROFEN 600 MG: 200 TABLET, FILM COATED ORAL at 18:17

## 2023-01-05 RX ADMIN — HEPARIN SODIUM 5000 UNITS: 5000 INJECTION, SOLUTION INTRAVENOUS; SUBCUTANEOUS at 09:12

## 2023-01-05 RX ADMIN — INSULIN ASPART 2 UNITS: 100 INJECTION, SOLUTION INTRAVENOUS; SUBCUTANEOUS at 04:42

## 2023-01-05 RX ADMIN — INSULIN ASPART 3 UNITS: 100 INJECTION, SOLUTION INTRAVENOUS; SUBCUTANEOUS at 08:15

## 2023-01-05 RX ADMIN — BUPRENORPHINE HYDROCHLORIDE 4 MG: 2 TABLET SUBLINGUAL at 20:08

## 2023-01-05 RX ADMIN — QUETIAPINE FUMARATE 25 MG: 25 TABLET ORAL at 14:16

## 2023-01-05 ASSESSMENT — ACTIVITIES OF DAILY LIVING (ADL)
ADLS_ACUITY_SCORE: 45
ADLS_ACUITY_SCORE: 47
ADLS_ACUITY_SCORE: 45

## 2023-01-05 NOTE — PROGRESS NOTES
"Speech Pathology: Blue Dye Test and Speaking Valve Evaluation     01/05/23 1220   Appointment Info   Signing Clinician's Name / Credentials (SLP) TAQUERAI Marcelino   General Information   Referring Physician LOUISE Nieves   Patient/Family Therapy Goal Statement (SLP) I want to eat   Pertinent History of Current Problem Per H&P: \"Deidre N Aasen is a 27 year old female with DM I and polysubstance abuse admitted on 1/4/2023 to LTAC for continuation of antibiotic treatment and weaning off ventilator.   She is transferred from [facility] ICU after initial admission of 12/16/2022 with confusion and DKA, sepsis secondary to MRSA cavitary pneumonia, candidemia and incidental finding of COVID infection.  She needed intubation and mechanical ventilation and later tracheostomy.She had severe sepsis and developed ARDS, NANNETTE and encephalopathy which eventually recovered from.  She had candidemia and had full course micafungin for 2 weeks.   Other sources of infection such as endocarditis, spine infection, discitis have already been ruled out.  She no longer needs COVID or MRSA isolation.\" NPO with NG tube.   General Observations Alert and cooperative   Type of Evaluation   Type of Evaluation Swallow Evaluation;Artificial Airway (Speaking Valve)   Tracheostomy Assessment (Speaking Valve)   Cuff, Tracheostomy Tube cuffed, inflated   Date of Tracheostomy 12/30/22   Tube Size, Tracheostomy 6   Participation Ability (Speaking Valve) awake/alert;attempts to communicate, mouthing words   Type, Tracheostomy Tube Shiley   Respiratory Status (Speaking Valve)   Oxygen Supply trach mask   Oral/Tracheal Secretions (Speaking Valve)   Oral Secretions (Speaking Valve Assessment) moderate secretions   Tracheal Secretions (Speaking Valve Assessment) minimal secretions   Speaking Valve Trials (Speaking Valve)   Outcome of Trial (Speaking Valve) tolerance is good;patient anxious with valve placement  (anxiety in anticipation of " speaking valve, but eased with education, reassurance and subsequent successful placement)   Voice Production (Speaking Valve Trial) voicing achieved;poor strength/quality   Breath Support (Speaking Valve Trial) exhales through mouth   Set-up/Cuff Deflation (Speaking Valve Trial) nonventilator-dependent;tolerance with no vital sign changes;tolerance, adequate airflow;cuff deflated, total   Recommendations (Speaking Valve Trials) speaking valve use recommended;decrease trach tube size, allow upper airway airflow   Cough Production (Speaking Valve Trial) fair   Secretions/Suction, Cuff Deflation (Speaking Valve) oral suctioning prior to cuff deflation;tracheal suctioning post cuff deflation   Secretions During Valve Use (Speaking Valve Trial) secretions stable during valve use   Airflow/Phonation Air flow around trach adequate with finger occlusion   Speaking Valve placed on tracheostomy tube   Cuff Inflated at Onset of Evaluation Yes   Orders received to deflate cuff for PMSV trial Yes   Oxygen saturation after cuff deflation 98 %   Oxygen saturation with PMSV placement 96 %   Total amount of time with PMSV placement: 15   Respiratory Rate with PMSV placement 22 Per Minute   Oral Motor   Oral Musculature generally intact   Structural Abnormalities none present   Mucosal Quality adequate   Dentition (Oral Motor)   Dentition (Oral Motor) some missing teeth   Facial Symmetry (Oral Motor)   Facial Symmetry (Oral Motor) WNL   Lip Function (Oral Motor)   Lip Range of Motion (Oral Motor) WNL   Lip Strength (Oral Motor) WNL   Tongue Function (Oral Motor)   Tongue Strength (Oral Motor) WNL   Tongue Coordination/Speed (Oral Motor) WNL   Tongue ROM (Oral Motor) WNL   Cough/Swallow/Gag Reflex (Oral Motor)   Volitional Throat Clear/Cough (Oral Motor) reduced strength   Volitional Swallow (Oral Motor) weak   Vocal Quality/Secretion Management (Oral Motor)   Vocal Quality (Oral Motor) aphonia;hypophonic;strained/strangled    Secretion Management (Oral Motor) WNL   Comment, Vocal Quality/Secretion Management (Oral Motor) Blue Dye Test: The reason for the current test was to evaluate swallow/secretion management. The patient had moderate oral secretions and the patient had minimal  tracheal secretions. The dye was given with the cuff up. A spontaneous swallow was elicited. Hyolaryngeal movement appeared reduced. There was no blue dye noted around trach site. (Of note, some dye spilled from patient's tongue upon administration and onto trach collar and gauze. This was changed.) Upon initial tracheal suctioning with cuff down there was no blue dye observed, confirmed by RT present. Hand off to RT, Mahnaz, was completed in room. Follow-up suctioning for delayed aspiration to be completed by RT. Please see RT notes for details.   General Swallowing Observations   Past History of Dysphagia None known   Respiratory Support (General Swallowing Observations) trach collar   Current Diet/Method of Nutritional Intake (General Swallowing Observations, NIS) NPO;nasogastric tube (NG)   SLP Goals   Therapy Frequency (SLP Eval) 5 times/wk                       SLP Discharge Planning   SLP Plan Speaking valve trials, bedside swallow study/video swallow study once tolerating consistently.   SLP Discharge Recommendation Acute Rehab Center-Motivated patient will benefit from intensive, interdisciplinary therapy.  Anticipate will be able to tolerate 3 hours of therapy per day   SLP Rationale for DC Rec swallowing and communication below baseline. Appears motivated and able to participate   SLP Brief overview of current status  No evidence of aspiration of secretions. Is able to produce voice with speaking valve, but with reduced volume, suspect in part at least d/t excessive trach size in relation to trachea. Would benefit from trach downsized once pulmonary deems safe to do so.

## 2023-01-05 NOTE — PROGRESS NOTES
SPIRITUAL HEALTH SERVICES ([facility]) Progress Note  [facility].  [facility]     Visited briefly with  pt Deidre N Aasen .     Patient/Family Understanding of Illness and Goals of Care - This was a first and short introductory visit where I introduced myself and the role of Spiritual Care. From previous notes she had visits from Spiritual Care while at [facility].      Distress and Loss - [facility]     Strengths, Coping, and Resources - Bridget (ed Pradhan-thor) self-describes as a Protestant and appreciates prayer. She specifically asked for a Recovery Bible - Narcotics Anonymous. I brought her a [employer] Bible and will see about finding a Recovery Bible for her. She said that she is well supported by family.      Meaning, Beliefs, and Spirituality - To be explored next time. She did ask for a prayer for healing and recovery.      Plan of Care - Bridget is open to Spiritual Care visits. I will follow her as able during her hospitalization.        Alma Welch, Ph.D.,[facility]  , Spiritual Health Services      [facility] available 24/7 for emergency requests/referrals, either by having the on-call  paged or by entering an ASAP/STAT consult in Epic (this will also page the on-call ).

## 2023-01-05 NOTE — CONSULTS
CLINICAL NUTRITION SERVICES  -  ASSESSMENT NOTE      Recommendations Ordered by Registered Dietitian (RD):   MVI/M  Vitamin D deficiency screening  Continue current TF regimen as follows:   Type of Feeding Tube: NDT (placed 12/20)  Enteral Frequency: Continuous  Enteral Regimen: Vivonex RTF at 60 mL/hr  Total Enteral Provisions: 1440 mL daily provides 1440 kcal (48 kcal per kg), 72g protein (2.4 g per kg), 253g CHO, 0g fiber and 1221 mL free water. Meets < 100% of DRI's.  Free Water Flush: standard 60 mL q4 hours  TF + fluid flush = 1581 mL per day    Daily electrolyte check, Mg and Phos add on  Daily weights   Future/Additional Recommendations:   If patient continues to have large stool output after transition to elemental formula, may need to consider testing for GI diseases/infections.   Malnutrition:   % Weight Loss: difficult to determine true weight loss d/t patient having 4+ generalized edema with initial [place] admission weight. Suspect recent weight loss partly fluid-related  % Intake:  Decreased intake does not meet criteria for malnutrition - patient meeting needs in past 1 week, suspect not meeting needs PTA  Subcutaneous Fat Loss:  Orbital region severe depletion and Upper arm region severe depletion  Muscle Loss:  Temporal region severe depletion, Clavicle bone region severe depletion, Acromion bone region severe depletion, Dorsal hand region severe depletion, Anterior thigh region severe depletion and Posterior calf region severe depletion  Fluid Retention:  None noted    Malnutrition Diagnosis: Severe malnutrition  In Context of:  Acute illness or injury and environmental or social circumstances     REASON FOR ASSESSMENT  Bridgetre N Aasen is a 27 year old female seen by Registered Dietitian for Provider Order - Registered Dietitian to Assess and Order TF per Medical Nutrition Therapy Protocol    PMH:  - T1DM w/ history of DKA and noncompliance with medication, polysubstance abuse, anxiety, and  depression  - admitted d/t bilateral cavitary pneumonia due to MRSA, respiratory failure requiring mechanical ventilation, tracheostomy    NUTRITION HISTORY  - Information obtained from patient and chart  - Patient is on a regular diet at home  - Diet history: eats 2-3 meals/day PTAricky  - question nutrition status PTA d/t PMH  - patient states that she did have frequent diarrhea PTA    Patient known to [facility] RD services  NDT placed 12/20, TF started 12/21  Trialed DINKlife Peptide 1.5 but had questionable absorption as weight was dropping and having diarrhea  Changed to Vivonex RTF at 60 mL/hr on 1/3, water flushes 60 mL Q4 hours    CURRENT NUTRITION ORDERS  Diet Order:     None    Current Intake/Tolerance:  Patient able to mouth words, complaining of diarrhea but says it has improved a little over the past couple days since TF transition to elemental formula. Had formed, yellow stool today.  Denies nausea/vomiting.  Patient feeling hungry, asking when she can eat something    NUTRITION FOCUSED PHYSICAL ASSESSMENT FOR DIAGNOSING MALNUTRITION)  Yes         Observed:    Muscle wasting (refer to documentation in Malnutrition section) and Subcutaneous fat loss (refer to documentation in Malnutrition section)    Obtained from Chart/Interdisciplinary Team:  Previously noted coccyx wound, lesions are viral    ANTHROPOMETRICS  Height: 160 cm  Weight: 65 lbs 12.8 oz  Body mass index is 11.66 kg/m .  Weight Status:  Underweight BMI <18.5  Weight History: 5.7% weight loss in 1 month  Upon initial RD assessment 12/21, patient noted to have 4+ generalized edema with weight of 39.4 kg  Wt Readings from Last 10 Encounters:   01/05/23 41.1 kg (90 lb 8 oz)   01/04/23 33.5 kg (73 lb 13.7 oz)   12/13/22 43.6 kg (96 lb 1.9 oz)   09/17/19 54.4 kg (120 lb)   02/13/19 60.8 kg (134 lb)     LABS  Labs reviewed  Labs:  Electrolytes  Potassium (mmol/L)   Date Value   01/04/2023 4.2   01/03/2023 4.6   01/02/2023 4.7   09/17/2019 4.0      Phosphorus (mg/dL)   Date Value   01/04/2023 4.3   01/03/2023 4.4   01/02/2023 4.8 (H)   01/01/2023 4.7 (H)   12/31/2022 4.5    Blood Glucose  Glucose (mg/dL)   Date Value   01/02/2023 125 (H)   01/01/2023 93   12/26/2022 272 (H)   12/24/2022 288 (H)   12/23/2022 210 (H)   09/17/2019 655 (HH)     GLUCOSE BY METER POCT (mg/dL)   Date Value   01/05/2023 214 (H)   01/05/2023 168 (H)   01/05/2023 191 (H)   01/04/2023 169 (H)   01/04/2023 171 (H)     Hemoglobin A1C (%)   Date Value   12/13/2022 16.4 (H)    Inflammatory Markers  CRP Inflammation (mg/L)   Date Value   12/24/2022 150.0 (H)   12/16/2022 277.0 (H)   12/16/2022 468.79 (H)     WBC (10e9/L)   Date Value   09/17/2019 6.3   11/03/2015 14.2 (H)     WBC Count (10e3/uL)   Date Value   01/04/2023 8.8   01/03/2023 12.7 (H)   01/02/2023 8.6     Albumin (g/dL)   Date Value   01/01/2023 1.3 (L)   12/22/2022 1.2 (L)   12/16/2022 2.8 (L)   12/13/2022 3.8      Magnesium (mg/dL)   Date Value   01/04/2023 2.1   01/03/2023 2.2   01/02/2023 2.3     Sodium (mmol/L)   Date Value   01/03/2023 139   01/02/2023 138   01/01/2023 138   09/17/2019 129 (L)    Renal  Urea Nitrogen (mg/dL)   Date Value   01/02/2023 17   01/01/2023 18   12/26/2022 13   09/17/2019 12     Creatinine (mg/dL)   Date Value   01/02/2023 0.50 (L)   01/01/2023 0.57   12/31/2022 0.54   09/17/2019 0.41 (L)     Additional  Triglycerides (mg/dL)   Date Value   12/26/2022 477 (H)   12/21/2022 168 (H)     Ketones Urine (mg/dL)   Date Value   12/16/2022 >150 (A)   07/09/2015 Negative        MEDICATIONS  Medications reviewed    buprenorphine  4 mg Sublingual BID     heparin ANTICOAGULANT  5,000 Units Subcutaneous BID     insulin aspart  1-12 Units Subcutaneous Q4H GINA     insulin glargine  6 Units Subcutaneous BID     multivitamins w/minerals  15 mL Per Feeding Tube Daily     pantoprazole  40 mg Per Feeding Tube QAM AC     QUEtiapine  25 mg Oral or Feeding Tube TID     sodium chloride (PF)  10 mL Intracatheter Q8H      traZODone  100 mg Oral or Feeding Tube At Bedtime     vancomycin  750 mg Intravenous Q12H        dextrose       - MEDICATION INSTRUCTIONS -        dextrose, glucose **OR** dextrose **OR** glucagon, hydrOXYzine **OR** hydrOXYzine, levalbuterol, lidocaine (buffered or not buffered), melatonin, naloxone **OR** naloxone **OR** naloxone **OR** naloxone, ondansetron **OR** ondansetron, - MEDICATION INSTRUCTIONS -, prochlorperazine **OR** prochlorperazine **OR** prochlorperazine, sodium chloride (PF), zolpidem     ASSESSED NUTRITION NEEDS PER APPROVED PRACTICE GUIDELINES:  Dosing Weight 41.1 kg (current weight)  Estimated Energy Needs: 5048-7143+ kcals (35-40+ Kcal/Kg)  Justification: repletion and underweight  Estimated Protein Needs: 62-82 grams protein (1.5-2+ g pro/Kg)  Justification: Repletion and hypercatabolism with critical illness  Estimated Fluid Needs: 1 mL/Kcal  Justification: maintenance or per provider pending fluid status    NUTRITION DIAGNOSIS:  Malnutrition related to acute illness as evidenced by severe muscle wasting and fat loss.    Unintended weight loss related to altered GI function (diarrhea), questionable absorption as evidenced by diarrhea and need for elemental TF formula.    NUTRITION INTERVENTIONS  Recommendations / Nutrition Prescription  See top of note.    Implementation  Nutrition education: Introduced self, provided education on role of enteral nutrition and RD, discussed nutrition goals.  EN Composition, EN Schedule and Feeding Tube Flush    Nutrition Goals  Goal TF will meet % needs   Patient will not drop weight below 29.8 kg   Normalize bowel regimen    MONITORING AND EVALUATION:  Progress towards goals will be monitored and evaluated per protocol and Practice Guidelines    Simin Nguyen RDN, LD  Clinical Dietitian

## 2023-01-05 NOTE — PROVIDER NOTIFICATION
01/05/23 1517   Height and Weight   Weight 41.1 kg (90 lb 8 oz)  (verified on standing scale)     Previous weight of 65 pounds questionable. Re-weighed patient on standing scale. Patient actually weighs 90.5 pounds and bed will either need to be rezero'd or even better, only standing scale weights used.    Bev Grady, RN

## 2023-01-05 NOTE — PLAN OF CARE
Goal Outcome Evaluation:  Patient is a new admit from this evening. Patient and oriented but non-verbal. She has trach. She denies pain or discomfort. Patient's blood sugar was 73 mg/dl at arrival and complain symptoms of hypoglycemia. Patient has type 1 diabetes and is on insulin. D50 IVP given. Recheck blood sugar was 171 mg/dl. Patient's vital signs were stable.

## 2023-01-05 NOTE — PLAN OF CARE
Goal Outcome Evaluation:      Plan of Care Reviewed With: patient    Overall Patient Progress: improvingOverall Patient Progress: improving    Outcome Evaluation: Patient is progressing well. She had her blue dye test today with no noticeable dye suctioned from her trach and she is tolerating the PMV very well. She got up in her chair for two hours today also.    CHG bath completed. Hair washed and braided.     Bev Grady RN

## 2023-01-05 NOTE — PLAN OF CARE
Problem: Mechanical Ventilation Invasive  Goal: Effective Communication  Outcome: Progressing  Goal: Optimal Device Function  Outcome: Progressing  Intervention: Optimize Device Care and Function  Recent Flowsheet Documentation  Taken 1/5/2023 1517 by Mahnaz Hobbs RT  Airway Safety Measures: all equipment/monitors on and audible  Taken 1/5/2023 1215 by Mahnaz Hobbs RT  Airway Safety Measures: all equipment/monitors on and audible  Taken 1/5/2023 1047 by Mahnaz Hobbs RT  Airway Safety Measures: all equipment/monitors on and audible  Taken 1/5/2023 0729 by Mahnaz Hobbs RT  Airway Safety Measures: all equipment/monitors on and audible  Taken 1/5/2023 0726 by Mahnaz Hobbs RT  Airway Safety Measures: all equipment/monitors on and audible  Goal: Mechanical Ventilation Liberation  Outcome: Progressing  Goal: Absence of Device-Related Skin and Tissue Injury  Outcome: Progressing  Goal: Absence of Ventilator-Induced Lung Injury  Outcome: Progressing   Goal Outcome Evaluation:

## 2023-01-05 NOTE — PROVIDER NOTIFICATION
Admitting Diagnosis: Bilateral MRSA cavitary PNA   Pertinent PMH: Type I Diabetes, polysubstance abuse and anxiety and depression  Mobility:  assistance, 1 person. Ok for bedside commode per PT  Diet: Adult Formula Drip Feeding: Continuous Vivonex RTF; Nasoduodenal tube; Goal Rate: 60; mL/hr; Change TF formula to Vivonex RTF, increase TF rate to 50 mL/hr x 12 hours and then advance to goal of 60 mL/hr  Mental Status:   Alert and oriented x 4. Easy to understand when she mouths words  O2: 30% FIO2  Mg+: 2.1 (01/04 0523)  CR:  Being drawn in am  K:  4.2 (01/04 0523)  PLT: 631 (01/04 0523)  HGB: 8.6 (01/04 0523)  BS: 214 (01/05 0754) given 3 units of novolog    /70 (BP Location: Left arm)    Pulse 81    Temp 99  F (37.2  C) (Oral)    Resp 16    Wt (!) 29.8 kg (65 lb 12.8 oz)    SpO2 96%   BMI 11.66 kg/m      Iso:  Contact for MRSA     Consults: Pulmonary Consult still needed, being called by [facility]  LDAs:  PICC and NG/OG and trach  Alarms/Safety: Bed Alarm unable to set because patient is too light for it to function properly  Pertinent Labs/Imaging: Vanco level in am  Telemetry: No     01/05/23 0832   Influenza Screen   Patient/guardian Refuse Vaccine Yes  (patient does not want flu shot)        01/05/23 0800   RLE Neurovascular Assessment   Sensation RLE numbness present  (per patient toes are numb)     This has been present since her hospitalization began. MD notified.

## 2023-01-05 NOTE — PLAN OF CARE
Goal Outcome Evaluation:  Patient slept all night. No signs of pain or distress noted. Patient's vital omkar were stable. Patient repositioned every two hours. All due cares and medications were given.

## 2023-01-05 NOTE — CONSULTS
Consultation - Pulmonary Medicine  Bridget N Aasen,  1995, MRN [id number]    Acute respiratory failure (H) [J96.00]   Code status:  Full Code       Extended Emergency Contact Information  Primary Emergency Contact: Aasen,Eric  Address: [address] [place] [address]  Home Phone: [phone]  Work Phone: [phone]  Relation: Father  Secondary Emergency Contact: Laurel Rodriguez  Mobile Phone: [phone]  Relation: Aunt       Impressions:   Principal Problem:    Bilateral MRSA cavitary pneumonia  Active Problems:    Anxiety with depression    Methamphetamine abuse (H)    Heroin abuse (H)    Pneumonia of both lungs due to MRSA (H)    Chronic respiratory failure requiring continuous mechanical ventilation through tracheostomy (H)    History of severe sepsis with cavitary pneumonia due to MRSA    Diabetes mellitus type 1 with Hx of DKA    Acute respiratory failure (H)    27 year old female with polysubstance abuse admitted on 2022 for DKA, respiratory failure from concurrent covid and bacterial pneumonia.  MRSA bacteremia and necrotizing pneumonia and candida bacteremia.  Has been making some improvements but has reached a plateau mostly limited by sedation/agitation and inability to wean from ventilator. Now s/p tracheostomy .  Transferred to LTAC .    Discussion of pertinent problems:  1. Acute hypoxic/hypercapnic respiratory failure: Tolerating PST all day prior to transfer  2. Tracheostomy: 6 Shiley placed  6 percutaneously.  Sutures removed   3. Dysphagia s/p NJ tube  4. Complex b/l cavitary pneumonia 2/2 MRSA pneumonia: Last positive BAL: MRSA .  CT chest  shows the largest cavities in  the right lower lobe appear decreased in size and multiple new small cavities in the right lung.  5. Covid19 infection(resolved)  6. MRSA bacteremia.  No endocarditis on CATHY.  Last blood cx pos from .  On Vanco  7. Candida glabrata fungemia s/p  Micafungin course   7. Malnutrition  8. Substance use disorder now on buprenorphine        Recommendations and Plans:   1. Start Phase 2: TM/PMV as tolerated.    2. BDT to assess aspiration risk.  No immediate, and tolerating PMV well per RT.  If delayed aspiration, remove speaking valve and inflate trach cuff  3. Trach change? Earliest first change would be Monday, 1/9.  Likely downsize to 6 Bivona at that time  4. Bronch hygiene: xopenex nebs PRN  5. Repeat CT chest wo contrast in 4-6 weeks from 12/23 for cavitary lesion surveillance.   6. To repeat blood cultures on 1/5 and again on 1/20 per ID - defer to primary   7. IV vanco for MRSA bacteremia - per primary  8. CxR given subjective left lower rib pain: reviewed and no acute findings.  9. If she stays off the vent and tolerates the PMV well thru the night, we can consider a VFSS tomorrow  10. Anticipate she will progress quickly towards decannulation    I updated her father today via telephone            Chief Complaint Cavitary pneumonia       HPI   I have been asked by Dr. Feliciano to see Bridget N Aasen in consultation for trach and ventilator management.    Deidre N Aasen is a 27 year old year old female admitted to [facility] on 1/4/2023 for ongoing treatment of respiratory failure.    The referring facility is [facility].  Records from that facility are available to assist in historical details.  Further history is provided by patient's father.    27-year-old female with a past medical history significant for type 1 diabetes, noncompliant with insulin regimen, depression, anxiety.  Presented to [facility] on 12/13/2022 with evidence of diabetic ketoacidosis and concern that she had not been taking her typical insulin regimen.  Her pH at that point was 7.09 with a bicarbonate of 9. She was started on typical DKA insulin regimen and did have improvement in her ketones and slight improvement in her bicarbonate as well as anion  gap.  She was diagnosed with COVID-19 at that point and started on remdesivir.  The patient improved and left AMA.  Returned to the Emergency Department at [facility] with a glucose of 561, anion gap of 28, pH of 6.79.  Chest x-ray showing evidence of right middle lobe and right lower lobe pneumonia with a 5 cm lobulated lucent region concerning for possible abscess.  CTA with no PE but evidence of right lower lobe consolidation and extensive airway debris concerning for possible pneumonia, suspect aspiration.  Head CT at [facility] negative for acute pathology.    Admitted to [facility] on 12/16/2022 for DKA, respiratory failure from concurrent covid and bacterial pneumonia.  MRSA bacteremia and necrotizing pneumonia and candida bacteremia.  Has been making some improvements but has reached a plateau mostly limited by sedation/agitation and inability to wean from ventilator. Now s/p tracheostomy 12/30.  Significant improvement in vent weaning after trach placed.  Completed course of Micafungin prior to LTAC admission on 1/4.  Remains on IV Vanco.     Admitted yesterday on vent   Vent Mode: CPAP/PS  (Continuous positive airway pressure with Pressure Support)  FiO2 (%): 30 %  Resp Rate (Set): 14 breaths/min  Tidal Volume (Set, mL): 370 mL  PEEP (cm H2O): 5 cmH2O  Pressure Support (cm H2O): 8 cmH2O  Resp: 16     Suctioned x1 overnight for small amount of secretions    Today she is on 30L/30% trach dome with SpO2 97% and looks quite comfortable.  Denies sob, n/v, fever chills.  She is positive for left lower rib pain that appears to be musculoskeletal.  This pain started a couple days ago per patient.  Otherwise she feels good and eager to get her trach and feeding tube removed.           Medical History  [unfilled]  [email or web address] History  Reviewed, and she  reports that she has never smoked. She has never used smokeless tobacco. She reports current drug use. She reports that she does not drink  alcohol.    Smoking history:   History   Smoking Status     Never   Smokeless Tobacco     Never    Family History  Reviewed, and family history is not on file.   Allergies  Allergies   Allergen Reactions     Ciprofloxacin Other (See Comments)     Vomiting; diarrhea              Current Medications:     buprenorphine  4 mg Sublingual BID     heparin ANTICOAGULANT  5,000 Units Subcutaneous BID     insulin aspart  1-12 Units Subcutaneous Q4H GINA     insulin glargine  6 Units Subcutaneous BID     multivitamins w/minerals  15 mL Per Feeding Tube Daily     pantoprazole  40 mg Per Feeding Tube QAM AC     QUEtiapine  25 mg Oral or Feeding Tube TID     sodium chloride (PF)  10 mL Intracatheter Q8H     traZODone  100 mg Oral or Feeding Tube At Bedtime     vancomycin  750 mg Intravenous Q12H          Review of Systems:  A 10-system review was obtained and is negative with the exception of the symptoms noted above. Physical Exam:  Temp:  [98.1  F (36.7  C)-99.1  F (37.3  C)] 99  F (37.2  C)  Pulse:  [63-81] 81  Resp:  [13-21] 16  BP: ()/(47-79) 113/70  FiO2 (%):  [30 %] 30 %  SpO2:  [95 %-100 %] 96 %  [unfilled]  Ventilator settings: Vent Mode: CPAP/PS  (Continuous positive airway pressure with Pressure Support)  FiO2 (%): 30 %  Resp Rate (Set): 14 breaths/min  Tidal Volume (Set, mL): 370 mL  PEEP (cm H2O): 5 cmH2O  Pressure Support (cm H2O): 8 cmH2O  Resp: 16      EXAM:  Physical Exam  Gen: no distress on trach dome  HEENT: NT, trach midline/intact  CV: RRR, no m/g/r  Resp: CTAB; non-labored, tender left lower ribs  Abd: soft, nontender, BS+  Skin: no rashes or lesions  Ext: no edema  Neuro: PERRL, nonfocal exam       Pertinent Labs:  Lab Results: personally reviewed.   Recent Labs   Lab 01/04/23  0523   WBC 8.8   HGB 8.6*   HCT 26.9*   *     Recent Labs   Lab 01/03/23  0406 01/02/23  0454 01/01/23  0404    138 138   CO2  --  28 26   BUN  --  17 18   ALKPHOS  --   --  75   ALT  --   --  8   AST  --   --  12         Pertinent Radiology:  Radiology results: images and reports personally viewed; radiology read below   XR CHEST PORT 1 VIEW  LOCATION: [facility]  DATE/TIME: 1/5/2023 1:10 PM     INDICATION: resp failure s p trach; left lower rib pain  COMPARISON: 12/30/2022                                                                      IMPRESSION: Tracheostomy in good position in the mid trachea. Feeding tube tip in the proximal jejunum and right upper extremity PICC at the SVC-RA junction.     Patchy airspace opacities in the right lower lung, decreased compared to prior. No pneumothorax. Unchanged tiny pleural effusions.     Normal cardiomediastinal silhouette.  --------------------------------    CHEST ONE VIEW  12/30/2022                                                                   IMPRESSION: Endotracheal tube tip 4 cm from the demar. Enteric tube  tip below the margin of the study. PICC line stable from the right.  Moderately extensive infiltrates on the right similar to previous.  Question minimal pleural fluid bilaterally. Minimal atelectasis and/or  infiltrate on the left similar to previous.  ----------  MR CERVICAL SPINE W/O CONTRAST, MR LUMBAR SPINE W/O CONTRAST, MR THORACIC SPINE W/O CONTRAST  LOCATION: [facility]  DATE/TIME: 12/26/2022     INDICATION: mrsa bacteremia, concern for spinal abscess diskitis                                                                IMPRESSION:     CERVICAL SPINE MRI:  1.  Normal cervical cord.  2.  No significant canal or foraminal narrowing at any level.  3.  With limitations of a noncontrast examination, no definite discitis osteomyelitis complex or an infectious process within the spinal canal.     THORACIC SPINE MRI:  1.  Normal thoracic cord.  2.  No significant canal or foraminal narrowing at any level.  3.  With limitations of a noncontrast examination, no definite discitis osteomyelitis complex or an  infectious process within the spinal canal.     LUMBAR SPINE MRI:  1.  Normal distal cord.  2.  No significant canal or foraminal narrowing at any level.  3.  With   -------------------  CT CHEST WITH CONTRAST  12/23/2022 12:02 PM     CLINICAL HISTORY: Interval evaluation of right chest  cavitations/infection.     TECHNIQUE: CT chest with IV contrast. Multiplanar reformats were  obtained. Dose reduction techniques were used.     CONTRAST:  64 mL isovue 370     COMPARISON: 12/19/2022     FINDINGS:   LUNGS AND PLEURA: Peribronchial infiltrates again seen throughout both  upper lobes and right middle lobe. In the right upper lobe, previously  seen nodular infiltrate has become cavitary. The right middle lobe  cavity has decreased fluid when compared to previous. There remains  dense consolidation both lower lobes, with air bronchograms. There is  a 4 cm cavity in the lateral right lower lobe, stable to slightly  smaller. Subcentimeter cavity in the medial right lower lobe, slightly  decreased. There are new small cavitary spaces in the anterior right  lower lobe. No significant pleural effusion on the right. Small left  pleural effusion.     MEDIASTINUM/AXILLAE: Endotracheal tube in good position above the  demar. Mildly enlarged lymph nodes should be reactive. No coronary  artery calcification.     UPPER ABDOMEN: No significant finding.     MUSCULOSKELETAL: Unremarkable.                                                                      IMPRESSION: Cavitary pneumonia bilaterally. The largest cavities in  the right lower lobe appear decreased in size; however, there are  multiple new small cavities in the right lung.   Other pertinent data:  Echocardiogram 12/20/2022  Interpretation Summary     1. No intracardiac mass or thrombus.  2. Normal left ventricular size and systolic function. LVEF 60-65%.  3. Normal right ventricular size and systolic function.  4. Normal cardiac valves. No endocarditis.  5. Bubble study  was negative for inter-atrial shunt.          Tracheostomy tube data:  Date of initial placement: 12/30  Current tube - type: Shiley , size: 6       Extensive record review is performed.  Key information about patient is reviewed in detail.  The respiratory plan of care is discussed with RT.  This patient will be rounded on regularly while requiring mechanical ventilator support.  Please contact us with questions or concerns.    Total time spent on pt examination and coordination of care was 55min   Martín Nieves CNP  Pulmonary Medicine  [facility]  Pager [phone]

## 2023-01-05 NOTE — PROGRESS NOTES
RT PROGRESS NOTE     DATA:     CURRENT SETTINGS:             TRACH TYPE / SIZE:  #6 Suad (placed 12/30)             MODE:   TM/PMV             FIO2:   30%/30L     ACTION:             THERAPIES:   PRN             SUCTION:                           FREQUENCY:   x3                        AMOUNT:   Small to Moderate                        CONSISTENCY:   Thick/Thin                        COLOR:   Pale Yellow             SPONTANEOUS COUGH EFFORT/STRENGTH OF EFFORT (not elicited by suctioning): Strong Spontaneous Cough                           WEANING PHASE:   Phase 2                        WEAN MODE:    30%/30L TM/PMV, PS 8/5                        WEAN TIME:   10:47am, 3 hours and 18 minutes                        END WEAN REASON:   Still Weaning, PS Before TM     RESPONSE:             BS:   Coarse             VITAL SIGNS:   Sating 96-99%, HR 66-82, RR 16-20             EMOTIONAL NEEDS / CONCERNS:  NA                RISK FOR SELF DECANNULATION:  No     NOTE / PLAN:   TM/PMV as tolerated and VBG in the morning.  Full vent settings are AC 14, 370, +5, 30%.  BDT no immediate or delayed aspiration.  PMV started at 12:15pm, tolerating well.  RT will continue to monitor.

## 2023-01-05 NOTE — PROGRESS NOTES
[facility]    Medicine Progress Note - Hospitalist Service    Date of Admission:  1/4/2023    Assessment & Plan   Principal Problem:    Bilateral MRSA cavitary pneumonia (1/4/2023)  Active Problems:    Pneumonia of both lungs due to MRSA (H) (1/4/2023)    Chronic respiratory failure requiring continuous mechanical ventilation through tracheostomy (H) (1/4/2023)    Diabetes mellitus type 1 with Hx of DKA (1/4/2023)    Methamphetamine abuse (H) (2/14/2019)    Heroin abuse (H) (2/14/2019)    History of severe sepsis with cavitary pneumonia due to MRSA (1/4/2023)    Acute respiratory failure (H) (1/5/2023)    Costochondritis (1/5/2023)    Anxiety with depression (2/13/2019)    Costochondritis    Bilateral cavitary pneumonia due to MRSA: Continue with vancomycin, pharmacy will dose. Continue with mechanical ventilation.  Pulmonology is consulted for weaning off ventilation  Respiratory failure requiring continuous mechanical ventilation with tracheostomy: Stable and improving.  Due to MRSA pneumonia.  Pulmonology is consulted for weaning off ventilator.  Type 1 diabetes with history of DKA and noncompliance with medication: Blood glucose is higher than yesterday.  Continue with current dose of Lantus and SSI.  Monitor blood glucose levels and titrate Lantus dosage.  Polysubstance abuse: Patient has been on different medications and is not in withdrawal.  Currently is stable on buprenorphine 4 mg sublingual 2 times daily, hydroxyzine as needed and trazodone for depression.  Anxiety and depression: Controlled with hydroxyzine as needed and trazodone 100 mg nightly  Costochondritis: Lidoderm and PRN Motrin       Diet: Adult Formula Drip Feeding: Continuous Vivonex RTF; Nasoduodenal tube; Goal Rate: 60; mL/hr; Change TF formula to Vivonex RTF, increase TF rate to 50 mL/hr x 12 hours and then advance to goal of 60 mL/hr    DVT Prophylaxis: Pneumatic Compression Devices  [place] Catheter: Not present  Lines: PRESENT      PICC  12/21/22 Triple Lumen Right Basilic OK to use nurse aware-Site Assessment: WDL      Cardiac Monitoring: None  Code Status: Full Code      Clinically Significant Risk Factors                         # Severe Malnutrition: based on nutrition assessment, PRESENT ON ADMISSION       Disposition Plan      Expected Discharge Date: 01/11/2023      Destination: home with help/services            Rogelio Feliciano MD  Hospitalist Service  [facility]  Securely message with [employer] (more info)  Text page via [employer] Paging/Directory   ______________________________________________________________________    Interval History   Patient is generally stable.  Pulmonology is following and weaning patient off ventilator.  She reports some pain over the left anterior lower chest wall with tenderness which seems to be costochondritis.  Motrin and Lidoderm were added to her medications.  Stays alert and oriented.    Physical Exam   Vital Signs: Temp: 98.2  F (36.8  C) Temp src: Oral BP: 116/75 Pulse: 92   Resp: 18 SpO2: 93 % O2 Device: Trach dome Oxygen Delivery: 30 LPM  Weight: 90 lbs 8 oz  Patient is a thin and pale young lady laying on her back in her bed.  She has tracheostomy and is on ventilator.  She is alert and oriented and pleasant and cooperative.  HEENT: Moist oral mucosal moisture.  Neck: Supple, tracheostomy in place and functional and hooked up to ventilator  Chest: There is tenderness over the costochondral junctions of left lower ribs.  No deformity.  Normal breathing through ventilator.  No retraction.  Breathing sounds are normal  Heart with regular rate and rhythm no murmur  Abdomen is a scaphoid and nontender without mass or organomegaly.   deferred  Extremities without edema or cyanosis or clubbing or deformity.  Neurological exam is grossly nonfocal.  Patient does not have any facial droop and moves all extremities equally.  No ataxia or dysmetria.    Medical Decision Making       30 MINUTES SPENT BY ME on the date of  service doing chart review, history, exam, documentation & further activities per the note.  MANAGEMENT DISCUSSED with the following over the past 24 hours: Patient and nursing staff       Data            Range & Units 01/03/23 03:57 01/03/23 04:10 01/03/23 07:59 01/03/23 12:06 01/03/23 15:34 01/03/23 17:58 01/03/23 19:47 01/03/23 21:57 01/04/23 02:07 01/04/23 05:22 01/04/23 07:50 01/04/23 12:27 01/04/23 15:39 01/04/23 16:26 01/04/23 19:59 01/05/23 00:45 01/05/23 04:03 01/05/23 07:54 01/05/23 12:32 01/05/23 17:15   GLUCOSE BY METER poct  70 - 99 mg/dL 70 96 129 (H) 151 (H) 149 (H) 138 (H) 198 (H) 226 (H) 215 (H) 160 (H) 164 (H) 230 (H) 73 171 (H) 169 (H) 191 (H) 168 (H) 214 (H) 197 (H) 214 (H)     Imaging results reviewed over the past 24 hrs:   Recent Results (from the past 24 hour(s))   XR Chest Port 1 View    Narrative    EXAM: XR CHEST PORT 1 VIEW  LOCATION: [facility]  DATE/TIME: 1/5/2023 1:10 PM    INDICATION: resp failure s p trach; left lower rib pain  COMPARISON: 12/30/2022      Impression    IMPRESSION: Tracheostomy in good position in the mid trachea. Feeding tube tip in the proximal jejunum and right upper extremity PICC at the SVC-RA junction.    Patchy airspace opacities in the right lower lung, decreased compared to prior. No pneumothorax. Unchanged tiny pleural effusions.    Normal cardiomediastinal silhouette.

## 2023-01-05 NOTE — PLAN OF CARE
7 PM to 7 AM  RT PROGRESS NOTE     DATA:     CURRENT SETTINGS:             TRACH TYPE / SIZE:  6 Shiley TG placed 12/30/22             MODE:   AC 14/370/+5/30%    ACTION:             THERAPIES:                SUCTION:                           FREQUENCY:   X 1                        AMOUNT:   small                        CONSISTENCY:   thin/thick                        COLOR:   pale yellow             SPONTANEOUS COUGH EFFORT/STRENGTH OF EFFORT (not elicited by suctioning): Good                              WEANING PHASE:   Not started yet.                        WEAN MODE:                            WEAN TIME:                           END WEAN REASON:        RESPONSE:             BS:   Clear and dim             VITAL SIGNS:   SATs %, HR 72-81, RR 19-20             RISK FOR SELF DECANNULATION:  No     NOTE / PLAN:   Wean as able.       Problem: Mechanical Ventilation Invasive  Goal: Effective Communication  Outcome: Progressing  Goal: Optimal Device Function  Outcome: Progressing  Goal: Mechanical Ventilation Liberation  Outcome: Progressing  Goal: Absence of Device-Related Skin and Tissue Injury  Outcome: Progressing  Goal: Absence of Ventilator-Induced Lung Injury  Outcome: Progressing

## 2023-01-06 ENCOUNTER — ANCILLARY PROCEDURE (OUTPATIENT)
Dept: GENERAL RADIOLOGY | Facility: CLINIC | Age: 28
End: 2023-01-06
Attending: NURSE PRACTITIONER
Payer: COMMERCIAL

## 2023-01-06 ENCOUNTER — APPOINTMENT (OUTPATIENT)
Dept: SPEECH THERAPY | Facility: CLINIC | Age: 28
End: 2023-01-06
Attending: HOSPITALIST
Payer: COMMERCIAL

## 2023-01-06 ENCOUNTER — APPOINTMENT (OUTPATIENT)
Dept: PHYSICAL THERAPY | Facility: CLINIC | Age: 28
End: 2023-01-06
Attending: HOSPITALIST
Payer: COMMERCIAL

## 2023-01-06 LAB
CREAT SERPL-MCNC: 0.49 MG/DL (ref 0.51–0.95)
DEPRECATED CALCIDIOL+CALCIFEROL SERPL-MC: 23 UG/L (ref 20–75)
GFR SERPL CREATININE-BSD FRML MDRD: >90 ML/MIN/1.73M2
GLUCOSE BLDC GLUCOMTR-MCNC: 181 MG/DL (ref 70–99)
GLUCOSE BLDC GLUCOMTR-MCNC: 199 MG/DL (ref 70–99)
GLUCOSE BLDC GLUCOMTR-MCNC: 228 MG/DL (ref 70–99)
GLUCOSE BLDC GLUCOMTR-MCNC: 247 MG/DL (ref 70–99)
GLUCOSE BLDC GLUCOMTR-MCNC: 260 MG/DL (ref 70–99)
GLUCOSE BLDC GLUCOMTR-MCNC: 270 MG/DL (ref 70–99)
VANCOMYCIN SERPL-MCNC: 15.6 UG/ML

## 2023-01-06 PROCEDURE — 94003 VENT MGMT INPAT SUBQ DAY: CPT | Performed by: NURSE PRACTITIONER

## 2023-01-06 PROCEDURE — 999N000157 HC STATISTIC RCP TIME EA 10 MIN

## 2023-01-06 PROCEDURE — 92526 ORAL FUNCTION THERAPY: CPT | Mod: GN | Performed by: SPEECH-LANGUAGE PATHOLOGIST

## 2023-01-06 PROCEDURE — 250N000011 HC RX IP 250 OP 636: Performed by: HOSPITALIST

## 2023-01-06 PROCEDURE — 999N000009 HC STATISTIC AIRWAY CARE

## 2023-01-06 PROCEDURE — 92611 MOTION FLUOROSCOPY/SWALLOW: CPT | Mod: GN | Performed by: SPEECH-LANGUAGE PATHOLOGIST

## 2023-01-06 PROCEDURE — 97530 THERAPEUTIC ACTIVITIES: CPT | Mod: GP

## 2023-01-06 PROCEDURE — 97110 THERAPEUTIC EXERCISES: CPT | Mod: GP

## 2023-01-06 PROCEDURE — 80202 ASSAY OF VANCOMYCIN: CPT | Performed by: HOSPITALIST

## 2023-01-06 PROCEDURE — 258N000003 HC RX IP 258 OP 636: Performed by: HOSPITALIST

## 2023-01-06 PROCEDURE — 74230 X-RAY XM SWLNG FUNCJ C+: CPT

## 2023-01-06 PROCEDURE — 82565 ASSAY OF CREATININE: CPT | Performed by: HOSPITALIST

## 2023-01-06 PROCEDURE — 82306 VITAMIN D 25 HYDROXY: CPT | Performed by: HOSPITALIST

## 2023-01-06 PROCEDURE — 999N000123 HC STATISTIC OXYGEN O2DAILY TECH TIME

## 2023-01-06 PROCEDURE — 120N000017 HC R&B RESPIRATORY CARE

## 2023-01-06 PROCEDURE — 99232 SBSQ HOSP IP/OBS MODERATE 35: CPT | Performed by: HOSPITALIST

## 2023-01-06 PROCEDURE — 99207 PR CDG-CUT & PASTE-POTENTIAL IMPACT ON LEVEL: CPT | Performed by: HOSPITALIST

## 2023-01-06 PROCEDURE — 250N000013 HC RX MED GY IP 250 OP 250 PS 637: Performed by: HOSPITALIST

## 2023-01-06 PROCEDURE — 87040 BLOOD CULTURE FOR BACTERIA: CPT | Performed by: NURSE PRACTITIONER

## 2023-01-06 RX ORDER — BARIUM SULFATE 400 MG/ML
SUSPENSION ORAL ONCE
Status: COMPLETED | OUTPATIENT
Start: 2023-01-06 | End: 2023-01-06

## 2023-01-06 RX ADMIN — ZOLPIDEM TARTRATE 10 MG: 5 TABLET ORAL at 21:49

## 2023-01-06 RX ADMIN — BARIUM SULFATE 20 ML: 400 SUSPENSION ORAL at 11:09

## 2023-01-06 RX ADMIN — HEPARIN SODIUM 5000 UNITS: 5000 INJECTION, SOLUTION INTRAVENOUS; SUBCUTANEOUS at 20:48

## 2023-01-06 RX ADMIN — Medication 40 MG: at 09:29

## 2023-01-06 RX ADMIN — HEPARIN SODIUM 5000 UNITS: 5000 INJECTION, SOLUTION INTRAVENOUS; SUBCUTANEOUS at 09:10

## 2023-01-06 RX ADMIN — INSULIN ASPART 5 UNITS: 100 INJECTION, SOLUTION INTRAVENOUS; SUBCUTANEOUS at 00:03

## 2023-01-06 RX ADMIN — BARIUM SULFATE 60 ML: 400 SUSPENSION ORAL at 11:10

## 2023-01-06 RX ADMIN — INSULIN ASPART 3 UNITS: 100 INJECTION, SOLUTION INTRAVENOUS; SUBCUTANEOUS at 16:39

## 2023-01-06 RX ADMIN — INSULIN ASPART 2 UNITS: 100 INJECTION, SOLUTION INTRAVENOUS; SUBCUTANEOUS at 04:16

## 2023-01-06 RX ADMIN — ZOLPIDEM TARTRATE 10 MG: 5 TABLET ORAL at 00:13

## 2023-01-06 RX ADMIN — VANCOMYCIN HYDROCHLORIDE 750 MG: 5 INJECTION, POWDER, LYOPHILIZED, FOR SOLUTION INTRAVENOUS at 09:29

## 2023-01-06 RX ADMIN — TRAZODONE HYDROCHLORIDE 100 MG: 50 TABLET ORAL at 20:48

## 2023-01-06 RX ADMIN — INSULIN GLARGINE 6 UNITS: 100 INJECTION, SOLUTION SUBCUTANEOUS at 21:13

## 2023-01-06 RX ADMIN — VANCOMYCIN HYDROCHLORIDE 1000 MG: 5 INJECTION, POWDER, LYOPHILIZED, FOR SOLUTION INTRAVENOUS at 22:06

## 2023-01-06 RX ADMIN — INSULIN GLARGINE 6 UNITS: 100 INJECTION, SOLUTION SUBCUTANEOUS at 09:12

## 2023-01-06 RX ADMIN — QUETIAPINE FUMARATE 25 MG: 25 TABLET ORAL at 09:10

## 2023-01-06 RX ADMIN — INSULIN ASPART 6 UNITS: 100 INJECTION, SOLUTION INTRAVENOUS; SUBCUTANEOUS at 09:09

## 2023-01-06 RX ADMIN — INSULIN ASPART 4 UNITS: 100 INJECTION, SOLUTION INTRAVENOUS; SUBCUTANEOUS at 13:11

## 2023-01-06 RX ADMIN — LIDOCAINE 1 PATCH: 4 PATCH TOPICAL at 20:48

## 2023-01-06 RX ADMIN — MULTIVIT AND MINERALS-FERROUS GLUCONATE 9 MG IRON/15 ML ORAL LIQUID 15 ML: at 09:09

## 2023-01-06 RX ADMIN — BUPRENORPHINE HYDROCHLORIDE 4 MG: 2 TABLET SUBLINGUAL at 20:48

## 2023-01-06 RX ADMIN — QUETIAPINE FUMARATE 25 MG: 25 TABLET ORAL at 13:53

## 2023-01-06 RX ADMIN — BUPRENORPHINE HYDROCHLORIDE 4 MG: 2 TABLET SUBLINGUAL at 09:10

## 2023-01-06 RX ADMIN — QUETIAPINE FUMARATE 25 MG: 25 TABLET ORAL at 20:48

## 2023-01-06 RX ADMIN — INSULIN ASPART 5 UNITS: 100 INJECTION, SOLUTION INTRAVENOUS; SUBCUTANEOUS at 20:45

## 2023-01-06 ASSESSMENT — ACTIVITIES OF DAILY LIVING (ADL)
ADLS_ACUITY_SCORE: 44
ADLS_ACUITY_SCORE: 45
ADLS_ACUITY_SCORE: 44
ADLS_ACUITY_SCORE: 44
ADLS_ACUITY_SCORE: 45
ADLS_ACUITY_SCORE: 44
ADLS_ACUITY_SCORE: 45
ADLS_ACUITY_SCORE: 44
ADLS_ACUITY_SCORE: 45
ADLS_ACUITY_SCORE: 45

## 2023-01-06 NOTE — PROGRESS NOTES
After Visit Summary   1/26/2018    Claude Higgins    MRN: [id number]           Patient Information     Date Of Birth          1973        Visit Information        Provider Department      1/26/2018 8:00 AM Lorenza Hinson MD [facility]        Today's Diagnoses     Convulsions, unspecified convulsion type (H)    -  1    Seizure disorder (H)           Follow-ups after your visit        Follow-up notes from your care team     Return in about 2 months (around 3/26/2018).      Your next 10 appointments already scheduled     Feb 01, 2018  8:30 AM CST   EEG with [facility] EEG 2   [facility] ([facility])    [address], [address]  [facility] [id number] [phone]            Mar 30, 2018  8:00 AM CDT   Return Visit with Lorenza Hinson MD   [facility] ([facility])    [address] [place] [id number] [phone]              Future tests that were ordered for you today     Open Future Orders        Priority Expected Expires Ordered    MR Brain w/o & w Contrast Routine  1/26/2019 1/26/2018    ORDER:  EEG video monitoring Routine  4/26/2018 1/26/2018            Who to contact     If you have questions or need follow up information about today's clinic visit or your schedule please contact [facility] directly at [phone].  Normal or non-critical lab and imaging results will be communicated to you by MyChart, letter or phone within 4 business days after the clinic has received the results. If you do not hear from us within 7 days, please contact the clinic through MyChart or phone. If you have a critical or abnormal lab result, we will notify you by phone as soon as possible.  Submit refill requests through [employer] or call your pharmacy and they will forward the refill request to us. Please allow 3 business days for your refill to be completed.          Additional  "Speech pathology: Clinical swallow evaluation     01/06/23 0816   Appointment Info   Signing Clinician's Name / Credentials (SLP) TAQUERIA Marcelino   Rehab Comments (SLP) Patient tolerated trach mask with speaking valve overnight without difficulty.   General Information   Onset of Illness/Injury or Date of Surgery 12/16/22   Referring Physician LOUISE Nieves   Patient/Family Therapy Goal Statement (SLP) I want to eat   Pertinent History of Current Problem Per H&P: \"Deidre N Aasen is a 27 year old female with DM I and polysubstance abuse admitted on 1/4/2023 to LTAC for continuation of antibiotic treatment and weaning off ventilator.   She is transferred from [facility] ICU after initial admission of 12/16/2022 with confusion and DKA, sepsis secondary to MRSA cavitary pneumonia, candidemia and incidental finding of COVID infection.  She needed intubation and mechanical ventilation and later tracheostomy.She had severe sepsis and developed ARDS, NANNETTE and encephalopathy which eventually recovered from.  She had candidemia and had full course micafungin for 2 weeks.   Other sources of infection such as endocarditis, spine infection, discitis have already been ruled out.  She no longer needs COVID or MRSA isolation.\" NPO with NG tube.   General Observations Patient alert cooperative.  Eager for p.o. intake.  Speaking valve in place upon arrival with strong voicing   Type of Evaluation   Type of Evaluation Swallow Evaluation   Tracheostomy Assessment (Speaking Valve)   Date of Tracheostomy 12/30/22   Tube Size, Tracheostomy 6   Type, Tracheostomy Tube Shiley   Respiratory Status (Speaking Valve)   Oxygen Supply trach mask   Oral Motor   Oral Musculature generally intact   Structural Abnormalities none present   Mucosal Quality adequate   Dentition (Oral Motor)   Dentition (Oral Motor) some missing teeth   Facial Symmetry (Oral Motor)   Facial Symmetry (Oral Motor) WNL   Lip Function (Oral Motor)   Lip Range " "Information About Your Visit        MyChart Information     [employer] gives you secure access to your electronic health record. If you see a primary care provider, you can also send messages to your care team and make appointments. If you have questions, please call your primary care clinic.  If you do not have a primary care provider, please call [phone] and they will assist you.      [employer] is an electronic gateway that provides easy, online access to your medical records. With [employer], you can request a clinic appointment, read your test results, renew a prescription or communicate with your care team.     To access your existing account, please contact your [facility] or call [phone] for assistance.        Care EveryWhere ID     This is your Care EveryWhere ID. This could be used by other organizations to access your [facility] medical records  [id number]        Your Vitals Were     Pulse Temperature Height Pulse Oximetry BMI (Body Mass Index)       71 97.5  F (36.4  C) (Oral) 1.791 m (5' 10.5\") 99% 22.63 kg/m2        Blood Pressure from Last 3 Encounters:   01/26/18 122/81   10/18/17 144/89   07/31/17 132/72    Weight from Last 3 Encounters:   01/26/18 72.6 kg (160 lb)   10/18/17 71.6 kg (157 lb 12.8 oz)   08/09/17 70.3 kg (155 lb)                 Where to get your medicines      These medications were sent to [facility] [address]   [address] [place] [address]     Phone:  [phone]     lamoTRIgine 25 MG tablet          Primary Care Provider Office Phone # Fax #    Loreto Merced Clement [phone]       XXX RESIGNED XXX [address] [place] [address]        Equal Access to Services     [facility] RENEE : Mehul Sage, wacharlida luqadaha, qaybta kaalmada lindsey, piero chisholm. So [facility] [phone].    ATENCIÓN: Si habla español, tiene a nicholas disposición " of Motion (Oral Motor) WNL   Lip Strength (Oral Motor) WNL   Tongue Function (Oral Motor)   Tongue Strength (Oral Motor) WNL   Tongue Coordination/Speed (Oral Motor) WNL   Tongue ROM (Oral Motor) WNL   Cough/Swallow/Gag Reflex (Oral Motor)   Volitional Throat Clear/Cough (Oral Motor) reduced strength   Volitional Swallow (Oral Motor) weak   Vocal Quality/Secretion Management (Oral Motor)   Vocal Quality (Oral Motor) strained/strangled  (Mild)   Secretion Management (Oral Motor) WNL   General Swallowing Observations   Past History of Dysphagia No immediate or delayed aspiration of secretions on blue dye test administered yesterday   Respiratory Support (General Swallowing Observations) trach collar   Current Diet/Method of Nutritional Intake (General Swallowing Observations, NIS) NPO;nasogastric tube (NG)   Swallowing Evaluation Clinical swallow evaluation   Clinical Swallow Evaluation   Feeding Assistance no assistance needed   Clinical Swallow Evaluation Textures Trialed thin liquids;mildly thick liquids;pureed   Clinical Swallow Eval: Thin Liquid Texture Trial   Mode of Presentation, Thin Liquids cup   Volume of Liquid or Food Presented 2 ounces   Oral Phase of Swallow WFL;other (see comments)  (Somewhat large gulps)   Pharyngeal Phase of Swallow coughing/choking;throat clearing;other (see comments)  (Increased respiratory rate, decreased oxygen saturations to mid 80s with timely return to mid 90s.)   Successful Strategies Trialed During Procedure other (see comments)  (Reduced bolus size)   Diagnostic Statement Patient presented with mild signs and symptoms potentially suggestive of aspiration.  Small sips strategies showed some potential benefit   Clinical Swallow Eval: Mildly Thick Liquids   Mode of Presentation cup   Volume Presented 3 ounces   Oral Phase WFL;other (see comments)  (Somewhat large gulps)   Pharyngeal Phase repeated swallows;other (see comments)  (Decrease in oxygen saturations to upper 80s)    Diagnostic Statement Patient presents with muted signs and symptoms potentially suggestive of aspiration and/or oral pharyngeal dysphagia   Clinical Swallow Evaluation: Puree Solid Texture Trial   Mode of Presentation, Puree spoon   Volume of Puree Presented 2 ounces   Oral Phase, Puree WFL;other (see comments)  (Mildly large bite-size)   Pharyngeal Phase, Puree repeated swallows   Diagnostic Statement No overt signs and symptoms of aspiration, but did present with multiple swallows, potentially suggestive of pharyngeal residue.  Patient endorses mild globus sensation that clears with multiple swallows.   Esophageal Phase of Swallow   Patient reports or presents with symptoms of esophageal dysphagia No   Swallowing Recommendations   Diet Consistency Recommendations NPO   Instrumental Assessment Recommendations VFSS (videofluoroscopic swallowing study)   Comment, Swallowing Recommendations Patient presents with inconsistent signs symptoms of aspiration and oral pharyngeal dysphagia.  Given risk for silent aspiration status post trach, inconsistent signs symptoms of aspiration during clinical evaluation, continuing n.p.o. status until more objective evaluation can be completed via video swallow study is recommended.  Suspect large diameter of trach in relation to trachea may be a factor in swallow safety and effectiveness.   General Therapy Interventions   Planned Therapy Interventions Dysphagia Treatment   Dysphagia treatment Instruction of safe swallow strategies;Compensatory strategies for swallowing   Clinical Impression   Criteria for Skilled Therapeutic Interventions Met (SLP Eval) Yes, treatment indicated   SLP Diagnosis Dysphagia   Risks & Benefits of therapy have been explained evaluation/treatment results reviewed;care plan/treatment goals reviewed;participants voiced agreement with care plan;participants included;patient   Clinical Impression Comments Bedside Swallow Study completed. Patient had subtle  servicios gratuitos de asistencia lingüística. Ranjit wright [phone].    We comply with applicable federal civil rights laws and [place] laws. We do not discriminate on the basis of race, color, national origin, age, disability, sex, sexual orientation, or gender identity.            Thank you!     Thank you for choosing [facility]  for your care. Our goal is always to provide you with excellent care. Hearing back from our patients is one way we can continue to improve our services. Please take a few minutes to complete the written survey that you may receive in the mail after your visit with us. Thank you!             Your Updated Medication List - Protect others around you: Learn how to safely use, store and throw away your medicines at www.disposemymeds.org.          This list is accurate as of 1/26/18  9:28 AM.  Always use your most recent med list.                   Brand Name Dispense Instructions for use Diagnosis    ADVIL 200 MG capsule   Generic drug:  ibuprofen      Take 200 mg by mouth 3 times daily as needed        cholecalciferol 1000 UNIT tablet    vitamin D3    90 tablet    Take 1 tablet (1,000 Units) by mouth daily    Low vitamin D level       lamoTRIgine 25 MG tablet    LaMICtal    180 tablet    Take 3 tablets (75 mg) by mouth 2 times daily Please call him today    Seizure disorder (H)          s/s aspiration with thin liquids, mildly thick liquids, and purée. Oral motor function was intact.  Hyolaryngeal elevation appears reduced upon visualization and palpation. Recommend diet of n.p.o. until video swallow study can be completed to provide more objective information regarding swallow safety and function.   SLP Total Evaluation Time   Eval: oral/pharyngeal swallow function, clinical swallow Minutes (19398) 20   Swallowing Dysfunction &/or Oral Function for Feeding   Treatment of Swallowing Dysfunction &/or Oral Function for Feeding Minutes (38367) 8   Treatment Detail/Skilled Intervention To reduce signs and symptoms of aspiration noted clinically, patient was instructed in use of compensatory swallow studies including: Chin tuck, small sip size.  Patient demonstrated understanding of strategies as evidenced by correct implementation and execution.  Patient demonstrated reduced signs and symptoms of aspiration with small sip strategy, at least inconsistently.  Chin tuck strategy increased immediacy of cough suggesting ineffectiveness as a strategy.   SLP Discharge Planning   SLP Plan Video swallow study   SLP Discharge Recommendation Acute Rehab Center-Motivated patient will benefit from intensive, interdisciplinary therapy.  Anticipate will be able to tolerate 3 hours of therapy per day   SLP Rationale for DC Rec swallowing and communication below baseline. Appears motivated and able to participate   SLP Brief overview of current status  Continue n.p.o. until video swallow study completed   Total Session Time   Total Session Time (sum of timed and untimed services) 28

## 2023-01-06 NOTE — PLAN OF CARE
Problem: Hyperglycemia  Goal: Blood Glucose Level Within Targeted Range  Outcome: Progressing   Goal Outcome Evaluation:          Latest Reference Range & Units 01/06/23 12:22   GLUCOSE BY METER POCT 70 - 99 mg/dL 228 (H)   (H): Data is abnormally high        Problem: Infection  Goal: Absence of Infection Signs and Symptoms  1/6/2023 1435 by Ana Lilia Luke, RN  Outcome: Progressing  1/6/2023 1434 by Ana Lilia Luke, RN  Outcome: Progressing   Alert and oriented, on a vent support.  High Blood sugar.  Blood culture line done and sent to lab  and peripheral not done patient refused for another stick.   Oral Diet started, ate 75%.   Ana Lilia Luke, RN

## 2023-01-06 NOTE — CONSULTS
Care Management Initial Consult    General Information  Deidre N Aasen is a 27 year old female with DM I and polysubstance abuse admitted on 1/4/2023 to LTAC for continuation of antibiotic treatment and weaning off ventilator.   She is transferred from [facility] ICU after initial admission of 12/16/2022 with confusion and DKA, sepsis secondary to MRSA cavitary pneumonia, candidemia and incidental finding of COVID infection.  She needed intubation and mechanical ventilation and later tracheostomy.   She had severe sepsis and developed ARDS, NANNETTE and encephalopathy which eventually recovered from.  She had candidemia and had full course micafungin for 2 weeks.   Other sources of infection such as endocarditis, spine infection, discitis have already been ruled out.  She no longer needs COVID or MRSA isolation.   She needs to be on IV vancomycin through 1/16/2023 and needs to be weaned off ventilator. She has NJ tube and is tube fed.  She is currently stable and afebrile and off pressors and micafungin.   She needs another blood culture repeat on 1/5/2023 and 1/20/2023.     Assessment completed with: Patient, patient  Type of CM/SW Visit: Initial Assessment  Called Jeronimo-father after meeting patient.    Primary Care Provider verified and updated as needed: No   Readmission within the last 30 days:        Reason for Consult: care coordination/care conference, discharge planning  Advance Care Planning:            Communication Assessment  Patient's communication style: spoken language (English or Bilingual)    Hearing Difficulty or Deaf: no   Wear Glasses or Blind: no    Cognitive  Cognitive/Neuro/Behavioral: .WDL except  Level of Consciousness: alert  Arousal Level: opens eyes spontaneously  Orientation: oriented x 4  Mood/Behavior: flat affect  Best Language: 0 - No aphasia  Speech: trached    Living Environment:   People in home: friend(s)  unknown  Current living Arrangements: homeless      Able to return to  prior arrangements: no.  Patient is planning to move to [place] with her father Jeronimo.       Family/Social Support:  Care provided by: self  Provides care for: no one  Marital Status: Single  Parent(s) (Jeronimo-father)          Description of Support System: Supportive, Involved    Support Assessment: Adequate family and caregiver support    Current Resources:   Patient receiving home care services: No     Community Resources: None  Equipment currently used at home: none  Supplies currently used at home: None    Employment/Financial:  Employment Status: unemployed        Financial Concerns:     Referral to Financial Worker: No       Lifestyle & Psychosocial Needs:  Social Determinants of Health     Tobacco Use: Low Risk      Smoking Tobacco Use: Never     Smokeless Tobacco Use: Never     Passive Exposure: Not on file   Alcohol Use: Not on file   Financial Resource Strain: Not on file   Food Insecurity: Not on file   Transportation Needs: Not on file   Physical Activity: Not on file   Stress: Not on file   Social Connections: Not on file   Intimate Partner Violence: Not on file   Depression: Not on file   Housing Stability: Not on file       Functional Status:  Prior to admission patient needed assistance: Independent, living on her own             Mental Health Status:  Mental Health Status: Current Concern.  Anxiety/Depression-no current  Treatment plan in place    Chemical Dependency Status:  Chemical Dependency Status: Current Concern.  herion and meth use.  Patient and father both verbalized they are in discussions about future CD/MI treatment, after going back to [place].           Values/Beliefs:  Spiritual, Cultural Beliefs, Protestant Practices, Values that affect care: no               Additional Information:  Writer completed What To Expect meeting with patient/family.  LTACH staffing and services were explained, and the ELOS/Target stay of days.    Writer completed What To Expect meeting with patient/family.  LTACH  staffing and services were explained, and the ELOS/Target stay of days.    WTE meeting/assessment took place on 01/06/23 @ 1000. Care progression meeting scheduled for 01/11/23 @ 10:00  Patient's exact discharge date, time, disposition TBD  SW will continue to follow for psychosocial and emotional support of patient and family. SW to facilitate discharge to home vs TCU when pt no longer requires LTACH level of care.      Tal Norman, LICSW  [facility]  [phone]        Tal Norman, LICSW

## 2023-01-06 NOTE — PROGRESS NOTES
Pulmonary Progress Note    Admit Date: 1/4/2023  CODE: Full Code    Assessment/Plan:   27 year old female with polysubstance abuse admitted on 12/16/2022 for DKA, respiratory failure from concurrent covid and bacterial pneumonia.  MRSA bacteremia and necrotizing pneumonia and candida bacteremia.  Has been making some improvements but has reached a plateau mostly limited by sedation/agitation and inability to wean from ventilator. Now s/p tracheostomy 12/30.  Transferred to LTAC 1/4.     Discussion of pertinent problems:  1. Acute hypoxic/hypercapnic respiratory failure: stayed off the vent overnight and doing well on continuous trach dome  2. Tracheostomy: 6 Shiley placed 12/30 6 percutaneously.  Sutures removed 1/4  3. Dysphagia s/p NJ tube: passed BDT yesterday and tolerating speaking valve continuously   4. Complex b/l cavitary pneumonia 2/2 MRSA pneumonia: Last positive BAL: MRSA 12/25.  CT chest 12/23 shows the largest cavities in the right lower lobe appear decreased in size and multiple new small cavities in the right lung.  On Vanco also for MRSA bacteremia   5. Covid19 infection(resolved)  6. MRSA bacteremia.  No endocarditis on ACTHY.  Last blood cx 12/26 neg.  On Vanco as above   7. Candida glabrata fungemia s/p Micafungin course   7. Malnutrition  8. Substance use disorder now on buprenorphine     Plan:  - Phase 2: TM/PMV as tolerated.  Likely start capping after trach downsize on Monday   - Check VBG tomorrow to ensure no pCO2 retention   - VFSS today and diet recommendation per SLP   - Trach change? Earliest first change would be Monday, 1/9.  Likely downsize to 6 Bivona at that time  - Bronch hygiene: xopenex nebs PRN  - Repeat CT chest wo contrast in 4-6 weeks from 12/23 for cavitary lesion surveillance.   - IV vanco for MRSA bacteremia & PNA thru 1/16 per ID.  To repeat BC 1/5 and 1/20.  I see no BCs obtained yesterday, will order BC today    Martín Nieves, LOUISE  Pulmonary Medicine  [facility]  [place]  Pager [phone]    Subjective/Interim Events:   - feeling well, denies sob, pain, n/v.  Tolerating PMV well with strong voice.  Eager to start eating     Tracheal secretions:  Overnight - x1, lg, thin/thick  Yesterday - x3, sm/mod, thin/thick    Cough strength: moderate    Current phase of ventilator weaning pathway:  Phase 2    Ventilator weaning results: continuous TM/PMV since 1/5    Clinical status discussed today with respiratory therapist, patient    Medications:       dextrose       - MEDICATION INSTRUCTIONS -         buprenorphine  4 mg Sublingual BID     heparin ANTICOAGULANT  5,000 Units Subcutaneous BID     insulin aspart  1-12 Units Subcutaneous Q4H GINA     insulin glargine  6 Units Subcutaneous BID     lidocaine  1 patch Transdermal Q24h    And     lidocaine   Transdermal Q8H GINA     multivitamins w/minerals  15 mL Per Feeding Tube Daily     pantoprazole  40 mg Per Feeding Tube QAM AC     QUEtiapine  25 mg Oral or Feeding Tube TID     sodium chloride (PF)  10 mL Intracatheter Q8H     traZODone  100 mg Oral or Feeding Tube At Bedtime     vancomycin  1,000 mg Intravenous Q12H         Exam/Data:   Vitals  /75 (BP Location: Left arm)   Pulse 91   Temp 98.3  F (36.8  C) (Oral)   Resp 20   Wt 41.1 kg (90 lb 8 oz)   SpO2 100%   BMI 16.04 kg/m       I/O last 3 completed shifts:  In: 2020 [I.V.:335; NG/GT:1257]  Out: 1600 [Urine:1600]  Weight change: -1.27 kg (-2 lb 12.8 oz)    Vent Mode: Trach collar  FiO2 (%): 30 %  Resp Rate (Set): 14 breaths/min  Tidal Volume (Set, mL): 370 mL  PEEP (cm H2O): 5 cmH2O  Pressure Support (cm H2O): 8 cmH2O  Resp: 20      EXAM:  Gen: no distress sitting in chair on TM/PMV  HEENT: NT, trach midline/intact, no stridor   CV: RRR, no m/g/r  Resp: CTAB; non-labored   Abd: soft, nontender, BS+  Skin: no rashes or lesions  Ext: no edema  Neuro: PERRL, nonfocal exam    ROS:  A 10-system review was obtained and is negative with the exception of the symptoms noted  above.    Labs:  Complete Blood Count   Recent Labs   Lab 01/04/23  0523 01/03/23  0406 01/02/23  0454 01/01/23 0404   WBC 8.8 12.7* 8.6 7.7   HGB 8.6* 7.3* 8.0* 7.8*   * 742* 643* 526*     Basic Metabolic Panel  Recent Labs   Lab 01/06/23  1222 01/06/23  0805 01/06/23  0601 01/06/23  0415 01/05/23  2356 01/04/23  0750 01/04/23  0523 01/03/23  0410 01/03/23  0406 01/02/23  0746 01/02/23  0454 01/01/23  0406 01/01/23  0404 01/01/23  0008 12/31/22 2047   NA  --   --   --   --   --   --   --   --  139  --  138  --  138  --   --    POTASSIUM  --   --   --   --   --   --  4.2  --  4.6  --  4.7  --  4.5  --   --    CHLORIDE  --   --   --   --   --   --   --   --   --   --  104  --  107  --   --    CO2  --   --   --   --   --   --   --   --   --   --  28  --  26  --   --    BUN  --   --   --   --   --   --   --   --   --   --  17  --  18  --   --    CR  --   --  0.49*  --   --   --   --   --   --   --  0.50*  --  0.57  --  0.54   * 270*  --  181* 247*   < >  --    < >  --    < > 125*   < > 93   < > 150*    < > = values in this interval not displayed.     Liver Function Tests  Recent Labs   Lab 01/01/23 0404   AST 12   ALT 8   ALKPHOS 75   BILITOTAL 0.3   ALBUMIN 1.3*       RADIOLOGY: Personally reviewed; radiology read below   XR Chest Port 1 View    Result Date: 1/5/2023  EXAM: XR CHEST PORT 1 VIEW LOCATION: [facility] DATE/TIME: 1/5/2023 1:10 PM INDICATION: resp failure s p trach; left lower rib pain COMPARISON: 12/30/2022     IMPRESSION: Tracheostomy in good position in the mid trachea. Feeding tube tip in the proximal jejunum and right upper extremity PICC at the SVC-RA junction. Patchy airspace opacities in the right lower lung, decreased compared to prior. No pneumothorax. Unchanged tiny pleural effusions. Normal cardiomediastinal silhouette.    XR Abdomen Port 1 View    Result Date: 1/4/2023  EXAM: XR ABDOMEN PORT 1 VIEW LOCATION: [facility]  DATE/TIME: 1/4/2023 5:18 PM INDICATION: Enteric tube placement COMPARISON: 12/20/2022     IMPRESSION: Feeding tube terminates at the level of the proximal jejunum. Mild gaseous distention of the visualized bowel. Patchy pulmonary opacities and small pleural effusion in the right lung base.

## 2023-01-06 NOTE — PLAN OF CARE
Problem: Plan of Care - These are the overarching goals to be used throughout the patient stay.    Goal: Optimal Comfort and Wellbeing  Intervention: Provide Person-Centered Care  Recent Flowsheet Documentation  Taken 1/5/2023 1906 by Mel Ayala RN  Trust Relationship/Rapport:   care explained   choices provided   emotional support provided   questions answered   questions encouraged     Problem: Mechanical Ventilation Invasive  Goal: Effective Communication  Intervention: Ensure Effective Communication  Recent Flowsheet Documentation  Taken 1/5/2023 1906 by Mel Ayala RN  Trust Relationship/Rapport:   care explained   choices provided   emotional support provided   questions answered   questions encouraged    Pt was in bed during shift.  Pt did put on call light and request assistance to go to bathroom.  Pt was a 1 assist to commode next to bed.  Prn ibuprofen given for pain in chest rated 5 of 10.  Pt reported ibuprofen was not helpful but evening medications  did help reduce pain.  Pt father Jeronimo called for an update.  Writer returned call and answered questions.  Pt does become agitated at beeping of food pump or Iv pump.

## 2023-01-06 NOTE — PLAN OF CARE
Problem: Mechanical Ventilation Invasive  Goal: Effective Communication  Outcome: Progressing  Goal: Optimal Device Function  Outcome: Progressing  Intervention: Optimize Device Care and Function  Recent Flowsheet Documentation  Taken 1/6/2023 1632 by Tamra Ortega RT  Airway Safety Measures: all equipment/monitors on and audible  Taken 1/6/2023 1135 by Tamra Ortega RT  Airway Safety Measures: all equipment/monitors on and audible  Taken 1/6/2023 0737 by Tamra Ortega RT  Airway Safety Measures: all equipment/monitors on and audible  Goal: Mechanical Ventilation Liberation  Outcome: Progressing  Goal: Absence of Device-Related Skin and Tissue Injury  Outcome: Progressing  Goal: Absence of Ventilator-Induced Lung Injury  Outcome: Progressing   RT PROGRESS NOTE     [id number]  DATA:     CURRENT SETTINGS:             TRACH TYPE / SIZE:  #6 Shiley TG placed on 12/30/22             MODE:   TM 30% 30L             FIO2:        ACTION:             THERAPIES:                SUCTION:  X3-4 gfor small to mod p-yellow thickish                         FREQUENCY:                           AMOUNT:                           CONSISTENCY:                           COLOR:                SPONTANEOUS COUGH EFFORT/STRENGTH OF EFFORT (not elicited by suctioning): Yes                              WEANING PHASE:  2                         WEAN MODE: TM / PMV 30% 30L, last noc  was the 1st noc off vent                         WEAN TIME:                           END WEAN REASON:        RESPONSE:             BS:                VITAL SIGNS:                EMOTIONAL NEEDS / CONCERNS:                  RISK FOR SELF DECANNULATION:                          RISK DUE TO:                          INTERVENTION/S IN PLACE IS/ARE:         NOTE / PLAN:   Goal Outcome Evaluation:     Pt had VSS today, started to eat.     Pulm order:  TM/PMV as tolerated.  VBG in the morning.  If has delayed aspiration via BDT, remove PMV and inflate cuff

## 2023-01-06 NOTE — PROGRESS NOTES
NUTRITION BRIEF NOTE :    TF Assess and Order  See RD note 1/5/23 for  Initial  assessment details    RECOMMENDATIONS FOR MDs/PROVIDERS TO ORDER:  Insulin:CHO dosing PRN    Recommendations already ordered by Registered Dietitian (RD):      Added moderate 60 g CHO/meal to diet order    Modified enteral feeding regimen to back up bolus feeding IF intake < 50 % of each meal: vivonex  ml TID (83 ml/h x 3 h).Flushes: water 100 ml b/4 and after bolus feeding if administered. This provides: 250 ml formula, 250 kcal, 12.5 g protein, 44 g carbohydrate, 0 g fiber, 242 ml free water from formula + 200 ml from flushes = 442 ml/ feeding        New findings in last 24 hours:    Pt staring on oral diet s/p VSS today:    Diet order: DIET EFFECTIVE NOW, Starting on Fri 1/6/23 at 1136, Until Specified  Liquid Consistency: Thin Liquids (level 0)  Upright to 90 degrees for all intake, small bites and sips with use of chin tuck with all swallowsDo not place a diet order here; use fields above, or search for combination diet.    Enteral: via ND placed 12/20/22 :CONTINUOUS, Starting on Wed 1/4/23 at 1606, Until Specified  Adult Formula: Vivonex RTF  Route: Nasoduodenal tube  Goal Rate: 60  Goal Units: mL/hr  Advancement Instruction (comments): Change TF formula to Vivonex RTF, increase TF rate to 50 mL/hr x 12 hours and then advance to goal of 60 mL/hr  Medication - Feeding Tube Flush Frequency: At least 15-30 mL water before and after medication administration and with tube clogging  Free Water Flush: standard 60 mL q4 hours  = 1440 mL  Formula,1440 kcal , 72g protein, 253g CHO, 0g fiber and 1221 mL free water + flushes.= 1581 mL per day    I/O last 3 completed shifts:    In: 2020 [I.V.:335; NG/GT:1257]    Out: 1600 [Urine:1600]    Labs: reviewed including:    Electrolytes  Potassium (mmol/L)   Date Value   01/04/2023 4.2   01/03/2023 4.6   01/02/2023 4.7   09/17/2019 4.0     Phosphorus (mg/dL)   Date Value   01/04/2023 4.3    01/03/2023 4.4   01/02/2023 4.8 (H)   01/01/2023 4.7 (H)   12/31/2022 4.5        Blood Glucose  Glucose (mg/dL)   Date Value   01/02/2023 125 (H)   01/01/2023 93   12/26/2022 272 (H)   12/24/2022 288 (H)   12/23/2022 210 (H)   09/17/2019 655 (HH)     GLUCOSE BY METER POCT (mg/dL)   Date Value   01/06/2023 270 (H)   01/06/2023 181 (H)   01/05/2023 247 (H)   01/05/2023 175 (H)   01/05/2023 214 (H)     Hemoglobin A1C (%)   Date Value   12/13/2022 16.4 (H)        Inflammatory Markers  CRP Inflammation (mg/L)   Date Value   12/24/2022 150.0 (H)   12/16/2022 277.0 (H)   12/16/2022 468.79 (H)     WBC (10e9/L)   Date Value   09/17/2019 6.3   11/03/2015 14.2 (H)     WBC Count (10e3/uL)   Date Value   01/04/2023 8.8   01/03/2023 12.7 (H)   01/02/2023 8.6     Albumin (g/dL)   Date Value   01/01/2023 1.3 (L)   12/22/2022 1.2 (L)   12/16/2022 2.8 (L)   12/13/2022 3.8        Magnesium (mg/dL)   Date Value   01/04/2023 2.1   01/03/2023 2.2   01/02/2023 2.3     Sodium (mmol/L)   Date Value   01/03/2023 139   01/02/2023 138   01/01/2023 138   09/17/2019 129 (L)        Renal  Urea Nitrogen (mg/dL)   Date Value   01/02/2023 17   01/01/2023 18   12/26/2022 13   09/17/2019 12     Creatinine (mg/dL)   Date Value   01/06/2023 0.49 (L)   01/02/2023 0.50 (L)   01/01/2023 0.57   09/17/2019 0.41 (L)         Additional  Triglycerides (mg/dL)   Date Value   12/26/2022 477 (H)   12/21/2022 168 (H)     Ketones Urine (mg/dL)   Date Value   12/16/2022 >150 (A)   07/09/2015 Negative            buprenorphine  4 mg Sublingual BID     heparin ANTICOAGULANT  5,000 Units Subcutaneous BID     insulin aspart  1-12 Units Subcutaneous Q4H GINA     insulin glargine  6 Units Subcutaneous BID     lidocaine  1 patch Transdermal Q24h    And     lidocaine   Transdermal Q8H GINA     multivitamins w/minerals  15 mL Per Feeding Tube Daily     pantoprazole  40 mg Per Feeding Tube QAM AC     QUEtiapine  25 mg Oral or Feeding Tube TID     sodium chloride (PF)  10 mL  "Intracatheter Q8H     traZODone  100 mg Oral or Feeding Tube At Bedtime     vancomycin  750 mg Intravenous Q12H          dextrose       - MEDICATION INSTRUCTIONS -              ANTHROPOMETRICS  Height: 63 \"  Weight: 41 kg   Body mass index is 16.04 kg/m .  Weight Status:  Underweight BMI <18.5    Weight History:   Wt Readings from Last 10 Encounters:   01/05/23 41.1 kg (90 lb 8 oz)   01/04/23 33.5 kg (73 lb 13.7 oz)   12/13/22 43.6 kg (96 lb 1.9 oz)   09/17/19 54.4 kg (120 lb)   02/13/19 60.8 kg (134 lb)     6 % weight loss in 1 month, as noted above, significant       Dosing Weight: 41 kg current   ASSESSED NUTRITION NEEDS  Estimated Energy Needs: 1400 + kcal/day (35 +kcal/kg)  Justification: Repletion and Underweight  Estimated Protein Needs: 60 + grams protein/day (1.5 + grams of pro/kg)  Justification: Repletion  Estimated Fluid Needs: 1200 + mL/day (30 + mL/kg)   Justification: Maintenance  Interventions:    Diet and Enteral order changes:see above   Collaboration and Referral of care: Discussed patient w/ MD, SLP   "

## 2023-01-06 NOTE — PLAN OF CARE
Problem: Mechanical Ventilation Invasive  Goal: Effective Communication  Outcome: Progressing  Intervention: Ensure Effective Communication  Recent Flowsheet Documentation  Taken 1/5/2023 9841 by Jossie Sagastume RN  Trust Relationship/Rapport:    care explained    choices provided   Goal Outcome Evaluation:         Patient was A/O X 4, was weaned throughout the shfit and patient tolerated it well. Patient denied pain , requested for prn Ambien that was given with good effect. Patient slept most of the shift, BS  was  247mg dL and 181 mg/dl and coverage was give per sliding scale. All other cares done.

## 2023-01-06 NOTE — PLAN OF CARE
7 PM to 7 AM  RT PROGRESS NOTE     DATA:     CURRENT SETTINGS: STBY: AC 14/370/+5/30%                TRACH TYPE / SIZE:   6 Shimarianne TG placed 12/30/22               MODE:   30% @ 30 LPM HFTM/PMV. This is pts first time weaning continuously on TM/PMV. She seems to be tolerating.     ACTION:             THERAPIES:                SUCTION:                           FREQUENCY:   X 1                         AMOUNT:   large                        CONSISTENCY:   thin/thick                        COLOR:   bloody pale yellow              SPONTANEOUS COUGH EFFORT/STRENGTH OF EFFORT (not elicited by suctioning):   Good                              WEANING PHASE:     TM/PMV as tolerated.                        WEAN MODE:    30% @ 30 LPM HFTM/PMV                          WEAN TIME:   TM started at 1047, PMV at 1215                         END WEAN REASON:  Ongoing      RESPONSE:             BS:   Clear              VITAL SIGNS:   SATs 93-94%, -103, RR 18-20             RISK FOR SELF DECANNULATION:  No     NOTE / PLAN:   VBG Friday to assess TM/PMV.  Wean as able.       Problem: Mechanical Ventilation Invasive  Goal: Effective Communication  Outcome: Progressing  Goal: Optimal Device Function  Outcome: Progressing  Goal: Mechanical Ventilation Liberation  Outcome: Progressing  Goal: Absence of Device-Related Skin and Tissue Injury  Outcome: Progressing  Goal: Absence of Ventilator-Induced Lung Injury  Outcome: Progressing

## 2023-01-06 NOTE — PHARMACY-VANCOMYCIN DOSING SERVICE
Pharmacy Vancomycin Note  Date of Service 2023  Patient's  1995   27 year old, female    Indication: MRSA  Day of Therapy: 18  Current vancomycin regimen:  750 mg IV q12h  Current vancomycin monitoring method: AUC  Current vancomycin therapeutic monitoring goal: 400-600 mg*h/L    Insight[employer] Prediction of Current Vancomycin Regimen  Loading dose: N/A  Regimen: 750 mg IV every 12 hours.  Start time: 10:55 on 2023  Exposure target: AUC24 (range)400-600 mg/L.hr   AUC24,ss: 392 mg/L.hr  Probability of AUC24 > 400: 43 %  Ctrough,ss: 11.2 mg/L  Probability of Ctrough,ss > 20: 0 %  Probability of nephrotoxicity (Lodise LEONORA ): 7 %    Current estimated CrCl = Estimated Creatinine Clearance: 113.9 mL/min (A) (based on SCr of 0.49 mg/dL (L)).    Creatinine for last 3 days  2023:  6:01 AM Creatinine 0.49 mg/dL    Recent Vancomycin Levels (past 3 days)  2023:  6:01 AM Vancomycin 15.6 ug/mL    Vancomycin IV Administrations (past 72 hours)                   vancomycin (VANCOCIN) 750 mg in sodium chloride 0.9 % 250 mL intermittent infusion (mg) 750 mg New Bag 23 0929     750 mg New Bag 23 2146     750 mg New Bag  1000     750 mg New Bag 23 2140    vancomycin (VANCOCIN) 750 mg in sodium chloride 0.9 % 250 mL intermittent infusion (mg) 750 mg New Bag 23 1006     750 mg New Bag 23 2230     750 mg New Bag  1047                Nephrotoxins and other renal medications (From now, onward)    Start     Dose/Rate Route Frequency Ordered Stop    23 1726  ibuprofen (ADVIL/MOTRIN) tablet 600 mg         600 mg Oral 3 TIMES DAILY PRN 23 1726      23 2200  vancomycin (VANCOCIN) 750 mg in sodium chloride 0.9 % 250 mL intermittent infusion         750 mg  over 90 Minutes Intravenous EVERY 12 HOURS 23 1605               Contrast Orders - past 72 hours (72h ago, onward)    None          Interpretation of levels and current regimen:  Vancomycin level is reflective  of AUC less than 400    Has serum creatinine changed greater than 50% in last 72 hours: No    Urine output:  good urine output    Renal Function: Stable    Insight[employer] Prediction of Planned New Vancomycin Regimen  Loading dose: N/A  Regimen: 1000 mg IV every 12 hours.  Start time: 10:55 on 01/06/2023  Exposure target: AUC24 (range)400-600 mg/L.hr   AUC24,ss: 519 mg/L.hr  Probability of AUC24 > 400: 99 %  Ctrough,ss: 14.9 mg/L  Probability of Ctrough,ss > 20: 2 %  Probability of nephrotoxicity (Lodise LEONORA 2009): 10 %    Plan:  1. Increase Dose to 1000mg Q12H  2. Vancomycin monitoring method: AUC  3. Vancomycin therapeutic monitoring goal: 400-600 mg*h/L  4. Pharmacy will check vancomycin levels as appropriate in 3-5 Days.  5. Serum creatinine levels will be ordered a minimum of twice weekly.    Migel Bond RPH

## 2023-01-06 NOTE — PROGRESS NOTES
"Speech Pathology: Video Swallow Study   01/06/23 1029   Appointment Info   Signing Clinician's Name / Credentials (SLP) TAQUERIA Marcelino   General Information   Onset of Illness/Injury or Date of Surgery 12/16/22   Referring Physician LOUISE Nieves   Patient/Family Therapy Goal Statement (SLP) I want to eat   Pertinent History of Current Problem Per H&P: \"Deidre N Aasen is a 27 year old female with DM I and polysubstance abuse admitted on 1/4/2023 to [facility] for continuation of antibiotic treatment and weaning off ventilator.   She is transferred from [facility] ICU after initial admission of 12/16/2022 with confusion and DKA, sepsis secondary to MRSA cavitary pneumonia, candidemia and incidental finding of COVID infection.  She needed intubation and mechanical ventilation and later tracheostomy.She had severe sepsis and developed ARDS, NANNETTE and encephalopathy which eventually recovered from.  She had candidemia and had full course micafungin for 2 weeks.   Other sources of infection such as endocarditis, spine infection, discitis have already been ruled out.  She no longer needs COVID or MRSA isolation.\" NPO with NG tube.   General Observations Patient alert cooperative.  Eager for p.o. intake.  Speaking valve in place upon arrival with strong voicing   Type of Evaluation   Type of Evaluation Swallow Evaluation   Tracheostomy Assessment (Speaking Valve)   Date of Tracheostomy 12/30/22   Tube Size, Tracheostomy 6   Type, Tracheostomy Tube Shiley   Respiratory Status (Speaking Valve)   Oxygen Supply trach mask   Oral Motor   Oral Musculature generally intact   Structural Abnormalities none present   Mucosal Quality adequate   Dentition (Oral Motor)   Dentition (Oral Motor) some missing teeth   Facial Symmetry (Oral Motor)   Facial Symmetry (Oral Motor) WNL   Lip Function (Oral Motor)   Lip Range of Motion (Oral Motor) WNL   Lip Strength (Oral Motor) WNL   Tongue Function (Oral Motor)   Tongue Strength " (Oral Motor) WNL   Tongue Coordination/Speed (Oral Motor) WNL   Tongue ROM (Oral Motor) WNL   Cough/Swallow/Gag Reflex (Oral Motor)   Volitional Throat Clear/Cough (Oral Motor) reduced strength   Volitional Swallow (Oral Motor) weak   Vocal Quality/Secretion Management (Oral Motor)   Vocal Quality (Oral Motor) strained/strangled  (mild)   Secretion Management (Oral Motor) WNL   General Swallowing Observations   Past History of Dysphagia No immediate or delayed aspiration of secretions on blue dye test administered yesterday. Inconsistent s/s aspiration noted on clinical swallow evaluation earlier today. Nursing called following that evaluation and added that patient appeared to have particulate consistent with apple sauce (used in clinical swallow evaluation earlier).   Respiratory Support (General Swallowing Observations) trach collar   Current Diet/Method of Nutritional Intake (General Swallowing Observations, NIS) NPO;nasogastric tube (NG)   Swallowing Evaluation Videofluoroscopic swallow study (VFSS)   VFSS Evaluation   Views Taken left lateral   VFSS Textures Trialed thin liquids;mildly thick liquids;moderately thick liquids/liquidized;pureed;solid foods   VFSS Eval: Thin Liquid Texture Trial   Mode of Presentation, Thin Liquid cup;straw   Preparatory Phase WFL   Oral Phase, Thin Liquid premature pharyngeal entry   Bolus Location When Swallow Triggered posterior laryngeal surface of epiglottis   Pharyngeal Phase, Thin Liquid impaired epiglottic movement  (delayed/slow)   Rosenbek's Penetration Aspiration Scale: Thin Liquid Trial Results 8 - contrast passes glottis, visible subglottic residue remains, absent patient response (aspiration)   Response to Aspiration absent response   Strategies and Compensations chin tuck;reduce bolus size   Diagnostic Statement Patient presented with laryngeal penetration and inconsistent aspiration with thin liquids. Use of small sip strategy and chin tuck was effective in  eliminating entrance into the airway with thin liquids via cup but not via straw.   VFSS Eval: Mildly Thick Liquids   Mode of Presentation cup   Preparatory Phase WFL   Oral Phase premature pharyngeal entry   Bolus Location When Swallow Triggered posterior laryngeal surface of epiglottis   Rosenbek's Penetration Aspiration Scale 3 - contrast remains above the vocal cords, visible residue remains (penetration)   Strategies and Compensations chin tuck;reduce bolus size   Diagnostic Statement Patient presented with non-transient laryngeal penetration  inconsistently with mildly thick liquids. This was eliminated with use of chin tuck strategy   VFSS Eval: Moderately Thick Liquids   Mode of Presentation spoon   Preparatory Phase WFL   Oral Phase WFL   Bolus Location When Swallow Triggered posterior angle of ramus   Pharyngeal Phase WFL   Rosenbek's Penetration Aspiration Scale 1 - no aspiration, contrast does not enter airway   Diagnostic Statement Oral and pharyngeal phase of swallow functional for moderately thick liquids   VFSS Evaluation: Puree Solid Texture Trial   Mode of Presentation, Puree spoon   Preparatory Phase WFL   Oral Phase, Puree WFL   Bolus Location When Swallow Triggered posterior angle of ramus   Pharyngeal Phase, Puree WFL   Rosenbek's Penetration Aspiration Scale: Puree Food Trial Results 1 - no aspiration, contrast does not enter airway   Diagnostic Statement Oral and pharyngeal phase of swallow functional for puree   VFSS Evaluation: Solid Food Texture Trial   Mode of Presentation, Solid self-fed   Preparatory Phase prolonged bolus preparation   Oral Phase, Solid WFL   Bolus Location When Swallow Triggered posterior angle of ramus   Pharyngeal Phase, Solid WFL   Rosenbek's Penetration Aspiration Scale: Solid Food Trial Results 1 - no aspiration, contrast does not enter airway   Diagnostic Statement Mildly impaired oral phase, suspect in part at least d/t missing dentition, possibly some weakness.  Pharyngeal phase appears adequate.   Esophageal Phase of Swallow   Patient reports or presents with symptoms of esophageal dysphagia No   Swallowing Recommendations   Diet Consistency Recommendations thin liquids (level 0);easy to chew (level 7)   Supervision Level for Intake patient independent   Mode of Delivery Recommendations bolus size, small;slow rate of intake;no straws   Postural Recommendations chin tuck   Monitoring/Assistance Required (Eating/Swallowing) monitor for cough or change in vocal quality with intake   Recommended Feeding/Eating Techniques (Swallow Eval) maintain upright sitting position for eating;maintain upright posture during/after eating for 30 minutes   Medication Administration Recommendations, Swallowing (SLP) via puree   Comment, Swallowing Recommendations Patient presents with mildly prolonged mastication with solid food item and laryngeal penetration and inconsistent aspiration with thin liquids and inconsistent laryngeal penetration with mildly thick liquids. Reduced entrance into the airway with all delivery modes and consistencies with exception of thin liquids via straw, by use of chin tuck and small sip strategies.   General Therapy Interventions   Planned Therapy Interventions Dysphagia Treatment   Dysphagia treatment Instruction of safe swallow strategies;Compensatory strategies for swallowing;Modified diet education   Clinical Impression   Criteria for Skilled Therapeutic Interventions Met (SLP Eval) Yes, treatment indicated   SLP Diagnosis dysphagia   Risks & Benefits of therapy have been explained evaluation/treatment results reviewed;care plan/treatment goals reviewed;participants voiced agreement with care plan;participants included;patient   Clinical Impression Comments Videofluoroscopic Swallow Study completed. Patient had silent aspiration with thin liquids and shallow penetration with mildly thick liquids inconsistently and moderate depth of penetration with thin  liquids. Reduced occurrence with use of chin tuck strategy and small bolus size. Oral motor function adequate and oral phase mildly impaired. Tongue base retraction was adequate. Swallow response was mildly delayed and epiglottic inversion was complete.  Minimal to no stasis occurred with any consistency . Hyolaryngeal elevation was adequate and hyolaryngeal excursion was adequate. Recommend diet of easy to chew food and thin liquids with use of chin tuck and small sip strategy.   SLP Total Evaluation Time   Eval: oral/pharyngeal swallow function, clinical swallow Minutes (53189) 15   Swallowing Dysfunction &/or Oral Function for Feeding   Treatment of Swallowing Dysfunction &/or Oral Function for Feeding Minutes (13282) 8   Treatment Detail/Skilled Intervention Under fluoroscopy, patient was instructed in use of chin tuck and reduced bolus size strategy. Patient did demonstrate understanding of instruction as evidenced by accurate execution of strategy. Result: Strategy was effective in reducing risk of entrance into the airway, except with use of straw with thin liquids.   SLP Discharge Planning   SLP Plan f/u re: diet tolerance, consistent use of chin tuck, reduced bolus size strategies   SLP Discharge Recommendation [facility]-Motivated patient will benefit from intensive, interdisciplinary therapy.  Anticipate will be able to tolerate 3 hours of therapy per day   SLP Rationale for DC Rec swallowing and communication below baseline. Appears motivated and able to participate   SLP Brief overview of current status  Appropriate to initiate diet of easy to chew food and thin liquids, but needs to use small sips, chin tuck with liquids.   Total Session Time   Total Session Time (sum of timed and untimed services) 23

## 2023-01-07 ENCOUNTER — APPOINTMENT (OUTPATIENT)
Dept: PHYSICAL THERAPY | Facility: CLINIC | Age: 28
End: 2023-01-07
Attending: HOSPITALIST
Payer: COMMERCIAL

## 2023-01-07 ENCOUNTER — APPOINTMENT (OUTPATIENT)
Dept: OCCUPATIONAL THERAPY | Facility: CLINIC | Age: 28
End: 2023-01-07
Attending: HOSPITALIST
Payer: COMMERCIAL

## 2023-01-07 ENCOUNTER — APPOINTMENT (OUTPATIENT)
Dept: SPEECH THERAPY | Facility: CLINIC | Age: 28
End: 2023-01-07
Attending: HOSPITALIST
Payer: COMMERCIAL

## 2023-01-07 LAB
BASE EXCESS BLDV CALC-SCNC: 6.2 MMOL/L (ref -8.1–1.9)
CA-I BLD-MCNC: 1.26 MMOL/L (ref 1.11–1.3)
CREAT SERPL-MCNC: 0.53 MG/DL (ref 0.51–0.95)
GFR SERPL CREATININE-BSD FRML MDRD: >90 ML/MIN/1.73M2
GLUCOSE BLD-MCNC: 180 MG/DL (ref 70–125)
GLUCOSE BLDC GLUCOMTR-MCNC: 144 MG/DL (ref 70–99)
GLUCOSE BLDC GLUCOMTR-MCNC: 175 MG/DL (ref 70–99)
GLUCOSE BLDC GLUCOMTR-MCNC: 184 MG/DL (ref 70–99)
GLUCOSE BLDC GLUCOMTR-MCNC: 189 MG/DL (ref 70–99)
GLUCOSE BLDC GLUCOMTR-MCNC: 215 MG/DL (ref 70–99)
GLUCOSE BLDC GLUCOMTR-MCNC: 223 MG/DL (ref 70–99)
HCO3 BLDV-SCNC: 29 MMOL/L (ref 24–30)
HGB BLD-MCNC: 10.5 G/DL (ref 11.7–15.7)
LACTATE BLD-SCNC: 0.5 MMOL/L (ref 0.7–2)
PCO2 BLDV: 50 MM HG (ref 35–50)
PH BLDV: 7.4 [PH] (ref 7.35–7.45)
PO2 BLDV: 36 MM HG (ref 25–47)
POTASSIUM BLD-SCNC: 4.2 MMOL/L (ref 3.5–5)
SATV LHE POCT: 68.5 % (ref 70–75)
SODIUM BLD-SCNC: 134 MMOL/L (ref 136–145)

## 2023-01-07 PROCEDURE — 258N000003 HC RX IP 258 OP 636: Performed by: HOSPITALIST

## 2023-01-07 PROCEDURE — 82330 ASSAY OF CALCIUM: CPT

## 2023-01-07 PROCEDURE — 120N000017 HC R&B RESPIRATORY CARE

## 2023-01-07 PROCEDURE — 99207 PR CDG-CUT & PASTE-POTENTIAL IMPACT ON LEVEL: CPT | Performed by: HOSPITALIST

## 2023-01-07 PROCEDURE — 82565 ASSAY OF CREATININE: CPT | Performed by: HOSPITALIST

## 2023-01-07 PROCEDURE — 92526 ORAL FUNCTION THERAPY: CPT | Mod: GN | Performed by: SPEECH-LANGUAGE PATHOLOGIST

## 2023-01-07 PROCEDURE — 999N000157 HC STATISTIC RCP TIME EA 10 MIN

## 2023-01-07 PROCEDURE — 999N000009 HC STATISTIC AIRWAY CARE

## 2023-01-07 PROCEDURE — 250N000013 HC RX MED GY IP 250 OP 250 PS 637: Performed by: HOSPITALIST

## 2023-01-07 PROCEDURE — 97110 THERAPEUTIC EXERCISES: CPT | Mod: GP

## 2023-01-07 PROCEDURE — 36415 COLL VENOUS BLD VENIPUNCTURE: CPT | Performed by: HOSPITALIST

## 2023-01-07 PROCEDURE — 250N000011 HC RX IP 250 OP 636: Performed by: HOSPITALIST

## 2023-01-07 PROCEDURE — 99232 SBSQ HOSP IP/OBS MODERATE 35: CPT | Performed by: HOSPITALIST

## 2023-01-07 PROCEDURE — 999N000123 HC STATISTIC OXYGEN O2DAILY TECH TIME

## 2023-01-07 PROCEDURE — 97530 THERAPEUTIC ACTIVITIES: CPT | Mod: GP

## 2023-01-07 PROCEDURE — 97110 THERAPEUTIC EXERCISES: CPT | Mod: GO | Performed by: OCCUPATIONAL THERAPIST

## 2023-01-07 RX ADMIN — BUPRENORPHINE HYDROCHLORIDE 4 MG: 2 TABLET SUBLINGUAL at 20:28

## 2023-01-07 RX ADMIN — QUETIAPINE FUMARATE 25 MG: 25 TABLET ORAL at 14:40

## 2023-01-07 RX ADMIN — ZOLPIDEM TARTRATE 10 MG: 5 TABLET ORAL at 20:56

## 2023-01-07 RX ADMIN — INSULIN ASPART 2 UNITS: 100 INJECTION, SOLUTION INTRAVENOUS; SUBCUTANEOUS at 00:23

## 2023-01-07 RX ADMIN — BUPRENORPHINE HYDROCHLORIDE 4 MG: 2 TABLET SUBLINGUAL at 08:51

## 2023-01-07 RX ADMIN — INSULIN GLARGINE 6 UNITS: 100 INJECTION, SOLUTION SUBCUTANEOUS at 20:26

## 2023-01-07 RX ADMIN — HEPARIN SODIUM 5000 UNITS: 5000 INJECTION, SOLUTION INTRAVENOUS; SUBCUTANEOUS at 08:51

## 2023-01-07 RX ADMIN — QUETIAPINE FUMARATE 25 MG: 25 TABLET ORAL at 08:52

## 2023-01-07 RX ADMIN — INSULIN ASPART 4 UNITS: 100 INJECTION, SOLUTION INTRAVENOUS; SUBCUTANEOUS at 12:26

## 2023-01-07 RX ADMIN — TRAZODONE HYDROCHLORIDE 100 MG: 50 TABLET ORAL at 20:28

## 2023-01-07 RX ADMIN — INSULIN ASPART 1 UNITS: 100 INJECTION, SOLUTION INTRAVENOUS; SUBCUTANEOUS at 03:51

## 2023-01-07 RX ADMIN — INSULIN ASPART 2 UNITS: 100 INJECTION, SOLUTION INTRAVENOUS; SUBCUTANEOUS at 08:52

## 2023-01-07 RX ADMIN — QUETIAPINE FUMARATE 25 MG: 25 TABLET ORAL at 20:29

## 2023-01-07 RX ADMIN — INSULIN GLARGINE 6 UNITS: 100 INJECTION, SOLUTION SUBCUTANEOUS at 08:52

## 2023-01-07 RX ADMIN — Medication 40 MG: at 08:51

## 2023-01-07 RX ADMIN — INSULIN ASPART 4 UNITS: 100 INJECTION, SOLUTION INTRAVENOUS; SUBCUTANEOUS at 16:52

## 2023-01-07 RX ADMIN — VANCOMYCIN HYDROCHLORIDE 1000 MG: 5 INJECTION, POWDER, LYOPHILIZED, FOR SOLUTION INTRAVENOUS at 22:43

## 2023-01-07 RX ADMIN — MULTIVIT AND MINERALS-FERROUS GLUCONATE 9 MG IRON/15 ML ORAL LIQUID 15 ML: at 08:51

## 2023-01-07 RX ADMIN — VANCOMYCIN HYDROCHLORIDE 1000 MG: 5 INJECTION, POWDER, LYOPHILIZED, FOR SOLUTION INTRAVENOUS at 09:57

## 2023-01-07 RX ADMIN — HEPARIN SODIUM 5000 UNITS: 5000 INJECTION, SOLUTION INTRAVENOUS; SUBCUTANEOUS at 20:26

## 2023-01-07 RX ADMIN — INSULIN ASPART 2 UNITS: 100 INJECTION, SOLUTION INTRAVENOUS; SUBCUTANEOUS at 20:27

## 2023-01-07 RX ADMIN — LIDOCAINE 1 PATCH: 4 PATCH TOPICAL at 20:24

## 2023-01-07 ASSESSMENT — ACTIVITIES OF DAILY LIVING (ADL)
ADLS_ACUITY_SCORE: 45

## 2023-01-07 NOTE — PLAN OF CARE
Goal Outcome Evaluation:         Problem: Mechanical Ventilation Invasive  Goal: Effective Communication  Outcome: Progressing  Intervention: Ensure Effective Communication     Problem: Mechanical Ventilation Invasive  Goal: Optimal Device Function  Intervention: Optimize Device Care and Function  Recent Flowsheet Documentation  Taken 1/6/2023 1801 by Mel Ayala RN  Airway Safety Measures: all equipment/monitors on and audible         Pt sat in chair for dinner eating 50% of meal and fed self independently.  Pt had no complaints of pain or discomfort.  Pt spent time with family in room and talked to father on phone.  Pt finger nails clipped and lotion applied to dry skin on hands and feet.  Pt requested an Ambien for sleep at Bed time.

## 2023-01-07 NOTE — PLAN OF CARE
Problem: Mechanical Ventilation Invasive  Goal: Effective Communication  Outcome: Adequate for Care Transition  Goal: Optimal Device Function  Intervention: Optimize Device Care and Function  Recent Flowsheet Documentation  Taken 1/6/2023 2041 by Theron Ortiz, RT  Airway Safety Measures: all equipment/monitors on and audible   Goal Outcome Evaluation:       RT PROGRESS NOTE     DATA:     CURRENT SETTINGS:             TRACH TYPE / SIZE:  #6 Shiley T-Guard placed in 12/30/22             MODE:   TM             FIO2:   30%     ACTION:             THERAPIES:   none             SUCTION:                           FREQUENCY:   X 1                        AMOUNT:   Large                         CONSISTENCY:   thick                        COLOR:   pale yellow             SPONTANEOUS COUGH EFFORT/STRENGTH OF EFFORT (not elicited by suctioning): fair.                               WEANING PHASE:   2                        WEAN MODE:    30%/30L TM/PMV                        WEAN TIME:   24/7 as tols                        END WEAN REASON:   ongoing.      RESPONSE:             BS:   clear and diminished.              VITAL SIGNS:   Blood pressure 116/76, pulse 72, temperature 98.2  F (36.8  C), temperature source Oral, resp. rate 18, weight 41.1 kg (90 lb 8 oz), SpO2 96 %, not currently breastfeeding.               EMOTIONAL NEEDS / CONCERNS:                  RISK FOR SELF DECANNULATION:                          RISK DUE TO:                          INTERVENTION/S IN PLACE IS/ARE:         NOTE / PLAN:   continue current orders and monitoring.

## 2023-01-07 NOTE — PROGRESS NOTES
[facility]    Medicine Progress Note - Hospitalist Service    Date of Admission:  1/4/2023    Assessment & Plan   Principal Problem:    Bilateral MRSA cavitary pneumonia (1/4/2023)  Active Problems:    Pneumonia of both lungs due to MRSA (H) (1/4/2023)    Chronic respiratory failure requiring continuous mechanical ventilation through tracheostomy (H) (1/4/2023)    Diabetes mellitus type 1 with Hx of DKA (1/4/2023)    Methamphetamine abuse (H) (2/14/2019)    Heroin abuse (H) (2/14/2019)    History of severe sepsis with cavitary pneumonia due to MRSA (1/4/2023)    Acute respiratory failure (H) (1/5/2023)    Costochondritis (1/5/2023)    Anxiety with depression (2/13/2019)    Costochondritis    Bilateral cavitary pneumonia due to MRSA: Continue with vancomycin, pharmacy will dose. Continue with mechanical ventilation.  Pulmonology is consulted for weaning off ventilation  Respiratory failure requiring continuous mechanical ventilation with tracheostomy: Stable and improving.  Due to MRSA pneumonia.  Pulmonology is consulted for weaning off ventilator.  Type 1 diabetes with history of DKA and noncompliance with medication: Blood glucose is higher than yesterday.  Continue with current dose of Lantus and SSI.  Monitor blood glucose levels and titrate Lantus dosage.  Polysubstance abuse: Patient has been on different medications and is not in withdrawal.  Currently is stable on buprenorphine 4 mg sublingual 2 times daily, hydroxyzine as needed and trazodone for depression.  Anxiety and depression: Controlled with hydroxyzine as needed and trazodone 100 mg nightly  Costochondritis: Lidoderm and PRN Motrin       Diet: Combination Diet Moderate Consistent Carb (60 g CHO per Meal) Diet; Easy to Chew (level 7); Thin Liquids (level 0)  Adult Formula Bolus Feeding: Vivonex RTF; Route: Nasogastric tube; 3 times daily; Volume per Bolus: 250; mL(s); Additional free water (mL): 100 ml H20 b/4 and after bolus feeding IF administered;  bolus feeding ONLY IF INTAKE , 50% of meal ( can ru...    DVT Prophylaxis: Pneumatic Compression Devices  [place] Catheter: Not present  Lines: PRESENT      PICC 12/21/22 Triple Lumen Right Basilic OK to use nurse aware-Site Assessment: WDL      Cardiac Monitoring: None  Code Status: Full Code      Clinically Significant Risk Factors          # Hypocalcemia: Lowest iCa = 1.26 mg/dL in last 2 days, will monitor and replace as appropriate                # Severe Malnutrition: based on nutrition assessment, PRESENT ON ADMISSION       Disposition Plan     Expected Discharge Date: 01/11/2023      Destination: home with help/services            Rogelio Feliciano MD  Hospitalist Service  [facility]  Securely message with [employer] (more info)  Text page via [employer] Paging/Directory   ______________________________________________________________________    Interval History   Patient is generally stable.  Pulmonology is following and weaning patient off ventilator.  She is on oral diet.  Blood glucose levels are 200s.  Continue monitoring.    Physical Exam   Vital Signs: Temp: 97.8  F (36.6  C) Temp src: Oral BP: 100/60 Pulse: 85   Resp: 19 SpO2: 98 % O2 Device: Trach dome Oxygen Delivery: 30 LPM  Weight: 90 lbs 8 oz  Patient is a thin and pale young lady laying on her back in her bed.  She has tracheostomy and is on ventilator.  She is alert and oriented and pleasant and cooperative.  HEENT: Moist oral mucosal moisture.  Neck: Supple, tracheostomy in place and functional and hooked up to ventilator  Chest: No deformity.  Normal breathing through ventilator.  No retraction.  Breathing sounds are normal  Heart with regular rate and rhythm no murmur  Abdomen is a scaphoid and nontender without mass or organomegaly.   deferred  Extremities without edema or cyanosis or clubbing or deformity.  Neurological exam is grossly nonfocal.  Patient does not have any facial droop and moves all extremities equally.  No ataxia or  dysmetria.    Medical Decision Making       30 MINUTES SPENT BY ME on the date of service doing chart review, history, exam, documentation & further activities per the note.  MANAGEMENT DISCUSSED with the following over the past 24 hours: Patient and nursing staff       Data     I have personally reviewed the following data over the past 24 hrs:     Imaging results reviewed over the past 24 hrs:   No results found for this or any previous visit (from the past 24 hour(s)).

## 2023-01-07 NOTE — PLAN OF CARE
Problem: Mechanical Ventilation Invasive  Goal: Absence of Device-Related Skin and Tissue Injury  Outcome: Progressing     Problem: Plan of Care - These are the overarching goals to be used throughout the patient stay.    Goal: Optimal Comfort and Wellbeing  Outcome: Progressing  Intervention: Provide Person-Centered Care  Recent Flowsheet Documentation  Taken 1/7/2023 0251 by Pati Merritt RN  Trust Relationship/Rapport: care explained     Problem: Hyperglycemia  Goal: Blood Glucose Level Within Targeted Range  Outcome: Progressing     Problem: Plan of Care - These are the overarching goals to be used throughout the patient stay.    Goal: Absence of Hospital-Acquired Illness or Injury  Intervention: Identify and Manage Fall Risk  Recent Flowsheet Documentation  Taken 1/7/2023 0251 by Pati Merritt RN  Safety Promotion/Fall Prevention: bed alarm on     Problem: Plan of Care - These are the overarching goals to be used throughout the patient stay.    Goal: Absence of Hospital-Acquired Illness or Injury  Intervention: Prevent Infection  Recent Flowsheet Documentation  Taken 1/7/2023 0251 by Pati Merritt RN  Infection Prevention:    rest/sleep promoted    hand hygiene promoted   Goal Outcome Evaluation:       Pt alert and oriented. No respiratory distress noted, Slept most of the night. No pain or discomfort. Will cont to monitor           Pati Tamayo RN

## 2023-01-07 NOTE — PLAN OF CARE
Problem: Mechanical Ventilation Invasive  Goal: Optimal Device Function  Outcome: Progressing  Intervention: Optimize Device Care and Function  Recent Flowsheet Documentation  Taken 1/7/2023 1552 by Robert Souza, RT  Airway Safety Measures: all equipment/monitors on and audible  Taken 1/7/2023 1130 by Robert Souza, RT  Airway Safety Measures: all equipment/monitors on and audible  Taken 1/7/2023 0800 by Robert Souza, RT  Airway Safety Measures: all equipment/monitors on and audible  Goal: Mechanical Ventilation Liberation  Outcome: Progressing  Goal: Absence of Device-Related Skin and Tissue Injury  Outcome: Progressing   RT PROGRESS NOTE     DATA:     CURRENT SETTINGS:             TRACH TYPE / SIZE: # 6 yung root             MODE:   30% + 30L             FIO2:   30%     ACTION:             THERAPIES:                SUCTION:                           FREQUENCY:   X3                        AMOUNT:   mod/large                        CONSISTENCY:  thin                        COLOR:   pale/yellow             SPONTANEOUS COUGH EFFORT/STRENGTH OF EFFORT (not elicited by suctioning):                               WEANING PHASE:   2                        WEAN MODE:    tm/pmv                         WEAN TIME:   as tim                        END WEAN REASON:   as tim     RESPONSE:             BS:   crs             VITAL SIGNS:   SPO2 93-96%, RR 20-22             EMOTIONAL NEEDS / CONCERNS:              RISK FOR SELF DECANNULATION:                          RISK DUE TO:                         INTERVENTION/S IN PLACE IS/ARE:       NOTE / PLAN:   Goal Outcome Evaluation:    Cont on 30%M with 30L, pt uses pmv as tim ,   Cont current care plan

## 2023-01-07 NOTE — PROGRESS NOTES
[facility]    Medicine Progress Note - Hospitalist Service    Date of Admission:  1/4/2023    Assessment & Plan   Principal Problem:    Bilateral MRSA cavitary pneumonia (1/4/2023)  Active Problems:    Pneumonia of both lungs due to MRSA (H) (1/4/2023)    Chronic respiratory failure requiring continuous mechanical ventilation through tracheostomy (H) (1/4/2023)    Diabetes mellitus type 1 with Hx of DKA (1/4/2023)    Methamphetamine abuse (H) (2/14/2019)    Heroin abuse (H) (2/14/2019)    History of severe sepsis with cavitary pneumonia due to MRSA (1/4/2023)    Acute respiratory failure (H) (1/5/2023)    Costochondritis (1/5/2023)    Anxiety with depression (2/13/2019)    Costochondritis    Bilateral cavitary pneumonia due to MRSA: Continue with vancomycin, pharmacy will dose. Continue with mechanical ventilation.  Pulmonology is consulted for weaning off ventilation  Respiratory failure requiring continuous mechanical ventilation with tracheostomy: Stable and improving.  Due to MRSA pneumonia.  Pulmonology is consulted for weaning off ventilator.  Type 1 diabetes with history of DKA and noncompliance with medication: Blood glucose is higher than yesterday.  Continue with current dose of Lantus and SSI.  Monitor blood glucose levels and titrate Lantus dosage.  Polysubstance abuse: Patient has been on different medications and is not in withdrawal.  Currently is stable on buprenorphine 4 mg sublingual 2 times daily, hydroxyzine as needed and trazodone for depression.  Anxiety and depression: Controlled with hydroxyzine as needed and trazodone 100 mg nightly  Costochondritis: Lidoderm and PRN Motrin       Diet: Combination Diet Moderate Consistent Carb (60 g CHO per Meal) Diet; Easy to Chew (level 7); Thin Liquids (level 0)  Adult Formula Bolus Feeding: Vivonex RTF; Route: Nasogastric tube; 3 times daily; Volume per Bolus: 250; mL(s); Additional free water (mL): 100 ml H20 b/4 and after bolus feeding IF administered;  bolus feeding ONLY IF INTAKE , 50% of meal ( can ru...    DVT Prophylaxis: Pneumatic Compression Devices  Sheffield Catheter: Not present  Lines: PRESENT      PICC 12/21/22 Triple Lumen Right Basilic OK to use nurse aware-Site Assessment: WDL      Cardiac Monitoring: None  Code Status: Full Code      Clinically Significant Risk Factors                         # Severe Malnutrition: based on nutrition assessment, PRESENT ON ADMISSION       Disposition Plan     Expected Discharge Date: 01/11/2023      Destination: home with help/services            Rogelio Feliciano MD  Hospitalist Service  [facility]  Securely message with [employer] (more info)  Text page via [email or web address] Paging/Directory   ______________________________________________________________________    Interval History   Patient is generally stable.  Pulmonology is following and weaning patient off ventilator.  She is a started on oral diet.  Her blood sugar readings are increasing.  I increased Lantus to 8 units twice daily from 6 units.    Physical Exam   Vital Signs: Temp: 98.3  F (36.8  C) Temp src: Oral BP: 112/67 Pulse: 84   Resp: 18 SpO2: 96 % O2 Device: Trach dome Oxygen Delivery: 30 LPM  Weight: 90 lbs 8 oz  Patient is a thin and pale young lady laying on her back in her bed.  She has tracheostomy and is on ventilator.  She is alert and oriented and pleasant and cooperative.  HEENT: Moist oral mucosal moisture.  Neck: Supple, tracheostomy in place and functional and hooked up to ventilator  Chest: No deformity.  Normal breathing through ventilator.  No retraction.  Breathing sounds are normal  Heart with regular rate and rhythm no murmur  Abdomen is a scaphoid and nontender without mass or organomegaly.   deferred  Extremities without edema or cyanosis or clubbing or deformity.  Neurological exam is grossly nonfocal.  Patient does not have any facial droop and moves all extremities equally.  No ataxia or dysmetria.    Medical Decision Making       30 MINUTES SPENT  BY ME on the date of service doing chart review, history, exam, documentation & further activities per the note.  MANAGEMENT DISCUSSED with the following over the past 24 hours: Patient and nursing staff       Data     I have personally reviewed the following data over the past 24 hrs:     Imaging results reviewed over the past 24 hrs:   Recent Results (from the past 24 hour(s))   XR Video Swallow with SLP or OT    Narrative    EXAM: XR VIDEO SWALLOW WITH SLP OR OT  LOCATION: [facility]  DATE/TIME: 1/6/2023 11:18 AM    INDICATION: Difficulty swallowing.  COMPARISON: None.    TECHNIQUE: Routine swallow study with speech pathology using multiple barium thicknesses.    FINDINGS:   FLUOROSCOPIC TIME: 1.1    Swallow study with Speech Pathology using multiple barium thicknesses.     Adequate oral phase.    Inconsistent delay in swallow reflex with pour over past the epiglottis during swallowing of thin liquid and mildly thick liquid. No delay in initiation of swallow reflex during swallowing of moderately thick liquid, puree or crunchy solid consistencies.   Complete epiglottic inversion was demonstrated during swallowing of all consistencies.    Deep laryngeal penetration and silent aspiration occurred during swallowing of thin liquid and nectar thick liquid in neutral position. Shallow laryngeal penetration occurred during swallowing of thin liquid using a chin tuck maneuver. No laryngeal   penetration or aspiration was seen during swallowing of moderately thick liquid, puree or crunchy solid consistencies.    No significant stasis.      Impression    IMPRESSION:  1.  Adequate oral phase.  2.  Inconsistent delay in swallow reflex with complete epiglottic inversion.  3.  Silent aspiration of thin liquid and nectar thick liquid in neutral position.  4.  Shallow laryngeal penetration during swallowing of thin liquid using chin tuck maneuver.  5.  No significant stasis.    Please refer to speech  therapy dysphasia evaluation report for additional information.    Examination performed by Marilee Christian RPA/ML, RT (R) under the general supervision of Dr. Fahrner .

## 2023-01-07 NOTE — PLAN OF CARE
Problem: Mechanical Ventilation Invasive  Goal: Absence of Device-Related Skin and Tissue Injury  1/7/2023 0701 by Pati Merritt RN  Outcome: Progressing  1/7/2023 0656 by Pati Merritt RN  Outcome: Progressing     Problem: Plan of Care - These are the overarching goals to be used throughout the patient stay.    Goal: Absence of Hospital-Acquired Illness or Injury  Intervention: Identify and Manage Fall Risk  Recent Flowsheet Documentation  Taken 1/7/2023 0251 by Pati Merritt RN  Safety Promotion/Fall Prevention: bed alarm on     Problem: Plan of Care - These are the overarching goals to be used throughout the patient stay.    Goal: Absence of Hospital-Acquired Illness or Injury  Intervention: Prevent Infection  Recent Flowsheet Documentation  Taken 1/7/2023 0251 by Pati Merritt RN  Infection Prevention:   rest/sleep promoted   hand hygiene promoted     Problem: Plan of Care - These are the overarching goals to be used throughout the patient stay.    Goal: Optimal Comfort and Wellbeing  Outcome: Progressing  Intervention: Provide Person-Centered Care  Recent Flowsheet Documentation  Taken 1/7/2023 0251 by Pati Merritt RN  Trust Relationship/Rapport: care explained   Goal Outcome Evaluation:       Pt alert and oriented. Hgb is low 6.8. Slept on and off. No respiratory distress noted    Will cont to monitor     Pati Tamayo RN

## 2023-01-08 LAB
CREAT SERPL-MCNC: 0.53 MG/DL (ref 0.51–0.95)
GFR SERPL CREATININE-BSD FRML MDRD: >90 ML/MIN/1.73M2
GLUCOSE BLDC GLUCOMTR-MCNC: 115 MG/DL (ref 70–99)
GLUCOSE BLDC GLUCOMTR-MCNC: 163 MG/DL (ref 70–99)
GLUCOSE BLDC GLUCOMTR-MCNC: 194 MG/DL (ref 70–99)
GLUCOSE BLDC GLUCOMTR-MCNC: 232 MG/DL (ref 70–99)
GLUCOSE BLDC GLUCOMTR-MCNC: 250 MG/DL (ref 70–99)
GLUCOSE BLDC GLUCOMTR-MCNC: 259 MG/DL (ref 70–99)
VANCOMYCIN SERPL-MCNC: 17.2 UG/ML

## 2023-01-08 PROCEDURE — 80202 ASSAY OF VANCOMYCIN: CPT | Performed by: HOSPITALIST

## 2023-01-08 PROCEDURE — 99232 SBSQ HOSP IP/OBS MODERATE 35: CPT | Performed by: HOSPITALIST

## 2023-01-08 PROCEDURE — 999N000157 HC STATISTIC RCP TIME EA 10 MIN

## 2023-01-08 PROCEDURE — 120N000017 HC R&B RESPIRATORY CARE

## 2023-01-08 PROCEDURE — 999N000123 HC STATISTIC OXYGEN O2DAILY TECH TIME

## 2023-01-08 PROCEDURE — 250N000011 HC RX IP 250 OP 636: Performed by: HOSPITALIST

## 2023-01-08 PROCEDURE — 99207 PR CDG-CUT & PASTE-POTENTIAL IMPACT ON LEVEL: CPT | Performed by: HOSPITALIST

## 2023-01-08 PROCEDURE — 250N000013 HC RX MED GY IP 250 OP 250 PS 637: Performed by: HOSPITALIST

## 2023-01-08 PROCEDURE — 999N000009 HC STATISTIC AIRWAY CARE

## 2023-01-08 PROCEDURE — 258N000003 HC RX IP 258 OP 636: Performed by: HOSPITALIST

## 2023-01-08 PROCEDURE — 82565 ASSAY OF CREATININE: CPT | Performed by: HOSPITALIST

## 2023-01-08 RX ORDER — QUETIAPINE FUMARATE 25 MG/1
25 TABLET, FILM COATED ORAL 3 TIMES DAILY
Status: DISCONTINUED | OUTPATIENT
Start: 2023-01-08 | End: 2023-01-18 | Stop reason: HOSPADM

## 2023-01-08 RX ORDER — TRAZODONE HYDROCHLORIDE 50 MG/1
100 TABLET, FILM COATED ORAL AT BEDTIME
Status: DISCONTINUED | OUTPATIENT
Start: 2023-01-08 | End: 2023-01-18 | Stop reason: HOSPADM

## 2023-01-08 RX ORDER — VITAMIN B COMPLEX
50 TABLET ORAL DAILY
Status: COMPLETED | OUTPATIENT
Start: 2023-01-08 | End: 2023-01-15

## 2023-01-08 RX ADMIN — HEPARIN SODIUM 5000 UNITS: 5000 INJECTION, SOLUTION INTRAVENOUS; SUBCUTANEOUS at 08:43

## 2023-01-08 RX ADMIN — Medication 50 MCG: at 21:42

## 2023-01-08 RX ADMIN — TRAZODONE HYDROCHLORIDE 100 MG: 50 TABLET ORAL at 21:43

## 2023-01-08 RX ADMIN — VANCOMYCIN HYDROCHLORIDE 1000 MG: 5 INJECTION, POWDER, LYOPHILIZED, FOR SOLUTION INTRAVENOUS at 23:00

## 2023-01-08 RX ADMIN — INSULIN ASPART 2 UNITS: 100 INJECTION, SOLUTION INTRAVENOUS; SUBCUTANEOUS at 08:43

## 2023-01-08 RX ADMIN — INSULIN ASPART 4 UNITS: 100 INJECTION, SOLUTION INTRAVENOUS; SUBCUTANEOUS at 00:27

## 2023-01-08 RX ADMIN — LIDOCAINE 1 PATCH: 4 PATCH TOPICAL at 21:42

## 2023-01-08 RX ADMIN — BUPRENORPHINE HYDROCHLORIDE 4 MG: 2 TABLET SUBLINGUAL at 21:43

## 2023-01-08 RX ADMIN — BUPRENORPHINE HYDROCHLORIDE 4 MG: 2 TABLET SUBLINGUAL at 08:43

## 2023-01-08 RX ADMIN — INSULIN GLARGINE 6 UNITS: 100 INJECTION, SOLUTION SUBCUTANEOUS at 08:44

## 2023-01-08 RX ADMIN — QUETIAPINE FUMARATE 25 MG: 25 TABLET ORAL at 15:04

## 2023-01-08 RX ADMIN — QUETIAPINE FUMARATE 25 MG: 25 TABLET ORAL at 21:43

## 2023-01-08 RX ADMIN — INSULIN ASPART 5 UNITS: 100 INJECTION, SOLUTION INTRAVENOUS; SUBCUTANEOUS at 15:58

## 2023-01-08 RX ADMIN — ZOLPIDEM TARTRATE 10 MG: 5 TABLET ORAL at 22:03

## 2023-01-08 RX ADMIN — VANCOMYCIN HYDROCHLORIDE 1000 MG: 5 INJECTION, POWDER, LYOPHILIZED, FOR SOLUTION INTRAVENOUS at 09:44

## 2023-01-08 RX ADMIN — INSULIN ASPART 3 UNITS: 100 INJECTION, SOLUTION INTRAVENOUS; SUBCUTANEOUS at 11:54

## 2023-01-08 RX ADMIN — QUETIAPINE FUMARATE 25 MG: 25 TABLET ORAL at 08:43

## 2023-01-08 RX ADMIN — Medication 40 MG: at 08:43

## 2023-01-08 RX ADMIN — MULTIVIT AND MINERALS-FERROUS GLUCONATE 9 MG IRON/15 ML ORAL LIQUID 15 ML: at 08:43

## 2023-01-08 ASSESSMENT — ACTIVITIES OF DAILY LIVING (ADL)
ADLS_ACUITY_SCORE: 45

## 2023-01-08 NOTE — PROGRESS NOTES
[facility]    Medicine Progress Note - Hospitalist Service    Date of Admission:  1/4/2023    Assessment & Plan   Principal Problem:    Bilateral MRSA cavitary pneumonia (1/4/2023)  Active Problems:    Pneumonia of both lungs due to MRSA (H) (1/4/2023)    Chronic respiratory failure requiring continuous mechanical ventilation through tracheostomy (H) (1/4/2023)    Diabetes mellitus type 1 with Hx of DKA (1/4/2023)    Methamphetamine abuse (H) (2/14/2019)    Heroin abuse (H) (2/14/2019)    History of severe sepsis with cavitary pneumonia due to MRSA (1/4/2023)    Acute respiratory failure (H) (1/5/2023)    Costochondritis (1/5/2023)    Anxiety with depression (2/13/2019)    Costochondritis    Bilateral cavitary pneumonia due to MRSA: Continue with vancomycin, pharmacy will dose. Continue with mechanical ventilation.  Pulmonology is consulted for weaning off ventilation  Respiratory failure requiring continuous mechanical ventilation with tracheostomy: Stable and improving.  Due to MRSA pneumonia.  Pulmonology is consulted for weaning off ventilator.  Type 1 diabetes with history of DKA and noncompliance with medication: Blood glucose is higher than yesterday.  Continue with current dose of Lantus and SSI.  Monitor blood glucose levels and titrate Lantus dosage.  Polysubstance abuse: Patient has been on different medications and is not in withdrawal.  Currently is stable on buprenorphine 4 mg sublingual 2 times daily, hydroxyzine as needed and trazodone for depression.  Anxiety and depression: Controlled with hydroxyzine as needed and trazodone 100 mg nightly  Costochondritis: Lidoderm and PRN Motrin       Diet: Combination Diet Moderate Consistent Carb (60 g CHO per Meal) Diet; Easy to Chew (level 7); Thin Liquids (level 0)  Adult Formula Bolus Feeding: Vivonex RTF; Route: Nasogastric tube; 3 times daily; Volume per Bolus: 250; mL(s); Additional free water (mL): 100 ml H20 b/4 and after bolus feeding IF administered;  bolus feeding ONLY IF INTAKE , 50% of meal ( can ru...    DVT Prophylaxis: Pneumatic Compression Devices  Sheffield Catheter: Not present  Lines: PRESENT      PICC 12/21/22 Triple Lumen Right Basilic OK to use nurse aware-Site Assessment: WDL      Cardiac Monitoring: None  Code Status: Full Code      Clinically Significant Risk Factors          # Hypocalcemia: Lowest iCa = 1.26 mg/dL in last 2 days, will monitor and replace as appropriate                # Severe Malnutrition: based on nutrition assessment        Disposition Plan     Expected Discharge Date: 01/11/2023      Destination: home with help/services            Rogelio Feliciano MD  Hospitalist Service  [facility]  Securely message with [email or web address] (more info)  Text page via [employer] Paging/Directory   ______________________________________________________________________    Interval History   Patient is generally stable.  Pulmonology is following and weaning patient off ventilator.  She is on oral diet and tolerating well.  Blood glucose levels are increasing to max 250s.  Increased Lantus dosage to 8 U bid. Will adjust dosage. Continue monitoring.    Physical Exam   Vital Signs: Temp: 98.6  F (37  C) Temp src: Oral BP: 101/63 Pulse: 86   Resp: 18 SpO2: 93 % O2 Device: Trach dome Oxygen Delivery: 30 LPM  Weight: 87 lbs 9.6 oz  Patient is a thin and pale young lady laying on her back in her bed.  She has tracheostomy and is on ventilator.  She is alert and oriented and pleasant and cooperative.  HEENT: Moist oral mucosal moisture.  Neck: Supple, tracheostomy in place and functional and hooked up to ventilator  Chest: No deformity.  Normal breathing through ventilator.  No retraction.  Breathing sounds are normal  Heart with regular rate and rhythm no murmur  Abdomen is a scaphoid and nontender without mass or organomegaly.   deferred  Extremities without edema or cyanosis or clubbing or deformity.  Neurological exam is grossly nonfocal.  Patient does not have any facial  droop and moves all extremities equally.  No ataxia or dysmetria.    Medical Decision Making       30 MINUTES SPENT BY ME on the date of service doing chart review, history, exam, documentation & further activities per the note.  MANAGEMENT DISCUSSED with the following over the past 24 hours: Patient and nursing staff       Data     I have personally reviewed the following data over the past 24 hrs:       Range & Units 01/06/23 04:15 01/06/23 08:05 01/06/23 12:22 01/06/23 16:38 01/06/23 20:43 01/07/23 00:20 01/07/23 03:54 01/07/23 08:08 01/07/23 12:13 01/07/23 16:51 01/07/23 19:58 01/08/23 00:12 01/08/23 03:51 01/08/23 08:13 01/08/23 11:31 01/08/23 15:41   GLUCOSE BY METER POCT 70 - 99 mg/dL 181 (H) 270 (H) 228 (H) 199 (H) 260 (H) 189 (H) 144 (H) 184 (H) 223 (H) 215 (H) 175 (H) 232 (H) 115 (H) 163 (H) 194 (H) 259 (H)     Imaging results reviewed over the past 24 hrs:   No results found for this or any previous visit (from the past 24 hour(s)).

## 2023-01-08 NOTE — PLAN OF CARE
Problem: Hyperglycemia  Goal: Blood Glucose Level Within Targeted Range  Outcome: Progressing   Goal Outcome Evaluation:           223 High   184 High   144 High        Alert and oriented, TM. Denies pain. Elevated blood sugar. Stable appetite, good oral intake -did not require Bolus TF. Trach-suctions-moderate to large pale yellow secretions and thick inn consistency. Pt's dad updated.  Ana Lilia Luke RN

## 2023-01-08 NOTE — PLAN OF CARE
Problem: Mechanical Ventilation Invasive  Goal: Optimal Device Function  Outcome: Progressing  Intervention: Optimize Device Care and Function  Recent Flowsheet Documentation  Taken 1/7/2023 1552 by Robert Souza, RT  Airway Safety Measures: all equipment/monitors on and audible  Taken 1/7/2023 1130 by Robert Souza, RT  Airway Safety Measures: all equipment/monitors on and audible  Taken 1/7/2023 0800 by Robert Souza, RT  Airway Safety Measures: all equipment/monitors on and audible  Goal: Mechanical Ventilation Liberation  Outcome: Progressing  Goal: Absence of Device-Related Skin and Tissue Injury  Outcome: Progressing   RT PROGRESS NOTE     DATA:     CURRENT SETTINGS:             TRACH TYPE / SIZE: # 6 yung root             MODE:   30% + 30L             FIO2:   30%     ACTION:             THERAPIES:                SUCTION:                           FREQUENCY:   X2                        AMOUNT:   mod/large                        CONSISTENCY:  thin                        COLOR:   pale/yellow             SPONTANEOUS COUGH EFFORT/STRENGTH OF EFFORT (not elicited by suctioning):                               WEANING PHASE:   2                        WEAN MODE:    tm/pmv                         WEAN TIME:   as tim                        END WEAN REASON:   as tim     RESPONSE:             BS:   crs             VITAL SIGNS:   SPO2 93-96%, RR 20-22             EMOTIONAL NEEDS / CONCERNS:              RISK FOR SELF DECANNULATION:                          RISK DUE TO:                         INTERVENTION/S IN PLACE IS/ARE:       NOTE / PLAN:   Goal Outcome Evaluation:    Cont on 30%M with 30L, pt uses pmv as tim ,   Cont current care plan

## 2023-01-08 NOTE — PLAN OF CARE
Problem: Mechanical Ventilation Invasive  Goal: Absence of Device-Related Skin and Tissue Injury  Outcome: Progressing     Problem: Plan of Care - These are the overarching goals to be used throughout the patient stay.    Goal: Optimal Comfort and Wellbeing  Outcome: Progressing  Intervention: Provide Person-Centered Care  Recent Flowsheet Documentation  Taken 1/8/2023 0057 by Pati Merritt RN  Trust Relationship/Rapport: care explained     Problem: Infection  Goal: Absence of Infection Signs and Symptoms  Outcome: Progressing  Intervention: Prevent or Manage Infection  Recent Flowsheet Documentation  Taken 1/8/2023 0057 by Pati Merritt RN  Isolation Precautions: contact precautions maintained     Problem: Hyperglycemia  Goal: Blood Glucose Level Within Targeted Range  Outcome: Progressing   Goal Outcome Evaluation:       Pt alert and oriented. Denies for any pain or discomfort. PRN Ambien given per pt requested at bed time and effective. U/O 1700 this morning. BS this morning 115  Slept well. Will cont to monitor      Pati Tamayo RN

## 2023-01-08 NOTE — PHARMACY-VANCOMYCIN DOSING SERVICE
Pharmacy Vancomycin Note  Date of Service 2023  Patient's  1995   27 year old, female    Indication: Bacteremia, MRSA  Day of Therapy: 20  Current vancomycin regimen:  1000 mg IV q12h  Current vancomycin monitoring method: AUC  Current vancomycin therapeutic monitoring goal: 400-600 mg*h/L    [employer] Prediction of Current Vancomycin Regimen   Loading dose: N/A  Regimen: 1000 mg IV every 12 hours.  Start time: 13:11 on 2023  Exposure target: AUC24 (range)400-600 mg/L.hr   AUC24,ss: 581 mg/L.hr  Probability of AUC24 > 400: 100 %  Ctrough,ss: 16.6 mg/L  Probability of Ctrough,ss > 20: 9 %  Probability of nephrotoxicity (Lodise LEONORA ): 12 %      Current estimated CrCl = Estimated Creatinine Clearance: 101.7 mL/min (based on SCr of 0.53 mg/dL).    Creatinine for last 3 days  2023:  6:01 AM Creatinine 0.49 mg/dL  2023:  6:55 AM Creatinine 0.53 mg/dL  2023:  5:59 AM Creatinine 0.53 mg/dL    Recent Vancomycin Levels (past 3 days)  2023:  6:01 AM Vancomycin 15.6 ug/mL  2023:  9:37 AM Vancomycin 17.2 ug/mL    Vancomycin IV Administrations (past 72 hours)                   vancomycin (VANCOCIN) 1,000 mg in sodium chloride 0.9 % 250 mL intermittent infusion (mg) 1,000 mg New Bag 23 0944     1,000 mg New Bag 23 2243     1,000 mg New Bag  0957     1,000 mg New Bag 23 2206    vancomycin (VANCOCIN) 750 mg in sodium chloride 0.9 % 250 mL intermittent infusion (mg) 750 mg New Bag 23 0929     750 mg New Bag 23 2146                Nephrotoxins and other renal medications (From now, onward)    Start     Dose/Rate Route Frequency Ordered Stop    23 2200  vancomycin (VANCOCIN) 1,000 mg in sodium chloride 0.9 % 250 mL intermittent infusion         1,000 mg  over 60 Minutes Intravenous EVERY 12 HOURS 23 1229      23 1726  ibuprofen (ADVIL/MOTRIN) tablet 600 mg         600 mg Oral 3 TIMES DAILY PRN 23 1033               Contrast Orders -  past 72 hours (72h ago, onward)    None          Interpretation of levels and current regimen:  Vancomycin level is reflective of -600    Has serum creatinine changed greater than 50% in last 72 hours: No    Urine output:  unable to determine    Renal Function: Stable      Plan:  1. Continue Current Dose  2. Vancomycin monitoring method: AUC  3. Vancomycin therapeutic monitoring goal: 400-600 mg*h/L  4. Pharmacy will check vancomycin levels as appropriate in 1-3 Days.  5. Serum creatinine levels will be ordered daily for the first week of therapy and at least twice weekly for subsequent weeks.    Zuly Hayes, [facility],BCCCP, BCCP

## 2023-01-08 NOTE — PLAN OF CARE
Problem: Plan of Care - These are the overarching goals to be used throughout the patient stay.    Goal: Plan of Care Review  Description: The Plan of Care Review/Shift note should be completed every shift.  The Outcome Evaluation is a brief statement about your assessment that the patient is improving, declining, or no change.  This information will be displayed automatically on your shift note.  Outcome: Progressing  Flowsheets (Taken 1/8/2023 2927)  Plan of Care Reviewed With: patient  Overall Patient Progress: improving   Goal Outcome Evaluation:      Plan of Care Reviewed With: patient    Overall Patient Progress: improvingOverall Patient Progress: improving    Alert and oriented, denies pain. Eating 100%, no bolus TF. TM, suctioned 2x. Large copious secretions. Assist of 1 with transfer.   Ana Lilia Luke RN

## 2023-01-09 ENCOUNTER — APPOINTMENT (OUTPATIENT)
Dept: SPEECH THERAPY | Facility: CLINIC | Age: 28
End: 2023-01-09
Attending: HOSPITALIST
Payer: COMMERCIAL

## 2023-01-09 ENCOUNTER — APPOINTMENT (OUTPATIENT)
Dept: OCCUPATIONAL THERAPY | Facility: CLINIC | Age: 28
End: 2023-01-09
Attending: HOSPITALIST
Payer: COMMERCIAL

## 2023-01-09 ENCOUNTER — APPOINTMENT (OUTPATIENT)
Dept: PHYSICAL THERAPY | Facility: CLINIC | Age: 28
End: 2023-01-09
Attending: HOSPITALIST
Payer: COMMERCIAL

## 2023-01-09 LAB
ANION GAP SERPL CALCULATED.3IONS-SCNC: 11 MMOL/L (ref 7–15)
BUN SERPL-MCNC: 18.1 MG/DL (ref 6–20)
CALCIUM SERPL-MCNC: 9 MG/DL (ref 8.6–10)
CHLORIDE SERPL-SCNC: 97 MMOL/L (ref 98–107)
CREAT SERPL-MCNC: 0.55 MG/DL (ref 0.51–0.95)
DEPRECATED HCO3 PLAS-SCNC: 27 MMOL/L (ref 22–29)
GFR SERPL CREATININE-BSD FRML MDRD: >90 ML/MIN/1.73M2
GLUCOSE BLDC GLUCOMTR-MCNC: 249 MG/DL (ref 70–99)
GLUCOSE BLDC GLUCOMTR-MCNC: 270 MG/DL (ref 70–99)
GLUCOSE BLDC GLUCOMTR-MCNC: 299 MG/DL (ref 70–99)
GLUCOSE BLDC GLUCOMTR-MCNC: 307 MG/DL (ref 70–99)
GLUCOSE BLDC GLUCOMTR-MCNC: 308 MG/DL (ref 70–99)
GLUCOSE BLDC GLUCOMTR-MCNC: 357 MG/DL (ref 70–99)
GLUCOSE SERPL-MCNC: 238 MG/DL (ref 70–99)
MAGNESIUM SERPL-MCNC: 1.8 MG/DL (ref 1.7–2.3)
PHOSPHATE SERPL-MCNC: 4.4 MG/DL (ref 2.5–4.5)
POTASSIUM SERPL-SCNC: 4.3 MMOL/L (ref 3.4–5.3)
SODIUM SERPL-SCNC: 135 MMOL/L (ref 136–145)

## 2023-01-09 PROCEDURE — 97110 THERAPEUTIC EXERCISES: CPT | Mod: GP

## 2023-01-09 PROCEDURE — 83735 ASSAY OF MAGNESIUM: CPT | Performed by: HOSPITALIST

## 2023-01-09 PROCEDURE — 99232 SBSQ HOSP IP/OBS MODERATE 35: CPT | Performed by: HOSPITALIST

## 2023-01-09 PROCEDURE — 258N000003 HC RX IP 258 OP 636: Performed by: HOSPITALIST

## 2023-01-09 PROCEDURE — 0B21XFZ CHANGE TRACHEOSTOMY DEVICE IN TRACHEA, EXTERNAL APPROACH: ICD-10-PCS | Performed by: NURSE PRACTITIONER

## 2023-01-09 PROCEDURE — 99232 SBSQ HOSP IP/OBS MODERATE 35: CPT | Performed by: INTERNAL MEDICINE

## 2023-01-09 PROCEDURE — 92526 ORAL FUNCTION THERAPY: CPT | Mod: GN

## 2023-01-09 PROCEDURE — 80048 BASIC METABOLIC PNL TOTAL CA: CPT | Performed by: HOSPITALIST

## 2023-01-09 PROCEDURE — 99232 SBSQ HOSP IP/OBS MODERATE 35: CPT | Performed by: NURSE PRACTITIONER

## 2023-01-09 PROCEDURE — 250N000011 HC RX IP 250 OP 636: Performed by: HOSPITALIST

## 2023-01-09 PROCEDURE — 97110 THERAPEUTIC EXERCISES: CPT | Mod: GO | Performed by: OCCUPATIONAL THERAPIST

## 2023-01-09 PROCEDURE — 999N000123 HC STATISTIC OXYGEN O2DAILY TECH TIME

## 2023-01-09 PROCEDURE — 97116 GAIT TRAINING THERAPY: CPT | Mod: GP

## 2023-01-09 PROCEDURE — 250N000013 HC RX MED GY IP 250 OP 250 PS 637: Performed by: HOSPITALIST

## 2023-01-09 PROCEDURE — 84100 ASSAY OF PHOSPHORUS: CPT | Performed by: HOSPITALIST

## 2023-01-09 PROCEDURE — 999N000157 HC STATISTIC RCP TIME EA 10 MIN

## 2023-01-09 PROCEDURE — 120N000017 HC R&B RESPIRATORY CARE

## 2023-01-09 PROCEDURE — 999N000009 HC STATISTIC AIRWAY CARE

## 2023-01-09 PROCEDURE — 999N000258 HC STATISTIC TRACH CHANGE

## 2023-01-09 PROCEDURE — 999N000253 HC STATISTIC WEANING TRIALS

## 2023-01-09 RX ORDER — DEXTROSE MONOHYDRATE 25 G/50ML
25-50 INJECTION, SOLUTION INTRAVENOUS
Status: DISCONTINUED | OUTPATIENT
Start: 2023-01-09 | End: 2023-01-09

## 2023-01-09 RX ORDER — NICOTINE POLACRILEX 4 MG
15-30 LOZENGE BUCCAL
Status: DISCONTINUED | OUTPATIENT
Start: 2023-01-09 | End: 2023-01-09

## 2023-01-09 RX ORDER — MINERAL OIL/HYDROPHIL PETROLAT
OINTMENT (GRAM) TOPICAL
Status: DISCONTINUED | OUTPATIENT
Start: 2023-01-09 | End: 2023-01-18 | Stop reason: HOSPADM

## 2023-01-09 RX ADMIN — QUETIAPINE FUMARATE 25 MG: 25 TABLET ORAL at 13:01

## 2023-01-09 RX ADMIN — BUPRENORPHINE HYDROCHLORIDE 4 MG: 2 TABLET SUBLINGUAL at 21:40

## 2023-01-09 RX ADMIN — Medication 50 MCG: at 08:41

## 2023-01-09 RX ADMIN — MULTIVIT AND MINERALS-FERROUS GLUCONATE 9 MG IRON/15 ML ORAL LIQUID 15 ML: at 08:41

## 2023-01-09 RX ADMIN — TRAZODONE HYDROCHLORIDE 100 MG: 50 TABLET ORAL at 21:44

## 2023-01-09 RX ADMIN — QUETIAPINE FUMARATE 25 MG: 25 TABLET ORAL at 08:41

## 2023-01-09 RX ADMIN — VANCOMYCIN HYDROCHLORIDE 1000 MG: 5 INJECTION, POWDER, LYOPHILIZED, FOR SOLUTION INTRAVENOUS at 10:24

## 2023-01-09 RX ADMIN — ZOLPIDEM TARTRATE 10 MG: 5 TABLET ORAL at 21:44

## 2023-01-09 RX ADMIN — LIDOCAINE 1 PATCH: 4 PATCH TOPICAL at 21:42

## 2023-01-09 RX ADMIN — VANCOMYCIN HYDROCHLORIDE 1000 MG: 5 INJECTION, POWDER, LYOPHILIZED, FOR SOLUTION INTRAVENOUS at 22:01

## 2023-01-09 RX ADMIN — Medication 40 MG: at 08:41

## 2023-01-09 RX ADMIN — QUETIAPINE FUMARATE 25 MG: 25 TABLET ORAL at 21:44

## 2023-01-09 RX ADMIN — BUPRENORPHINE HYDROCHLORIDE 4 MG: 2 TABLET SUBLINGUAL at 08:41

## 2023-01-09 ASSESSMENT — ACTIVITIES OF DAILY LIVING (ADL)
ADLS_ACUITY_SCORE: 45

## 2023-01-09 NOTE — PROGRESS NOTES
[facility]    Medicine Progress Note - Hospitalist Service    Date of Admission:  1/4/2023    LTACH course:  1/9:  Dry skin added aquaphor, doing well with trach capped, downsized.  Now able to eat general diet, will discontinue tube feeds and monitor glucose    Assessment & Plan   Principal Problem:    Bilateral MRSA cavitary pneumonia (1/4/2023)  Active Problems:    Pneumonia of both lungs due to MRSA (H) (1/4/2023)    Chronic respiratory failure requiring continuous mechanical ventilation through tracheostomy (H) (1/4/2023)    Diabetes mellitus type 1 with Hx of DKA (1/4/2023)    Methamphetamine abuse (H) (2/14/2019)    Heroin abuse (H) (2/14/2019)    History of severe sepsis with cavitary pneumonia due to MRSA (1/4/2023)    Acute respiratory failure (H) (1/5/2023)    Costochondritis (1/5/2023)    Anxiety with depression (2/13/2019)    Costochondritis    Bilateral cavitary pneumonia due to MRSA: Continue with vancomycin, pharmacy will dose. Continue with mechanical ventilation.  Pulmonology is consulted for weaning off ventilation  Respiratory failure requiring continuous mechanical ventilation with tracheostomy: Stable and improving.  Due to MRSA pneumonia.  Pulmonology is consulted for weaning off ventilator.  Type 1 diabetes with history of DKA and noncompliance with medication: Blood glucose is higher than yesterday.  Continue with current dose of Lantus and SSI.  Monitor blood glucose levels and titrate Lantus dosage.  Polysubstance abuse: Patient has been on different medications and is not in withdrawal.  Currently is stable on buprenorphine 4 mg sublingual 2 times daily, hydroxyzine as needed and trazodone for depression.  Anxiety and depression: Controlled with hydroxyzine as needed and trazodone 100 mg nightly  Costochondritis: Lidoderm and PRN Motrin       Diet: Combination Diet Moderate Consistent Carb (60 g CHO per Meal) Diet; Easy to Chew (level 7); Thin Liquids (level 0)  Adult Formula Bolus Feeding:  Vivonex RTF; Route: Nasogastric tube; 3 times daily; Volume per Bolus: 250; mL(s); Additional free water (mL): 100 ml H20 b/4 and after bolus feeding IF administered; bolus feeding ONLY IF INTAKE , 50% of meal ( can ru...    DVT Prophylaxis: Pneumatic Compression Devices  Sheffield Catheter: Not present  Lines: PRESENT      PICC 12/21/22 Triple Lumen Right Basilic OK to use nurse aware-Site Assessment: WDL      Cardiac Monitoring: None  Code Status: Full Code      Clinically Significant Risk Factors                         # Severe Malnutrition: based on nutrition assessment        Disposition Plan     Expected Discharge Date: 01/11/2023      Destination: home with help/services            Bev Ott MD  Hospitalist Service  LT[facility]  Securely message with [employer] (more info)  Text page via [employer] Paging/Directory   ______________________________________________________________________    Interval History   She reports that she just started eating regular diet, she has no nausea or vomiting, She has no chest pain, no dyspnea    Physical Exam   Vital Signs: Temp: 98.6  F (37  C) Temp src: Oral BP: 118/72 Pulse: 87   Resp: 18 SpO2: 93 % O2 Device: Trach dome Oxygen Delivery: 30 LPM  Weight: 89 lbs 3.2 oz  Patient is a thin and pale young lady laying on her back in her bed.  She has tracheostomy and is on ventilator.  She is alert and oriented and pleasant and cooperative.  HEENT: Moist oral mucosal moisture.  Neck: Supple, tracheostomy in place and functional and hooked up to ventilator  Chest: No deformity.  Normal breathing through ventilator.  No retraction.  Breathing sounds are normal  Heart with regular rate and rhythm no murmur  Abdomen is a scaphoid and nontender without mass or organomegaly.   deferred  Extremities without edema or cyanosis or clubbing or deformity.  Neurological exam is grossly nonfocal.  Patient does not have any facial droop and moves all extremities equally.  No ataxia or  dysmetria.      Data     I have personally reviewed the following data over the past 24 hrs:       Range & Units 01/06/23 04:15 01/06/23 08:05 01/06/23 12:22 01/06/23 16:38 01/06/23 20:43 01/07/23 00:20 01/07/23 03:54 01/07/23 08:08 01/07/23 12:13 01/07/23 16:51 01/07/23 19:58 01/08/23 00:12 01/08/23 03:51 01/08/23 08:13 01/08/23 11:31 01/08/23 15:41   GLUCOSE BY METER POCT 70 - 99 mg/dL 181 (H) 270 (H) 228 (H) 199 (H) 260 (H) 189 (H) 144 (H) 184 (H) 223 (H) 215 (H) 175 (H) 232 (H) 115 (H) 163 (H) 194 (H) 259 (H)     Imaging results reviewed over the past 24 hrs:   No results found for this or any previous visit (from the past 24 hour(s)).

## 2023-01-09 NOTE — PLAN OF CARE
"Goal Outcome Evaluation:      Plan of Care Reviewed With: patient    Overall Patient Progress: improvingOverall Patient Progress: improving         Problem: Hyperglycemia  Goal: Blood Glucose Level Within Targeted Range  Outcome: Progressing     Problem: Artificial Airway  Goal: Effective Communication  Outcome: Progressing     Patient is alert and oriented. x4 Able to communicate her needs. Denies pain this shift. Trach capped and current on 2L oxygen nasal cannula. Assist of 1 with ADL's and tranfers. Uses bedside commode. NJ intact. Patient refused scheduled Heparin this morning, patient stated,\" I'm moving more and it hurts my stomach\". Attending MD notified. Vitals stable.   "

## 2023-01-09 NOTE — PLAN OF CARE
Problem: Mechanical Ventilation Invasive  Goal: Effective Communication  Intervention: Ensure Effective Communication     Problem: Plan of Care - These are the overarching goals to be used throughout the patient stay.    Goal: Optimal Comfort and Wellbeing  Intervention: Provide Person-Centered Care     Goal Outcome Evaluation:    Pt slept during shift in bed, pt was easily waken up for cares or to receive medication.  Pt has no complaints of pain.  Pt sat in chiar for meal eating 80% of dinner offered.  Pt took medications crushed in applesauce siting up.  Pt requested an amben at .                           (1) Minor paralysis (flattened nasolabial fold, asymmetry on smiling)

## 2023-01-09 NOTE — PLAN OF CARE
Problem: Artificial Airway  Goal: Effective Communication  Outcome: Progressing  Goal: Optimal Device Function  Outcome: Progressing  Goal: Absence of Device-Related Skin or Tissue Injury  Outcome: Progressing   Goal Outcome Evaluation:  RT PROGRESS NOTE     DATA:     CURRENT SETTINGS:             TRACH TYPE / SIZE: # 6 BIVONA TTS Changed on 01/09/2023              MODE: Trach Capped                       (TM/PMV)             FIO2: 2LNC     ACTION:             THERAPIES:                SUCTION:                           FREQUENCY: X2                        AMOUNT: Moderate to large                        CONSISTENCY: thick                        COLOR: white/tan             SPONTANEOUS COUGH EFFORT/STRENGTH OF EFFORT (not elicited by suctioning): fair                              WEANING PHASE: 3                        WEAN MODE:TRACH Capped on 2LNC                        WEAN TIME:Since 09:45                         END WEAN REASON: Ongoing       RESPONSE:             BS: Clear             VITAL SIGNS:Sat 95-97%, HR , RR 20-22             EMOTIONAL NEEDS / CONCERNS:                  RISK FOR SELF DECANNULATION:                         RISK DUE TO:                          INTERVENTION/S IN PLACE IS/ARE:       NOTE / PLAN: Cont. Current plan of care

## 2023-01-09 NOTE — PROGRESS NOTES
01/05/23 1045   Appointment Info   Signing Clinician's Name / Credentials (OT) Ernesto Mejia, OTRL/CBIS   Living Environment   People in Home other (see comments)  (unclear, astranged from parents, Pt poor historian)   Current Living Arrangements house   Home Accessibility stairs within home   Number of Stairs, Within Home, Primary greater than 10 stairs   Stair Railings, Within Home, Primary railings safe and in good condition   Transportation Anticipated family or friend will provide   Living Environment Comments unclear, astranged from parents, Pt poor historian   Self-Care   Usual Activity Tolerance good   Current Activity Tolerance poor   Regular Exercise Yes   Activity/Exercise Type strength training   Exercise Amount/Frequency daily   Fall history within last six months no   Activity/Exercise/Self-Care Comment IND at baseline, no AD   Instrumental Activities of Daily Living (IADL)   IADL Comments Per pt, was IND w/all ADL-IADLs   General Information   Onset of Illness/Injury or Date of Surgery 12/16/22   Referring Physician Anabell Feliciano MD   Patient/Family Therapy Goal Statement (OT) get to toilet IND   Additional Occupational Profile Info/Pertinent History of Current Problem 27 year old female with DM I and polysubstance abuse admitted on 1/4/2023 to LTAC for continuation of antibiotic treatment and weaning off ventilator.   She is transferred from [facility] ICU after initial admission of 12/16/2022 with confusion and DKA, sepsis secondary to MRSA cavitary pneumonia, candidemia and incidental finding of COVID infection.  She needed intubation and mechanical ventilation and later tracheostomy.   She had severe sepsis and developed ARDS, NANNETTE and encephalopathy which eventually recovered from.  She had candidemia and had full course micafungin for 2 weeks.   Other sources of infection such as endocarditis, spine infection, discitis have already been ruled out.  She no longer needs COVID or MRSA  isolation.   She needs to be on IV vancomycin through 1/16/2023 and needs to be weaned off ventilator. She has NJ tube and is tube fed.  She is currently stable and afebrile and off pressors and micafungin.   Existing Precautions/Restrictions fall;NPO;oxygen therapy device and L/min   Left Upper Extremity (Weight-bearing Status) full weight-bearing (FWB)   Right Upper Extremity (Weight-bearing Status) full weight-bearing (FWB)   Left Lower Extremity (Weight-bearing Status) full weight-bearing (FWB)   Right Lower Extremity (Weight-bearing Status) full weight-bearing (FWB)   General Observations and Info Pt presenting below baseline for ADL, functional mobility,IADLs and activity tolerance/strength for these tasks. Pt fatigues quickly. Decreased strength noted with all grooming/dressing tasks.   Cognitive Status Examination   Orientation Status orientation to person, place and time   Affect/Mental Status (Cognitive) flat/blunted affect;WFL   Follows Commands WNL   Visual Perception   Visual Impairment/Limitations corrective lenses full-time   Sensory   Sensory Quick Adds sensation intact   Pain Assessment   Patient Currently in Pain No   Range of Motion Comprehensive   Comment, General Range of Motion BUE PROM WFL, AROM limited d.t decreased strength. BUE AROM shld flex ~80*.   Strength Comprehensive (MMT)   Comment, General Manual Muscle Testing (MMT) Assessment BUE MMT grossly 3/5   Coordination   Coordination Comments generally WFL, slightly decreased d/t weakness.   Bed Mobility   Bed Mobility rolling left;rolling right;scooting/bridging;supine-sit;sit-supine   Rolling Left [place] (Bed Mobility) modified independence   Rolling Right [place] (Bed Mobility) modified independence   Scooting/Bridging [place] (Bed Mobility) minimum assist (75% patient effort);supervision   Supine-Sit [place] (Bed Mobility) minimum assist (75% patient effort);supervision   Sit-Supine [place] (Bed Mobility)  minimum assist (75% patient effort);supervision   Balance   Balance Comments sitting balance IND   Lower Body Dressing Assessment/Training   [place] Level (Lower Body Dressing) supervision;set up   Comment, (Lower Body Dressing) donned socks from supine /HOB elevated.   Grooming Assessment/Training   [place] Level (Grooming) set up   Comment, (Grooming) completed EOB   Clinical Impression   Criteria for Skilled Therapeutic Interventions Met (OT) Yes, treatment indicated   OT Diagnosis decreased ADL/trf skills   OT Problem List-Impairments impacting ADL problems related to;balance;cognition;mobility;strength  (resp status, decreased activity tolerance)   Assessment of Occupational Performance 3-5 Performance Deficits   Identified Performance Deficits dressing, toileting, bathing, IADLs, functional mobility   Planned Therapy Interventions (OT) ADL retraining;bed mobility training;cognition;motor coordination training;strengthening;transfer training;home program guidelines   Clinical Decision Making Complexity (OT) low complexity   Risk & Benefits of therapy have been explained evaluation/treatment results reviewed;care plan/treatment goals reviewed;patient   Clinical Impression Comments Pt presenting below baseline for ADL, functional mobility,IADLs and activity tolerance/strength for these tasks. Pt fatigues quickly. Decreased strength noted with all grooming/dressing tasks. Pt motivated to improve IND   OT Total Evaluation Time   OT Eval, Low Complexity Minutes (89093) 10   OT Goals   Therapy Frequency (OT) 5 times/wk   OT Predicted Duration/Target Date for Goal Attainment 02/09/23   OT Goals Hygiene/Grooming;Upper Body Dressing;Lower Body Dressing;Upper Body Bathing;Bed Mobility;Toilet Transfer/Toileting;Aerobic Activity;Cognition;OT Goal 1   OT: Hygiene/Grooming independent   OT: Upper Body Dressing Independent   OT: Lower Body Dressing Independent   OT: Upper Body Bathing Independent   OT: Bed Mobility  Independent   OT: Toilet Transfer/Toileting Modified independent   OT: Cognitive Patient/caregiver will verbalize understanding of cognitive assessment results/recommendations as needed for safe discharge planning   OT: Goal 1 Pt will complete 12-15 mins of UE ex to increase ROM, strength, coordination in prep for ADLs.   Interventions   Interventions Quick Adds Self-Care/Home Management;Therapeutic Procedures/Exercise;Therapeutic Activity   Self-Care/Home Management   Self-Care/Home Mgmt/ADL, Compensatory, Meal Prep Minutes (50071) 10   Symptoms Noted During/After Treatment (Meal Preparation/Planning Training) fatigue   Treatment Detail/Skilled Intervention Facilitated ADLs to increase tolerance. Pt completed at EOB Pt demo bed mobility rolling/lsuoine<>sit w/ CGA. TH cued for hand placement. Fatigue/dizziness when in sitting. Dizziness resolved after few minutes. Completed grooming tasks/dressing after setup. Rest breaks between each task.   [place] Level (Grooming Training) contact guard   Therapeutic Activities   Therapeutic Activity Minutes (49843) 10   Symptoms noted during/after treatment fatigue   Treatment Detail/Skilled Intervention OT: Pt partic with AROM exerc in sitting at EOB. Tolerated x3 / 1 minute intervals, x2 sets. Rest breaks between sets to manage fatigue and mild SOB. Limited AROM d/t general weakness.   OT Discharge Planning   OT Plan 1/5 trach dome w/ PMV, monitor HR Tx: ADLs at EOB to build strength-endurance, ACE, core strengtheing, trf skills w/ FWW, standing tolerance, BUE strengthening, bring to clinic when able.   OT Discharge Recommendation (DC Rec) [facility]-Motivated patient will benefit from intensive, interdisciplinary therapy.  Anticipate will be able to tolerate 3 hours of therapy per day   OT Rationale for DC Rec Pt significantly below baseline for ADL performance. Independent at baseline. Limited by O2 needs, decreased ROM, strength and activity tim.Approp for  further therapy to increase strength, independence prior to returning to community.   OT Brief overview of current status Orders recieved for OT eval/tx- completed. Pt presenting below baseline for ADL, functional mobility,IADLs and activity tolerance/strength for these tasks. Pt fatigues quickly. Decreased strength noted with all grooming/dressing tasks.   Total Session Time   Timed Code Treatment Minutes 20   Total Session Time (sum of timed and untimed services) 30   Post Acute Settings Only   What unit is patient on? LTACH   Hearing, Vision, and Speech: Expression    Expression of Ideas and Wants Without difficulty   Hearing, Vision, and Speech: Understanding   Understanding Verbal/Non-Verbal Content Understands   Eating   Reason if not Attempted Tube feeding or oral hydration only source of nutrition   Oral Hygiene   Patient Performance Set up or Clean up assist   Discharge Goal (Admit only) IND   Wash Upper Body   Patient Performance Set up or Clean up assist   Toileting Hygiene   Patient Performance Dependent   Toilet Transfer   Patient Performance Substantial/maximal assist   Roll Left and Right   Patient Performance Set up or Clean up assist   Sit to Lying   Patient Performance Set up or Clean up assist   Lying to Sitting on Side of Bed   Patient Performance Set up or Clean up assist

## 2023-01-09 NOTE — CONSULTS
Addiction Medicine Follow Up    Principal Problem:    Bilateral MRSA cavitary pneumonia  Active Problems:    Anxiety with depression    Methamphetamine abuse (H)    Heroin abuse (H)    Pneumonia of both lungs due to MRSA (H)    Chronic respiratory failure requiring continuous mechanical ventilation through tracheostomy (H)    History of severe sepsis with cavitary pneumonia due to MRSA    Diabetes mellitus type 1 with Hx of DKA    Acute respiratory failure (H)    Costochondritis      Subjective  Chief complaint: has tracheostomy, on ventilator    HPI: Patient is known to our service because was seen when at [facility].   We started her on buprenorphine via microinductiond, as she was using heroin/fentanyl IV prior to admission.   Currently on 4 mg bid and seems to be tolerating this without oversedation or withdrawal.   And has been transferred to LTACH for weaning and completion of antibiotics.    Patient was admitted on 12/16 and found to have cavitary MRSA pneumonia and fungemia.   Presumed due to IV drug use, but no other site of infection was found.  She also had COVID and has Type I Diabetes mellitis.    Per hospitalist,  She is alert and oriented.  On ventilatory with 30 LPM.  She weighs 89 #.         Assessment and Plan:  1.  Opioid use disorder, severe  - on Suboxone 4 mg bid.   Will hold at this dose for now as to not cause any respiratory depression while trying to wean from ventilator.   Can adjust dose upward once respiratory status more stable.      2.  Methamphetamine use disorder, severe -  No specific pharmacotherapy for this substance.   Patient should go to CD treatment and would recommend we get Chem Health Assessment while patient is in LTACH, if possible.     Reportedly, father wants patient to go live with him in [place] after hospitalization and do CD treatment there.   Unclear what resources or insurance he has, but  Patient will be 28 yrs old in April 2023 and then would need own insurance.   SW  to evaluate this.        Peyton Larose MD  Addiction Medicine Service  [facility]  Page me (click here for Blank Larose)

## 2023-01-09 NOTE — PLAN OF CARE
Problem: Mechanical Ventilation Invasive  Goal: Optimal Device Function  Intervention: Optimize Device Care and Function  Recent Flowsheet Documentation  Taken 1/9/2023 0041 by Rebecca Contreras RN  Airway Safety Measures:   all equipment/monitors on and audible   oxygen flowmeter  Goal: Mechanical Ventilation Liberation  Intervention: Promote Extubation and Mechanical Ventilation Liberation  Recent Flowsheet Documentation  Taken 1/9/2023 0041 by Rebecca Contreras RN  Medication Review/Management: medications reviewed     Problem: Plan of Care - These are the overarching goals to be used throughout the patient stay.    Goal: Absence of Hospital-Acquired Illness or Injury  Intervention: Identify and Manage Fall Risk  Recent Flowsheet Documentation  Taken 1/9/2023 0041 by Rebecca Contreras RN  Safety Promotion/Fall Prevention: activity supervised  Intervention: Prevent Skin Injury  Recent Flowsheet Documentation  Taken 1/9/2023 0607 by Rebecca Contreras RN  Body Position: position changed independently  Taken 1/9/2023 0041 by Rebecca Contreras RN  Body Position: position changed independently  Intervention: Prevent Infection  Recent Flowsheet Documentation  Taken 1/9/2023 0041 by Rebecca Contreras RN  Infection Prevention:   environmental surveillance performed   hand hygiene promoted   personal protective equipment utilized   rest/sleep promoted   single patient room provided     Problem: Infection  Goal: Absence of Infection Signs and Symptoms  Intervention: Prevent or Manage Infection  Recent Flowsheet Documentation  Taken 1/9/2023 0041 by Rebecca Contreras RN  Isolation Precautions: contact precautions maintained     Problem: Artificial Airway  Goal: Optimal Device Function  Intervention: Optimize Device Care and Function  Recent Flowsheet Documentation  Taken 1/9/2023 0041 by Rebecca Contreras RN  Airway Safety Measures:   all equipment/monitors on and audible   oxygen flowmeter     Problem: Hyperglycemia  Goal:  Blood Glucose Level Within Targeted Range  Outcome: Progressing     Problem: Artificial Airway  Goal: Effective Communication  Outcome: Progressing     Goal Outcome Evaluation:    Pt slept through shift, awaking during cares. VSS, denied pain. New insulin and blood glucose monitoring orders put into place overnight,  and 249. On trach dome overnight, tolerated well. IV vanco drip continued into start of shift. Morning labs drawn from PICC line.

## 2023-01-09 NOTE — PROGRESS NOTES
01/09/23 1600   Name of Certified Therapeutic Rec Specialist   Name of Certified Therapeutic Rec Specialist NITA Mcbride   Appointment Type   Type of Therapeutic Rec Session Therapeutic Rec Assessment   General Information   Patient Profile Review See Profile for full history and prior level of function   Daily Contact with Relatives or Friends Phone call;Visit   Hobbies/Interests   Craft/Art Painting;Drawing;Other (comments)  (cooking)   Music   Music Preferences Rock;Other (comments)  (Sera, hip hop)   Listens to Recorded Music Yes   Brought Own Equipment No   Media   TV / Movies TV   Reading Magazines;Books   Reading Preferences Fiction  (fantasy)   Sports / Physical Activities   Outdoor Activities Other (see comments)  (hanging out outside)   Sports/Exercise Swim;Bike;Walks   Impression   Open to Socializing with Others Initiates with cues   Patient, family and / or staff in agreement with Plan of Care Yes   Treatment Plan   Type of Intervention Independent with activity   Equipment and Supplies While on Unit Radio;Other (comments)  (adult coloring supplies)   Assessment Assessment completed, pt was oriented to role of rec therapy and provided with leisure resource list. Pt expressed interest in and was provided with adult coloring supplies and radio. will provide check in for materials as needed

## 2023-01-09 NOTE — PLAN OF CARE
Problem: Artificial Airway  Goal: Effective Communication  Outcome: Progressing  Goal: Optimal Device Function  Outcome: Progressing  Intervention: Optimize Device Care and Function  Recent Flowsheet Documentation  Taken 1/9/2023 0031 by Mike House RT  Airway Safety Measures: all equipment/monitors on and audible  Taken 1/8/2023 2045 by Mike House RT  Airway Safety Measures: all equipment/monitors on and audible  Goal: Absence of Device-Related Skin or Tissue Injury  Outcome: Progressing     RT PROGRESS NOTE     DATA:     CURRENT SETTINGS:             TRACH TYPE / SIZE: #6 [place] taper guard, placed on 12/30.             MODE: TM/PMV              FIO2: 30%/30L     ACTION:             THERAPIES:                SUCTION:                           FREQUENCY: 2X                        AMOUNT: moderate to large                        CONSISTENCY: thick                        COLOR: white/yellow              SPONTANEOUS COUGH EFFORT/STRENGTH OF EFFORT (not elicited by suctioning): good loose productive                            WEANING PHASE: 2                        WEAN MODE: TM/PMV                        WEAN TIME: Cont as tim                        END WEAN REASON: ongoing     RESPONSE:             BS: clear             VITAL SIGNS: /72 (BP Location: Left arm, Patient Position: Supine, Cuff Size: Adult Regular)   Pulse 75   Temp 98.6  F (37  C) (Oral)   Resp 18   Wt 39.7 kg (87 lb 9.6 oz)   SpO2 94%   BMI 15.52 kg/m                EMOTIONAL NEEDS / CONCERNS:                  RISK FOR SELF DECANNULATION:                          RISK DUE TO:                          INTERVENTION/S IN PLACE IS/ARE:         NOTE / PLAN: Patient remains on TM/PMV, tolerating it well. Plan to possibly downsize trach and start capping soon. Will cont to monitor.

## 2023-01-09 NOTE — PROGRESS NOTES
This writer contacted on-call physician Dr. Batista at 0050 following q4h blood glucose check of 270 and seeking clarification as to whether or not insulin needed to be given prior to insulin dosing changes set to occur later in the morning. Upon review, Dr. Batista changed POCT testing to AC and HS testing with a 0200 recheck.

## 2023-01-09 NOTE — PROGRESS NOTES
Pulmonary Progress Note    Admit Date: 1/4/2023  CODE: Full Code    Assessment/Plan:   27 year old female with polysubstance abuse admitted on 12/16/2022 for DKA, respiratory failure from concurrent covid and bacterial pneumonia.  MRSA bacteremia and necrotizing pneumonia and candida bacteremia.  Has been making some improvements but has reached a plateau mostly limited by sedation/agitation and inability to wean from ventilator. Now s/p tracheostomy 12/30.  Transferred to LTAC 1/4.     Discussion of pertinent problems:  1. Acute hypoxic/hypercapnic respiratory failure: liberated from the vent on 1/5.  Tolerating continuous TM/PMV  2. Tracheostomy: 6 Shiley placed 12/30 6 percutaneously.  Sutures removed 1/4   3. Dysphagia s/p NJ tube: passed BDT yesterday and tolerating speaking valve continuously.  Reg diet started today   4. Complex b/l cavitary pneumonia 2/2 MRSA pneumonia: Last positive BAL: MRSA 12/25.  CT chest 12/23 shows the largest cavities in the right lower lobe appear decreased in size and multiple new small cavities in the right lung.  On Vanco also for MRSA bacteremia   5. Covid19 infection(resolved)  6. MRSA bacteremia.  No endocarditis on CATHY.  Last blood cx 12/26 neg.  On Vanco as above   7. Candida glabrata fungemia s/p Micafungin course   7. Malnutrition  8. Substance use disorder now on buprenorphine     Plan:  - Phase 3: cap as tolerated.  When she's able to manage secretions with minimal to no suctioning, will consider decannulation.    - monitor for s/s aspiration   - start IS  - Downsize trach to 6 Bivona  - Bronch hygiene: xopenex nebs PRN  - Repeat CT chest wo contrast in 4-6 weeks from 12/23 for cavitary lesion surveillance.   - IV vanco for MRSA bacteremia & PNA thru 1/16.  To repeat BC 1/20 per ID    Martín Nieves CNP  Pulmonary Medicine  [facility]  Pager [phone]    Subjective/Interim Events:   - feeling well, denies sob, pain, n/v.    - tolerating trach cap well after trach  downsize     Tracheal secretions:  Overnight - x1, lg, thin/thick  Yesterday - x3, sm/mod, thin/thick    Cough strength: moderate    Current phase of ventilator weaning pathway:  Phase 2    Ventilator weaning results: continuous TM/PMV since 1/5    Clinical status discussed today with respiratory therapist, patient    Medications:       dextrose       - MEDICATION INSTRUCTIONS -         buprenorphine  4 mg Sublingual BID     heparin ANTICOAGULANT  5,000 Units Subcutaneous BID     insulin aspart  1-7 Units Subcutaneous TID AC     insulin aspart  1-5 Units Subcutaneous At Bedtime     insulin glargine  8 Units Subcutaneous BID     lidocaine  1 patch Transdermal Q24h    And     lidocaine   Transdermal Q8H GINA     multivitamins w/minerals  15 mL Per Feeding Tube Daily     pantoprazole  40 mg Per Feeding Tube QAM AC     QUEtiapine  25 mg Oral TID     sodium chloride (PF)  10 mL Intracatheter Q8H     traZODone  100 mg Oral At Bedtime     vancomycin  1,000 mg Intravenous Q12H     cholecalciferol  50 mcg Oral Daily         Exam/Data:   Vitals  /68 (BP Location: Left arm)   Pulse 101   Temp 98.7  F (37.1  C) (Oral)   Resp 18   Wt 40.5 kg (89 lb 3.2 oz)   SpO2 98%   BMI 15.81 kg/m       I/O last 3 completed shifts:  In: 1300 [P.O.:1030; I.V.:90; NG/GT:180]  Out: -   Weight change: 0.726 kg (1 lb 9.6 oz)    Vent Mode: Trach collar  FiO2 (%): 30 %  Resp: 18      EXAM:  Gen: no distress sitting in chair capped  HEENT: NT, trach midline/intact, no stridor  CV: RRR, no m/g/r  Resp: CTAB; non-labored   Abd: soft, nontender, BS+  Skin: no rashes or lesions  Ext: no edema  Neuro: PERRL, nonfocal exam    ROS:  A 10-system review was obtained and is negative with the exception of the symptoms noted above.    Labs:  Complete Blood Count   Recent Labs   Lab 01/07/23  0617 01/04/23  0523 01/03/23  0406   WBC  --  8.8 12.7*   HGB 10.5* 8.6* 7.3*   PLT  --  631* 742*     Basic Metabolic Panel  Recent Labs   Lab 01/09/23  1158  01/09/23  0553 01/09/23  0119 01/09/23  0033 01/08/23  0813 01/08/23  0559 01/07/23  0808 01/07/23  0655 01/07/23  0617 01/06/23  0805 01/06/23  0601 01/04/23  0750 01/04/23  0523 01/03/23  0410 01/03/23  0406   NA  --  135*  --   --   --   --   --   --  134*  --   --   --   --   --  139   POTASSIUM  --  4.3  --   --   --   --   --   --  4.2  --   --   --  4.2  --  4.6   CHLORIDE  --  97*  --   --   --   --   --   --   --   --   --   --   --   --   --    CO2  --  27  --   --   --   --   --   --   --   --   --   --   --   --   --    BUN  --  18.1  --   --   --   --   --   --   --   --   --   --   --   --   --    CR  --  0.55  --   --   --  0.53  --  0.53  --   --  0.49*  --   --   --   --    * 238* 249* 270*   < >  --    < >  --  180*   < >  --    < >  --    < >  --     < > = values in this interval not displayed.     Venous Blood Gas  Recent Labs   Lab 01/07/23  0617   PHV 7.40   PCO2V 50   PO2V 36   HCO3V 29   STAN 6.2*       RADIOLOGY: Personally reviewed; radiology read below   XR Chest Port 1 View    Result Date: 1/5/2023  EXAM: XR CHEST PORT 1 VIEW LOCATION: [facility] DATE/TIME: 1/5/2023 1:10 PM INDICATION: resp failure s p trach; left lower rib pain COMPARISON: 12/30/2022     IMPRESSION: Tracheostomy in good position in the mid trachea. Feeding tube tip in the proximal jejunum and right upper extremity PICC at the SVC-RA junction. Patchy airspace opacities in the right lower lung, decreased compared to prior. No pneumothorax. Unchanged tiny pleural effusions. Normal cardiomediastinal silhouette.    XR Abdomen Port 1 View    Result Date: 1/4/2023  EXAM: XR ABDOMEN PORT 1 VIEW LOCATION: [facility] DATE/TIME: 1/4/2023 5:18 PM INDICATION: Enteric tube placement COMPARISON: 12/20/2022     IMPRESSION: Feeding tube terminates at the level of the proximal jejunum. Mild gaseous distention of the visualized bowel. Patchy pulmonary opacities and small pleural  effusion in the right lung base.

## 2023-01-10 ENCOUNTER — APPOINTMENT (OUTPATIENT)
Dept: PHYSICAL THERAPY | Facility: CLINIC | Age: 28
End: 2023-01-10
Attending: HOSPITALIST
Payer: COMMERCIAL

## 2023-01-10 ENCOUNTER — APPOINTMENT (OUTPATIENT)
Dept: OCCUPATIONAL THERAPY | Facility: CLINIC | Age: 28
End: 2023-01-10
Attending: HOSPITALIST
Payer: COMMERCIAL

## 2023-01-10 ENCOUNTER — APPOINTMENT (OUTPATIENT)
Dept: SPEECH THERAPY | Facility: CLINIC | Age: 28
End: 2023-01-10
Attending: HOSPITALIST
Payer: COMMERCIAL

## 2023-01-10 LAB
ALBUMIN SERPL BCG-MCNC: 3.1 G/DL (ref 3.5–5.2)
ALP SERPL-CCNC: 86 U/L (ref 35–104)
ALT SERPL W P-5'-P-CCNC: 9 U/L (ref 10–35)
ANION GAP SERPL CALCULATED.3IONS-SCNC: 10 MMOL/L (ref 7–15)
AST SERPL W P-5'-P-CCNC: 16 U/L (ref 10–35)
BILIRUB SERPL-MCNC: <0.2 MG/DL
BUN SERPL-MCNC: 18.2 MG/DL (ref 6–20)
CALCIUM SERPL-MCNC: 8.8 MG/DL (ref 8.6–10)
CHLORIDE SERPL-SCNC: 94 MMOL/L (ref 98–107)
CREAT SERPL-MCNC: 0.58 MG/DL (ref 0.51–0.95)
DEPRECATED HCO3 PLAS-SCNC: 29 MMOL/L (ref 22–29)
ERYTHROCYTE [DISTWIDTH] IN BLOOD BY AUTOMATED COUNT: 14.2 % (ref 10–15)
GFR SERPL CREATININE-BSD FRML MDRD: >90 ML/MIN/1.73M2
GLUCOSE BLDC GLUCOMTR-MCNC: 228 MG/DL (ref 70–99)
GLUCOSE BLDC GLUCOMTR-MCNC: 231 MG/DL (ref 70–99)
GLUCOSE BLDC GLUCOMTR-MCNC: 286 MG/DL (ref 70–99)
GLUCOSE BLDC GLUCOMTR-MCNC: 306 MG/DL (ref 70–99)
GLUCOSE SERPL-MCNC: 273 MG/DL (ref 70–99)
HCT VFR BLD AUTO: 30.8 % (ref 35–47)
HGB BLD-MCNC: 10 G/DL (ref 11.7–15.7)
MAGNESIUM SERPL-MCNC: 1.5 MG/DL (ref 1.7–2.3)
MCH RBC QN AUTO: 29.6 PG (ref 26.5–33)
MCHC RBC AUTO-ENTMCNC: 32.5 G/DL (ref 31.5–36.5)
MCV RBC AUTO: 91 FL (ref 78–100)
PHOSPHATE SERPL-MCNC: 3.7 MG/DL (ref 2.5–4.5)
PLATELET # BLD AUTO: 779 10E3/UL (ref 150–450)
POTASSIUM SERPL-SCNC: 4.7 MMOL/L (ref 3.4–5.3)
PROT SERPL-MCNC: 8.5 G/DL (ref 6.4–8.3)
RBC # BLD AUTO: 3.38 10E6/UL (ref 3.8–5.2)
SODIUM SERPL-SCNC: 133 MMOL/L (ref 136–145)
WBC # BLD AUTO: 9.9 10E3/UL (ref 4–11)

## 2023-01-10 PROCEDURE — 250N000013 HC RX MED GY IP 250 OP 250 PS 637: Performed by: HOSPITALIST

## 2023-01-10 PROCEDURE — 999N000009 HC STATISTIC AIRWAY CARE

## 2023-01-10 PROCEDURE — 999N000157 HC STATISTIC RCP TIME EA 10 MIN

## 2023-01-10 PROCEDURE — 84100 ASSAY OF PHOSPHORUS: CPT | Performed by: HOSPITALIST

## 2023-01-10 PROCEDURE — 258N000003 HC RX IP 258 OP 636: Performed by: HOSPITALIST

## 2023-01-10 PROCEDURE — 92526 ORAL FUNCTION THERAPY: CPT | Mod: GN | Performed by: SPEECH-LANGUAGE PATHOLOGIST

## 2023-01-10 PROCEDURE — 97110 THERAPEUTIC EXERCISES: CPT | Mod: GO | Performed by: OCCUPATIONAL THERAPIST

## 2023-01-10 PROCEDURE — 83735 ASSAY OF MAGNESIUM: CPT | Performed by: HOSPITALIST

## 2023-01-10 PROCEDURE — 85027 COMPLETE CBC AUTOMATED: CPT | Performed by: HOSPITALIST

## 2023-01-10 PROCEDURE — 120N000017 HC R&B RESPIRATORY CARE

## 2023-01-10 PROCEDURE — 250N000011 HC RX IP 250 OP 636: Performed by: HOSPITALIST

## 2023-01-10 PROCEDURE — 97116 GAIT TRAINING THERAPY: CPT | Mod: GP | Performed by: PHYSICAL THERAPIST

## 2023-01-10 PROCEDURE — 97535 SELF CARE MNGMENT TRAINING: CPT | Mod: GO | Performed by: OCCUPATIONAL THERAPIST

## 2023-01-10 PROCEDURE — 99233 SBSQ HOSP IP/OBS HIGH 50: CPT | Performed by: HOSPITALIST

## 2023-01-10 PROCEDURE — 80053 COMPREHEN METABOLIC PANEL: CPT | Performed by: HOSPITALIST

## 2023-01-10 PROCEDURE — 999N000123 HC STATISTIC OXYGEN O2DAILY TECH TIME

## 2023-01-10 RX ORDER — MAGNESIUM SULFATE HEPTAHYDRATE 40 MG/ML
2 INJECTION, SOLUTION INTRAVENOUS ONCE
Status: COMPLETED | OUTPATIENT
Start: 2023-01-10 | End: 2023-01-11

## 2023-01-10 RX ORDER — PANTOPRAZOLE SODIUM 40 MG/1
40 TABLET, DELAYED RELEASE ORAL
Status: DISCONTINUED | OUTPATIENT
Start: 2023-01-11 | End: 2023-01-18 | Stop reason: HOSPADM

## 2023-01-10 RX ORDER — MULTIPLE VITAMINS W/ MINERALS TAB 9MG-400MCG
1 TAB ORAL DAILY
Status: DISCONTINUED | OUTPATIENT
Start: 2023-01-11 | End: 2023-01-13

## 2023-01-10 RX ADMIN — Medication 50 MCG: at 08:30

## 2023-01-10 RX ADMIN — MAGNESIUM SULFATE HEPTAHYDRATE 2 G: 40 INJECTION, SOLUTION INTRAVENOUS at 23:37

## 2023-01-10 RX ADMIN — QUETIAPINE FUMARATE 25 MG: 25 TABLET ORAL at 13:29

## 2023-01-10 RX ADMIN — ALTEPLASE 2 MG: 2.2 INJECTION, POWDER, LYOPHILIZED, FOR SOLUTION INTRAVENOUS at 08:35

## 2023-01-10 RX ADMIN — Medication 5 MG: at 22:57

## 2023-01-10 RX ADMIN — VANCOMYCIN HYDROCHLORIDE 1000 MG: 5 INJECTION, POWDER, LYOPHILIZED, FOR SOLUTION INTRAVENOUS at 10:29

## 2023-01-10 RX ADMIN — MULTIVIT AND MINERALS-FERROUS GLUCONATE 9 MG IRON/15 ML ORAL LIQUID 15 ML: at 08:20

## 2023-01-10 RX ADMIN — BUPRENORPHINE HYDROCHLORIDE 4 MG: 2 TABLET SUBLINGUAL at 08:20

## 2023-01-10 RX ADMIN — VANCOMYCIN HYDROCHLORIDE 1000 MG: 5 INJECTION, POWDER, LYOPHILIZED, FOR SOLUTION INTRAVENOUS at 22:57

## 2023-01-10 RX ADMIN — TRAZODONE HYDROCHLORIDE 100 MG: 50 TABLET ORAL at 21:35

## 2023-01-10 RX ADMIN — ZOLPIDEM TARTRATE 10 MG: 5 TABLET ORAL at 21:35

## 2023-01-10 RX ADMIN — LIDOCAINE 1 PATCH: 4 PATCH TOPICAL at 21:32

## 2023-01-10 RX ADMIN — QUETIAPINE FUMARATE 25 MG: 25 TABLET ORAL at 08:20

## 2023-01-10 RX ADMIN — QUETIAPINE FUMARATE 25 MG: 25 TABLET ORAL at 21:35

## 2023-01-10 RX ADMIN — Medication 40 MG: at 08:13

## 2023-01-10 RX ADMIN — BUPRENORPHINE HYDROCHLORIDE 4 MG: 2 TABLET SUBLINGUAL at 21:35

## 2023-01-10 ASSESSMENT — ACTIVITIES OF DAILY LIVING (ADL)
ADLS_ACUITY_SCORE: 45

## 2023-01-10 NOTE — PLAN OF CARE
Goal Outcome Evaluation:               Problem: Mechanical Ventilation Invasive  Goal: Effective Communication  Intervention: Ensure Effective Communication  Recent Flowsheet Documentation  Taken 1/9/2023 1643 by Mel Ayala, RN  Family/Support System Care: involvement promoted  Trust Relationship/Rapport:    care explained    choices provided     Problem: Plan of Care - These are the overarching goals to be used throughout the patient stay.    Goal: Absence of Hospital-Acquired Illness or Injury  Intervention: Identify and Manage Fall Risk  Recent Flowsheet Documentation  Taken 1/9/2023 1643 by Mel Ayala, RN  Safety Promotion/Fall Prevention: room door open    Pt in bed during shift sleeping off and on.  Pt did sit in chair for meal eating more than 50%.  Pt has no complaints of pain.  Pt did refuse Heparin shot stating it hurts too much.  Education provided.    Pt took medication crushed in apple sauce.  Pt requested Ambien for sleep given at 2144.

## 2023-01-10 NOTE — PLAN OF CARE
7 PM to 7 AM RT PROGRESS NOTE     DATA:     CURRENT SETTINGS:             TRACH TYPE / SIZE:  6 Bivona placed 1/9/23             MODE:   Capped/ 2 LPM NC    ACTION:             SUCTION:                           FREQUENCY:   X 1                        AMOUNT:   Copious                        CONSISTENCY:   thick                        COLOR:   pale yellow             SPONTANEOUS COUGH EFFORT/STRENGTH OF EFFORT (not elicited by suctioning): Moderate strength                              WEANING PHASE:   3    WEAN MODE: Capped/ NC    WEAN TIME:  Since 0945 Monday 1/9/23     END WEAN REASON:   Ongoing     RESPONSE:             BS:   Clear and dim             VITAL SIGNS:   SATs 95-96%, HR 84-91, RR 18-20             RISK FOR SELF DECANNULATION:  No    NOTE / PLAN:   Pt tolerating capping. She states that she isn't able to cough and clear all her secretions and had to call for sxn x 1 this shift. Continue with same.      Problem: Artificial Airway  Goal: Effective Communication  Outcome: Progressing  Goal: Optimal Device Function  Outcome: Progressing  Intervention: Optimize Device Care and Function  Recent Flowsheet Documentation  Taken 1/10/2023 0049 by Angelic Lee, RT  Airway Safety Measures:    all equipment/monitors on and audible    manual resuscitator/mask/valve in room  Goal: Absence of Device-Related Skin or Tissue Injury  Outcome: Progressing

## 2023-01-10 NOTE — PROGRESS NOTES
NUTRITION BRIEF NOTE:      See RD note 1/6 for full reassessment details    Recommendations for Provider:  TF removal per MD, patient eating well and willing to try additional snacks/supplements to further support weight maintenance. Recommend removal as able.     New findings in last 24 hours:    Diet order:   Orders Placed This Encounter      Combination Diet Regular Diet; Moderate Consistent Carb (60 g CHO per Meal) Diet      Provider discontinued TF orders yesterday with transition to regular diet textures    Patient tolerating regular diet well, eating % of meals and having good appetite    Weight: stable 88-90#    Labs: reviewed     Meds: reviewed    Interventions:    Glucerna at 10am and HS (chocolate)    Cheese and crackers at 2pm    Entered orders for regimen outlined above    Collaboration and Referral of care: Discussed patient during interdisciplinary care rounds this morning    Please page/consult as needed.    Simin Nguyen RDN, LD  Clinical Dietitian

## 2023-01-10 NOTE — PLAN OF CARE
Problem: Plan of Care - These are the overarching goals to be used throughout the patient stay.    Goal: Absence of Hospital-Acquired Illness or Injury  Intervention: Identify and Manage Fall Risk  Recent Flowsheet Documentation  Safety Promotion/Fall Prevention: assistive device/personal items within reach     Problem: Infection  Goal: Absence of Infection Signs and Symptoms  Intervention: Prevent or Manage Infection  Recent Flowsheet Documentation  Isolation Precautions: contact precautions maintained      Problem: Infection  Goal: Absence of Infection Signs and Symptoms  Intervention: Prevent or Manage Infection  Recent Flowsheet Documentation  Isolation Precautions: contact precautions maintained      Goal Outcome Evaluation:         Patient slept all night, requested not to be disturbed until she woke up at 6:30 AM. Assisted with transfer to commode, tolerated activity.   PICC line flushed, but no blood return this morning, ordered alteplase in attempt to improve patency.

## 2023-01-10 NOTE — PLAN OF CARE
Problem: Artificial Airway  Goal: Effective Communication  Outcome: Progressing  Goal: Optimal Device Function  Outcome: Progressing  Goal: Absence of Device-Related Skin or Tissue Injury  Outcome: Progressing   Goal Outcome Evaluation:  RT PROGRESS NOTE     DATA:     CURRENT SETTINGS:             TRACH TYPE / SIZE:# 6 BIVONA TTS Changed on 01/09/2023              MODE: Trach Capped                       (TM/PMV)             FIO2:2LNC     ACTION:             THERAPIES:                SUCTION:                           FREQUENCY: X 2                        AMOUNT: Large                         CONSISTENCY:thick                        COLOR:  Pale yellow              SPONTANEOUS COUGH EFFORT/STRENGTH OF EFFORT (not elicited by suctioning): fair                              WEANING PHASE: 3                        WEAN MODE:TRACH Capped on 2LNC                        WEAN TIME:Since 1/9/23                        END WEAN REASON: Ongoing       RESPONSE:             BS: Clear Decreased              VITAL SIGNS:Sat 95-97%,HR , RR 10-22             EMOTIONAL NEEDS / CONCERNS:                  RISK FOR SELF DECANNULATION:                         RISK DUE TO:                          INTERVENTION/S IN PLACE IS/ARE:       NOTE / PLAN: Cont. Current plan of care

## 2023-01-10 NOTE — PLAN OF CARE
Problem: Infection  Goal: Absence of Infection Signs and Symptoms  Intervention: Prevent or Manage Infection  Recent Flowsheet Documentation  Taken 1/10/2023 0835 by Tobias Brooks RN  Isolation Precautions: contact precautions maintained     Problem: Hyperglycemia  Goal: Blood Glucose Level Within Targeted Range  Outcome: Progressing   Goal Outcome Evaluation:         Vital signs are WDL.On IV Vancomycin.Denies pain.Transfer from bed to chair with one assist.  Ate 100% and 75% of foods.No tube feeding back up bolus given this shift.Blood sugar this shift is 286 and 306 mg/dil.Insulin coverage is given.    Refused Heparin subcutaneous saying med is painful and she is moving from chair to bed and ambulating occasionally.

## 2023-01-10 NOTE — PROGRESS NOTES
[facility]    Medicine Progress Note - Hospitalist Service    Date of Admission:  1/4/2023    Brief Summary of Hospital Course:   28 yo female with polysubstance abuse admitted 12/16/2 for DKA, respiratory failure from concurrent COVID and bacterial pneumonia. MRSA bacteremia and necrotizing pneumonia and candida bacteremia. Has been making some improvement. Tracheostomy 12/30. Transferred to [facility] 1/4/23. 1/10/23 tolerating trach capping.     [facility] course:  1/9:  Dry skin added aquaphor, doing well with trach capped, downsized.  Now able to eat general diet, will discontinue tube feeds and monitor glucose    1/10/23:   -No acute events. Tolerating trach capping. Reports insomnia, difficulty with falling asleep and staying asleep. Started melatonin . Two soft BMs overnight. Low Mg, started on Mg replacement protocol. Tolerating regular diet. Monitoring caloric intake. Will hopefully be able to remove NJ tube soon.     Follow-up:   -Repeat CT chest wo contrast in 4-6 weeks from 12/23 for cavitary lesion surveillance  -IV vanco for MRSA bacteremia & PNA thru 1/16.  To repeat BC 1/20 per ID    Assessment & Plan   Principal Problem:    Bilateral MRSA cavitary pneumonia (1/4/2023)  Active Problems:    Pneumonia of both lungs due to MRSA (H) (1/4/2023)    Chronic respiratory failure requiring continuous mechanical ventilation through tracheostomy (H) (1/4/2023)    Diabetes mellitus type 1 with Hx of DKA (1/4/2023)    Methamphetamine abuse (H) (2/14/2019)    Heroin abuse (H) (2/14/2019)    History of severe sepsis with cavitary pneumonia due to MRSA (1/4/2023)    Acute respiratory failure (H) (1/5/2023)    Costochondritis (1/5/2023)    Anxiety with depression (2/13/2019)    Costochondritis    Bilateral cavitary pneumonia due to MRSA  Acute Respiratory Failure, s/p tracheostomy  -Continue with vancomycin, pharmacy will dose.   -Continue with mechanical ventilation.    -Pulmonology is consulted for weaning off  ventilation  -1/10/: tolerating trach capping  -Repeat CT chest wo contrast in 4-6 weeks from 12/23 for cavitary lesion surveillance  -IV vanco for MRSA bacteremia & PNA thru 1/16.  To repeat BC 1/20 per ID    Malnutrition  Dysphagia s/p NJ  -Passed BDT 1/8/23  -Regular diet started 1/9/23  -Nutrition consulted.   -Continue glucerna bid.   -Monitor caloric intake.   -Consider discontinuation of tube NJ tube depending on caloric intake.     Type 1 diabetes  History of DKA and noncompliance with medication  -Blood glucose is higher than yesterday.    -Continue with current dose of Lantus and SSI.    -Monitor blood glucose levels and titrate Lantus dosage.    Polysubstance abuse   -Patient has been on different medications and is not in withdrawal.    -Currently is stable on buprenorphine 4 mg sublingual 2 times daily, hydroxyzine as needed and trazodone for depression.    Anxiety and depression   Insomnia  -Controlled with hydroxyzine as needed and trazodone 100 mg nightly  -Melatonin ordered at bedtime  -Ordered ambien prn    Costochondritis   -Lidoderm and PRN Motrin       Diet: Combination Diet Regular Diet; Moderate Consistent Carb (60 g CHO per Meal) Diet    DVT Prophylaxis: Pneumatic Compression Devices  [place] Catheter: Not present  Lines: PRESENT      PICC 12/21/22 Triple Lumen Right Basilic OK to use nurse aware-Site Assessment: WDL      Cardiac Monitoring: None  Code Status: Full Code      Clinically Significant Risk Factors                         # Severe Malnutrition: based on nutrition assessment        Disposition Plan     Expected Discharge Date: 01/11/2023      Destination: home with help/services            Jeanette Serrano MD  Hospitalist Service  [facility]  Securely message with [employer] (more info)  Text page via [employer] Paging/Directory   ______________________________________________________________________    Interval History   No acute events overnight.   No abdominal pain or nausea or vomiting.   Two  loose BM overnight.   Difficulty with sleeping a night. Patient requests for lorazepam, but understands that it could be addictive. She is willing to try melatonin.   Tolerating regular diet.   No chest pain , no sob      Physical Exam   Vital Signs: Temp: 98.6  F (37  C) Temp src: Oral BP: 102/65 Pulse: 76   Resp: 20 SpO2: 99 % O2 Device: Nasal cannula with humidification Oxygen Delivery: 2 LPM  Weight: 89 lbs 3.2 oz  General: thin and pale young lady laying on her back in her bed.  No acute distress  HEENT: NC, AT, EOMI, Moist oral mucosal moisture. Nasal feeding tube in place.   Neck: Supple, tracheostomy in place, trach capped   Chest: CTAB, no rhonchi/ wheeze  Heart: S1 S2 RRR, no murmur  Abdomen: S, NT, ND, BS+   Extremities: no edema or cyanosis or clubbing or deformity.  Neurological: grossly nonfocal. Moves all extremities equally.      Data     I have personally reviewed the following data over the past 24 hrs:       Range & Units 01/06/23 04:15 01/06/23 08:05 01/06/23 12:22 01/06/23 16:38 01/06/23 20:43 01/07/23 00:20 01/07/23 03:54 01/07/23 08:08 01/07/23 12:13 01/07/23 16:51 01/07/23 19:58 01/08/23 00:12 01/08/23 03:51 01/08/23 08:13 01/08/23 11:31 01/08/23 15:41   GLUCOSE BY METER POCT 70 - 99 mg/dL 181 (H) 270 (H) 228 (H) 199 (H) 260 (H) 189 (H) 144 (H) 184 (H) 223 (H) 215 (H) 175 (H) 232 (H) 115 (H) 163 (H) 194 (H) 259 (H)     Imaging results reviewed over the past 24 hrs:   No results found for this or any previous visit (from the past 24 hour(s)).

## 2023-01-11 ENCOUNTER — APPOINTMENT (OUTPATIENT)
Dept: OCCUPATIONAL THERAPY | Facility: CLINIC | Age: 28
End: 2023-01-11
Attending: HOSPITALIST
Payer: COMMERCIAL

## 2023-01-11 ENCOUNTER — APPOINTMENT (OUTPATIENT)
Dept: PHYSICAL THERAPY | Facility: CLINIC | Age: 28
End: 2023-01-11
Attending: HOSPITALIST
Payer: COMMERCIAL

## 2023-01-11 LAB
BACTERIA BLD CULT: NO GROWTH
CREAT SERPL-MCNC: 0.5 MG/DL (ref 0.51–0.95)
GFR SERPL CREATININE-BSD FRML MDRD: >90 ML/MIN/1.73M2
GLUCOSE BLDC GLUCOMTR-MCNC: 233 MG/DL (ref 70–99)
GLUCOSE BLDC GLUCOMTR-MCNC: 237 MG/DL (ref 70–99)
GLUCOSE BLDC GLUCOMTR-MCNC: 290 MG/DL (ref 70–99)
GLUCOSE BLDC GLUCOMTR-MCNC: 315 MG/DL (ref 70–99)
MAGNESIUM SERPL-MCNC: 2.2 MG/DL (ref 1.7–2.3)

## 2023-01-11 PROCEDURE — 120N000017 HC R&B RESPIRATORY CARE

## 2023-01-11 PROCEDURE — 250N000013 HC RX MED GY IP 250 OP 250 PS 637: Performed by: HOSPITALIST

## 2023-01-11 PROCEDURE — 999N000009 HC STATISTIC AIRWAY CARE

## 2023-01-11 PROCEDURE — 999N000157 HC STATISTIC RCP TIME EA 10 MIN

## 2023-01-11 PROCEDURE — 97535 SELF CARE MNGMENT TRAINING: CPT | Mod: GO | Performed by: OCCUPATIONAL THERAPIST

## 2023-01-11 PROCEDURE — 999N000123 HC STATISTIC OXYGEN O2DAILY TECH TIME

## 2023-01-11 PROCEDURE — 99232 SBSQ HOSP IP/OBS MODERATE 35: CPT | Performed by: NURSE PRACTITIONER

## 2023-01-11 PROCEDURE — 97116 GAIT TRAINING THERAPY: CPT | Mod: GP | Performed by: PHYSICAL THERAPIST

## 2023-01-11 PROCEDURE — 258N000003 HC RX IP 258 OP 636: Performed by: HOSPITALIST

## 2023-01-11 PROCEDURE — 94799 UNLISTED PULMONARY SVC/PX: CPT

## 2023-01-11 PROCEDURE — 82565 ASSAY OF CREATININE: CPT | Performed by: HOSPITALIST

## 2023-01-11 PROCEDURE — 99233 SBSQ HOSP IP/OBS HIGH 50: CPT | Performed by: HOSPITALIST

## 2023-01-11 PROCEDURE — 97110 THERAPEUTIC EXERCISES: CPT | Mod: GP | Performed by: PHYSICAL THERAPIST

## 2023-01-11 PROCEDURE — 250N000011 HC RX IP 250 OP 636: Performed by: HOSPITALIST

## 2023-01-11 PROCEDURE — 999N000125 HC STATISTIC PATIENT MED CONFERENCE < 30 MIN: Performed by: OCCUPATIONAL THERAPIST

## 2023-01-11 PROCEDURE — 83735 ASSAY OF MAGNESIUM: CPT | Performed by: HOSPITALIST

## 2023-01-11 PROCEDURE — 99366 TEAM CONF W/PAT BY HC PROF: CPT | Performed by: PHYSICAL THERAPIST

## 2023-01-11 PROCEDURE — 999N000125 HC STATISTIC PATIENT MED CONFERENCE < 30 MIN: Performed by: SPEECH-LANGUAGE PATHOLOGIST

## 2023-01-11 RX ADMIN — IBUPROFEN 600 MG: 200 TABLET, FILM COATED ORAL at 08:54

## 2023-01-11 RX ADMIN — Medication 5 MG: at 21:39

## 2023-01-11 RX ADMIN — Medication 50 MCG: at 08:45

## 2023-01-11 RX ADMIN — QUETIAPINE FUMARATE 25 MG: 25 TABLET ORAL at 21:39

## 2023-01-11 RX ADMIN — TRAZODONE HYDROCHLORIDE 100 MG: 50 TABLET ORAL at 21:40

## 2023-01-11 RX ADMIN — VANCOMYCIN HYDROCHLORIDE 1000 MG: 5 INJECTION, POWDER, LYOPHILIZED, FOR SOLUTION INTRAVENOUS at 09:35

## 2023-01-11 RX ADMIN — BUPRENORPHINE HYDROCHLORIDE 4 MG: 2 TABLET SUBLINGUAL at 21:36

## 2023-01-11 RX ADMIN — PANTOPRAZOLE SODIUM 40 MG: 40 TABLET, DELAYED RELEASE ORAL at 06:45

## 2023-01-11 RX ADMIN — LIDOCAINE 1 PATCH: 4 PATCH TOPICAL at 21:34

## 2023-01-11 RX ADMIN — ZOLPIDEM TARTRATE 10 MG: 5 TABLET ORAL at 21:44

## 2023-01-11 RX ADMIN — QUETIAPINE FUMARATE 25 MG: 25 TABLET ORAL at 14:46

## 2023-01-11 RX ADMIN — VANCOMYCIN HYDROCHLORIDE 1000 MG: 5 INJECTION, POWDER, LYOPHILIZED, FOR SOLUTION INTRAVENOUS at 21:49

## 2023-01-11 RX ADMIN — QUETIAPINE FUMARATE 25 MG: 25 TABLET ORAL at 08:45

## 2023-01-11 RX ADMIN — Medication 1 TABLET: at 08:45

## 2023-01-11 RX ADMIN — BUPRENORPHINE HYDROCHLORIDE 4 MG: 2 TABLET SUBLINGUAL at 08:45

## 2023-01-11 ASSESSMENT — ACTIVITIES OF DAILY LIVING (ADL)
ADLS_ACUITY_SCORE: 45
ADLS_ACUITY_SCORE: 49
ADLS_ACUITY_SCORE: 45
ADLS_ACUITY_SCORE: 49

## 2023-01-11 NOTE — PROGRESS NOTES
REHAB CARE PROGRESSION MEETING:    Medical Team Conference total time 30 minutes.  I was present for 17 minutes with patient, and father on the phone.  See progress noted dated today alycia by Tal Jin,  for details.

## 2023-01-11 NOTE — PLAN OF CARE
Problem: Mechanical Ventilation Invasive  Goal: Optimal Device Function  Outcome: Progressing  Intervention: Optimize Device Care and Function  Recent Flowsheet Documentation  Taken 1/7/2023 1552 by Robert Souza, RT  Airway Safety Measures: all equipment/monitors on and audible  Taken 1/7/2023 1130 by Robert Souza, RT  Airway Safety Measures: all equipment/monitors on and audible  Taken 1/7/2023 0800 by Robert Souza, RT  Airway Safety Measures: all equipment/monitors on and audible  Goal: Mechanical Ventilation Liberation  Outcome: Progressing  Goal: Absence of Device-Related Skin and Tissue Injury  Outcome: Progressing   RT PROGRESS NOTE     DATA:     CURRENT SETTINGS:             TRACH TYPE / SIZE: # 6 bivona 1/9             MODE:   cap/2L             FIO2:       ACTION:             THERAPIES:                SUCTION:                           FREQUENCY:   X1                        AMOUNT:   SMALL                        CONSISTENCY:  THICK                        COLOR:   pale/yellow             SPONTANEOUS COUGH EFFORT/STRENGTH OF EFFORT (not elicited by suctioning):                               WEANING PHASE:   3                        WEAN MODE:    cap/2L                        WEAN TIME:   as tim                        END WEAN REASON:   as tim     RESPONSE:             BS:   crs             VITAL SIGNS:   SPO2 93-96%, RR 20-22             EMOTIONAL NEEDS / CONCERNS:              RISK FOR SELF DECANNULATION:                          RISK DUE TO:                         INTERVENTION/S IN PLACE IS/ARE:       NOTE / PLAN:   Goal Outcome Evaluation:    Coct trach capped on 2L/NC, tolerating well, suctioned x1 for small thick pale/yellow ,  Will cont current care plan

## 2023-01-11 NOTE — PLAN OF CARE
Problem: Artificial Airway  Goal: Effective Communication  Outcome: Progressing  Goal: Optimal Device Function  Outcome: Progressing  Goal: Absence of Device-Related Skin or Tissue Injury  Outcome: Progressing   RT PROGRESS NOTE     DATA:     CURRENT SETTINGS:             TRACH TYPE / SIZE: #6 Bivona 1/9/23             MODE:   CAPPED              FIO2:   2LNC      ACTION:             THERAPIES:                SUCTION:                           FREQUENCY:   none this shift                        AMOUNT:                           CONSISTENCY:                           COLOR:                SPONTANEOUS COUGH EFFORT/STRENGTH OF EFFORT (not elicited by suctioning):                               WEANING PHASE:   3                        WEAN MODE:    capped                        WEAN TIME:   on going                         END WEAN REASON:        RESPONSE:             BS:   clear             VITAL SIGNS:   Blood pressure 98/61, pulse 80, temperature 98.2  F (36.8  C), temperature source Oral, resp. rate 18, weight 40.5 kg (89 lb 4.8 oz), SpO2 98 %, not currently breastfeeding.                 EMOTIONAL NEEDS / CONCERNS:                  RISK FOR SELF DECANNULATION:                          RISK DUE TO:                          INTERVENTION/S IN PLACE IS/ARE:         NOTE / PLAN:   remains capped. Tolerating well.

## 2023-01-11 NOTE — PROGRESS NOTES
REHAB CARE PROGRESSION MEETING:    Medical Team Conference total time 25 minutes.  Fariha Pinzon PT present for 18 minutes.  See progress noted dated 1/11/2023 alycia by Tal Jin,  for details.

## 2023-01-11 NOTE — PLAN OF CARE
Problem: Plan of Care - These are the overarching goals to be used throughout the patient stay.    Goal: Plan of Care Review  Description: The Plan of Care Review/Shift note should be completed every shift.  The Outcome Evaluation is a brief statement about your assessment that the patient is improving, declining, or no change.  This information will be displayed automatically on your shift note.  Outcome: Progressing     Problem: Infection  Goal: Absence of Infection Signs and Symptoms  Outcome: Progressing  Intervention: Prevent or Manage Infection  Recent Flowsheet Documentation  Taken 1/11/2023 0900 by Rob Wallis RN  Isolation Precautions: contact precautions maintained   Goal Outcome Evaluation:  Patient is alert and oriented x4, meds and care compliant. She reported headache, rated at 5/10, Ibuprofen given with relief. Picc line is intact and patent, kumar port has no blood but able to flush. Pt has care conference done today.  and 315, she is on sliding scale insulin and carb count. NJ d/c'd at 1500.  Explained meds and procedure to patient.

## 2023-01-11 NOTE — PROGRESS NOTES
Care Progression meeting  Staff:  PT-Doreen, ST-Yosi, OT-Ernesto, RD-adrien, SW-Tal  Family: Father-Jeronimo    Medical:Doing well.  Trach Capped.  IV abx until 01/16/23.  NJ to be removed    PT:  Doing well.  Walkmend ing 200ft walker/SBA.  Slightly unsteady due to weakness.  Will work on stairs.  Recommend walker at this time    OT: Making progress.  Working on independence in room, working on morning routine.  Patient doing well with rest breaks.  Standing at sink with rest breaks.    ST: Continue assessment of swallow.  Good voice, good volume.  Recommending swallow strategies, chin tuck, no straws.  Will redo VFSS.  .     RD: Eating well.  NJ to be removed.  Monitor weight    SW: current discharge goal is for patient to discharge to her father-Jeronimo's home in [place].  Father plans to drive patient home to [place].  Writer informed father and patient they should plan on a discharge date around 01/17/23.            Tal Norman, [facility] [place]  [phone]

## 2023-01-11 NOTE — PROGRESS NOTES
[facility]    Medicine Progress Note - Hospitalist Service    Date of Admission:  1/4/2023    Brief Summary of Hospital Course:   28 yo female with polysubstance abuse admitted 12/16/2 for DKA, respiratory failure from concurrent COVID and bacterial pneumonia. MRSA bacteremia and necrotizing pneumonia and candida bacteremia. Has been making some improvement. Tracheostomy 12/30. Transferred to [facility] 1/4/23. 1/10/23 tolerating trach capping.     [facility] course:  1/9:  Dry skin added aquaphor, doing well with trach capped, downsized.  Now able to eat general diet, will discontinue tube feeds and monitor glucose    1/10/23:   -No acute events. Tolerating trach capping. Reports insomnia, difficulty with falling asleep and staying asleep. Started melatonin . Two soft BMs overnight. Low Mg, started on Mg replacement protocol. Tolerating regular diet. Monitoring caloric intake. Will hopefully be able to remove NJ tube soon.     1/11/23- patient wants her NJ tube to be removed as it is very uncomfortable. She denies chest pain, SOB, nausea.emesis.she is eating well.  Reviewed with Nutritionist - mimi to remove NJ tube    Follow-up:   -Repeat CT chest wo contrast in 4-6 weeks from 12/23 for cavitary lesion surveillance  -IV vanco for MRSA bacteremia & PNA thru 1/16.  To repeat BC 1/20 per ID    Assessment & Plan   Principal Problem:    Bilateral MRSA cavitary pneumonia (1/4/2023)  Active Problems:    Pneumonia of both lungs due to MRSA (H) (1/4/2023)    Chronic respiratory failure requiring continuous mechanical ventilation through tracheostomy (H) (1/4/2023)    Diabetes mellitus type 1 with Hx of DKA (1/4/2023)    Methamphetamine abuse (H) (2/14/2019)    Heroin abuse (H) (2/14/2019)    History of severe sepsis with cavitary pneumonia due to MRSA (1/4/2023)    Acute respiratory failure (H) (1/5/2023)    Costochondritis (1/5/2023)    Anxiety with depression (2/13/2019)    Costochondritis    Bilateral cavitary pneumonia due  to MRSA  Acute Respiratory Failure, s/p tracheostomy  -Continue with vancomycin, pharmacy will dose.   -Continue with mechanical ventilation.    -Pulmonology is consulted for weaning off ventilation  -1/10/: tolerating trach capping  -Repeat CT chest wo contrast in 4-6 weeks from 12/23 for cavitary lesion surveillance  -IV vanco for MRSA bacteremia & PNA thru 1/16.  To repeat BC 1/20 per ID    Malnutrition  Dysphagia s/p NJ  -Passed BDT 1/8/23  -Regular diet started 1/9/23  -Nutrition consulted.   -Continue glucerna bid.   -Monitor caloric intake.   - discontinue NJ tube    Type 1 diabetes  History of DKA and noncompliance with medication  -Blood glucose is higher than yesterday.    -Continue with current dose of Lantus and SSI.    - added meal time insulin  - diabetic ed on board    Polysubstance abuse   -Patient has been on different medications and is not in withdrawal.    -Currently is stable on buprenorphine 4 mg sublingual 2 times daily, hydroxyzine as needed and trazodone for depression.    Anxiety and depression   Insomnia  -Controlled with hydroxyzine as needed and trazodone 100 mg nightly  -Melatonin ordered at bedtime  -Ordered ambien prn    Costochondritis   -Lidoderm and PRN Motrin       Diet: Combination Diet Regular Diet; Moderate Consistent Carb (60 g CHO per Meal) Diet  Snacks/Supplements Adult: Glucerna; Between Meals  Snacks/Supplements Adult: Other; see comments; Between Meals    DVT Prophylaxis: Pneumatic Compression Devices  [place] Catheter: Not present  Lines: PRESENT    PICC 12/21/22 Triple Lumen Right Basilic OK to use nurse aware-Site Assessment: WDL      Cardiac Monitoring: None  Code Status: Full Code            # Hypomagnesemia: Lowest Mg = 1.5 mg/dL in last 2 days, will replace as needed   # Hypoalbuminemia: Lowest albumin = 3.1 g/dL at 1/10/2023 10:24 AM, will monitor as appropriate            # Severe Malnutrition: based on nutrition assessment        Disposition Plan     Expected  Discharge Date: 01/11/2023      Destination: home with help/services            Aracely Tompkins MD  Hospitalist Service  [facility]  Securely message with [facility] (more info)  Text page via [facility] Paging/Directory   ______________________________________________________________________    Interval History   No acute events overnight.   No abdominal pain or nausea or vomiting. .   Tolerating regular diet.   No chest pain , no sob      Physical Exam   Vital Signs: Temp: 98.4  F (36.9  C) Temp src: Oral BP: 107/74 Pulse: (P) 80   Resp: (P) 20 SpO2: (P) 98 % O2 Device: (P) Nasal cannula Oxygen Delivery: (P) 2 LPM  Weight: 89 lbs 4.8 oz  General: thin and pale young lady laying on her back in her bed.  No acute distress  HEENT: NC, AT, EOMI, Moist oral mucosal moisture. Nasal feeding tube in place  Neck: Supple, tracheostomy in place, trach capped   Chest: CTAB, no rhonchi/ wheeze  Heart: S1 S2 RRR, no murmur  Abdomen: S, NT, ND, BS+   Extremities: no edema or cyanosis or clubbing or deformity.  Neurological: grossly nonfocal. Moves all extremities equally.      Data     I have personally reviewed the following data over the past 24 hrs:       Range & Units 01/06/23 04:15 01/06/23 08:05 01/06/23 12:22 01/06/23 16:38 01/06/23 20:43 01/07/23 00:20 01/07/23 03:54 01/07/23 08:08 01/07/23 12:13 01/07/23 16:51 01/07/23 19:58 01/08/23 00:12 01/08/23 03:51 01/08/23 08:13 01/08/23 11:31 01/08/23 15:41   GLUCOSE BY METER POCT 70 - 99 mg/dL 181 (H) 270 (H) 228 (H) 199 (H) 260 (H) 189 (H) 144 (H) 184 (H) 223 (H) 215 (H) 175 (H) 232 (H) 115 (H) 163 (H) 194 (H) 259 (H)     Imaging results reviewed over the past 24 hrs:   No results found for this or any previous visit (from the past 24 hour(s)).

## 2023-01-11 NOTE — PLAN OF CARE
Problem: Plan of Care - These are the overarching goals to be used throughout the patient stay.    Goal: Plan of Care Review  Description: The Plan of Care Review/Shift note should be completed every shift.  The Outcome Evaluation is a brief statement about your assessment that the patient is improving, declining, or no change.  This information will be displayed automatically on your shift note.  Outcome: Progressing           Pt sleeps on sheryl shift, awakens for meds/cares. Pt's father Jeronimo phones for update, which was given by writer. Per her father, pt is scheduled for a care conference today. HS meds given via feeding tube per pt's request. She was hesitant to take EC Protonix whole this am. Pt worried that she wouldn't be able to swallow med. Was able to take with encouragement from writer using chin tuck method to swallow, only took a small sip of water. Started on Magnesium protocol last eve. The charge nurse spoke with MD who directed staff to run protocol off of Mag level drawn on Tues afternoon, no need to re draw level. Per MD recheck Mag level in am following bump. Pt received one bump IV Magnesium. F/U Mag level drawn this am, results pending. Given PRN Ambien 10 mg @ hs per pt's request. Slept soundly t/o the night, denies pain. Assisted onto bed side commode @ end of night shift to void. O2 sats gradually dropped to 77% on 2 liters with capped trach. Pt became anxious stating she couldn't breath. Oxygen briefly increased to 5 liters, assisted back into bed & suctioned trach. Removed mod amt pale, yellow/white secretions. Interventions effective, pt felt much better. She also c/o stuffy nose, assisted pt with blowing her nose & cleaning nose with Q-tip. O2 turned back down to usual 2 liters NC. Sub-Q Heparin was discontinued last eve per MD. Ate hs snack skyler crackers & sun flower butter with 3 oz  cranberry derek.

## 2023-01-11 NOTE — PROGRESS NOTES
REHAB CARE PROGRESSION MEETING:    Medical Team Conference total time 25 minutes.  TH present for 25 minutes.  See progress noted dated 1/11/23 alycia by Tal Jin,  for details.       Ernesto Mejia, OTRL/LENCHOIS

## 2023-01-11 NOTE — PROGRESS NOTES
Pulmonary Progress Note    Admit Date: 1/4/2023  CODE: Full Code    Assessment/Plan:   27 year old female with polysubstance abuse admitted on 12/16/2022 for DKA, respiratory failure from concurrent covid and bacterial pneumonia.  MRSA bacteremia and necrotizing pneumonia and candida bacteremia.  Has been making some improvements but has reached a plateau mostly limited by sedation/agitation and inability to wean from ventilator. Now s/p tracheostomy 12/30.  Transferred to LT[facility] 1/4.     Discussion of pertinent problems:  1. Acute hypoxic/hypercapnic respiratory failure: liberated from the vent on 1/5.  Tolerating continuous capping since 1/9  2. Tracheostomy: 6 Shiley placed 12/30.  Downsized to 6 Bivona on 1/9  3. Dysphagia s/p NJ tube: passed BDT.  On Reg diet   4. Complex b/l cavitary pneumonia 2/2 MRSA pneumonia: Last positive BAL: MRSA 12/25.  CT chest 12/23 shows the largest cavities in the right lower lobe appear decreased in size and multiple new small cavities in the right lung.  On Vanco also for MRSA bacteremia   5. Covid19 infection(resolved)  6. MRSA bacteremia.  No endocarditis on CATHY.  Last blood cx 12/26 neg.  On Vanco as above   7. Candida glabrata fungemia s/p Micafungin course   7. Malnutrition  8. Substance use disorder on buprenorphine     Plan:  - Phase 3: cap as tolerated.  When she's able to manage secretions with minimal to no suctioning, will consider decannulation.  Still requires deep tracheal suctioning  - Start flutter valve to help with expectoration.  If continues to require deep suctioning, will downsize trach to 5 Bivona.  Hopefully NJ can be removed today too as it seems she's meeting her nutritional needs via PO   - Cont IS  - Bronch hygiene: xopenex nebs PRN  - Repeat CT chest wo contrast in 4-6 weeks from 12/23 for cavitary lesion surveillance.   - IV vanco for MRSA bacteremia & PNA thru 1/16.  To repeat BC 1/20 per ID    Martín Nieves CNP  Pulmonary Medicine  [facility]  [place]  Pager [phone]    Subjective/Interim Events:   - overnight had O2 desat to 77%, increased from 2 to 5L via NC and suctioned mor moderate amount of secretions which resolved hypoxia and now back to 2L.    - denies sob, pain, n/v.  Eager to get her NJ and trach out     Tracheal secretions:  Overnight - x1, mod, thin/thick  Yesterday - x2, large, thick    Cough strength: moderate    Current phase of ventilator weaning pathway:  Phase 2    Ventilator weaning results: continuous capping since 1/9    Clinical status discussed today with respiratory therapist, patient    Medications:       dextrose       - MEDICATION INSTRUCTIONS -         buprenorphine  4 mg Sublingual BID     [START ON 1/12/2023] insulin aspart   Subcutaneous Daily with breakfast     insulin aspart   Subcutaneous Daily with lunch     insulin aspart   Subcutaneous Daily with supper     insulin aspart  1-7 Units Subcutaneous TID AC     insulin aspart  1-5 Units Subcutaneous At Bedtime     insulin glargine  8 Units Subcutaneous BID     lidocaine  1 patch Transdermal Q24h    And     lidocaine   Transdermal Q8H GINA     melatonin  5 mg Oral At Bedtime     multivitamin w/minerals  1 tablet Oral Daily     pantoprazole  40 mg Oral QAM AC     QUEtiapine  25 mg Oral TID     sodium chloride (PF)  10 mL Intracatheter Q8H     traZODone  100 mg Oral At Bedtime     vancomycin  1,000 mg Intravenous Q12H     cholecalciferol  50 mcg Oral Daily         Exam/Data:   Vitals  /74 (BP Location: Left arm)   Pulse 81   Temp 98.4  F (36.9  C) (Oral)   Resp 20   Wt 40.5 kg (89 lb 4.8 oz)   SpO2 95%   BMI 15.82 kg/m       I/O last 3 completed shifts:  In: 1701 [P.O.:810; I.V.:591; NG/GT:300]  Out: -   Weight change:     Vent Mode: Other (see comments) (Trach capped)  Resp: 20      EXAM:  Gen: no distress lying in bed   HEENT: NT, trach midline/intact, no stridor  CV: RRR, no m/g/r  Resp: CTAB; non-labored   Abd: soft, nontender, BS+  Skin: no rashes or  lesions  Ext: no edema  Neuro: PERRL, nonfocal exam    ROS:  A 10-system review was obtained and is negative with the exception of the symptoms noted above.    Labs:  Complete Blood Count   Recent Labs   Lab 01/10/23  1024 01/07/23  0617   WBC 9.9  --    HGB 10.0* 10.5*   *  --      Basic Metabolic Panel  Recent Labs   Lab 01/11/23  1204 01/11/23  0801 01/11/23  0600 01/10/23  2146 01/10/23  1717 01/10/23  1140 01/10/23  1024 01/09/23  0814 01/09/23  0553 01/08/23  0813 01/08/23  0559 01/07/23  0655 01/07/23  0617   NA  --   --   --   --   --   --  133*  --  135*  --   --   --  134*   POTASSIUM  --   --   --   --   --   --  4.7  --  4.3  --   --   --  4.2   CHLORIDE  --   --   --   --   --   --  94*  --  97*  --   --   --   --    CO2  --   --   --   --   --   --  29  --  27  --   --   --   --    BUN  --   --   --   --   --   --  18.2  --  18.1  --   --   --   --    CR  --   --  0.50*  --   --   --  0.58  --  0.55  --  0.53   < >  --    * 237*  --  231* 228*   < > 273*   < > 238*   < >  --    < > 180*    < > = values in this interval not displayed.     Venous Blood Gas  Recent Labs   Lab 01/07/23 0617   PHV 7.40   PCO2V 50   PO2V 36   HCO3V 29   STAN 6.2*       RADIOLOGY: Personally reviewed; radiology read below   XR Chest Port 1 View    Result Date: 1/5/2023  EXAM: XR CHEST PORT 1 VIEW LOCATION: [facility] DATE/TIME: 1/5/2023 1:10 PM INDICATION: resp failure s p trach; left lower rib pain COMPARISON: 12/30/2022     IMPRESSION: Tracheostomy in good position in the mid trachea. Feeding tube tip in the proximal jejunum and right upper extremity PICC at the SVC-RA junction. Patchy airspace opacities in the right lower lung, decreased compared to prior. No pneumothorax. Unchanged tiny pleural effusions. Normal cardiomediastinal silhouette.    XR Abdomen Port 1 View    Result Date: 1/4/2023  EXAM: XR ABDOMEN PORT 1 VIEW LOCATION: [facility] DATE/TIME:  1/4/2023 5:18 PM INDICATION: Enteric tube placement COMPARISON: 12/20/2022     IMPRESSION: Feeding tube terminates at the level of the proximal jejunum. Mild gaseous distention of the visualized bowel. Patchy pulmonary opacities and small pleural effusion in the right lung base.

## 2023-01-12 ENCOUNTER — APPOINTMENT (OUTPATIENT)
Dept: OCCUPATIONAL THERAPY | Facility: CLINIC | Age: 28
End: 2023-01-12
Attending: HOSPITALIST
Payer: COMMERCIAL

## 2023-01-12 ENCOUNTER — APPOINTMENT (OUTPATIENT)
Dept: PHYSICAL THERAPY | Facility: CLINIC | Age: 28
End: 2023-01-12
Attending: HOSPITALIST
Payer: COMMERCIAL

## 2023-01-12 LAB
GLUCOSE BLDC GLUCOMTR-MCNC: 212 MG/DL (ref 70–99)
GLUCOSE BLDC GLUCOMTR-MCNC: 296 MG/DL (ref 70–99)
GLUCOSE BLDC GLUCOMTR-MCNC: 297 MG/DL (ref 70–99)
GLUCOSE BLDC GLUCOMTR-MCNC: 324 MG/DL (ref 70–99)
MAGNESIUM SERPL-MCNC: 1.8 MG/DL (ref 1.7–2.3)

## 2023-01-12 PROCEDURE — 999N000258 HC STATISTIC TRACH CHANGE

## 2023-01-12 PROCEDURE — 250N000013 HC RX MED GY IP 250 OP 250 PS 637: Performed by: HOSPITALIST

## 2023-01-12 PROCEDURE — 999N000123 HC STATISTIC OXYGEN O2DAILY TECH TIME

## 2023-01-12 PROCEDURE — 99232 SBSQ HOSP IP/OBS MODERATE 35: CPT | Performed by: NURSE PRACTITIONER

## 2023-01-12 PROCEDURE — 83735 ASSAY OF MAGNESIUM: CPT | Performed by: HOSPITALIST

## 2023-01-12 PROCEDURE — 97530 THERAPEUTIC ACTIVITIES: CPT | Mod: GO | Performed by: OCCUPATIONAL THERAPIST

## 2023-01-12 PROCEDURE — 99233 SBSQ HOSP IP/OBS HIGH 50: CPT | Performed by: STUDENT IN AN ORGANIZED HEALTH CARE EDUCATION/TRAINING PROGRAM

## 2023-01-12 PROCEDURE — 31600 PLANNED TRACHEOSTOMY: CPT

## 2023-01-12 PROCEDURE — 94799 UNLISTED PULMONARY SVC/PX: CPT

## 2023-01-12 PROCEDURE — 999N000009 HC STATISTIC AIRWAY CARE

## 2023-01-12 PROCEDURE — 258N000003 HC RX IP 258 OP 636: Performed by: HOSPITALIST

## 2023-01-12 PROCEDURE — 97535 SELF CARE MNGMENT TRAINING: CPT | Mod: GO | Performed by: OCCUPATIONAL THERAPIST

## 2023-01-12 PROCEDURE — 999N000157 HC STATISTIC RCP TIME EA 10 MIN

## 2023-01-12 PROCEDURE — 120N000017 HC R&B RESPIRATORY CARE

## 2023-01-12 PROCEDURE — 97116 GAIT TRAINING THERAPY: CPT | Mod: GP | Performed by: PHYSICAL THERAPIST

## 2023-01-12 PROCEDURE — 250N000011 HC RX IP 250 OP 636: Performed by: HOSPITALIST

## 2023-01-12 RX ADMIN — QUETIAPINE FUMARATE 25 MG: 25 TABLET ORAL at 14:53

## 2023-01-12 RX ADMIN — Medication 5 MG: at 21:32

## 2023-01-12 RX ADMIN — LIDOCAINE 1 PATCH: 4 PATCH TOPICAL at 21:30

## 2023-01-12 RX ADMIN — VANCOMYCIN HYDROCHLORIDE 1000 MG: 5 INJECTION, POWDER, LYOPHILIZED, FOR SOLUTION INTRAVENOUS at 09:41

## 2023-01-12 RX ADMIN — PANTOPRAZOLE SODIUM 40 MG: 40 TABLET, DELAYED RELEASE ORAL at 08:24

## 2023-01-12 RX ADMIN — Medication 1 TABLET: at 08:24

## 2023-01-12 RX ADMIN — TRAZODONE HYDROCHLORIDE 100 MG: 50 TABLET ORAL at 21:32

## 2023-01-12 RX ADMIN — VANCOMYCIN HYDROCHLORIDE 1000 MG: 5 INJECTION, POWDER, LYOPHILIZED, FOR SOLUTION INTRAVENOUS at 21:49

## 2023-01-12 RX ADMIN — QUETIAPINE FUMARATE 25 MG: 25 TABLET ORAL at 08:24

## 2023-01-12 RX ADMIN — Medication 50 MCG: at 08:24

## 2023-01-12 RX ADMIN — BUPRENORPHINE HYDROCHLORIDE 4 MG: 2 TABLET SUBLINGUAL at 08:24

## 2023-01-12 RX ADMIN — IBUPROFEN 600 MG: 200 TABLET, FILM COATED ORAL at 09:40

## 2023-01-12 RX ADMIN — ALTEPLASE 2 MG: 2.2 INJECTION, POWDER, LYOPHILIZED, FOR SOLUTION INTRAVENOUS at 17:48

## 2023-01-12 RX ADMIN — INSULIN ASPART 3 UNITS: 100 INJECTION, SOLUTION INTRAVENOUS; SUBCUTANEOUS at 08:21

## 2023-01-12 RX ADMIN — QUETIAPINE FUMARATE 25 MG: 25 TABLET ORAL at 21:34

## 2023-01-12 RX ADMIN — ZOLPIDEM TARTRATE 10 MG: 5 TABLET ORAL at 21:31

## 2023-01-12 RX ADMIN — BUPRENORPHINE HYDROCHLORIDE 4 MG: 2 TABLET SUBLINGUAL at 21:31

## 2023-01-12 ASSESSMENT — ACTIVITIES OF DAILY LIVING (ADL)
ADLS_ACUITY_SCORE: 49
ADLS_ACUITY_SCORE: 41
ADLS_ACUITY_SCORE: 49
ADLS_ACUITY_SCORE: 41
ADLS_ACUITY_SCORE: 49
ADLS_ACUITY_SCORE: 41
ADLS_ACUITY_SCORE: 41
ADLS_ACUITY_SCORE: 49

## 2023-01-12 NOTE — PROGRESS NOTES
[facility] SERVICES ([facility])  SPIRITUAL ASSESSMENT Progress Note  [facility]. Unit [facility]     REFERRAL SOURCE: Self     Followed  Up with Bridget after leaving a bible for her. She had two visitors, an aunt and a cousin. They had brought a recovery Bible for her and a notebook.       PLAN: Will followup the next time I am on site (Tuesday or Thursday)     Alma Welch, Ph.D., [facility]      [facility]S available 24/7 for emergency requests/referrals, either by having the on-call  paged or by entering an ASAP/STAT consult in Epic (this will also page the on-call )

## 2023-01-12 NOTE — PLAN OF CARE
"7 PM to 7 AM RT PROGRESS NOTE     DATA:     CURRENT SETTINGS:             TRACH TYPE / SIZE:  6 Bivona placed 1/9/23             MODE:   Capped/ 2 LPM NC    ACTION:   THERAPIES: Flutter QID. Pt declined to demonstrate as she wanted to sleep. She did say she performs on her own. Reinforced that the flutter will help her mobilize secretions.              SUCTION:                           FREQUENCY: X 1                          AMOUNT:  copious                        CONSISTENCY:  thin/thick                         COLOR:   pale yellow             SPONTANEOUS COUGH EFFORT/STRENGTH OF EFFORT (not elicited by suctioning): Moderate strength                              WEANING PHASE:   3    WEAN MODE: Capped/ NC    WEAN TIME:  Since 0945 Monday 1/9/23     END WEAN REASON:   Ongoing     RESPONSE:             BS:   Clear              VITAL SIGNS:   SATs 98%, HR 77, RR 18-22, a little shallow w/sleep             RISK FOR SELF DECANNULATION:  No    NOTE / PLAN:    Sxnd at pts request. Pt was supine. She states she coughed some up but \"there's still some in there\" Sxnd for copious. Consider # 5 Bivona and capped for another week? Continue with same.      Problem: Artificial Airway  Goal: Effective Communication  Outcome: Progressing  Goal: Optimal Device Function  Outcome: Progressing  Intervention: Optimize Device Care and Function  Recent Flowsheet Documentation  Taken 1/10/2023 0049 by Angelic Lee, RT  Airway Safety Measures:    all equipment/monitors on and audible    manual resuscitator/mask/valve in room  Goal: Absence of Device-Related Skin or Tissue Injury  Outcome: Progressing       "

## 2023-01-12 NOTE — PLAN OF CARE
Problem: Infection  Goal: Absence of Infection Signs and Symptoms  Intervention: Prevent or Manage Infection  Recent Flowsheet Documentation  Taken 1/11/2023 1909 by Blaire Saucedo RN  Isolation Precautions: contact precautions maintained     Problem: Plan of Care - These are the overarching goals to be used throughout the patient stay.    Goal: Optimal Comfort and Wellbeing  Outcome: Progressing  Intervention: Provide Person-Centered Care  Recent Flowsheet Documentation  Taken 1/11/2023 1909 by Blaire Saucedo RN  Trust Relationship/Rapport: care explained   Goal Outcome Evaluation:       Patient was cooperative with cares and tolerated oral medications well.. Contact precaution maintained. No complain of pain.

## 2023-01-12 NOTE — PLAN OF CARE
Problem: Plan of Care - These are the overarching goals to be used throughout the patient stay.    Goal: Plan of Care Review  Description: The Plan of Care Review/Shift note should be completed every shift.  The Outcome Evaluation is a brief statement about your assessment that the patient is improving, declining, or no change.  This information will be displayed automatically on your shift note.  Outcome: Progressing   Goal Outcome Evaluation:         Patient was calm and cooperative with cares, denied pain and slept most of the shift, patient was assisted to the bathroom x 1 , had I medium BM All other cares done.

## 2023-01-12 NOTE — PLAN OF CARE
Problem: Mechanical Ventilation Invasive  Goal: Optimal Device Function  Outcome: Progressing  Intervention: Optimize Device Care and Function  Recent Flowsheet Documentation  Taken 1/7/2023 1552 by Robert Souza, RT  Airway Safety Measures: all equipment/monitors on and audible  Taken 1/7/2023 1130 by Robert Souza, RT  Airway Safety Measures: all equipment/monitors on and audible  Taken 1/7/2023 0800 by Robert Souza, RT  Airway Safety Measures: all equipment/monitors on and audible  Goal: Mechanical Ventilation Liberation  Outcome: Progressing  Goal: Absence of Device-Related Skin and Tissue Injury  Outcome: Progressing   RT PROGRESS NOTE     DATA:     CURRENT SETTINGS:             TRACH TYPE / SIZE: # 5  bivona 1/12             MODE:   cap/RA             FIO2:       ACTION:             THERAPIES:                SUCTION:  none in this shift                         FREQUENCY:                           AMOUNT:                           CONSISTENCY:                          COLOR:                SPONTANEOUS COUGH EFFORT/STRENGTH OF EFFORT (not elicited by suctioning):                               WEANING PHASE:   3                        WEAN MODE:    cap/RA                        WEAN TIME:   as tim                        END WEAN REASON:   as tim     RESPONSE:             BS:   crs             VITAL SIGNS:   SPO2 93-96%, RR 20-22             EMOTIONAL NEEDS / CONCERNS:              RISK FOR SELF DECANNULATION:                          RISK DUE TO:                         INTERVENTION/S IN PLACE IS/ARE:       NOTE / PLAN:   Goal Outcome Evaluation:    Coct trach capped on Room air, tolerating well  Will cont current care plan

## 2023-01-12 NOTE — PROGRESS NOTES
[facility]    Medicine Progress Note - Hospitalist Service    Date of Admission:  1/4/2023    Brief Summary of Hospital Course:   28 yo female with polysubstance abuse admitted 12/16/2 for DKA, respiratory failure from concurrent COVID and bacterial pneumonia. MRSA bacteremia and necrotizing pneumonia and candida bacteremia. Has been making some improvement. Tracheostomy 12/30. Transferred to [facility] 1/4/23. 1/10/23 tolerating trach capping.     [facility] course:  1/9:  Dry skin added aquaphor, doing well with trach capped, downsized.  Now able to eat general diet, will discontinue tube feeds and monitor glucose    1/10/23:   -No acute events. Tolerating trach capping. Reports insomnia, difficulty with falling asleep and staying asleep. Started melatonin . Two soft BMs overnight. Low Mg, started on Mg replacement protocol. Tolerating regular diet. Monitoring caloric intake. Will hopefully be able to remove NJ tube soon.     1/11/23- patient wants her NJ tube to be removed as it is very uncomfortable. She denies chest pain, SOB, nausea.emesis.she is eating well.  Reviewed with Nutritionist - mimi to remove NJ tube    1/12 - increasing lantus to 10u and mealtime to 1:10 insulin:carb ratio today. NJ out y/d. Doing well otherwise. Motivated in change her life around.     Follow-up:   -Repeat CT chest wo contrast in 4-6 weeks from 12/23 for cavitary lesion surveillance  -IV vanco for MRSA bacteremia & PNA thru 1/16.  To repeat BC 1/20 per ID    Assessment & Plan   Principal Problem:    Bilateral MRSA cavitary pneumonia (1/4/2023)  Active Problems:    Pneumonia of both lungs due to MRSA (H) (1/4/2023)    Chronic respiratory failure requiring continuous mechanical ventilation through tracheostomy (H) (1/4/2023)    Diabetes mellitus type 1 with Hx of DKA (1/4/2023)    Methamphetamine abuse (H) (2/14/2019)    Heroin abuse (H) (2/14/2019)    History of severe sepsis with cavitary pneumonia due to MRSA (1/4/2023)    Acute  respiratory failure (H) (1/5/2023)    Costochondritis (1/5/2023)    Anxiety with depression (2/13/2019)    Costochondritis    Bilateral cavitary pneumonia due to MRSA  Acute Respiratory Failure, s/p tracheostomy  -Continue with vancomycin, pharmacy will dose.   -Continue with mechanical ventilation.    -Pulmonology is consulted for weaning off ventilation  -1/10/: tolerating trach capping  -Repeat CT chest wo contrast in 4-6 weeks from 12/23 for cavitary lesion surveillance  -IV vanco for MRSA bacteremia & PNA thru 1/16.  To repeat BC 1/20 per ID    Malnutrition  Dysphagia s/p NJ  -Passed BDT 1/8/23  -Regular diet started 1/9/23  -Nutrition consulted.   -Continue glucerna bid.   -Monitor caloric intake.   - discontinue NJ tube    Type 1 diabetes  History of DKA and noncompliance with medication  -Blood glucose is higher than yesterday.    -increase to lantus 10u BID  - increase novolog to 1:10 insulin:carb ratio today TID AC  - diabetic ed on board    Polysubstance abuse   -Patient has been on different medications and is not in withdrawal.    -Currently is stable on buprenorphine 4 mg sublingual 2 times daily, hydroxyzine as needed and trazodone for depression.    Anxiety and depression   Insomnia  -Controlled with hydroxyzine as needed and trazodone 100 mg nightly  -Melatonin ordered at bedtime  -Ordered ambien prn    Costochondritis   -Lidoderm and PRN Motrin       Diet: Combination Diet Regular Diet; Moderate Consistent Carb (60 g CHO per Meal) Diet  Snacks/Supplements Adult: Glucerna; Between Meals  Snacks/Supplements Adult: Other; see comments; Between Meals    DVT Prophylaxis: Pneumatic Compression Devices  Sheffield Catheter: Not present  Lines: PRESENT    PICC 12/21/22 Triple Lumen Right Basilic OK to use nurse aware-Site Assessment: WDL      Cardiac Monitoring: None  Code Status: Full Code            # Hypomagnesemia: Lowest Mg = 1.5 mg/dL in last 2 days, will replace as needed   # Hypoalbuminemia: Lowest  albumin = 3.1 g/dL at 1/10/2023 10:24 AM, will monitor as appropriate            # Severe Malnutrition: based on nutrition assessment        Disposition Plan     Expected Discharge Date: 01/11/2023      Destination: home with help/services            Rex St MD  Hospitalist Service  [facility]  Securely message with [employer] (more info)  Text page via [employer] Paging/Directory   ______________________________________________________________________    Interval History   - no acute events ovn  - pt doing well today  - thankful to have NJ tube out  - breathing well with decreased nasal/respiratory secretions since NJ out  - discussed increasing insulin today and will monitor over next few days  - she has a positive outlook and attitude to change her life  - no other acute concerns      Physical Exam   Vital Signs: Temp: 98  F (36.7  C) Temp src: Oral BP: 107/68 Pulse: 73   Resp: 20 SpO2: 96 % O2 Device: Nasal cannula with humidification Oxygen Delivery: 2 LPM  Weight: 89 lbs 4.8 oz  General: thin and pale young lady laying on her back in her bed.  No acute distress  HEENT: NC, AT, EOMI, Moist oral mucosal moisture. Nasal feeding tube in place  Neck: Supple, tracheostomy in place, trach capped   Chest: CTAB, no rhonchi/ wheeze  Heart: S1 S2 RRR, no murmur  Abdomen: S, NT, ND, BS+   Extremities: no edema or cyanosis or clubbing or deformity.  Neurological: grossly nonfocal. Moves all extremities equally.      Data          Range & Units 01/06/23 04:15 01/06/23 08:05 01/06/23 12:22 01/06/23 16:38 01/06/23 20:43 01/07/23 00:20 01/07/23 03:54 01/07/23 08:08 01/07/23 12:13 01/07/23 16:51 01/07/23 19:58 01/08/23 00:12 01/08/23 03:51 01/08/23 08:13 01/08/23 11:31 01/08/23 15:41   GLUCOSE BY METER POCT 70 - 99 mg/dL 181 (H) 270 (H) 228 (H) 199 (H) 260 (H) 189 (H) 144 (H) 184 (H) 223 (H) 215 (H) 175 (H) 232 (H) 115 (H) 163 (H) 194 (H) 259 (H)     Imaging results reviewed over the past 24 hrs:   No results found for this or any  previous visit (from the past 24 hour(s)).

## 2023-01-12 NOTE — PLAN OF CARE
Problem: Plan of Care - These are the overarching goals to be used throughout the patient stay.    Goal: Plan of Care Review  Description: The Plan of Care Review/Shift note should be completed every shift.  The Outcome Evaluation is a brief statement about your assessment that the patient is improving, declining, or no change.  This information will be displayed automatically on your shift note.  Outcome: Progressing     Problem: Infection  Goal: Absence of Infection Signs and Symptoms  Outcome: Progressing  Intervention: Prevent or Manage Infection  Recent Flowsheet Documentation  Taken 1/12/2023 0900 by Rob Wallis RN  Isolation Precautions: contact precautions maintained     Problem: Hyperglycemia  Goal: Blood Glucose Level Within Targeted Range  Outcome: Progressing   Goal Outcome Evaluation:  Patient is alert and oriented x4, meds compliant.  ans 297. She is on sliding scale and carb count insulin. She reported headache this am, rated at 4/10, ibuprofen given with relief. She ate 100% for both meals. Pt is on magnesium protocol and level is 1.8, will check level tomorrow. Explained meds and care plan to patient.

## 2023-01-12 NOTE — PROGRESS NOTES
Pulmonary Progress Note    Admit Date: 1/4/2023  CODE: Full Code    Assessment/Plan:   27 year old female with polysubstance abuse admitted on 12/16/2022 for DKA, respiratory failure from concurrent covid and bacterial pneumonia.  MRSA bacteremia and necrotizing pneumonia and candida bacteremia.  Has been making some improvements but has reached a plateau mostly limited by sedation/agitation and inability to wean from ventilator. Now s/p tracheostomy 12/30.  Transferred to [facility] 1/4.     Discussion of pertinent problems:  1. Acute hypoxic/hypercapnic respiratory failure: liberated from the vent on 1/5.  Tolerating continuous capping since 1/9.  Still requiring deep tracheal suctioning but slowly improving   2. Tracheostomy: 6 Shiley placed 12/30.  Downsized to 6 Bivona on 1/9  3. Dysphagia: passed BDT.  On Reg diet, NJ removed   4. Complex b/l cavitary pneumonia 2/2 MRSA pneumonia: Last positive BAL: MRSA 12/25.  CT chest 12/23 shows the largest cavities in the right lower lobe appear decreased in size and multiple new small cavities in the right lung.  On Vanco also for MRSA bacteremia   5. Covid19 infection(resolved)  6. MRSA bacteremia.  No endocarditis on CATHY.  Last blood cx 12/26 neg.  Vanco as above   7. Candida glabrata fungemia s/p Micafungin course   8. Substance use disorder on buprenorphine     Plan:  - Phase 3: cap as tolerated.  When she's able to manage secretions with minimal to no suctioning, will decannulate.  Still requires deep tracheal suctioning, but her cough is improving and now coughing up sputum by self.    - Downsize trach to 5 Bivona to aide in expectoration   - Cont IS & flutter valve   - Bronch hygiene: xopenex nebs PRN  - Repeat CT chest wo contrast in 4-6 weeks from 12/23 for cavitary lesion surveillance.   - IV vanco for MRSA bacteremia & PNA thru 1/16.  To repeat BC 1/20 per ID    Martín Nieves CNP  Pulmonary Medicine  [facility]  Pager [phone]    Subjective/Interim Events:    - O2 titrated down in now on room air   - denies sob, pain, n/v.  Feels her cough is improving.  Walking more    Tracheal secretions:  Overnight - x1, copious, thin/thick  Yesterday - x1, sm, thick    Cough strength: moderate    Current phase of ventilator weaning pathway:  Phase 3    Ventilator weaning results: continuous capping since 1/9    Clinical status discussed today with respiratory therapist, patient    Medications:       dextrose       - MEDICATION INSTRUCTIONS -         buprenorphine  4 mg Sublingual BID     insulin aspart   Subcutaneous Daily with breakfast     insulin aspart   Subcutaneous Daily with lunch     insulin aspart   Subcutaneous Daily with supper     insulin aspart  1-7 Units Subcutaneous TID AC     insulin aspart  1-5 Units Subcutaneous At Bedtime     insulin glargine  8 Units Subcutaneous BID     lidocaine  1 patch Transdermal Q24h    And     lidocaine   Transdermal Q8H GINA     melatonin  5 mg Oral At Bedtime     multivitamin w/minerals  1 tablet Oral Daily     pantoprazole  40 mg Oral QAM AC     QUEtiapine  25 mg Oral TID     sodium chloride (PF)  10 mL Intracatheter Q8H     traZODone  100 mg Oral At Bedtime     vancomycin  1,000 mg Intravenous Q12H     cholecalciferol  50 mcg Oral Daily         Exam/Data:   Vitals  /68 (BP Location: Left arm)   Pulse 75   Temp 98  F (36.7  C) (Oral)   Resp 20   Wt 40.5 kg (89 lb 4.8 oz)   SpO2 95%   BMI 15.82 kg/m       I/O last 3 completed shifts:  In: 1024 [P.O.:720; I.V.:304]  Out: 750 [Urine:750]  Weight change:     FiO2 (%): 21 %  Resp: 20      EXAM:  Gen: no distress sitting in chair   HEENT: NT, trach midline/intact, no stridor  CV: RRR, no m/g/r  Resp: CTAB; non-labored   Abd: soft, nontender, BS+  Skin: no rashes or lesions  Ext: no edema  Neuro: PERRL, nonfocal exam    ROS:  A 10-system review was obtained and is negative with the exception of the symptoms noted above.    Labs:  Complete Blood Count   Recent Labs   Lab  01/10/23  1024 01/07/23  0617   WBC 9.9  --    HGB 10.0* 10.5*   *  --      Basic Metabolic Panel  Recent Labs   Lab 01/12/23  0758 01/11/23  2140 01/11/23  1715 01/11/23  1204 01/11/23  0801 01/11/23  0600 01/10/23  1140 01/10/23  1024 01/09/23  0814 01/09/23  0553 01/08/23  0813 01/08/23  0559 01/07/23  0655 01/07/23  0617   NA  --   --   --   --   --   --   --  133*  --  135*  --   --   --  134*   POTASSIUM  --   --   --   --   --   --   --  4.7  --  4.3  --   --   --  4.2   CHLORIDE  --   --   --   --   --   --   --  94*  --  97*  --   --   --   --    CO2  --   --   --   --   --   --   --  29  --  27  --   --   --   --    BUN  --   --   --   --   --   --   --  18.2  --  18.1  --   --   --   --    CR  --   --   --   --   --  0.50*  --  0.58  --  0.55  --  0.53   < >  --    * 290* 233* 315*   < >  --    < > 273*   < > 238*   < >  --    < > 180*    < > = values in this interval not displayed.     Venous Blood Gas  Recent Labs   Lab 01/07/23  0617   PHV 7.40   PCO2V 50   PO2V 36   HCO3V 29   STAN 6.2*       RADIOLOGY: Personally reviewed; radiology read below   XR Chest Port 1 View    Result Date: 1/5/2023  EXAM: XR CHEST PORT 1 VIEW LOCATION: [facility] DATE/TIME: 1/5/2023 1:10 PM INDICATION: resp failure s p trach; left lower rib pain COMPARISON: 12/30/2022     IMPRESSION: Tracheostomy in good position in the mid trachea. Feeding tube tip in the proximal jejunum and right upper extremity PICC at the SVC-RA junction. Patchy airspace opacities in the right lower lung, decreased compared to prior. No pneumothorax. Unchanged tiny pleural effusions. Normal cardiomediastinal silhouette.    XR Abdomen Port 1 View    Result Date: 1/4/2023  EXAM: XR ABDOMEN PORT 1 VIEW LOCATION: [facility] DATE/TIME: 1/4/2023 5:18 PM INDICATION: Enteric tube placement COMPARISON: 12/20/2022     IMPRESSION: Feeding tube terminates at the level of the proximal jejunum. Mild  gaseous distention of the visualized bowel. Patchy pulmonary opacities and small pleural effusion in the right lung base.

## 2023-01-13 ENCOUNTER — APPOINTMENT (OUTPATIENT)
Dept: OCCUPATIONAL THERAPY | Facility: CLINIC | Age: 28
End: 2023-01-13
Attending: HOSPITALIST
Payer: COMMERCIAL

## 2023-01-13 ENCOUNTER — APPOINTMENT (OUTPATIENT)
Dept: PHYSICAL THERAPY | Facility: CLINIC | Age: 28
End: 2023-01-13
Attending: HOSPITALIST
Payer: COMMERCIAL

## 2023-01-13 LAB
GLUCOSE BLDC GLUCOMTR-MCNC: 142 MG/DL (ref 70–99)
GLUCOSE BLDC GLUCOMTR-MCNC: 187 MG/DL (ref 70–99)
GLUCOSE BLDC GLUCOMTR-MCNC: 188 MG/DL (ref 70–99)
GLUCOSE BLDC GLUCOMTR-MCNC: 337 MG/DL (ref 70–99)
MAGNESIUM SERPL-MCNC: 1.7 MG/DL (ref 1.7–2.3)

## 2023-01-13 PROCEDURE — 83735 ASSAY OF MAGNESIUM: CPT | Performed by: STUDENT IN AN ORGANIZED HEALTH CARE EDUCATION/TRAINING PROGRAM

## 2023-01-13 PROCEDURE — 97116 GAIT TRAINING THERAPY: CPT | Mod: GP

## 2023-01-13 PROCEDURE — 999N000157 HC STATISTIC RCP TIME EA 10 MIN

## 2023-01-13 PROCEDURE — 120N000017 HC R&B RESPIRATORY CARE

## 2023-01-13 PROCEDURE — 99233 SBSQ HOSP IP/OBS HIGH 50: CPT | Performed by: STUDENT IN AN ORGANIZED HEALTH CARE EDUCATION/TRAINING PROGRAM

## 2023-01-13 PROCEDURE — 97535 SELF CARE MNGMENT TRAINING: CPT | Mod: GO | Performed by: OCCUPATIONAL THERAPIST

## 2023-01-13 PROCEDURE — 250N000011 HC RX IP 250 OP 636: Performed by: HOSPITALIST

## 2023-01-13 PROCEDURE — 258N000003 HC RX IP 258 OP 636: Performed by: HOSPITALIST

## 2023-01-13 PROCEDURE — 250N000013 HC RX MED GY IP 250 OP 250 PS 637: Performed by: HOSPITALIST

## 2023-01-13 PROCEDURE — 99232 SBSQ HOSP IP/OBS MODERATE 35: CPT | Performed by: NURSE PRACTITIONER

## 2023-01-13 PROCEDURE — 999N000009 HC STATISTIC AIRWAY CARE

## 2023-01-13 PROCEDURE — 97110 THERAPEUTIC EXERCISES: CPT | Mod: GP

## 2023-01-13 RX ADMIN — IBUPROFEN 600 MG: 200 TABLET, FILM COATED ORAL at 08:25

## 2023-01-13 RX ADMIN — BUPRENORPHINE HYDROCHLORIDE 4 MG: 2 TABLET SUBLINGUAL at 08:18

## 2023-01-13 RX ADMIN — PANTOPRAZOLE SODIUM 40 MG: 40 TABLET, DELAYED RELEASE ORAL at 06:31

## 2023-01-13 RX ADMIN — VANCOMYCIN HYDROCHLORIDE 1000 MG: 5 INJECTION, POWDER, LYOPHILIZED, FOR SOLUTION INTRAVENOUS at 12:15

## 2023-01-13 RX ADMIN — QUETIAPINE FUMARATE 25 MG: 25 TABLET ORAL at 21:28

## 2023-01-13 RX ADMIN — QUETIAPINE FUMARATE 25 MG: 25 TABLET ORAL at 08:18

## 2023-01-13 RX ADMIN — VANCOMYCIN HYDROCHLORIDE 1000 MG: 5 INJECTION, POWDER, LYOPHILIZED, FOR SOLUTION INTRAVENOUS at 21:28

## 2023-01-13 RX ADMIN — Medication 5 MG: at 21:28

## 2023-01-13 RX ADMIN — INSULIN ASPART 5 UNITS: 100 INJECTION, SOLUTION INTRAVENOUS; SUBCUTANEOUS at 08:30

## 2023-01-13 RX ADMIN — BUPRENORPHINE HYDROCHLORIDE 4 MG: 2 TABLET SUBLINGUAL at 21:29

## 2023-01-13 RX ADMIN — TRAZODONE HYDROCHLORIDE 100 MG: 50 TABLET ORAL at 21:28

## 2023-01-13 RX ADMIN — ZOLPIDEM TARTRATE 10 MG: 5 TABLET ORAL at 21:38

## 2023-01-13 RX ADMIN — LIDOCAINE 1 PATCH: 4 PATCH TOPICAL at 21:25

## 2023-01-13 RX ADMIN — QUETIAPINE FUMARATE 25 MG: 25 TABLET ORAL at 14:32

## 2023-01-13 RX ADMIN — Medication 50 MCG: at 08:25

## 2023-01-13 RX ADMIN — Medication 1 TABLET: at 08:18

## 2023-01-13 RX ADMIN — INSULIN GLARGINE 13 UNITS: 100 INJECTION, SOLUTION SUBCUTANEOUS at 21:28

## 2023-01-13 ASSESSMENT — ACTIVITIES OF DAILY LIVING (ADL)
ADLS_ACUITY_SCORE: 49
ADLS_ACUITY_SCORE: 47
ADLS_ACUITY_SCORE: 47
ADLS_ACUITY_SCORE: 41
ADLS_ACUITY_SCORE: 47
ADLS_ACUITY_SCORE: 49
ADLS_ACUITY_SCORE: 41
ADLS_ACUITY_SCORE: 49

## 2023-01-13 NOTE — PROGRESS NOTES
RT PROGRESS NOTE     DATA:     CURRENT SETTINGS:             TRACH TYPE / SIZE:  #5 Bivona (placed 1/12)             MODE:   Capped             FIO2:   RA     ACTION:             THERAPIES:   PRN/Aerobika QID             SUCTION:                           FREQUENCY:   No Suction                        AMOUNT:   NA                        CONSISTENCY:   NA                        COLOR:   NA             SPONTANEOUS COUGH EFFORT/STRENGTH OF EFFORT (not elicited by suctioning): Strong Spontaneous Cough                           WEANING PHASE:   Phase 3                        WEAN MODE:    Capped RA                        WEAN TIME:   Continuous                        END WEAN REASON:   NA     RESPONSE:             BS:   Clear             VITAL SIGNS:   Sating 92-95%, HR , RR 16-18             EMOTIONAL NEEDS / CONCERNS:  NA                RISK FOR SELF DECANNULATION:  No     NOTE / PLAN:   Possibly decannulate on Monday 1/16.  Repeat VBG on Monday 1/16 morning.  RT will continue to monitor.

## 2023-01-13 NOTE — PROGRESS NOTES
[facility]    Medicine Progress Note - Hospitalist Service    Date of Admission:  1/4/2023    Brief Summary of Hospital Course:   26 yo female with polysubstance abuse admitted 12/16/2 for DKA, respiratory failure from concurrent COVID and bacterial pneumonia. MRSA bacteremia and necrotizing pneumonia and candida bacteremia. Has been making some improvement. Tracheostomy 12/30. Transferred to [facility] 1/4/23. 1/10/23 tolerating trach capping.     [facility] course:  1/9:  Dry skin added aquaphor, doing well with trach capped, downsized.  Now able to eat general diet, will discontinue tube feeds and monitor glucose    1/10/23:   -No acute events. Tolerating trach capping. Reports insomnia, difficulty with falling asleep and staying asleep. Started melatonin . Two soft BMs overnight. Low Mg, started on Mg replacement protocol. Tolerating regular diet. Monitoring caloric intake. Will hopefully be able to remove NJ tube soon.     1/11/23- patient wants her NJ tube to be removed as it is very uncomfortable. She denies chest pain, SOB, nausea.emesis.she is eating well.  Reviewed with Nutritionist - mimi to remove NJ tube    1/12 - increasing lantus to 10u and mealtime to 1:10 insulin:carb ratio today. NJ out y/d. Doing well otherwise. Motivated in change her life around.   1/13 - increase lantus to 13u BID. ISS to high. Mg 1.7, repeat in AM and replete    Follow-up:   -Repeat CT chest wo contrast in 4-6 weeks from 12/23 for cavitary lesion surveillance  -IV vanco for MRSA bacteremia & PNA thru 1/16.  To repeat BC 1/20 per ID    Assessment & Plan   Principal Problem:    Bilateral MRSA cavitary pneumonia (1/4/2023)  Active Problems:    Pneumonia of both lungs due to MRSA (H) (1/4/2023)    Chronic respiratory failure requiring continuous mechanical ventilation through tracheostomy (H) (1/4/2023)    Diabetes mellitus type 1 with Hx of DKA (1/4/2023)    Methamphetamine abuse (H) (2/14/2019)    Heroin abuse (H) (2/14/2019)     History of severe sepsis with cavitary pneumonia due to MRSA (1/4/2023)    Acute respiratory failure (H) (1/5/2023)    Costochondritis (1/5/2023)    Anxiety with depression (2/13/2019)    Costochondritis    Bilateral cavitary pneumonia due to MRSA  Acute Respiratory Failure, s/p tracheostomy  -Continue with vancomycin, pharmacy will dose.   -Continue with mechanical ventilation.    -Pulmonology is consulted for weaning off ventilation  -1/10/: tolerating trach capping  -Repeat CT chest wo contrast in 4-6 weeks from 12/23 for cavitary lesion surveillance  -IV vanco for MRSA bacteremia & PNA thru 1/16.  To repeat BC 1/20 per ID    Malnutrition  Dysphagia s/p NJ  -Passed BDT 1/8/23  -Regular diet started 1/9/23  -Nutrition consulted.   -Continue glucerna bid.   -Monitor caloric intake.   - discontinue NJ tube    Type 1 diabetes  History of DKA and noncompliance with medication  - increase to lantus 13u BID  - novolog 1:10 insulin:carb ratio TID AC  - increase to High ISS  - diabetic ed on board    Polysubstance abuse   -Patient has been on different medications and is not in withdrawal.    -Currently is stable on buprenorphine 4 mg sublingual 2 times daily, hydroxyzine as needed and trazodone for depression.    Anxiety and depression   Insomnia  -Controlled with hydroxyzine as needed and trazodone 100 mg nightly  -Melatonin ordered at bedtime  -Ordered ambien prn    Costochondritis   -Lidoderm and PRN Motrin       Diet: Combination Diet Regular Diet; Moderate Consistent Carb (60 g CHO per Meal) Diet  Snacks/Supplements Adult: Glucerna; Between Meals  Snacks/Supplements Adult: Other; see comments; Between Meals    DVT Prophylaxis: Pneumatic Compression Devices  Sheffield Catheter: Not present  Lines: PRESENT    PICC 12/21/22 Triple Lumen Right Basilic OK to use nurse aware-Site Assessment: WDL      Cardiac Monitoring: None  Code Status: Full Code              # Hypoalbuminemia: Lowest albumin = 3.1 g/dL at 1/10/2023 10:24  AM, will monitor as appropriate            # Severe Malnutrition: based on nutrition assessment        Disposition Plan     Expected Discharge Date: 01/11/2023      Destination: home with help/services            Rex St MD  Hospitalist Service  [facility]  Securely message with [employer] (more info)  Text page via [employer] Paging/Directory   ______________________________________________________________________    Interval History   - no acute events ovn  - pt doing well  - discussed increasing insulin regimen, will likely need further tailoring over the weekend  - Sleeping well at night  - continues to be excited about progress she is making  - no other acute concerns      Physical Exam   Vital Signs: Temp: 98  F (36.7  C) Temp src: Oral BP: 109/71 Pulse: 105   Resp: 18 SpO2: 93 % O2 Device: None (Room air)    Weight: 92 lbs 3.2 oz  General: thin and pale young lady laying on her back in her bed.  No acute distress  HEENT: NC, AT, EOMI, Moist oral mucosal moisture. Nasal feeding tube in place  Neck: Supple, tracheostomy in place, trach capped   Chest: CTAB, no rhonchi/ wheeze  Heart: S1 S2 RRR, no murmur  Abdomen: S, NT, ND, BS+   Extremities: no edema or cyanosis or clubbing or deformity.  Neurological: grossly nonfocal. Moves all extremities equally.      Data          Range & Units 01/06/23 04:15 01/06/23 08:05 01/06/23 12:22 01/06/23 16:38 01/06/23 20:43 01/07/23 00:20 01/07/23 03:54 01/07/23 08:08 01/07/23 12:13 01/07/23 16:51 01/07/23 19:58 01/08/23 00:12 01/08/23 03:51 01/08/23 08:13 01/08/23 11:31 01/08/23 15:41   GLUCOSE BY METER POCT 70 - 99 mg/dL 181 (H) 270 (H) 228 (H) 199 (H) 260 (H) 189 (H) 144 (H) 184 (H) 223 (H) 215 (H) 175 (H) 232 (H) 115 (H) 163 (H) 194 (H) 259 (H)     Imaging results reviewed over the past 24 hrs:   No results found for this or any previous visit (from the past 24 hour(s)).

## 2023-01-13 NOTE — PLAN OF CARE
Problem: Infection  Goal: Absence of Infection Signs and Symptoms  Outcome: Progressing  Intervention: Prevent or Manage Infection  Recent Flowsheet Documentation  Taken 1/13/2023 0200 by Mell Mortensen RN  Isolation Precautions: contact precautions maintained     Problem: Hyperglycemia  Goal: Blood Glucose Level Within Targeted Range  Outcome: Progressing   Goal Outcome Evaluation:    Patient appeared to rest well most of noc shift, irritable and whining when this Writer flushed picc line, denied pain, blood return noted in all ports, vss, labs collected this am, alert x 4, no change in Loc, will continue to monitor.

## 2023-01-13 NOTE — PLAN OF CARE
Problem: Artificial Airway  Goal: Effective Communication  Outcome: Progressing  Goal: Optimal Device Function  Outcome: Progressing  Intervention: Optimize Device Care and Function  Recent Flowsheet Documentation  Taken 1/13/2023 1053 by Mahnaz Hobbs RT  Airway Safety Measures: all equipment/monitors on and audible  Taken 1/13/2023 0803 by Mahnaz Hobbs RT  Airway Safety Measures: all equipment/monitors on and audible  Goal: Absence of Device-Related Skin or Tissue Injury  Outcome: Progressing     Problem: Mechanical Ventilation Invasive  Goal: Optimal Device Function  Intervention: Optimize Device Care and Function  Recent Flowsheet Documentation  Taken 1/13/2023 1053 by Mahnaz Hobbs RT  Airway Safety Measures: all equipment/monitors on and audible  Taken 1/13/2023 0803 by Mahnaz Hobbs RT  Airway Safety Measures: all equipment/monitors on and audible   Goal Outcome Evaluation:

## 2023-01-13 NOTE — PROGRESS NOTES
Pulmonary Progress Note    Admit Date: 1/4/2023  CODE: Full Code    Assessment/Plan:   27 year old female with polysubstance abuse admitted on 12/16/2022 for DKA, respiratory failure from concurrent covid and bacterial pneumonia.  MRSA bacteremia and necrotizing pneumonia and candida bacteremia.  Has been making some improvements but has reached a plateau mostly limited by sedation/agitation and inability to wean from ventilator. Now s/p tracheostomy 12/30.  Transferred to LTAC 1/4.     Discussion of pertinent problems:  1. Acute hypoxic/hypercapnic respiratory failure: liberated from the vent on 1/5.  Tolerating continuous capping since 1/9.  Secretion burden improving, on room air   2. Tracheostomy: 6 Shiley placed 12/30.  Downsized to 6 Bivona on 1/9.  Downsized to 5 Bivona 1/12 to aide in expectoration   3. Dysphagia: passed BDT.  On Reg diet, NJ removed   4. Complex b/l cavitary pneumonia 2/2 MRSA pneumonia: Last positive BAL: MRSA 12/25.  CT chest 12/23 shows the largest cavities in the right lower lobe appear decreased in size and multiple new small cavities in the right lung.  On Vanco also for MRSA bacteremia   5. Covid19 infection(resolved)  6. MRSA bacteremia.  No endocarditis on CATHY.  Blood cx 12/26 & 1/6 neg.  Vanco as above   7. Candida glabrata fungemia s/p Micafungin course   8. Substance use disorder on buprenorphine     Plan:  - Phase 3: cap as tolerated.  When she's able to manage secretions with minimal to no suctioning, will decannulate.  Likely Monday if no issues arise   - Cont IS & flutter valve   - Bronch hygiene: xopenex nebs PRN  - Repeat CT chest wo contrast in 4-6 weeks from 12/23 for cavitary lesion surveillance.   - IV vanco for MRSA bacteremia & PNA thru 1/16.    Martín Nieves CNP  Pulmonary Medicine  [facility]  Pager [phone]    Subjective/Interim Events:   - remains on room air  - no suctioning since trach downsized yesterday   - denies sob, pain, n/v.    Tracheal  secretions:  Overnight - none  Yesterday - none    Cough strength: moderate    Current phase of ventilator weaning pathway:  Phase 3    Ventilator weaning results: continuous capping since 1/9    Clinical status discussed today with respiratory therapist, patient    Medications:       dextrose       - MEDICATION INSTRUCTIONS -         buprenorphine  4 mg Sublingual BID     insulin aspart  1-10 Units Subcutaneous TID AC     insulin aspart  1-7 Units Subcutaneous At Bedtime     insulin aspart   Subcutaneous Daily with breakfast     insulin aspart   Subcutaneous Daily with lunch     insulin aspart   Subcutaneous Daily with supper     insulin glargine  13 Units Subcutaneous BID     lidocaine  1 patch Transdermal Q24h    And     lidocaine   Transdermal Q8H GINA     melatonin  5 mg Oral At Bedtime     multivitamin w/minerals  1 tablet Oral Daily     pantoprazole  40 mg Oral QAM AC     QUEtiapine  25 mg Oral TID     sodium chloride (PF)  10 mL Intracatheter Q8H     traZODone  100 mg Oral At Bedtime     vancomycin  1,000 mg Intravenous Q12H     cholecalciferol  50 mcg Oral Daily         Exam/Data:   Vitals  /71 (BP Location: Left arm)   Pulse 111   Temp 98  F (36.7  C) (Oral)   Resp 16   Wt 41.8 kg (92 lb 3.2 oz)   SpO2 92%   BMI 16.34 kg/m       I/O last 3 completed shifts:  In: 1180 [P.O.:1120; I.V.:60]  Out: -   Weight change:     FiO2 (%): 21 %  Resp: 16      EXAM:  Gen: no distress sitting in chair   HEENT: NT, trach midline/intact, no stridor  CV: RRR, no m/g/r  Resp: CTAB; non-labored   Abd: soft, nontender, BS+  Skin: no rashes or lesions  Ext: no edema  Neuro: PERRL, nonfocal exam    ROS:  A 10-system review was obtained and is negative with the exception of the symptoms noted above.    Labs:  Complete Blood Count   Recent Labs   Lab 01/10/23  1024 01/07/23  0617   WBC 9.9  --    HGB 10.0* 10.5*   *  --      Basic Metabolic Panel  Recent Labs   Lab 01/13/23  1150 01/13/23  0755 01/12/23  0657  01/12/23  1641 01/11/23  0801 01/11/23  0600 01/10/23  1140 01/10/23  1024 01/09/23  0814 01/09/23  0553 01/08/23  0813 01/08/23  0559 01/07/23  0655 01/07/23  0617   NA  --   --   --   --   --   --   --  133*  --  135*  --   --   --  134*   POTASSIUM  --   --   --   --   --   --   --  4.7  --  4.3  --   --   --  4.2   CHLORIDE  --   --   --   --   --   --   --  94*  --  97*  --   --   --   --    CO2  --   --   --   --   --   --   --  29  --  27  --   --   --   --    BUN  --   --   --   --   --   --   --  18.2  --  18.1  --   --   --   --    CR  --   --   --   --   --  0.50*  --  0.58  --  0.55  --  0.53   < >  --    * 337* 296* 324*   < >  --    < > 273*   < > 238*   < >  --    < > 180*    < > = values in this interval not displayed.     Venous Blood Gas  Recent Labs   Lab 01/07/23  0617   PHV 7.40   PCO2V 50   PO2V 36   HCO3V 29   STAN 6.2*       RADIOLOGY: Personally reviewed; radiology read below   XR Chest Port 1 View    Result Date: 1/5/2023  EXAM: XR CHEST PORT 1 VIEW LOCATION: [facility] DATE/TIME: 1/5/2023 1:10 PM INDICATION: resp failure s p trach; left lower rib pain COMPARISON: 12/30/2022     IMPRESSION: Tracheostomy in good position in the mid trachea. Feeding tube tip in the proximal jejunum and right upper extremity PICC at the SVC-RA junction. Patchy airspace opacities in the right lower lung, decreased compared to prior. No pneumothorax. Unchanged tiny pleural effusions. Normal cardiomediastinal silhouette.    XR Abdomen Port 1 View    Result Date: 1/4/2023  EXAM: XR ABDOMEN PORT 1 VIEW LOCATION: [facility] DATE/TIME: 1/4/2023 5:18 PM INDICATION: Enteric tube placement COMPARISON: 12/20/2022     IMPRESSION: Feeding tube terminates at the level of the proximal jejunum. Mild gaseous distention of the visualized bowel. Patchy pulmonary opacities and small pleural effusion in the right lung base.

## 2023-01-13 NOTE — PLAN OF CARE
Problem: Plan of Care - These are the overarching goals to be used throughout the patient stay.    Goal: Absence of Hospital-Acquired Illness or Injury  Intervention: Prevent Infection  Recent Flowsheet Documentation  Taken 1/12/2023 1909 by Blaire Saucedo, RN  Infection Prevention: hand hygiene promoted     Problem: Mechanical Ventilation Invasive  Goal: Effective Communication  Intervention: Ensure Effective Communication  Recent Flowsheet Documentation  Taken 1/12/2023 1909 by Blaire Saucedo RN  Trust Relationship/Rapport: care explained   Goal Outcome Evaluation:       Patient remained quiet with flat effect all shift.Good appetite eat supper and food brought  from out of hospital. PRN Ambien given at hs per request.

## 2023-01-13 NOTE — PLAN OF CARE
7 PM to 7 AM RT PROGRESS NOTE     DATA:     CURRENT SETTINGS:             TRACH TYPE / SIZE:  # 5 Bivona placed 1/12/23             MODE:   Capped/ RA    ACTION:   THERAPIES: Flutter QID. Pt states she does independently.               SUCTION:                           FREQUENCY:  None this shift                         AMOUNT:                          CONSISTENCY:                          COLOR:                SPONTANEOUS COUGH EFFORT/STRENGTH OF EFFORT (not elicited by suctioning): Moderate strength                              WEANING PHASE:   3    WEAN MODE: Capped    WEAN TIME:  Since 0945 Monday 1/9/23     END WEAN REASON:   Ongoing     RESPONSE:             BS:   Clear              VITAL SIGNS:   SATs 95%, HR 90, RR 20             RISK FOR SELF DECANNULATION:  No    NOTE / PLAN:    Continue with same.      Problem: Artificial Airway  Goal: Effective Communication  Outcome: Progressing  Goal: Optimal Device Function  Outcome: Progressing  Intervention: Optimize Device Care and Function  Recent Flowsheet Documentation  Taken 1/10/2023 0049 by Angelic Lee, RT  Airway Safety Measures:    all equipment/monitors on and audible    manual resuscitator/mask/valve in room  Goal: Absence of Device-Related Skin or Tissue Injury  Outcome: Progressing

## 2023-01-14 ENCOUNTER — APPOINTMENT (OUTPATIENT)
Dept: OCCUPATIONAL THERAPY | Facility: CLINIC | Age: 28
End: 2023-01-14
Attending: HOSPITALIST
Payer: COMMERCIAL

## 2023-01-14 ENCOUNTER — APPOINTMENT (OUTPATIENT)
Dept: PHYSICAL THERAPY | Facility: CLINIC | Age: 28
End: 2023-01-14
Attending: HOSPITALIST
Payer: COMMERCIAL

## 2023-01-14 LAB
GLUCOSE BLDC GLUCOMTR-MCNC: 124 MG/DL (ref 70–99)
GLUCOSE BLDC GLUCOMTR-MCNC: 126 MG/DL (ref 70–99)
GLUCOSE BLDC GLUCOMTR-MCNC: 176 MG/DL (ref 70–99)
GLUCOSE BLDC GLUCOMTR-MCNC: 197 MG/DL (ref 70–99)

## 2023-01-14 PROCEDURE — 97530 THERAPEUTIC ACTIVITIES: CPT | Mod: GP

## 2023-01-14 PROCEDURE — 999N000009 HC STATISTIC AIRWAY CARE

## 2023-01-14 PROCEDURE — 97116 GAIT TRAINING THERAPY: CPT | Mod: GP

## 2023-01-14 PROCEDURE — 99233 SBSQ HOSP IP/OBS HIGH 50: CPT | Performed by: STUDENT IN AN ORGANIZED HEALTH CARE EDUCATION/TRAINING PROGRAM

## 2023-01-14 PROCEDURE — 250N000013 HC RX MED GY IP 250 OP 250 PS 637: Performed by: HOSPITALIST

## 2023-01-14 PROCEDURE — 97535 SELF CARE MNGMENT TRAINING: CPT | Mod: GO

## 2023-01-14 PROCEDURE — 258N000003 HC RX IP 258 OP 636: Performed by: HOSPITALIST

## 2023-01-14 PROCEDURE — 999N000157 HC STATISTIC RCP TIME EA 10 MIN

## 2023-01-14 PROCEDURE — 97110 THERAPEUTIC EXERCISES: CPT | Mod: GO

## 2023-01-14 PROCEDURE — 120N000017 HC R&B RESPIRATORY CARE

## 2023-01-14 PROCEDURE — 250N000011 HC RX IP 250 OP 636: Performed by: HOSPITALIST

## 2023-01-14 RX ADMIN — QUETIAPINE FUMARATE 25 MG: 25 TABLET ORAL at 20:44

## 2023-01-14 RX ADMIN — QUETIAPINE FUMARATE 25 MG: 25 TABLET ORAL at 08:22

## 2023-01-14 RX ADMIN — PANTOPRAZOLE SODIUM 40 MG: 40 TABLET, DELAYED RELEASE ORAL at 07:03

## 2023-01-14 RX ADMIN — Medication 50 MCG: at 08:22

## 2023-01-14 RX ADMIN — INSULIN GLARGINE 13 UNITS: 100 INJECTION, SOLUTION SUBCUTANEOUS at 20:48

## 2023-01-14 RX ADMIN — BUPRENORPHINE HYDROCHLORIDE 4 MG: 2 TABLET SUBLINGUAL at 20:44

## 2023-01-14 RX ADMIN — VANCOMYCIN HYDROCHLORIDE 1000 MG: 5 INJECTION, POWDER, LYOPHILIZED, FOR SOLUTION INTRAVENOUS at 10:49

## 2023-01-14 RX ADMIN — LIDOCAINE 1 PATCH: 4 PATCH TOPICAL at 20:43

## 2023-01-14 RX ADMIN — VANCOMYCIN HYDROCHLORIDE 1000 MG: 5 INJECTION, POWDER, LYOPHILIZED, FOR SOLUTION INTRAVENOUS at 22:17

## 2023-01-14 RX ADMIN — INSULIN GLARGINE 13 UNITS: 100 INJECTION, SOLUTION SUBCUTANEOUS at 08:21

## 2023-01-14 RX ADMIN — IBUPROFEN 600 MG: 200 TABLET, FILM COATED ORAL at 08:22

## 2023-01-14 RX ADMIN — QUETIAPINE FUMARATE 25 MG: 25 TABLET ORAL at 15:43

## 2023-01-14 RX ADMIN — ZOLPIDEM TARTRATE 10 MG: 5 TABLET ORAL at 22:16

## 2023-01-14 RX ADMIN — Medication 5 MG: at 20:44

## 2023-01-14 RX ADMIN — INSULIN ASPART 4 UNITS: 100 INJECTION, SOLUTION INTRAVENOUS; SUBCUTANEOUS at 08:31

## 2023-01-14 RX ADMIN — TRAZODONE HYDROCHLORIDE 100 MG: 50 TABLET ORAL at 20:44

## 2023-01-14 RX ADMIN — BUPRENORPHINE HYDROCHLORIDE 4 MG: 2 TABLET SUBLINGUAL at 08:22

## 2023-01-14 ASSESSMENT — ACTIVITIES OF DAILY LIVING (ADL)
ADLS_ACUITY_SCORE: 40
ADLS_ACUITY_SCORE: 47
ADLS_ACUITY_SCORE: 47
ADLS_ACUITY_SCORE: 40
ADLS_ACUITY_SCORE: 40
ADLS_ACUITY_SCORE: 49
ADLS_ACUITY_SCORE: 43
ADLS_ACUITY_SCORE: 45
ADLS_ACUITY_SCORE: 45
ADLS_ACUITY_SCORE: 47
ADLS_ACUITY_SCORE: 45
ADLS_ACUITY_SCORE: 47

## 2023-01-14 NOTE — PROGRESS NOTES
[facility]    Medicine Progress Note - Hospitalist Service    Date of Admission:  1/4/2023    Brief Summary of Hospital Course:   26 yo female with polysubstance abuse admitted 12/16/2 for DKA, respiratory failure from concurrent COVID and bacterial pneumonia. MRSA bacteremia and necrotizing pneumonia and candida bacteremia. Has been making some improvement. Tracheostomy 12/30. Transferred to [facility] 1/4/23. 1/10/23 tolerating trach capping.     [facility] course:  1/9:  Dry skin added aquaphor, doing well with trach capped, downsized.  Now able to eat general diet, will discontinue tube feeds and monitor glucose    1/10/23:   -No acute events. Tolerating trach capping. Reports insomnia, difficulty with falling asleep and staying asleep. Started melatonin . Two soft BMs overnight. Low Mg, started on Mg replacement protocol. Tolerating regular diet. Monitoring caloric intake. Will hopefully be able to remove NJ tube soon.     1/11/23- patient wants her NJ tube to be removed as it is very uncomfortable. She denies chest pain, SOB, nausea.emesis.she is eating well.  Reviewed with Nutritionist - okay to remove NJ tube    1/12 - increasing lantus to 10u and mealtime to 1:10 insulin:carb ratio today. NJ out y/d. Doing well otherwise. Motivated in change her life around.   1/13 - increase lantus to 13u BID. ISS to high. Mg 1.7  1/14 - BG much improved today, continue to monitor and change insulin as necessary over weekend.    Follow-up:   -Repeat CT chest wo contrast in 4-6 weeks from 12/23 for cavitary lesion surveillance  -IV vanco for MRSA bacteremia & PNA thru 1/16.  To repeat BC 1/20 per ID    Assessment & Plan   Principal Problem:    Bilateral MRSA cavitary pneumonia (1/4/2023)  Active Problems:    Pneumonia of both lungs due to MRSA (H) (1/4/2023)    Chronic respiratory failure requiring continuous mechanical ventilation through tracheostomy (H) (1/4/2023)    Diabetes mellitus type 1 with Hx of DKA (1/4/2023)     Methamphetamine abuse (H) (2/14/2019)    Heroin abuse (H) (2/14/2019)    History of severe sepsis with cavitary pneumonia due to MRSA (1/4/2023)    Acute respiratory failure (H) (1/5/2023)    Costochondritis (1/5/2023)    Anxiety with depression (2/13/2019)    Costochondritis    Bilateral cavitary pneumonia due to MRSA  Acute Respiratory Failure, s/p tracheostomy  -Continue with vancomycin, pharmacy will dose.   -Continue with mechanical ventilation.    -Pulmonology is consulted for weaning off ventilation  -1/10/: tolerating trach capping  -Repeat CT chest wo contrast in 4-6 weeks from 12/23 for cavitary lesion surveillance  -IV vanco for MRSA bacteremia & PNA thru 1/16.  To repeat BC 1/20 per ID    Malnutrition  Dysphagia s/p NJ  -Passed BDT 1/8/23  -Regular diet started 1/9/23  -Nutrition consulted.   -Continue glucerna bid.   -Monitor caloric intake.   - discontinue NJ tube    Type 1 diabetes  History of DKA and noncompliance with medication  - lantus 13u BID  - novolog 1:10 insulin:carb ratio TID AC  - increase to High ISS  - diabetic ed on board    Polysubstance abuse   -Patient has been on different medications and is not in withdrawal.    -Currently is stable on buprenorphine 4 mg sublingual 2 times daily, hydroxyzine as needed and trazodone for depression.    Anxiety and depression   Insomnia  -Controlled with hydroxyzine as needed and trazodone 100 mg nightly  -Melatonin ordered at bedtime  -Ordered ambien prn    Costochondritis   -Lidoderm and PRN Motrin       Diet: Combination Diet Regular Diet; Moderate Consistent Carb (60 g CHO per Meal) Diet  Snacks/Supplements Adult: Glucerna; Between Meals  Snacks/Supplements Adult: Other; see comments; Between Meals    DVT Prophylaxis: Pneumatic Compression Devices  [place] Catheter: Not present  Lines: PRESENT    PICC 12/21/22 Triple Lumen Right Basilic OK to use nurse aware-Site Assessment: WDL      Cardiac Monitoring: None  Code Status: Full Code              #  Hypoalbuminemia: Lowest albumin = 3.1 g/dL at 1/10/2023 10:24 AM, will monitor as appropriate            # Severe Malnutrition: based on nutrition assessment        Disposition Plan     Expected Discharge Date: 01/11/2023      Destination: home with help/services            Rex [address] MD  Hospitalist Service  [facility]  Securely message with [employer] (more info)  Text page via [employer] Paging/Directory   ______________________________________________________________________    Interval History   - no acute events ovn  - pt slept well  - BG much improved today  - pt with some discomfort around trach site but not severe  - eating well  - no other acute concerns      Physical Exam   Vital Signs: Temp: 98.2  F (36.8  C) Temp src: Oral BP: 94/65 Pulse: 89   Resp: 18 SpO2: 95 % O2 Device: None (Room air)    Weight: 93 lbs 3.2 oz  General: thin and pale young lady laying on her back in her bed.  No acute distress  HEENT: NC, AT, EOMI, Moist oral mucosal moisture. Nasal feeding tube in place  Neck: Supple, tracheostomy in place, trach capped   Chest: CTAB, no rhonchi/ wheeze  Heart: S1 S2 RRR, no murmur  Abdomen: S, NT, ND, BS+   Extremities: no edema or cyanosis or clubbing or deformity.  Neurological: grossly nonfocal. Moves all extremities equally.      Data          Range & Units 01/06/23 04:15 01/06/23 08:05 01/06/23 12:22 01/06/23 16:38 01/06/23 20:43 01/07/23 00:20 01/07/23 03:54 01/07/23 08:08 01/07/23 12:13 01/07/23 16:51 01/07/23 19:58 01/08/23 00:12 01/08/23 03:51 01/08/23 08:13 01/08/23 11:31 01/08/23 15:41   GLUCOSE BY METER POCT 70 - 99 mg/dL 181 (H) 270 (H) 228 (H) 199 (H) 260 (H) 189 (H) 144 (H) 184 (H) 223 (H) 215 (H) 175 (H) 232 (H) 115 (H) 163 (H) 194 (H) 259 (H)     Imaging results reviewed over the past 24 hrs:   No results found for this or any previous visit (from the past 24 hour(s)).

## 2023-01-14 NOTE — PLAN OF CARE
Problem: Hyperglycemia  Goal: Blood Glucose Level Within Targeted Range  Outcome: Progressing     Problem: Artificial Airway  Goal: Effective Communication  Outcome: Progressing   Goal Outcome Evaluation:    Patient had ibuprofen 600 mg at 0822 for a 4/10 all over headache with effect, BG was 197 at 0749, 176 at 1150, ate 100 % of meals. Patient was not cooperative with a full head to toe assessments-refused to have sacral Mepilex replaced (sacrum still reddened (no open area)

## 2023-01-14 NOTE — PLAN OF CARE
Problem: Artificial Airway  Goal: Effective Communication  Outcome: Progressing  Goal: Optimal Device Function  Outcome: Progressing  Goal: Absence of Device-Related Skin or Tissue Injury  Outcome: Progressing     RT PROGRESS NOTE     DATA:     CURRENT SETTINGS:             TRACH TYPE / SIZE:  #5 Bivona changed on 1/12/23             MODE:   Trach capped             FIO2:   RA     ACTION:             THERAPIES:                SUCTION:                           FREQUENCY: X1                         AMOUNT:   Small                        CONSISTENCY:  Thin/thick                        COLOR:   White/yellow             SPONTANEOUS COUGH EFFORT/STRENGTH OF EFFORT (not elicited by suctioning): Yes:Strong                              WEANING PHASE:  #3                        WEAN MODE:   Trach capped as tim                        WEAN TIME:   Since 1/9/23                        END WEAN REASON:   Cont     RESPONSE:             BS:   Clear             VITAL SIGNS:   SAT 93-96%, HR 58-79, RR 18-20             EMOTIONAL NEEDS / CONCERNS: N/A                RISK FOR SELF DECANNULATION:  N/A                        RISK DUE TO:                          INTERVENTION/S IN PLACE IS/ARE:N/A      NOTE / PLAN:   Pt is on trach capped with RA, tim well. Cont current therapy and keep sat >/=90%. Consider decannulation soon.

## 2023-01-14 NOTE — PLAN OF CARE
Problem: Plan of Care - These are the overarching goals to be used throughout the patient stay.    Goal: Plan of Care Review  Description: The Plan of Care Review/Shift note should be completed every shift.  The Outcome Evaluation is a brief statement about your assessment that the patient is improving, declining, or no change.  This information will be displayed automatically on your shift note.  Outcome: Progressing     Problem: Artificial Airway  Goal: Effective Communication  Outcome: Progressing     Problem: Mechanical Ventilation Invasive  Goal: Optimal Device Function  Intervention: Optimize Device Care and Function  Recent Flowsheet Documentation  Taken 1/13/2023 2200 by Heather Roblero RN  Airway Safety Measures: all equipment/monitors on and audible  Taken 1/13/2023 0833 by Heather Roblero RN  Airway Safety Measures: all equipment/monitors on and audible  Goal: Mechanical Ventilation Liberation  Intervention: Promote Extubation and Mechanical Ventilation Liberation  Recent Flowsheet Documentation  Taken 1/13/2023 2200 by Heather Roblero RN  Medication Review/Management: medications reviewed  Taken 1/13/2023 0833 by Heather Roblero RN  Medication Review/Management: medications reviewed     Problem: Plan of Care - These are the overarching goals to be used throughout the patient stay.    Goal: Absence of Hospital-Acquired Illness or Injury  Intervention: Identify and Manage Fall Risk  Recent Flowsheet Documentation  Taken 1/13/2023 2200 by Heather Roblero RN  Safety Promotion/Fall Prevention: clutter free environment maintained  Taken 1/13/2023 0833 by Heather Roblero RN  Safety Promotion/Fall Prevention: clutter free environment maintained  Intervention: Prevent Infection  Recent Flowsheet Documentation  Taken 1/13/2023 2200 by Heather Roblero RN  Infection Prevention: rest/sleep promoted  Taken 1/13/2023 0833 by Heather Roblero RN  Infection Prevention: rest/sleep  promoted     Problem: Infection  Goal: Absence of Infection Signs and Symptoms  Intervention: Prevent or Manage Infection  Recent Flowsheet Documentation  Taken 1/13/2023 2200 by Heather Roblero RN  Isolation Precautions: contact precautions maintained  Taken 1/13/2023 0833 by Heather Roblero RN  Isolation Precautions: contact precautions maintained     Problem: Artificial Airway  Goal: Optimal Device Function  Intervention: Optimize Device Care and Function  Recent Flowsheet Documentation  Taken 1/13/2023 2200 by Heather Roblero RN  Airway Safety Measures: all equipment/monitors on and audible  Taken 1/13/2023 0833 by Heather Roblero RN  Airway Safety Measures: all equipment/monitors on and audible   Goal Outcome Evaluation:       Pt trach capped.mag protocol discontinued by dr.prn Ambien given per pt request.denies pain.VSS.cares met.

## 2023-01-14 NOTE — PLAN OF CARE
Problem: Plan of Care - These are the overarching goals to be used throughout the patient stay.    Goal: Absence of Hospital-Acquired Illness or Injury  Outcome: Progressing  Intervention: Identify and Manage Fall Risk  Recent Flowsheet Documentation  Taken 1/14/2023 0103 by Ashlie Tovar RN  Safety Promotion/Fall Prevention:    room door open    room near nurse's station    safety round/check completed  Intervention: Prevent Infection  Recent Flowsheet Documentation  Taken 1/14/2023 0103 by Ashlie Tovar RN  Infection Prevention:    cohorting utilized    hand hygiene promoted    personal protective equipment utilized    rest/sleep promoted    single patient room provided     Problem: Plan of Care - These are the overarching goals to be used throughout the patient stay.    Goal: Optimal Comfort and Wellbeing  Outcome: Progressing     Problem: Infection  Goal: Absence of Infection Signs and Symptoms  Outcome: Progressing  Intervention: Prevent or Manage Infection  Recent Flowsheet Documentation  Taken 1/14/2023 0103 by Ashlie Tovar RN  Isolation Precautions: contact precautions maintained     Problem: Artificial Airway  Goal: Optimal Device Function  Intervention: Optimize Device Care and Function  Recent Flowsheet Documentation  Taken 1/14/2023 0103 by Ashlie Tovar RN  Airway Safety Measures:    all equipment/monitors on and audible    oxygen flowmeter    suction at bedside    suction equipment    suction regulator   Goal Outcome Evaluation:  Patient denied pain, irritable when woken up; ambulated to the bathroom by herself; stable. Patient slept most time of the shift.

## 2023-01-15 LAB
GLUCOSE BLDC GLUCOMTR-MCNC: 174 MG/DL (ref 70–99)
GLUCOSE BLDC GLUCOMTR-MCNC: 229 MG/DL (ref 70–99)
GLUCOSE BLDC GLUCOMTR-MCNC: 276 MG/DL (ref 70–99)
GLUCOSE BLDC GLUCOMTR-MCNC: 355 MG/DL (ref 70–99)

## 2023-01-15 PROCEDURE — 258N000003 HC RX IP 258 OP 636: Performed by: HOSPITALIST

## 2023-01-15 PROCEDURE — 999N000009 HC STATISTIC AIRWAY CARE

## 2023-01-15 PROCEDURE — 250N000013 HC RX MED GY IP 250 OP 250 PS 637: Performed by: HOSPITALIST

## 2023-01-15 PROCEDURE — 250N000011 HC RX IP 250 OP 636: Performed by: HOSPITALIST

## 2023-01-15 PROCEDURE — 99233 SBSQ HOSP IP/OBS HIGH 50: CPT | Performed by: HOSPITALIST

## 2023-01-15 PROCEDURE — 120N000017 HC R&B RESPIRATORY CARE

## 2023-01-15 PROCEDURE — 999N000157 HC STATISTIC RCP TIME EA 10 MIN

## 2023-01-15 PROCEDURE — 250N000012 HC RX MED GY IP 250 OP 636 PS 637: Performed by: STUDENT IN AN ORGANIZED HEALTH CARE EDUCATION/TRAINING PROGRAM

## 2023-01-15 RX ORDER — ACETAMINOPHEN 325 MG/1
975 TABLET ORAL EVERY 4 HOURS PRN
Status: DISCONTINUED | OUTPATIENT
Start: 2023-01-15 | End: 2023-01-18 | Stop reason: HOSPADM

## 2023-01-15 RX ADMIN — ZOLPIDEM TARTRATE 10 MG: 5 TABLET ORAL at 20:39

## 2023-01-15 RX ADMIN — INSULIN GLARGINE 13 UNITS: 100 INJECTION, SOLUTION SUBCUTANEOUS at 21:36

## 2023-01-15 RX ADMIN — QUETIAPINE FUMARATE 25 MG: 25 TABLET ORAL at 08:19

## 2023-01-15 RX ADMIN — INSULIN ASPART 7 UNITS: 100 INJECTION, SOLUTION INTRAVENOUS; SUBCUTANEOUS at 08:24

## 2023-01-15 RX ADMIN — Medication 50 MCG: at 08:19

## 2023-01-15 RX ADMIN — BUPRENORPHINE HYDROCHLORIDE 4 MG: 2 TABLET SUBLINGUAL at 08:19

## 2023-01-15 RX ADMIN — QUETIAPINE FUMARATE 25 MG: 25 TABLET ORAL at 20:31

## 2023-01-15 RX ADMIN — VANCOMYCIN HYDROCHLORIDE 1000 MG: 5 INJECTION, POWDER, LYOPHILIZED, FOR SOLUTION INTRAVENOUS at 10:08

## 2023-01-15 RX ADMIN — IBUPROFEN 600 MG: 200 TABLET, FILM COATED ORAL at 08:34

## 2023-01-15 RX ADMIN — INSULIN GLARGINE 13 UNITS: 100 INJECTION, SOLUTION SUBCUTANEOUS at 08:19

## 2023-01-15 RX ADMIN — QUETIAPINE FUMARATE 25 MG: 25 TABLET ORAL at 14:19

## 2023-01-15 RX ADMIN — IBUPROFEN 600 MG: 200 TABLET, FILM COATED ORAL at 00:12

## 2023-01-15 RX ADMIN — TRAZODONE HYDROCHLORIDE 100 MG: 50 TABLET ORAL at 20:31

## 2023-01-15 RX ADMIN — BUPRENORPHINE HYDROCHLORIDE 4 MG: 2 TABLET SUBLINGUAL at 20:30

## 2023-01-15 RX ADMIN — VANCOMYCIN HYDROCHLORIDE 1000 MG: 5 INJECTION, POWDER, LYOPHILIZED, FOR SOLUTION INTRAVENOUS at 22:52

## 2023-01-15 RX ADMIN — LIDOCAINE 1 PATCH: 4 PATCH TOPICAL at 20:27

## 2023-01-15 RX ADMIN — PANTOPRAZOLE SODIUM 40 MG: 40 TABLET, DELAYED RELEASE ORAL at 06:39

## 2023-01-15 RX ADMIN — Medication 5 MG: at 20:31

## 2023-01-15 RX ADMIN — ACETAMINOPHEN 975 MG: 325 TABLET, FILM COATED ORAL at 14:23

## 2023-01-15 ASSESSMENT — ACTIVITIES OF DAILY LIVING (ADL)
ADLS_ACUITY_SCORE: 43
ADLS_ACUITY_SCORE: 40
ADLS_ACUITY_SCORE: 43
ADLS_ACUITY_SCORE: 40
ADLS_ACUITY_SCORE: 40
ADLS_ACUITY_SCORE: 43
ADLS_ACUITY_SCORE: 39
ADLS_ACUITY_SCORE: 40
ADLS_ACUITY_SCORE: 43
ADLS_ACUITY_SCORE: 43
ADLS_ACUITY_SCORE: 40
ADLS_ACUITY_SCORE: 43

## 2023-01-15 NOTE — PROGRESS NOTES
[facility]    Medicine Progress Note - Hospitalist Service    Date of Admission:  1/4/2023    Brief Summary of Hospital Course:   28 yo female with polysubstance abuse admitted 12/16/2 for DKA, respiratory failure from concurrent COVID and bacterial pneumonia. MRSA bacteremia and necrotizing pneumonia and candida bacteremia. Has been making some improvement. Tracheostomy 12/30. Transferred to [facility] 1/4/23. 1/10/23 tolerating trach capping.     [facility] course:  1/9:  Dry skin added aquaphor, doing well with trach capped, downsized.  Now able to eat general diet, will discontinue tube feeds and monitor glucose    1/10/23:   -No acute events. Tolerating trach capping. Reports insomnia, difficulty with falling asleep and staying asleep. Started melatonin . Two soft BMs overnight. Low Mg, started on Mg replacement protocol. Tolerating regular diet. Monitoring caloric intake. Will hopefully be able to remove NJ tube soon.     1/11/23- patient wants her NJ tube to be removed as it is very uncomfortable. She denies chest pain, SOB, nausea.emesis.she is eating well.  Reviewed with Nutritionist - okay to remove NJ tube    1/12 - increasing lantus to 10u and mealtime to 1:10 insulin:carb ratio today. NJ out y/d. Doing well otherwise. Motivated in change her life around.   1/13 - increase lantus to 13u BID. ISS to high. Mg 1.7  1/14 - BG much improved today, continue to monitor and change insulin as necessary over weekend.  1/15: Stable.  Blood glucose control improved. HA- ordered APAP    Follow-up:   -Repeat CT chest wo contrast in 4-6 weeks from 12/23 for cavitary lesion surveillance  -IV vanco for MRSA bacteremia & PNA thru 1/16.  To repeat BC 1/20 per ID    Assessment & Plan   Principal Problem:    Bilateral MRSA cavitary pneumonia (1/4/2023)  Active Problems:    Pneumonia of both lungs due to MRSA (H) (1/4/2023)    Chronic respiratory failure requiring continuous mechanical ventilation through tracheostomy (H)  (1/4/2023)    Diabetes mellitus type 1 with Hx of DKA (1/4/2023)    Methamphetamine abuse (H) (2/14/2019)    Heroin abuse (H) (2/14/2019)    History of severe sepsis with cavitary pneumonia due to MRSA (1/4/2023)    Acute respiratory failure (H) (1/5/2023)    Costochondritis (1/5/2023)    Anxiety with depression (2/13/2019)    Costochondritis    Bilateral cavitary pneumonia due to MRSA  Acute Respiratory Failure, s/p tracheostomy  -Continue with vancomycin, pharmacy will dose.   -Continue with mechanical ventilation.    -Pulmonology is consulted for weaning off ventilation  -1/10/: tolerating trach capping  -Repeat CT chest wo contrast in 4-6 weeks from 12/23 for cavitary lesion surveillance  -IV vanco for MRSA bacteremia & PNA thru 1/16.  To repeat BC 1/20 per ID    Malnutrition  Dysphagia s/p NJ  -Passed BDT 1/8/23  -Regular diet started 1/9/23  -Nutrition consulted.   -Continue glucerna bid.   -Monitor caloric intake.   -Discontinue NJ tube    Type 1 diabetes  History of DKA and noncompliance with medication  - Lantus 13u BID  - Novolog 1:10 insulin:carb ratio TID AC  - Continue High ISS  - Diabetic ed on board    Polysubstance abuse   -Patient has been on different medications and is not in withdrawal.    -Currently is stable on buprenorphine 4 mg sublingual 2 times daily, hydroxyzine as needed and trazodone for depression.    Anxiety and depression   Insomnia  -Controlled with hydroxyzine as needed and trazodone 100 mg nightly  -Melatonin ordered at bedtime  -Ordered ambien prn    Costochondritis   -Lidoderm and PRN Motrin    Generalized HA  (1/15)  -No focal neuro deficits  -Ordered prn APAP     Diet: Combination Diet Regular Diet; Moderate Consistent Carb (60 g CHO per Meal) Diet  Snacks/Supplements Adult: Glucerna; Between Meals  Snacks/Supplements Adult: Other; see comments; Between Meals    DVT Prophylaxis: Pneumatic Compression Devices  Sheffield Catheter: Not present  Lines: PRESENT    PICC 12/21/22 Triple  Lumen Right Basilic OK to use nurse aware-Site Assessment: WDL      Cardiac Monitoring: None  Code Status: Full Code              # Hypoalbuminemia: Lowest albumin = 3.1 g/dL at 1/10/2023 10:24 AM, will monitor as appropriate            # Severe Malnutrition: based on nutrition assessment        Disposition Plan     Expected Discharge Date: 01/11/2023      Destination: home with help/services            Jeanette Serrano MD  Hospitalist Service  [facility]  Securely message with [employer] (more info)  Text page via [employer] Paging/Directory   ______________________________________________________________________    Interval History   - No acute events overnight  - Patient slept well  - Generalized HA this AM, no relief with ibuprofen. Requests for APAP. No blurry vision or nausea. No focal neuro deficits  - Blood glucose levels much better controlled. Did not require any sliding scale overnight.   - Tolerating trach capped  - Patient with some discomfort around trach site but not severe. Looking forward to possible decannulation Monday  - Eating well  - No other acute concerns      Physical Exam   Vital Signs: Temp: 98.4  F (36.9  C) Temp src: Oral BP: 109/79 Pulse: 86   Resp: 16 SpO2: 97 % O2 Device: None (Room air) Oxygen Delivery: 30 LPM  Weight: 94 lbs 12.8 oz  General: thin and pale young lady laying on her back in her bed.  No acute distress  HEENT: NC, AT, EOMI, Moist oral mucosal moisture.    Neck: Supple, tracheostomy in place, trach capped   Chest: CTAB, no rhonchi/ wheeze  Heart: S1 S2 RRR, no murmur  Abdomen: S, NT, ND, BS+   Extremities: no edema or cyanosis or clubbing or deformity.  Neurological: grossly nonfocal. Moves all extremities equally.      Data          Range & Units 01/06/23 04:15 01/06/23 08:05 01/06/23 12:22 01/06/23 16:38 01/06/23 20:43 01/07/23 00:20 01/07/23 03:54 01/07/23 08:08 01/07/23 12:13 01/07/23 16:51 01/07/23 19:58 01/08/23 00:12 01/08/23 03:51 01/08/23 08:13 01/08/23 11:31 01/08/23 15:41    GLUCOSE BY METER POCT 70 - 99 mg/dL 181 (H) 270 (H) 228 (H) 199 (H) 260 (H) 189 (H) 144 (H) 184 (H) 223 (H) 215 (H) 175 (H) 232 (H) 115 (H) 163 (H) 194 (H) 259 (H)     Imaging results reviewed over the past 24 hrs:   No results found for this or any previous visit (from the past 24 hour(s)).

## 2023-01-15 NOTE — PLAN OF CARE
Problem: Mechanical Ventilation Invasive  Goal: Effective Communication  Outcome: Adequate for Care Transition  Goal: Optimal Device Function  Intervention: Optimize Device Care and Function  Recent Flowsheet Documentation  Taken 1/6/2023 2041 by Theron Ortiz, RT  Airway Safety Measures: all equipment/monitors on and audible   Goal Outcome Evaluation:       RT PROGRESS NOTE     DATA:     CURRENT SETTINGS:             TRACH TYPE / SIZE:  #5 Bivona placed in 1/12/23             MODE:   CAP/RA             FIO2:        ACTION:             THERAPIES:   none             SUCTION:                           FREQUENCY: none                         AMOUNT:                           CONSISTENCY:   thick                        COLOR:   pale yellow             SPONTANEOUS COUGH EFFORT/STRENGTH OF EFFORT (not elicited by suctioning): fair.                               WEANING PHASE:   3                        WEAN MODE: CAPPED/RA                        WEAN TIME:   24/7 as tols                        END WEAN REASON:   ongoing.      RESPONSE:             BS:   clear and diminished.              VITAL SIGNS:   Blood pressure 107/67, pulse 93, temperature 98.2  F (36.8  C), temperature source Oral, resp. rate 20, weight 43 kg (94 lb 12.8 oz), SpO2 95 %, not currently breastfeeding.               EMOTIONAL NEEDS / CONCERNS:                  RISK FOR SELF DECANNULATION:                          RISK DUE TO:                          INTERVENTION/S IN PLACE IS/ARE:         NOTE / PLAN:       01/13/23 1430  Ventilator weaning phase 3  UNTIL DISCONTINUED        Comments: Cap as tolerated.  Repeat VBG Monday morning   Order Class: Hospital Performed

## 2023-01-15 NOTE — PLAN OF CARE
Problem: Artificial Airway  Goal: Effective Communication  Outcome: Progressing  Goal: Optimal Device Function  Outcome: Progressing  Goal: Absence of Device-Related Skin or Tissue Injury  Outcome: Progressing     RT PROGRESS NOTE     DATA:     CURRENT SETTINGS:             TRACH TYPE / SIZE:  #5 Bivona, placed on 1/12/23             MODE:  capped             FIO2:   RA     ACTION:             THERAPIES:                SUCTION:                           FREQUENCY: none                         AMOUNT:                          CONSISTENCY:                          COLOR:               SPONTANEOUS COUGH EFFORT/STRENGTH OF EFFORT (not elicited by suctioning): weak, dry to little bit congested, non-productive cough                              WEANING PHASE:  3                        WEAN MODE:   capped/ RA                        WEAN TIME:  cont as tolerated                        END WEAN REASON:       RESPONSE:             BS:  clear on L side, clear to clear decreased on R side             VITAL SIGNS:   sat 94-96%, HR , RR 18-20             EMOTIONAL NEEDS / CONCERNS: N/A                RISK FOR SELF DECANNULATION:  N/A                        RISK DUE TO:                          INTERVENTION/S IN PLACE IS/ARE:N/A      NOTE / PLAN:  cont on current plan of care -  cap trach as tolerated. Consider decannulation soon.

## 2023-01-15 NOTE — PLAN OF CARE
Goal Outcome Evaluation:      Plan of Care Reviewed With: patient    Overall Patient Progress: improvingOverall Patient Progress: improving    Outcome Evaluation: Patient is quiet with a flat affect but is cooperative with most cares. She ate 100% of her dinner and her sugars are much more controlled. She did not require any sliding scale insulin this shift. She is able to walk to the bathroom with standby assist and she is continent of both bowel and bladder. She ate a small snack of crackers and string cheese and is sleeping at this time. She did request and receive her prn ambien dose. Vitals are stable. Weight is trending up. Patient is trach capped and will have ABGs on Monday to see if she is ready for decannulation.    Bev Grady RN

## 2023-01-16 ENCOUNTER — APPOINTMENT (OUTPATIENT)
Dept: OCCUPATIONAL THERAPY | Facility: CLINIC | Age: 28
End: 2023-01-16
Attending: HOSPITALIST
Payer: COMMERCIAL

## 2023-01-16 ENCOUNTER — APPOINTMENT (OUTPATIENT)
Dept: PHYSICAL THERAPY | Facility: CLINIC | Age: 28
End: 2023-01-16
Attending: HOSPITALIST
Payer: COMMERCIAL

## 2023-01-16 LAB
ANION GAP SERPL CALCULATED.3IONS-SCNC: 7 MMOL/L (ref 7–15)
BASE EXCESS BLDV CALC-SCNC: 6.6 MMOL/L (ref -8.1–1.9)
BUN SERPL-MCNC: 27.5 MG/DL (ref 6–20)
CA-I BLD-MCNC: 1.29 MMOL/L (ref 1.11–1.3)
CALCIUM SERPL-MCNC: 9.6 MG/DL (ref 8.6–10)
CHLORIDE SERPL-SCNC: 99 MMOL/L (ref 98–107)
CREAT SERPL-MCNC: 0.57 MG/DL (ref 0.51–0.95)
DEPRECATED HCO3 PLAS-SCNC: 28 MMOL/L (ref 22–29)
GFR SERPL CREATININE-BSD FRML MDRD: >90 ML/MIN/1.73M2
GLUCOSE BLD-MCNC: 124 MG/DL (ref 70–125)
GLUCOSE BLDC GLUCOMTR-MCNC: 124 MG/DL (ref 70–99)
GLUCOSE BLDC GLUCOMTR-MCNC: 124 MG/DL (ref 70–99)
GLUCOSE BLDC GLUCOMTR-MCNC: 129 MG/DL (ref 70–99)
GLUCOSE BLDC GLUCOMTR-MCNC: 214 MG/DL (ref 70–99)
GLUCOSE SERPL-MCNC: 122 MG/DL (ref 70–99)
HCO3 BLDV-SCNC: 29 MMOL/L (ref 24–30)
HGB BLD-MCNC: 10.6 G/DL (ref 11.7–15.7)
LACTATE BLD-SCNC: 0.8 MMOL/L (ref 0.7–2)
MAGNESIUM SERPL-MCNC: 1.7 MG/DL (ref 1.7–2.3)
PCO2 BLDV: 49 MM HG (ref 35–50)
PH BLDV: 7.41 [PH] (ref 7.35–7.45)
PHOSPHATE SERPL-MCNC: 4.7 MG/DL (ref 2.5–4.5)
PO2 BLDV: 33 MM HG (ref 25–47)
POTASSIUM BLD-SCNC: 4.4 MMOL/L (ref 3.5–5)
POTASSIUM SERPL-SCNC: 4.4 MMOL/L (ref 3.4–5.3)
SATV LHE POCT: 62.2 % (ref 70–75)
SODIUM BLD-SCNC: 139 MMOL/L (ref 136–145)
SODIUM SERPL-SCNC: 134 MMOL/L (ref 136–145)

## 2023-01-16 PROCEDURE — 99232 SBSQ HOSP IP/OBS MODERATE 35: CPT | Performed by: NURSE PRACTITIONER

## 2023-01-16 PROCEDURE — 250N000013 HC RX MED GY IP 250 OP 250 PS 637: Performed by: HOSPITALIST

## 2023-01-16 PROCEDURE — 258N000003 HC RX IP 258 OP 636: Performed by: HOSPITALIST

## 2023-01-16 PROCEDURE — 82330 ASSAY OF CALCIUM: CPT

## 2023-01-16 PROCEDURE — 97535 SELF CARE MNGMENT TRAINING: CPT | Mod: GO

## 2023-01-16 PROCEDURE — 0BP1XFZ REMOVAL OF TRACHEOSTOMY DEVICE FROM TRACHEA, EXTERNAL APPROACH: ICD-10-PCS | Performed by: NURSE PRACTITIONER

## 2023-01-16 PROCEDURE — 999N000009 HC STATISTIC AIRWAY CARE

## 2023-01-16 PROCEDURE — 84295 ASSAY OF SERUM SODIUM: CPT | Performed by: HOSPITALIST

## 2023-01-16 PROCEDURE — 250N000011 HC RX IP 250 OP 636: Performed by: HOSPITALIST

## 2023-01-16 PROCEDURE — 120N000017 HC R&B RESPIRATORY CARE

## 2023-01-16 PROCEDURE — 97530 THERAPEUTIC ACTIVITIES: CPT | Mod: GP | Performed by: PHYSICAL THERAPIST

## 2023-01-16 PROCEDURE — 99233 SBSQ HOSP IP/OBS HIGH 50: CPT | Performed by: STUDENT IN AN ORGANIZED HEALTH CARE EDUCATION/TRAINING PROGRAM

## 2023-01-16 PROCEDURE — 250N000012 HC RX MED GY IP 250 OP 636 PS 637: Performed by: STUDENT IN AN ORGANIZED HEALTH CARE EDUCATION/TRAINING PROGRAM

## 2023-01-16 PROCEDURE — 999N000259 HC STATISTIC EXTUBATION

## 2023-01-16 PROCEDURE — 84100 ASSAY OF PHOSPHORUS: CPT | Performed by: HOSPITALIST

## 2023-01-16 PROCEDURE — 83735 ASSAY OF MAGNESIUM: CPT | Performed by: HOSPITALIST

## 2023-01-16 PROCEDURE — 999N000157 HC STATISTIC RCP TIME EA 10 MIN

## 2023-01-16 PROCEDURE — 97530 THERAPEUTIC ACTIVITIES: CPT | Mod: GO

## 2023-01-16 PROCEDURE — 97116 GAIT TRAINING THERAPY: CPT | Mod: GP | Performed by: PHYSICAL THERAPIST

## 2023-01-16 RX ADMIN — QUETIAPINE FUMARATE 25 MG: 25 TABLET ORAL at 08:42

## 2023-01-16 RX ADMIN — INSULIN GLARGINE 13 UNITS: 100 INJECTION, SOLUTION SUBCUTANEOUS at 21:06

## 2023-01-16 RX ADMIN — VANCOMYCIN HYDROCHLORIDE 1000 MG: 5 INJECTION, POWDER, LYOPHILIZED, FOR SOLUTION INTRAVENOUS at 21:06

## 2023-01-16 RX ADMIN — ZOLPIDEM TARTRATE 10 MG: 5 TABLET ORAL at 21:04

## 2023-01-16 RX ADMIN — QUETIAPINE FUMARATE 25 MG: 25 TABLET ORAL at 14:41

## 2023-01-16 RX ADMIN — QUETIAPINE FUMARATE 25 MG: 25 TABLET ORAL at 20:49

## 2023-01-16 RX ADMIN — Medication 5 MG: at 20:50

## 2023-01-16 RX ADMIN — INSULIN ASPART 6 UNITS: 100 INJECTION, SOLUTION INTRAVENOUS; SUBCUTANEOUS at 08:41

## 2023-01-16 RX ADMIN — HYDROXYZINE HYDROCHLORIDE 25 MG: 25 TABLET ORAL at 11:44

## 2023-01-16 RX ADMIN — ACETAMINOPHEN 975 MG: 325 TABLET, FILM COATED ORAL at 09:14

## 2023-01-16 RX ADMIN — ACETAMINOPHEN 975 MG: 325 TABLET, FILM COATED ORAL at 16:01

## 2023-01-16 RX ADMIN — BUPRENORPHINE HYDROCHLORIDE 4 MG: 2 TABLET SUBLINGUAL at 08:42

## 2023-01-16 RX ADMIN — LIDOCAINE 1 PATCH: 4 PATCH TOPICAL at 20:51

## 2023-01-16 RX ADMIN — VANCOMYCIN HYDROCHLORIDE 1000 MG: 5 INJECTION, POWDER, LYOPHILIZED, FOR SOLUTION INTRAVENOUS at 09:14

## 2023-01-16 RX ADMIN — TRAZODONE HYDROCHLORIDE 100 MG: 50 TABLET ORAL at 20:50

## 2023-01-16 RX ADMIN — BUPRENORPHINE HYDROCHLORIDE 4 MG: 2 TABLET SUBLINGUAL at 20:49

## 2023-01-16 RX ADMIN — PANTOPRAZOLE SODIUM 40 MG: 40 TABLET, DELAYED RELEASE ORAL at 06:39

## 2023-01-16 RX ADMIN — INSULIN GLARGINE 13 UNITS: 100 INJECTION, SOLUTION SUBCUTANEOUS at 08:41

## 2023-01-16 ASSESSMENT — ACTIVITIES OF DAILY LIVING (ADL)
ADLS_ACUITY_SCORE: 41

## 2023-01-16 NOTE — PLAN OF CARE
Problem: Plan of Care - These are the overarching goals to be used throughout the patient stay.    Goal: Absence of Hospital-Acquired Illness or Injury  Outcome: Progressing  Intervention: Identify and Manage Fall Risk  Recent Flowsheet Documentation  Taken 1/16/2023 0256 by Ashlie Tovar RN  Safety Promotion/Fall Prevention:   assistive device/personal items within reach   lighting adjusted   room door open   room near nurse's station  Intervention: Prevent Infection  Recent Flowsheet Documentation  Taken 1/16/2023 0256 by Ashlie Tovar RN  Infection Prevention:   hand hygiene promoted   personal protective equipment utilized   rest/sleep promoted     Problem: Plan of Care - These are the overarching goals to be used throughout the patient stay.    Goal: Optimal Comfort and Wellbeing  Outcome: Progressing  Intervention: Provide Person-Centered Care  Recent Flowsheet Documentation  Taken 1/16/2023 0256 by Ashlie Tovar RN  Trust Relationship/Rapport: care explained   Goal Outcome Evaluation:  Patient denied pain; ambulated to the bathroom with steady gait.  Patient sleeping well in between cares.

## 2023-01-16 NOTE — PLAN OF CARE
Pt started shift with trach capped, RA. Extubated at 1205, cont on RA. Sat 96-99%, RR 18, HR . BS isaias T/O.       Maldonado Colon, RT

## 2023-01-16 NOTE — PROGRESS NOTES
[facility]    Medicine Progress Note - Hospitalist Service    Date of Admission:  1/4/2023    Brief Summary of Hospital Course:   26 yo female with polysubstance abuse admitted 12/16/2 for DKA, respiratory failure from concurrent COVID and bacterial pneumonia. MRSA bacteremia and necrotizing pneumonia and candida bacteremia. Has been making some improvement. Tracheostomy 12/30. Transferred to [facility] 1/4/23. 1/10/23 tolerating trach capping.     [facility] course:  1/9:  Dry skin added aquaphor, doing well with trach capped, downsized.  Now able to eat general diet, will discontinue tube feeds and monitor glucose    1/10/23:   -No acute events. Tolerating trach capping. Reports insomnia, difficulty with falling asleep and staying asleep. Started melatonin . Two soft BMs overnight. Low Mg, started on Mg replacement protocol. Tolerating regular diet. Monitoring caloric intake. Will hopefully be able to remove NJ tube soon.     1/11/23- patient wants her NJ tube to be removed as it is very uncomfortable. She denies chest pain, SOB, nausea.emesis.she is eating well.  Reviewed with Nutritionist - okay to remove NJ tube    1/12 - increasing lantus to 10u and mealtime to 1:10 insulin:carb ratio today. NJ out y/d. Doing well otherwise. Motivated in change her life around.   1/13 - increase lantus to 13u BID. ISS to high. Mg 1.7  1/14 - BG much improved today, continue to monitor and change insulin as necessary over weekend.  1/15: Stable.  Blood glucose control improved. HA- ordered APAP  1/16 - Trach decannulation planned today. BG elevated, consider starting TID scheduled novolog    Follow-up:   -Repeat CT chest wo contrast in 4-6 weeks from 12/23 for cavitary lesion surveillance  -IV vanco for MRSA bacteremia & PNA thru 1/16.  To repeat BC 1/20 per ID    Assessment & Plan   Principal Problem:    Bilateral MRSA cavitary pneumonia (1/4/2023)  Active Problems:    Pneumonia of both lungs due to MRSA (H) (1/4/2023)    Chronic  respiratory failure requiring continuous mechanical ventilation through tracheostomy (H) (1/4/2023)    Diabetes mellitus type 1 with Hx of DKA (1/4/2023)    Methamphetamine abuse (H) (2/14/2019)    Heroin abuse (H) (2/14/2019)    History of severe sepsis with cavitary pneumonia due to MRSA (1/4/2023)    Acute respiratory failure (H) (1/5/2023)    Costochondritis (1/5/2023)    Anxiety with depression (2/13/2019)    Costochondritis    Bilateral cavitary pneumonia due to MRSA  Acute Respiratory Failure, s/p tracheostomy  -Continue with vancomycin, pharmacy will dose.   -Continue with mechanical ventilation.    -Pulmonology is consulted for weaning off ventilation  -1/10/: tolerating trach capping  -Repeat CT chest wo contrast in 4-6 weeks from 12/23 for cavitary lesion surveillance  -IV vanco for MRSA bacteremia & PNA thru 1/16.  To repeat BC 1/20 per ID    Malnutrition  Dysphagia s/p NJ - removed  -Passed BDT 1/8/23  -Regular diet started 1/9/23  -Nutrition consulted.   -Continue glucerna bid.   -Monitor caloric intake.   -Discontinue NJ tube    Type 1 diabetes  History of DKA and noncompliance with medication  - Lantus 13u BID  - Novolog 1:10 insulin:carb ratio TID AC  - Continue High ISS  - Diabetic ed on board    Polysubstance abuse   -Patient has been on different medications and is not in withdrawal.    -Currently is stable on buprenorphine 4 mg sublingual 2 times daily, hydroxyzine as needed and trazodone for depression.    Anxiety and depression   Insomnia  -Controlled with hydroxyzine as needed and trazodone 100 mg nightly  -Melatonin ordered at bedtime  -Ordered ambien prn    Costochondritis   -Lidoderm and PRN Motrin    Generalized HA  (1/15)  -No focal neuro deficits  -Ordered prn APAP     Diet: Combination Diet Regular Diet; Moderate Consistent Carb (60 g CHO per Meal) Diet  Snacks/Supplements Adult: Glucerna; Between Meals  Snacks/Supplements Adult: Other; see comments; Between Meals    DVT Prophylaxis:  Pneumatic Compression Devices  Sheffield Catheter: Not present  Lines: PRESENT    PICC 12/21/22 Triple Lumen Right Basilic OK to use nurse aware-Site Assessment: WDL      Cardiac Monitoring: None  Code Status: Full Code          # Hypocalcemia: Lowest iCa = 1.29 mg/dL in last 2 days, will monitor and replace as appropriate     # Hypoalbuminemia: Lowest albumin = 3.1 g/dL at 1/10/2023 10:24 AM, will monitor as appropriate            # Severe Malnutrition: based on nutrition assessment        Disposition Plan     Expected Discharge Date: 01/11/2023      Destination: home with help/services            Rex St MD  Hospitalist Service  [facility]  Securely message with [employer] (more info)  Text page via [employer] Paging/Directory   ______________________________________________________________________    Interval History   - No acute events overnight  - BG elevated y/d, no changes, will monitor today  - trach to come out today per pulm  - will monitor for 24-48h then discharge  - No other acute concerns      Physical Exam   Vital Signs: Temp: 97.7  F (36.5  C) Temp src: Oral BP: 94/65 Pulse: 96   Resp: 16 SpO2: 100 % O2 Device: None (Room air)    Weight: 94 lbs 12.8 oz  General: thin and pale young lady laying on her back in her bed.  No acute distress  HEENT: NC, AT, EOMI, Moist oral mucosal moisture.    Neck: Supple, tracheostomy in place, trach capped   Chest: CTAB, no rhonchi/ wheeze  Heart: S1 S2 RRR, no murmur  Abdomen: S, NT, ND, BS+   Extremities: no edema or cyanosis or clubbing or deformity.  Neurological: grossly nonfocal. Moves all extremities equally.      Data     I have personally reviewed the following data over the past 24 hrs:       Range & Units 01/06/23 04:15 01/06/23 08:05 01/06/23 12:22 01/06/23 16:38 01/06/23 20:43 01/07/23 00:20 01/07/23 03:54 01/07/23 08:08 01/07/23 12:13 01/07/23 16:51 01/07/23 19:58 01/08/23 00:12 01/08/23 03:51 01/08/23 08:13 01/08/23 11:31 01/08/23 15:41   GLUCOSE BY METER POCT 70 -  99 mg/dL 181 (H) 270 (H) 228 (H) 199 (H) 260 (H) 189 (H) 144 (H) 184 (H) 223 (H) 215 (H) 175 (H) 232 (H) 115 (H) 163 (H) 194 (H) 259 (H)     Imaging results reviewed over the past 24 hrs:   No results found for this or any previous visit (from the past 24 hour(s)).

## 2023-01-16 NOTE — PLAN OF CARE
Problem: Plan of Care - These are the overarching goals to be used throughout the patient stay.    Goal: Absence of Hospital-Acquired Illness or Injury  Outcome: Progressing  Intervention: Identify and Manage Fall Risk  Recent Flowsheet Documentation  Taken 1/16/2023 1046 by Heather Roblero RN  Safety Promotion/Fall Prevention: clutter free environment maintained  Intervention: Prevent Infection  Recent Flowsheet Documentation  Taken 1/16/2023 1046 by Heather Roblero RN  Infection Prevention: rest/sleep promoted     Problem: Artificial Airway  Goal: Effective Communication  Outcome: Progressing   Goal Outcome Evaluation:       Pt trach de cannulated , tolerating well.prn atarax given before trach remove.pt calm and cooperative.cares met.

## 2023-01-16 NOTE — PROGRESS NOTES
Social Work Note:    Writer met with patient to discuss planned discharge for Wednesday.  Patient confirmed her father is now driving up from [place].    Patient will need out patient chemical health medication management.  Patient will be referred to:  [facility]  [address] [place] [address]  [phone]  This was discussed with attending MD and patient.      Tal Norman, [facility]  [phone]

## 2023-01-16 NOTE — PLAN OF CARE
Problem: Mechanical Ventilation Invasive  Goal: Effective Communication  Outcome: Adequate for Care Transition  Goal: Optimal Device Function  Intervention: Optimize Device Care and Function  Recent Flowsheet Documentation  Taken 1/6/2023 2041 by Theron Ortiz, RT  Airway Safety Measures: all equipment/monitors on and audible   Goal Outcome Evaluation:       RT PROGRESS NOTE     DATA:     CURRENT SETTINGS:             TRACH TYPE / SIZE:  #5 Bivona placed in 1/12/23             MODE:   CAP/RA             FIO2:        ACTION:             THERAPIES:   none             SUCTION:                           FREQUENCY: none                         AMOUNT:                           CONSISTENCY:   thick                        COLOR:   pale yellow             SPONTANEOUS COUGH EFFORT/STRENGTH OF EFFORT (not elicited by suctioning): fair.                               WEANING PHASE:   3                        WEAN MODE: CAPPED/RA                        WEAN TIME:   24/7 as tols                        END WEAN REASON:   ongoing.      RESPONSE:             BS:   clear and diminished.              VITAL SIGNS:   Blood pressure 94/65, pulse 96, temperature 97.7  F (36.5  C), temperature source Oral, resp. rate 16, weight 43 kg (94 lb 12.8 oz), SpO2 100 %, not currently breastfeeding.               EMOTIONAL NEEDS / CONCERNS:                  RISK FOR SELF DECANNULATION:                          RISK DUE TO:                          INTERVENTION/S IN PLACE IS/ARE:         NOTE / PLAN:       01/13/23 1430  Ventilator weaning phase 3  UNTIL DISCONTINUED        Comments: Cap as tolerated.  Repeat VBG Monday morning   Order Class: Hospital Performed

## 2023-01-16 NOTE — PROGRESS NOTES
Pulmonary Progress Note    Admit Date: 1/4/2023  CODE: Full Code    Assessment/Plan:   27 year old female with polysubstance abuse admitted on 12/16/2022 for DKA, respiratory failure from concurrent covid and bacterial pneumonia.  MRSA bacteremia and necrotizing pneumonia and candida bacteremia.  Has been making some improvements but has reached a plateau mostly limited by sedation/agitation and inability to wean from ventilator. Now s/p tracheostomy 12/30.  Transferred to LTAC 1/4.     Discussion of pertinent problems:  1. Acute hypoxic/hypercapnic respiratory failure: liberated from the vent on 1/5.  Tolerating continuous capping since 1/9.  Secretion burden improved, now with good cough and no tracheal suctioning required.  Remains on room air.  VBG today remains stable(7.41/49)  2. Tracheostomy: 6 Shiley placed 12/30.  Downsized to 6 Bivona on 1/9.  Downsized to 5 Bivona 1/12 to aide in expectoration   3. Dysphagia: passed BDT.  On Reg diet, NJ removed   4. Complex b/l cavitary pneumonia 2/2 MRSA pneumonia: Last positive BAL: MRSA 12/25.  CT chest 12/23 shows the largest cavities in the right lower lobe appear decreased in size and multiple new small cavities in the right lung.  On Vanco also for MRSA bacteremia   5. Covid19 infection(resolved)  6. MRSA bacteremia.  No endocarditis on CATHY.  Blood cx 12/26 & 1/6 neg.  Vanco as above   7. Candida glabrata fungemia s/p Micafungin course   8. Substance use disorder on buprenorphine     Plan:  - decannulate trach today - monitor on cont pulse ox  - Cont IS & flutter valve   - Bronch hygiene: xopenex nebs PRN  - Repeat CT chest wo contrast in 4-6 weeks from 12/23 for cavitary lesion surveillance.   - IV vanco for MRSA bacteremia & PNA thru 1/16.    Martín Nieves CNP  Pulmonary Medicine  [facility]  Pager [phone]    Subjective/Interim Events:   - remains on room air  - no suctioning since trach downsized yesterday   - denies sob, pain, n/v.    Tracheal  secretions:  Overnight - none  Yesterday - none    Cough strength: moderate    Current phase of ventilator weaning pathway:  Phase 3    Ventilator weaning results: continuous capping since 1/9    Clinical status discussed today with respiratory therapist, patient    Medications:       dextrose       - MEDICATION INSTRUCTIONS -         buprenorphine  4 mg Sublingual BID     insulin aspart  1-10 Units Subcutaneous TID AC     insulin aspart  1-7 Units Subcutaneous At Bedtime     insulin aspart   Subcutaneous Daily with breakfast     insulin aspart   Subcutaneous Daily with lunch     insulin aspart   Subcutaneous Daily with supper     insulin glargine  13 Units Subcutaneous BID     lidocaine  1 patch Transdermal Q24h    And     lidocaine   Transdermal Q8H GINA     melatonin  5 mg Oral At Bedtime     pantoprazole  40 mg Oral QAM AC     QUEtiapine  25 mg Oral TID     sodium chloride (PF)  10 mL Intracatheter Q8H     traZODone  100 mg Oral At Bedtime     vancomycin  1,000 mg Intravenous Q12H         Exam/Data:   Vitals  /70 (BP Location: Left arm)   Pulse 105   Temp 97.7  F (36.5  C) (Oral)   Resp 18   Wt 43 kg (94 lb 12.8 oz)   SpO2 100%   BMI 16.80 kg/m       I/O last 3 completed shifts:  In: 970 [P.O.:720; I.V.:250]  Out: -   Weight change:     Vent Mode: Other (see comments)  FiO2 (%): 21 %  Resp: 18      EXAM:  Gen: no distress sitting in chair   HEENT: NT, trach midline/intact, no stridor  CV: RRR, no m/g/r  Resp: CTAB; non-labored   Abd: soft, nontender, BS+  Skin: no rashes or lesions  Ext: no edema  Neuro: PERRL, nonfocal exam    ROS:  A 10-system review was obtained and is negative with the exception of the symptoms noted above.    Labs:  Complete Blood Count   Recent Labs   Lab 01/16/23  0758 01/10/23  1024   WBC  --  9.9   HGB 10.6* 10.0*   PLT  --  779*     Basic Metabolic Panel  Recent Labs   Lab 01/16/23  1149 01/16/23  0801 01/16/23  0758 01/16/23  0643 01/11/23  0801 01/11/23  0600  01/10/23  1140 01/10/23  1024   NA  --   --  139 134*  --   --   --  133*   POTASSIUM  --   --  4.4 4.4  --   --   --  4.7   CHLORIDE  --   --   --  99  --   --   --  94*   CO2  --   --   --  28  --   --   --  29   BUN  --   --   --  27.5*  --   --   --  18.2   CR  --   --   --  0.57  --  0.50*  --  0.58   * 129* 124 122*   < >  --    < > 273*    < > = values in this interval not displayed.     Venous Blood Gas  Recent Labs   Lab 01/16/23  0758   PHV 7.41   PCO2V 49   PO2V 33   HCO3V 29   STAN 6.6*       RADIOLOGY: Personally reviewed; radiology read below   XR Chest Port 1 View    Result Date: 1/5/2023  EXAM: XR CHEST PORT 1 VIEW LOCATION: [facility] DATE/TIME: 1/5/2023 1:10 PM INDICATION: resp failure s p trach; left lower rib pain COMPARISON: 12/30/2022     IMPRESSION: Tracheostomy in good position in the mid trachea. Feeding tube tip in the proximal jejunum and right upper extremity PICC at the SVC-RA junction. Patchy airspace opacities in the right lower lung, decreased compared to prior. No pneumothorax. Unchanged tiny pleural effusions. Normal cardiomediastinal silhouette.    XR Abdomen Port 1 View    Result Date: 1/4/2023  EXAM: XR ABDOMEN PORT 1 VIEW LOCATION: [facility] DATE/TIME: 1/4/2023 5:18 PM INDICATION: Enteric tube placement COMPARISON: 12/20/2022     IMPRESSION: Feeding tube terminates at the level of the proximal jejunum. Mild gaseous distention of the visualized bowel. Patchy pulmonary opacities and small pleural effusion in the right lung base.

## 2023-01-17 ENCOUNTER — APPOINTMENT (OUTPATIENT)
Dept: OCCUPATIONAL THERAPY | Facility: CLINIC | Age: 28
End: 2023-01-17
Attending: HOSPITALIST
Payer: COMMERCIAL

## 2023-01-17 ENCOUNTER — APPOINTMENT (OUTPATIENT)
Dept: PHYSICAL THERAPY | Facility: CLINIC | Age: 28
End: 2023-01-17
Attending: HOSPITALIST
Payer: COMMERCIAL

## 2023-01-17 LAB
GLUCOSE BLDC GLUCOMTR-MCNC: 195 MG/DL (ref 70–99)
GLUCOSE BLDC GLUCOMTR-MCNC: 271 MG/DL (ref 70–99)
GLUCOSE BLDC GLUCOMTR-MCNC: 299 MG/DL (ref 70–99)
GLUCOSE BLDC GLUCOMTR-MCNC: 422 MG/DL (ref 70–99)

## 2023-01-17 PROCEDURE — 97535 SELF CARE MNGMENT TRAINING: CPT | Mod: GO | Performed by: OCCUPATIONAL THERAPIST

## 2023-01-17 PROCEDURE — 99233 SBSQ HOSP IP/OBS HIGH 50: CPT | Performed by: STUDENT IN AN ORGANIZED HEALTH CARE EDUCATION/TRAINING PROGRAM

## 2023-01-17 PROCEDURE — 120N000017 HC R&B RESPIRATORY CARE

## 2023-01-17 PROCEDURE — 97116 GAIT TRAINING THERAPY: CPT | Mod: GP

## 2023-01-17 PROCEDURE — 999N000157 HC STATISTIC RCP TIME EA 10 MIN

## 2023-01-17 PROCEDURE — 250N000013 HC RX MED GY IP 250 OP 250 PS 637: Performed by: HOSPITALIST

## 2023-01-17 PROCEDURE — 97112 NEUROMUSCULAR REEDUCATION: CPT | Mod: GP

## 2023-01-17 RX ADMIN — INSULIN GLARGINE 13 UNITS: 100 INJECTION, SOLUTION SUBCUTANEOUS at 08:49

## 2023-01-17 RX ADMIN — QUETIAPINE FUMARATE 25 MG: 25 TABLET ORAL at 21:01

## 2023-01-17 RX ADMIN — BUPRENORPHINE HYDROCHLORIDE 4 MG: 2 TABLET SUBLINGUAL at 08:49

## 2023-01-17 RX ADMIN — LIDOCAINE 1 PATCH: 4 PATCH TOPICAL at 21:00

## 2023-01-17 RX ADMIN — ACETAMINOPHEN 975 MG: 325 TABLET, FILM COATED ORAL at 09:42

## 2023-01-17 RX ADMIN — QUETIAPINE FUMARATE 25 MG: 25 TABLET ORAL at 08:49

## 2023-01-17 RX ADMIN — PANTOPRAZOLE SODIUM 40 MG: 40 TABLET, DELAYED RELEASE ORAL at 06:36

## 2023-01-17 RX ADMIN — ZOLPIDEM TARTRATE 10 MG: 5 TABLET ORAL at 21:24

## 2023-01-17 RX ADMIN — TRAZODONE HYDROCHLORIDE 100 MG: 50 TABLET ORAL at 21:01

## 2023-01-17 RX ADMIN — INSULIN GLARGINE 13 UNITS: 100 INJECTION, SOLUTION SUBCUTANEOUS at 21:01

## 2023-01-17 RX ADMIN — QUETIAPINE FUMARATE 25 MG: 25 TABLET ORAL at 14:23

## 2023-01-17 RX ADMIN — Medication 5 MG: at 21:01

## 2023-01-17 RX ADMIN — INSULIN ASPART 6 UNITS: 100 INJECTION, SOLUTION INTRAVENOUS; SUBCUTANEOUS at 08:50

## 2023-01-17 RX ADMIN — BUPRENORPHINE HYDROCHLORIDE 4 MG: 2 TABLET SUBLINGUAL at 21:01

## 2023-01-17 ASSESSMENT — ACTIVITIES OF DAILY LIVING (ADL)
ADLS_ACUITY_SCORE: 39
ADLS_ACUITY_SCORE: 41
ADLS_ACUITY_SCORE: 39
ADLS_ACUITY_SCORE: 41
ADLS_ACUITY_SCORE: 39
ADLS_ACUITY_SCORE: 41
ADLS_ACUITY_SCORE: 39
ADLS_ACUITY_SCORE: 41

## 2023-01-17 NOTE — PLAN OF CARE
"  Problem: Plan of Care - These are the overarching goals to be used throughout the patient stay.    Goal: Optimal Comfort and Wellbeing  Outcome: Progressing  Intervention: Provide Person-Centered Care  Recent Flowsheet Documentation  Taken 1/17/2023 0023 by Rebecca Contreras RN  Trust Relationship/Rapport: care explained     Problem: Infection  Goal: Absence of Infection Signs and Symptoms  Outcome: Progressing  Intervention: Prevent or Manage Infection  Recent Flowsheet Documentation  Taken 1/17/2023 0023 by Rebecca Contreras RN  Isolation Precautions: contact precautions maintained   Goal Outcome Evaluation:    Bridget slept 6 hours, denied pain, other VSS. Requested to be minimally disturbed overnight and stated she \"slept okay.\" Turned self in bed. Ambulated to the bathroom x2 overnight. Cooperative and calm with cares.  "

## 2023-01-17 NOTE — PLAN OF CARE
Goal Outcome Evaluation:      Plan of Care Reviewed With: patient    Overall Patient Progress: improvingOverall Patient Progress: improving       Patient alert and oriented,vss. Tylenol given for headache with effect. Blood glucose manageable. All medication given and explained.

## 2023-01-17 NOTE — PROGRESS NOTES
Pulmonary Note     Chart reviewed. Patient doing well following decannulation. Pulmonary consult service will sign off, but please call any time if needed.      - Referral not placed for ICU survivorship clinic as she is moving down to [place] to be with her dad.  Recommend they follow up with pulmonary medicine down there and repeat CT chest wo contrast in 4-6 weeks from 12/23 for cavitary lesion surveillance.  Discussed with hospitalist., patient, and father about this recommendation.       Martín Nieves CNP  Pulmonary Medicine  [facility]  Pager [phone]  Office [phone]

## 2023-01-17 NOTE — PLAN OF CARE
Pt was decannulated day shift and is on RA with cont oxymetry, tim well. bs clear. Blood pressure 99/64, pulse 92, temperature 97.8  F (36.6  C), temperature source Oral, resp. rate 20, weight 43 kg (94 lb 12.8 oz), SpO2 95 %, not currently breastfeeding.

## 2023-01-17 NOTE — DISCHARGE INSTRUCTIONS
DIABETES REMINDERS:  1) Check your blood sugar 4x times per day (fasting, before eating, before bedtime) or utilize continuous glucose monitor (Freestyle Allegra 2 covered 100% by insurance).  Always bring your blood sugar log and meter to your diabetes-related appointments.  2) Your blood sugar goals:  mg/dL before eating and  mg/dL 2 hours after eating (or per your doctor).  3) Always be prepared to treat a low blood sugar should it happen. Keep a sugar-containing beverage or food nearby.  4) When to call your clinic:   Blood sugar over 400 mg/dL.   If you have 2 to 3 low blood sugars (under 70 mg/dL) in a row,   Low reading the same time of day several days in a row,  Blood sugars elevated and you can not get them down with your usual diabetes regimen,  You are ill and can't keep blood sugars controlled.   5) When to call 911: If your blood glucose does not get better with treatment, or if you/someone else is unable to give you treatment.  6) Talk to your primary care doctor about an appointment with a Certified Diabetes Educator to assist you with your blood sugar management.  7) Follow insulin regimen on discharge orders until able to see provider where doses may be adjusted based on blood sugar patterns.

## 2023-01-17 NOTE — PROGRESS NOTES
[facility]    Medicine Progress Note - Hospitalist Service    Date of Admission:  1/4/2023    Brief Summary of Hospital Course:   26 yo female with polysubstance abuse admitted 12/16/2 for DKA, respiratory failure from concurrent COVID and bacterial pneumonia. MRSA bacteremia and necrotizing pneumonia and candida bacteremia. Has been making some improvement. Tracheostomy 12/30. Transferred to [facility] 1/4/23. 1/10/23 tolerating trach capping.     [facility] course:  1/9:  Dry skin added aquaphor, doing well with trach capped, downsized.  Now able to eat general diet, will discontinue tube feeds and monitor glucose    1/10/23:   -No acute events. Tolerating trach capping. Reports insomnia, difficulty with falling asleep and staying asleep. Started melatonin . Two soft BMs overnight. Low Mg, started on Mg replacement protocol. Tolerating regular diet. Monitoring caloric intake. Will hopefully be able to remove NJ tube soon.     1/11/23- patient wants her NJ tube to be removed as it is very uncomfortable. She denies chest pain, SOB, nausea.emesis.she is eating well.  Reviewed with Nutritionist - okay to remove NJ tube    1/12 - increasing lantus to 10u and mealtime to 1:10 insulin:carb ratio today. NJ out y/d. Doing well otherwise. Motivated in change her life around.   1/13 - increase lantus to 13u BID. ISS to high. Mg 1.7  1/14 - BG much improved today, continue to monitor and change insulin as necessary over weekend.  1/15: Stable.  Blood glucose control improved. HA- ordered APAP  1/16 - Trach decannulation planned today. BG elevated.  1/17 - changed insulin to 1:8 ratio. Discussed suboxone Rx and that will cover until early next week so she can setup appointment with suboxone clinic in [place].    Follow-up:   -Repeat CT chest wo contrast in 4-6 weeks from 12/23 for cavitary lesion surveillance (next 2 weeks)  - To repeat BC 1/20 per ID    Assessment & Plan   Principal Problem:    Bilateral MRSA cavitary pneumonia  (1/4/2023)  Active Problems:    Pneumonia of both lungs due to MRSA (H) (1/4/2023)    Chronic respiratory failure requiring continuous mechanical ventilation through tracheostomy (H) (1/4/2023)    Diabetes mellitus type 1 with Hx of DKA (1/4/2023)    Methamphetamine abuse (H) (2/14/2019)    Heroin abuse (H) (2/14/2019)    History of severe sepsis with cavitary pneumonia due to MRSA (1/4/2023)    Acute respiratory failure (H) (1/5/2023)    Costochondritis (1/5/2023)    Anxiety with depression (2/13/2019)    Costochondritis    Bilateral cavitary pneumonia due to MRSA  Acute Respiratory Failure, s/p tracheostomy  -Continue with vancomycin, pharmacy will dose.   -Continue with mechanical ventilation.    -Pulmonology is consulted for weaning off ventilation  -1/10: tolerating trach capping  -Repeat CT chest wo contrast in 4-6 weeks from 12/23 for cavitary lesion surveillance  -IV vanco for MRSA bacteremia & PNA thru 1/16.  To repeat BC 1/20 per ID  - decannulated 1/16    Malnutrition  Dysphagia s/p NJ - removed  -Passed BDT 1/8/23  -Regular diet started 1/9/23  -Nutrition consulted.   -Continue glucerna bid.   -Monitor caloric intake.   -Discontinue NJ tube    Type 1 diabetes  History of DKA and noncompliance with medication  - Lantus 13u BID  - Novolog 1:8 insulin:carb ratio TID AC  - Continue High ISS  - Diabetic ed on board    Polysubstance abuse   -Patient has been on different medications and is not in withdrawal.    -Currently is stable on buprenorphine 4 mg sublingual 2 times daily, hydroxyzine as needed and trazodone for depression.    Anxiety and depression   Insomnia  -Controlled with hydroxyzine as needed and trazodone 100 mg nightly  -Melatonin ordered at bedtime  -Ordered ambien prn    Costochondritis   -Lidoderm and PRN Motrin    Generalized HA  (1/15)  -No focal neuro deficits  -Ordered prn APAP     Diet: Combination Diet Regular Diet; Moderate Consistent Carb (60 g CHO per Meal) Diet  Snacks/Supplements  Adult: Glucerna; Between Meals  Snacks/Supplements Adult: Other; see comments; Between Meals    DVT Prophylaxis: Pneumatic Compression Devices  [place] Catheter: Not present  Lines: PRESENT    PICC 12/21/22 Triple Lumen Right Basilic OK to use nurse aware-Site Assessment: WDL      Cardiac Monitoring: None  Code Status: Full Code          # Hypocalcemia: Lowest iCa = 1.29 mg/dL in last 2 days, will monitor and replace as appropriate     # Hypoalbuminemia: Lowest albumin = 3.1 g/dL at 1/10/2023 10:24 AM, will monitor as appropriate            # Severe Malnutrition: based on nutrition assessment        Disposition Plan     Expected Discharge Date: 01/11/2023      Destination: home with help/services            Rex [address] MD  Hospitalist Service  [facility]  Securely message with [employer] (more info)  Text page via [employer] Paging/Directory   ______________________________________________________________________    Interval History   - No acute events overnight  - decannulated y/d  - doing well today  - discussed her suboxone and setting up with clinic in [place]  - will send Rx to nearby pharmacy so she can get prior to driving back to [place]  - No other acute concerns      Physical Exam   Vital Signs: Temp: 98.1  F (36.7  C) Temp src: Oral BP: 134/76 Pulse: 100   Resp: 16 SpO2: 97 % O2 Device: None (Room air)    Weight: 96 lbs 6.4 oz  General: thin and pale young lady laying on her back in her bed.  No acute distress  HEENT: NC, AT, EOMI, Moist oral mucosal moisture.    Neck: Supple, tracheostomy in place, trach capped   Chest: CTAB, no rhonchi/ wheeze  Heart: S1 S2 RRR, no murmur  Abdomen: S, NT, ND, BS+   Extremities: no edema or cyanosis or clubbing or deformity.  Neurological: grossly nonfocal. Moves all extremities equally.      Data     I have personally reviewed the following data over the past 24 hrs:       Range & Units 01/06/23 04:15 01/06/23 08:05 01/06/23 12:22 01/06/23 16:38 01/06/23 20:43 01/07/23 00:20 01/07/23 03:54  01/07/23 08:08 01/07/23 12:13 01/07/23 16:51 01/07/23 19:58 01/08/23 00:12 01/08/23 03:51 01/08/23 08:13 01/08/23 11:31 01/08/23 15:41   GLUCOSE BY METER POCT 70 - 99 mg/dL 181 (H) 270 (H) 228 (H) 199 (H) 260 (H) 189 (H) 144 (H) 184 (H) 223 (H) 215 (H) 175 (H) 232 (H) 115 (H) 163 (H) 194 (H) 259 (H)     Imaging results reviewed over the past 24 hrs:   No results found for this or any previous visit (from the past 24 hour(s)).

## 2023-01-18 ENCOUNTER — APPOINTMENT (OUTPATIENT)
Dept: SPEECH THERAPY | Facility: CLINIC | Age: 28
End: 2023-01-18
Attending: HOSPITALIST
Payer: COMMERCIAL

## 2023-01-18 ENCOUNTER — ANCILLARY PROCEDURE (OUTPATIENT)
Dept: GENERAL RADIOLOGY | Facility: CLINIC | Age: 28
End: 2023-01-18
Attending: STUDENT IN AN ORGANIZED HEALTH CARE EDUCATION/TRAINING PROGRAM
Payer: COMMERCIAL

## 2023-01-18 VITALS
RESPIRATION RATE: 18 BRPM | OXYGEN SATURATION: 96 % | DIASTOLIC BLOOD PRESSURE: 68 MMHG | HEART RATE: 91 BPM | SYSTOLIC BLOOD PRESSURE: 99 MMHG | WEIGHT: 95.6 LBS | BODY MASS INDEX: 16.94 KG/M2 | TEMPERATURE: 98.2 F

## 2023-01-18 LAB — GLUCOSE BLDC GLUCOMTR-MCNC: 228 MG/DL (ref 70–99)

## 2023-01-18 PROCEDURE — 999N000157 HC STATISTIC RCP TIME EA 10 MIN

## 2023-01-18 PROCEDURE — 250N000013 HC RX MED GY IP 250 OP 250 PS 637: Performed by: HOSPITALIST

## 2023-01-18 PROCEDURE — 99239 HOSP IP/OBS DSCHRG MGMT >30: CPT | Performed by: STUDENT IN AN ORGANIZED HEALTH CARE EDUCATION/TRAINING PROGRAM

## 2023-01-18 PROCEDURE — 92611 MOTION FLUOROSCOPY/SWALLOW: CPT | Mod: GN | Performed by: SPEECH-LANGUAGE PATHOLOGIST

## 2023-01-18 PROCEDURE — 74230 X-RAY XM SWLNG FUNCJ C+: CPT

## 2023-01-18 RX ORDER — BARIUM SULFATE 400 MG/ML
SUSPENSION ORAL ONCE
Status: COMPLETED | OUTPATIENT
Start: 2023-01-18 | End: 2023-01-18

## 2023-01-18 RX ORDER — PANTOPRAZOLE SODIUM 40 MG/1
40 TABLET, DELAYED RELEASE ORAL
Qty: 30 TABLET | Refills: 0 | Status: SHIPPED | OUTPATIENT
Start: 2023-01-18

## 2023-01-18 RX ORDER — HYDROXYZINE HYDROCHLORIDE 25 MG/1
25 TABLET, FILM COATED ORAL EVERY 6 HOURS PRN
Qty: 20 TABLET | Refills: 0 | Status: SHIPPED | OUTPATIENT
Start: 2023-01-18

## 2023-01-18 RX ORDER — BUPRENORPHINE 2 MG/1
4 TABLET SUBLINGUAL 2 TIMES DAILY
Qty: 28 TABLET | Refills: 0 | Status: SHIPPED | OUTPATIENT
Start: 2023-01-18

## 2023-01-18 RX ORDER — TRAZODONE HYDROCHLORIDE 100 MG/1
100 TABLET ORAL AT BEDTIME
Qty: 14 TABLET | Refills: 0 | Status: SHIPPED | OUTPATIENT
Start: 2023-01-18

## 2023-01-18 RX ADMIN — INSULIN ASPART 8 UNITS: 100 INJECTION, SOLUTION INTRAVENOUS; SUBCUTANEOUS at 09:30

## 2023-01-18 RX ADMIN — ACETAMINOPHEN 975 MG: 325 TABLET, FILM COATED ORAL at 09:55

## 2023-01-18 RX ADMIN — QUETIAPINE FUMARATE 25 MG: 25 TABLET ORAL at 09:25

## 2023-01-18 RX ADMIN — INSULIN GLARGINE 13 UNITS: 100 INJECTION, SOLUTION SUBCUTANEOUS at 09:24

## 2023-01-18 RX ADMIN — BARIUM SULFATE 60 ML: 400 SUSPENSION ORAL at 09:39

## 2023-01-18 RX ADMIN — BUPRENORPHINE HYDROCHLORIDE 4 MG: 2 TABLET SUBLINGUAL at 09:25

## 2023-01-18 RX ADMIN — PANTOPRAZOLE SODIUM 40 MG: 40 TABLET, DELAYED RELEASE ORAL at 09:25

## 2023-01-18 ASSESSMENT — ACTIVITIES OF DAILY LIVING (ADL)
ADLS_ACUITY_SCORE: 39
ADLS_ACUITY_SCORE: 39
ADLS_ACUITY_SCORE: 38
ADLS_ACUITY_SCORE: 39

## 2023-01-18 NOTE — PLAN OF CARE
Speech Language Therapy Discharge Summary    Reason for therapy discharge:    Discharged to home.    Progress towards therapy goal(s). See goals on Care Plan in [facility] electronic health record for goal details.  Goals met    Therapy recommendation(s):    No further therapy is recommended. Recommended diet at discharge is regular textures and thin liquids with no ongoing need for compensatory strategies. Video swallow study completed on 1/18/23 with no aspiration and only transient, shallow penetration noted x1 with large, consecutive gulps via cup.

## 2023-01-18 NOTE — PROGRESS NOTES
Care Management Discharge Note    Discharge Date: 01/18/2023       Discharge Disposition: Home, Outpatient Chemical Dependency    Discharge Services:  [facility] [place] [facility]  Addiction treatment center in [place], [place]  Address: [address] , [place] [address]  Hours:  Open 24 hours  Confirmed by this business 8 weeks ago  Phone: [phone] or [phone]    Discharge DME:  none    Discharge Transportation: family or friend will provide.  Father will transport    Private pay costs discussed: Not applicable    PAS Confirmation Code:  N/A/  Patient/family educated on Medicare website which has current facility and service quality ratings:  N/A    Education Provided on the Discharge Plan:  yes  Persons Notified of Discharge Plans: patient and father  Patient/Family in Agreement with the Plan: yes    Handoff Referral Completed: Yes    Additional Information:  Home with father to [place].  Father will  and assist as needed.      YUE Lerner  [facility] [place]  [phone]            YUE Lopez

## 2023-01-18 NOTE — DISCHARGE SUMMARY
[facility]  Hospitalist Discharge Summary      Date of Admission:  1/4/2023  Date of Discharge:  1/18/2023  Discharging Provider: Rex St MD  Discharge Service: Hospitalist Service    Discharge Diagnoses   Acute respiratory failure 2/2 bilateral cavitary lung lesions 2/2 MRSA    Follow-ups Needed After Discharge   Pt needs to f/u with suboxone clinic.  Pt needs to f/u with Pulmonology and have a repeat CT chest within the next 2 weeks  Pt needs to establish with a primary care physician.  Pt will need repeat blood cultures early next week (week of 1/22)      Discharge Disposition   Discharged to home  Condition at discharge: Stable    Hospital Course   26 yo female with polysubstance abuse admitted 12/16/2 for DKA, respiratory failure from concurrent COVID and bacterial pneumonia. MRSA bacteremia and necrotizing pneumonia and candida bacteremia. Has been making some improvement. Tracheostomy 12/30. Transferred to [facility] 1/4/23. 1/10/23 tolerating trach capping.     Pt did well while in [facility]. Her respiratory status improved and she was eventually decannulated. She has been doing well since then. Speech worked with the pt and helped to advance her diet both before and after decannulation. Pt's blood sugars labile, insulin regimen titrated to a comfortable level. Will need additional titration and tailoring after discharge. Pt was stable and was discharged home.     Bilateral cavitary pneumonia due to MRSA  Acute Respiratory Failure, s/p tracheostomy  -Continue with vancomycin, pharmacy will dose.   -Continue with mechanical ventilation.    -Pulmonology is consulted for weaning off ventilation  -1/10: tolerating trach capping  -Repeat CT chest wo contrast in 4-6 weeks from 12/23 for cavitary lesion surveillance  -IV vanco for MRSA bacteremia & PNA thru 1/16.  To repeat BC 1/20 per ID  - decannulated 1/16    Malnutrition  Dysphagia s/p NJ - removed  -Passed BDT 1/8/23  -Regular diet started 1/9/23  -Nutrition  consulted.   -Continue glucerna bid.   -Monitor caloric intake.   -Discontinue NJ tube    Type 1 diabetes  History of DKA and noncompliance with medication  - Lantus 13u BID  - Novolog 1:8 insulin:carb ratio TID AC  - Continue High ISS  - Diabetic ed on board    Polysubstance abuse   -Patient has been on different medications and is not in withdrawal.    -Currently is stable on buprenorphine 4 mg sublingual 2 times daily, hydroxyzine as needed and trazodone for depression.    Anxiety and depression   Insomnia  -Controlled with hydroxyzine as needed and trazodone 100 mg nightly  -Melatonin ordered at bedtime  -Ordered ambien prn    Costochondritis   -Lidoderm and PRN Motrin    Generalized HA  (1/15)  -No focal neuro deficits  -Ordered prn APAP    Consultations This Hospital Stay   PHARMACY IP CONSULT  ADDICTION MEDICINE INPATIENT CONSULT  THERAPEUTIC RECREATION EVAL & TREAT  NUTRITION SERVICES ADULT IP CONSULT  NUTRITION SERVICES ADULT IP CONSULT  PULMONARY IP CONSULT  OCCUPATIONAL THERAPY ADULT IP CONSULT  PHYSICAL THERAPY ADULT IP CONSULT  RESPIRATORY CARE IP CONSULT  SPEECH LANGUAGE PATH ADULT IP CONSULT  CARE MANAGEMENT / SOCIAL WORK IP CONSULT    Code Status   Full Code    Time Spent on this Encounter   I, Rex St MD, personally saw the patient today and spent greater than 30 minutes discharging this patient.       Rex St MD  [facility] [address] [place] [address]  Phone: [phone]  Fax: [phone]  ______________________________________________________________________    Physical Exam   Vital Signs: Temp: 98.2  F (36.8  C) Temp src: Oral BP: 99/68 Pulse: 91   Resp: 18 SpO2: 96 % O2 Device: None (Room air)    Weight: 95 lbs 9.6 oz  General: thin and pale young lady laying on her back in her bed.  No acute distress  HEENT: NC, AT, EOMI, Moist oral mucosal moisture.    Neck: Supple, tracheostomy in place, trach capped   Chest: CTAB, no rhonchi/  wheeze  Heart: S1 S2 RRR, no murmur  Abdomen: S, NT, ND, BS+   Extremities: no edema or cyanosis or clubbing or deformity.  Neurological: grossly nonfocal. Moves all extremities equally.       Primary Care Physician   Physician No Ref-Primary    Discharge Orders      Reason for your hospital stay    Acute respiratory failure caused by bilateral cavitary pneumonia from MRSA infection     Follow-up and recommended labs and tests     Need pulmonary outpatient follow up within 2 weeks.  Need repeat CT chest within 2 weeks.  Need to establish care with primary care provider  Need to establish care with suboxone clinic.     Activity    Your activity upon discharge: activity as tolerated     Diet    Diabetic diet         Discharge Medications   Current Discharge Medication List      START taking these medications    Details   blood glucose (NO BRAND SPECIFIED) lancets standard Use to test blood sugar four times daily or as directed.  Qty: 100 each, Refills: 0    Associated Diagnoses: Type 1 diabetes mellitus with hyperglycemia (H)      blood glucose (NO BRAND SPECIFIED) test strip Use to test blood sugar four times daily or as directed.  Qty: 100 strip, Refills: 0    Associated Diagnoses: Type 1 diabetes mellitus with hyperglycemia (H)      blood glucose monitoring (NO BRAND SPECIFIED) meter device kit Use to test blood sugar four times daily or as directed.  Qty: 1 kit, Refills: 0    Associated Diagnoses: Type 1 diabetes mellitus with hyperglycemia (H)      buprenorphine (SUBUTEX) 2 MG SUBL sublingual tablet Place 2 tablets (4 mg) under the tongue 2 times daily  Qty: 28 tablet, Refills: 0    Associated Diagnoses: Heroin abuse (H)      insulin pen needle (32G X 4 MM) 32G X 4 MM miscellaneous Use four pen needles daily or as directed.  Qty: 100 each, Refills: 0    Associated Diagnoses: Type 1 diabetes mellitus with hyperglycemia (H)      pantoprazole (PROTONIX) 40 MG EC tablet Take 1 tablet (40 mg) by mouth every morning  (before breakfast)  Qty: 30 tablet, Refills: 0    Associated Diagnoses: Esophagitis      traZODone (DESYREL) 100 MG tablet Take 1 tablet (100 mg) by mouth At Bedtime  Qty: 14 tablet, Refills: 0    Associated Diagnoses: Anxiety with depression         CONTINUE these medications which have CHANGED    Details   insulin aspart (NOVOLOG PEN) 100 UNIT/ML pen 1 unit per 8 grams of carbohydrate. Three times daily before meals.  Qty: 15 mL, Refills: 0    Comments: Or whatever brand is covered by insurance  Associated Diagnoses: Type 1 diabetes mellitus with hyperglycemia (H)      insulin glargine (LANTUS PEN) 100 UNIT/ML pen Inject 13 Units Subcutaneous 2 times daily  Qty: 15 mL, Refills: 0    Comments: If Lantus is not covered by insurance, may substitute Basaglar or Semglee or other insulin glargine product per insurance preference at same dose and frequency.    Associated Diagnoses: Type 1 diabetes mellitus with hyperglycemia (H)           Allergies   Allergies   Allergen Reactions     Ciprofloxacin Other (See Comments)     Vomiting; diarrhea       ADDENDUM    Pharmacy called. Buprenorphine tablets not covered under insurance. Voice ordered Suboxone sublingual film TETO 1 which was covered.

## 2023-01-18 NOTE — PLAN OF CARE
Problem: Plan of Care - These are the overarching goals to be used throughout the patient stay.    Goal: Readiness for Transition of Care  Outcome: Met     Patient discharged at 11:00 AM, given after visit summary, discharge instruction reviewed with patient, and her dad.  Was picked up father, left by family car.

## 2023-01-18 NOTE — PROGRESS NOTES
Patient is stable.  Old trach site is healing well. Patient will be discharging tomorrow and told charge and primary RN that she doesn't need diabetic education.  Sneha Mccord RN

## 2023-01-18 NOTE — PLAN OF CARE
Problem: Infection  Goal: Absence of Infection Signs and Symptoms  Outcome: Progressing     Problem: Hyperglycemia  Goal: Blood Glucose Level Within Targeted Range  Outcome: Progressing   Goal Outcome Evaluation:         Pt BG at HS was 422 mg/dl , and 7  units of Novolog sliding scale per order. MD informed per order and  advice to  spot check BG  after 2 hours. BG at 2300 was 195 mg/dl.  Informed MD  via text paged and no further  order given. Pt requested a midnight snack of 2 Gildardo crackers. Pt continues to be independent in the room. Requested PRN Ambien at HS.and was given. Vitals stable.  Pt slept all night.

## 2023-01-18 NOTE — PROGRESS NOTES
"SPEECH PATHOLOGY-VIDEO SWALLOW STUDY     01/18/23 0901   Appointment Info   Signing Clinician's Name / Credentials (SLP) TAQUERIA Marcelino   Rehab Comments (SLP) Decannulated. Nasal feeding tube removed. Purpose of current evaluation is to determine if patient is able to safely discontinue use of compensatory swallow strategies for safe po intake, particularly of thin liquids.   General Information   Onset of Illness/Injury or Date of Surgery 12/16/22   Referring Physician Etchison   Pertinent History of Current Problem Per H&P: \"Deidre N Aasen is a 27 year old female with DM I and polysubstance abuse admitted on 1/4/2023 to [facility] for continuation of antibiotic treatment and weaning off ventilator.   She is transferred from [facility] ICU after initial admission of 12/16/2022 with confusion and DKA, sepsis secondary to MRSA cavitary pneumonia, candidemia and incidental finding of COVID infection.  She needed intubation and mechanical ventilation and later tracheostomy.She had severe sepsis and developed ARDS, NANNETTE and encephalopathy which eventually recovered from.  She had candidemia and had full course micafungin for 2 weeks.   Other sources of infection such as endocarditis, spine infection, discitis have already been ruled out.  She no longer needs COVID or MRSA isolation.\" NPO with NG tube.   General Observations Patient alert and cooperative   Type of Evaluation   Type of Evaluation Swallow Evaluation   Facial Symmetry (Oral Motor)   Facial Symmetry (Oral Motor) WNL   Lip Function (Oral Motor)   Lip Range of Motion (Oral Motor) WNL   Lip Strength (Oral Motor) WNL   Tongue Function (Oral Motor)   Tongue Strength (Oral Motor) WNL   Tongue Coordination/Speed (Oral Motor) WNL   Tongue ROM (Oral Motor) WNL   Vocal Quality/Secretion Management (Oral Motor)   Vocal Quality (Oral Motor) WFL   General Swallowing Observations   Current Diet/Method of Nutritional Intake (General Swallowing Observations, " NIS) thin liquids (level 0);regular diet;other (see comments)  (with chin tuck strategy and small bites/sips)   Swallowing Evaluation Videofluoroscopic swallow study (VFSS)   Clinical Swallow Evaluation   Feeding Assistance no assistance needed   VFSS Evaluation   Views Taken left lateral   VFSS Textures Trialed thin liquids;mildly thick liquids   VFSS Eval: Thin Liquid Texture Trial   Mode of Presentation, Thin Liquid cup;straw   Preparatory Phase WFL   Oral Phase, Thin Liquid WFL   Bolus Location When Swallow Triggered posterior angle of ramus   Pharyngeal Phase, Thin Liquid WFL   Rosenbek's Penetration Aspiration Scale: Thin Liquid Trial Results 2 - contrast enters airway, remains above the vocal cords, no residue remains (penetration)   Diagnostic Statement No aspiration or penetration noted with spontaneous small sips via cup or straw. Notable for isolated incident of transient, shallow penetration with multiple, consecutive large gulps via cup.   VFSS Eval: Mildly Thick Liquids   Mode of Presentation cup   Preparatory Phase WFL   Oral Phase WFL   Bolus Location When Swallow Triggered posterior angle of ramus   Pharyngeal Phase WFL   Rosenbek's Penetration Aspiration Scale 1 - no aspiration, contrast does not enter airway   Diagnostic Statement Oral and pharyngeal phase of swallow functional for mildly thick liquids   Esophageal Phase of Swallow   Patient reports or presents with symptoms of esophageal dysphagia No   Swallowing Recommendations   Diet Consistency Recommendations thin liquids (level 0);regular diet   Supervision Level for Intake patient independent   Medication Administration Recommendations, Swallowing (SLP) per preference   Comment, Swallowing Recommendations Improved swallow function from previous evaluation. Recommend discontinuing recommendations for compensatory strategies of small sips and chin tuck.   Clinical Impression   Criteria for Skilled Therapeutic Interventions Met (SLP Eval) No  problems identified which require skilled intervention   SLP Diagnosis dysphagia/resolved   Risks & Benefits of therapy have been explained evaluation/treatment results reviewed;care plan/treatment goals reviewed;risks/benefits reviewed;participants voiced agreement with care plan;participants included;patient   Clinical Impression Comments Patient demonstrates improved swallow function from previous video swallow study, now within functional limits. Risk of aspiration is deemed low and risk of complications from aspiration is also deemed low, particularly in light of patient's independent mobility.   SLP Total Evaluation Time   Evaluation, videofluoroscopic eval of swallow function Minutes (35776) 15   SLP Goals   SLP Goals Swallow   SLP: Safely tolerate diet without signs/symptoms of aspiration Goal Met   SLP Discharge Planning   SLP Plan No ongoing speech therapy anticipated   SLP Discharge Recommendation home   SLP Rationale for DC Rec Swallowing functional   SLP Brief overview of current status  Appropriate to continue regular textures and thin liquids, no longer needs to use compensatory strategies for safe swallow.   Total Session Time   Total Session Time (sum of timed and untimed services) 15

## 2023-01-18 NOTE — CARE PLAN
Occupational Therapy Discharge Summary    Reason for therapy discharge:    All goals and outcomes met, no further needs identified.    Progress towards therapy goal(s). See goals on Care Plan in Epic electronic health record for goal details.  Goals met    Therapy recommendation(s):    No further therapy is recommended.    Norma SUTTON under supervision of Ernesto Mejia OTR/ANDREIA Mejia OTRL/KALEN

## 2023-01-20 ENCOUNTER — PATIENT OUTREACH (OUTPATIENT)
Dept: CARE COORDINATION | Facility: CLINIC | Age: 28
End: 2023-01-20
Payer: COMMERCIAL

## 2023-01-20 NOTE — PROGRESS NOTES
Clinic Care Coordination Contact  [facility]/Voicemail       Clinical Data: Care Coordinator Outreach  Outreach attempted x 2.  Left message on patient's voicemail with call back information and requested return call.  Plan: Care Coordinator will do no further outreaches at this time.    ELOINA Jimenez  [phone]  [facility]

## 2023-01-23 LAB — BACTERIA BRONCH: ABNORMAL

## 2023-02-20 LAB
ACID FAST STAIN (ARUP): NORMAL
ACID FAST STAIN (ARUP): NORMAL

## 2023-06-18 ENCOUNTER — HOSPITAL ENCOUNTER (EMERGENCY)
Facility: CLINIC | Age: 28
Discharge: HOME | End: 2023-06-18
Attending: EMERGENCY MEDICINE | Admitting: EMERGENCY MEDICINE
Payer: COMMERCIAL

## 2023-06-18 VITALS
RESPIRATION RATE: 20 BRPM | HEART RATE: 93 BPM | WEIGHT: 128 LBS | HEIGHT: 63 IN | OXYGEN SATURATION: 97 % | TEMPERATURE: 98 F | SYSTOLIC BLOOD PRESSURE: 98 MMHG | BODY MASS INDEX: 22.68 KG/M2 | DIASTOLIC BLOOD PRESSURE: 67 MMHG

## 2023-06-18 DIAGNOSIS — T40.411A ACCIDENTAL FENTANYL OVERDOSE, INITIAL ENCOUNTER (H): ICD-10-CM

## 2023-06-18 PROCEDURE — 99283 EMERGENCY DEPT VISIT LOW MDM: CPT

## 2023-06-19 NOTE — ED NOTES
Bed: ED27  Expected date: 6/18/23  Expected time: 7:03 PM  Means of arrival: Ambulance  Comments:  Lida Calixto  28F  Overdose/Narcan'd

## 2023-06-19 NOTE — ED PROVIDER NOTES
"  History     Chief Complaint:  Drug Overdose     The history is provided by the patient.      Deidre N Aasen is a 28 year old female with a history of type I diabetes, DKA, acute respiratory failure, and sepsis who presents via EMS for a drug overdose. The patient reports she was six months clean but wanted to get high tonight. States that she snorted fentanyl in a [employer] bathroom and accidentally overdosed. Notes that after she lost consciousness, a bystander called EMS. Upon their arrival, they gave the patient naloxone. Adds that she is nauseous. Of note, she takes a total of 20 mg of Suboxone in three doses throughout the day which she did not take today because she was planning to get high. Reports history of heroin, fentanyl, and methamphetamine use. Denies history of drug overdose.     Independent Historian:   None - Patient Only    Review of External Notes:   no    Medications:    Buprenorphine  Hydroxyzine  Novolog pen  Lantus pen  Pantoprazole  Trazodone  Suboxone     Past Medical History:    Anxiety  Chronic malnutrition  Depression  Type I diabetes  Heroin abuse  Methamphetamine abuse  Spontaneous vaginal delivery  Diabetic ketoacidosis  Acute kidney failure with tubular necrosis  Hypophosphatemia  Esophagitis  Bacterial pneumonia, MRSA  Chronic respiratory failure  Acute respiratory failure  Costochondritis   Sepsis    Past Surgical History:    Tracheostomy    Insert PICC    Physical Exam     Patient Vitals for the past 24 hrs:   BP Temp Temp src Pulse Resp SpO2 Height Weight   06/18/23 2000 -- -- -- -- -- 97 % -- --   06/18/23 1947 98/67 -- -- -- -- 98 % -- --   06/18/23 1915 -- -- -- -- -- 99 % -- --   06/18/23 1913 -- 98  F (36.7  C) Temporal -- -- -- -- --   06/18/23 1911 106/68 -- -- 93 20 100 % 1.6 m (5' 3\") 58.1 kg (128 lb)      Physical Exam  General: Alert, sitting no sign of respiratory distress.  Orts feeling unwell.  HENT:  No trauma.  No oral injury.  Eyes: EOMI. Normal conjunctiva.  Neck: "  Normal range of motion and appearance.  No tenderness.  Cardiovascular:  Normal rate.  Pulmonary/Chest:  Effort normal.  Abdominal: Soft. No distension or tenderness.     Musculoskeletal: No trauma.  Neurological: oriented, normal strength, sensation, and coordination.   Skin: Warm and dry. No rash or bruising.   Psychiatric: Anxious.  Clinically sober.  No psychosis.    Emergency Department Course   Emergency Department Course & Assessments:     Interventions:  Medications - No data to display     Assessments:   I obtained history and examined the patient as noted above.    Independent Interpretation (X-rays, CTs, rhythm strip):  None    Consultations/Discussion of Management or Tests:  None       Disposition:  The patient was discharged to home.     Impression & Plan    Medical Decision Makin-year-old female rise via EMS after being found unresponsive in the bathroom at [employer] after snorting fentanyl to get high.  She did require naloxone administration which she responded to immediately, on arrival, she is alert, and in no respiratory distress.  Several longstanding history of polysubstance abuse, including heroin and methamphetamine, though she does report that she has been sober for the past 6 months.  She is on daily Suboxone.  She does report however that she did not dose Suboxone today as she planned to attempt to get high on fentanyl.  She is tearful and remorseful and feels generally unwell.  She is clear that this was not an intentional overdose.  Her exam otherwise is unremarkable.  She was observed for period of time during which she remained alert with normal vital signs, including respiratory rate and oxygen saturations.  She was offered Zofran which she declines.  She has Suboxone at home which I have recommended she resume this evening and follow-up with her addiction medicine specialist for further assistance as needed.    Diagnosis:    ICD-10-CM    1. Accidental fentanyl overdose,  initial encounter (H)  T40.411A           Scribe Disclosure:  I, Megan Olvera, am serving as a scribe at 7:34 PM on 6/18/2023 to document services personally performed by Roman Anthony MD based on my observations and the provider's statements to me.     6/18/2023   Roman Anthony MD Isaacson, Brian A, MD  06/18/23 2133

## 2023-06-19 NOTE — ED TRIAGE NOTES
"Pt arrives to the ED due to overdosing on fentanyl. Per EMS, she was found in the [employer] bathroom with snoring respirations. PD gave 4 mg intranasal narcan with response. Pt has h/o of drug abuse, has been on suboxone. States \"I took 2 hits of fentanyl on foil\".       "

## (undated) DEVICE — SU PROLENE 2-0 RB-1DA 36" 8559H

## (undated) DEVICE — BLADE KNIFE SURG 15 371115

## (undated) DEVICE — SOL ADH LIQUID BENZOIN SWAB 0.6ML C1544

## (undated) DEVICE — LUBRICATING JELLY 4.25OZ

## (undated) DEVICE — SYR 10ML LL W/O NDL

## (undated) DEVICE — DRAPE U SPLIT 74X120" 29440

## (undated) DEVICE — PACK MINOR SBA15MIFSE

## (undated) DEVICE — MANIFOLD NEPTUNE 4 PORT 700-20

## (undated) DEVICE — NDL 25GA 1.5" 305127

## (undated) DEVICE — TUBING SUCTION MEDI-VAC SOFT 3/16"X20' N520A

## (undated) DEVICE — LINEN TOWEL PACK X5 5464

## (undated) DEVICE — SOL WATER IRRIG 1000ML BOTTLE 2F7114

## (undated) DEVICE — PREP CHLORAPREP 26ML TINTED HI-LITE ORANGE 930815

## (undated) DEVICE — SOL NACL 0.9% IRRIG 1000ML BOTTLE 2F7124

## (undated) DEVICE — LINEN GOWN XLG 5407

## (undated) RX ORDER — FENTANYL CITRATE 50 UG/ML
INJECTION, SOLUTION INTRAMUSCULAR; INTRAVENOUS
Status: DISPENSED
Start: 2022-12-30

## (undated) RX ORDER — FENTANYL CITRATE 50 UG/ML
INJECTION, SOLUTION INTRAMUSCULAR; INTRAVENOUS
Status: DISPENSED
Start: 2022-12-29

## (undated) RX ORDER — BUPIVACAINE HYDROCHLORIDE AND EPINEPHRINE 2.5; 5 MG/ML; UG/ML
INJECTION, SOLUTION EPIDURAL; INFILTRATION; INTRACAUDAL; PERINEURAL
Status: DISPENSED
Start: 2022-12-30

## (undated) RX ORDER — LIDOCAINE HYDROCHLORIDE AND EPINEPHRINE 10; 10 MG/ML; UG/ML
INJECTION, SOLUTION INFILTRATION; PERINEURAL
Status: DISPENSED
Start: 2022-12-30

## (undated) RX ORDER — PROPOFOL 10 MG/ML
INJECTION, EMULSION INTRAVENOUS
Status: DISPENSED
Start: 2022-12-29

## (undated) RX ORDER — LIDOCAINE HYDROCHLORIDE 10 MG/ML
INJECTION, SOLUTION INFILTRATION; PERINEURAL
Status: DISPENSED
Start: 2022-12-16

## (undated) RX ORDER — PROPOFOL 10 MG/ML
INJECTION, EMULSION INTRAVENOUS
Status: DISPENSED
Start: 2022-12-30

## (undated) RX ORDER — ONDANSETRON 2 MG/ML
INJECTION INTRAMUSCULAR; INTRAVENOUS
Status: DISPENSED
Start: 2022-12-29